# Patient Record
Sex: FEMALE | Race: WHITE | NOT HISPANIC OR LATINO | Employment: OTHER | RURAL
[De-identification: names, ages, dates, MRNs, and addresses within clinical notes are randomized per-mention and may not be internally consistent; named-entity substitution may affect disease eponyms.]

---

## 2017-02-09 ENCOUNTER — OFFICE VISIT (OUTPATIENT)
Dept: FAMILY MEDICINE CLINIC | Facility: CLINIC | Age: 72
End: 2017-02-09

## 2017-02-09 VITALS
HEART RATE: 64 BPM | BODY MASS INDEX: 32.1 KG/M2 | OXYGEN SATURATION: 97 % | DIASTOLIC BLOOD PRESSURE: 80 MMHG | WEIGHT: 187 LBS | SYSTOLIC BLOOD PRESSURE: 148 MMHG | RESPIRATION RATE: 16 BRPM

## 2017-02-09 DIAGNOSIS — E03.9 ACQUIRED HYPOTHYROIDISM: ICD-10-CM

## 2017-02-09 DIAGNOSIS — Z79.4 TYPE 2 DIABETES MELLITUS WITHOUT COMPLICATION, WITH LONG-TERM CURRENT USE OF INSULIN (HCC): ICD-10-CM

## 2017-02-09 DIAGNOSIS — R25.1 TREMOR: ICD-10-CM

## 2017-02-09 DIAGNOSIS — I48.0 PAROXYSMAL ATRIAL FIBRILLATION (HCC): ICD-10-CM

## 2017-02-09 DIAGNOSIS — E11.9 TYPE 2 DIABETES MELLITUS WITHOUT COMPLICATION, WITH LONG-TERM CURRENT USE OF INSULIN (HCC): ICD-10-CM

## 2017-02-09 DIAGNOSIS — I10 ESSENTIAL HYPERTENSION: Primary | ICD-10-CM

## 2017-02-09 PROCEDURE — 99213 OFFICE O/P EST LOW 20 MIN: CPT | Performed by: FAMILY MEDICINE

## 2017-02-09 NOTE — PROGRESS NOTES
Subjective   Dianne Pritchard is a 71 y.o. female.     Chief Complaint   Patient presents with   • Follow-up       History of Present Illness     she thinks the pacerone is causing her to have tremors--she wants to see dr ford...she nots good bp control without chest pain or dyspnea..she is seeing dr steele about her thyroid...      Current Outpatient Prescriptions:   •  amiodarone (PACERONE) 400 MG tablet, Take 400 mg by mouth 2 (two) times a day., Disp: , Rfl:   •  bisoprolol-hydrochlorothiazide (ZIAC) 10-6.25 MG per tablet, TAKE ONE TABLET BY MOUTH ONCE DAILY, Disp: 90 tablet, Rfl: 0  •  Calcium Carb-Cholecalciferol (CALCIUM CARBONATE-VITAMIN D3) 600-400 MG-UNIT tablet, Take  by mouth daily., Disp: , Rfl:   •  diltiazem (CARDIZEM) 30 MG tablet, Take 30 mg by mouth 3 (three) times a day., Disp: , Rfl:   •  Insulin Pen Needle 32G X 4 MM misc, 1 Units/day Daily., Disp: 100 each, Rfl: 2  •  letrozole (FEMARA) 2.5 MG tablet, Take  by mouth daily., Disp: , Rfl:   •  LEVEMIR FLEXTOUCH 100 UNIT/ML injection, INJECT 50 UNITS TWICE DAILY., Disp: 15 mL, Rfl: 5  •  levothyroxine (SYNTHROID, LEVOTHROID) 100 MCG tablet, TAKE ONE TABLET BY MOUTH ONCE DAILY, Disp: 90 tablet, Rfl: 0  •  metFORMIN (GLUCOPHAGE) 1000 MG tablet, Take 1 tablet by mouth 2 (Two) Times a Day., Disp: 180 tablet, Rfl: 1  •  Multiple Vitamins-Minerals (CENTRUM SILVER PO), Take  by mouth daily., Disp: , Rfl:   Allergies   Allergen Reactions   • Amoxil [Amoxicillin]    • Biaxin [Clarithromycin]    • Lortab [Hydrocodone-Acetaminophen]        Past Medical History   Diagnosis Date   • Atrial fibrillation, currently in sinus rhythm    • Diabetes mellitus, type II    • Dizziness    • Essential hypertension    • GERD (gastroesophageal reflux disease)    • GI bleed requiring more than 4 units of blood in 24 hours, ICU, or surgery    • History of lymphoma    • Hypomagnesemia    • Hypothyroidism    • On amiodarone therapy      Past Surgical History   Procedure  Laterality Date   • Tubal abdominal ligation     • Other surgical history       Mediport insertion   • Other surgical history       remote lymphoma treatment   • Mastectomy         Review of Systems   Constitutional: Negative.    HENT: Negative.    Eyes: Negative.    Respiratory: Negative.    Cardiovascular: Negative.    Gastrointestinal: Negative.    Endocrine: Negative.    Genitourinary: Negative.    Musculoskeletal: Negative.    Skin: Negative.    Allergic/Immunologic: Negative.    Neurological: Positive for tremors.   Hematological: Negative.    Psychiatric/Behavioral: Negative.        Objective    Visit Vitals   • /80   • Pulse 64   • Resp 16   • Wt 187 lb (84.8 kg)   • SpO2 97%   • BMI 32.1 kg/m2     Physical Exam   Constitutional: She is oriented to person, place, and time. She appears well-developed and well-nourished.   HENT:   Head: Normocephalic and atraumatic.   Right Ear: External ear normal.   Left Ear: External ear normal.   Nose: Nose normal.   Mouth/Throat: Oropharynx is clear and moist.   Eyes: Conjunctivae and EOM are normal. Pupils are equal, round, and reactive to light.   Neck: Normal range of motion. Neck supple.   Cardiovascular: Normal rate, regular rhythm, normal heart sounds and intact distal pulses.    Pulmonary/Chest: Effort normal and breath sounds normal.   Abdominal: Soft. Bowel sounds are normal.   Musculoskeletal: Normal range of motion.    Dianne had a diabetic foot exam performed today.   During the foot exam she had a monofilament test not performed.    Neurological Sensory Findings - Unaltered hot/cold right ankle/foot discrimination and unaltered hot/cold left ankle/foot discrimination. Unaltered sharp/dull right ankle/foot discrimination and unaltered sharp/dull left ankle/foot discrimination. No right ankle/foot altered proprioception and no left ankle/foot altered proprioception    Vascular Status -  Her exam exhibits right foot vasculature normal. Her exam exhibits no  right foot edema. Her exam exhibits left foot vasculature normal. Her exam exhibits no left foot edema.   Skin Integrity  -  Her right foot skin is intact.     Dianne 's left foot skin is intact. .  Neurological: She is alert and oriented to person, place, and time. She has normal reflexes.   Skin: Skin is warm and dry.   Psychiatric: She has a normal mood and affect. Her behavior is normal. Judgment and thought content normal.   Nursing note and vitals reviewed.      Assessment/Plan   Dianne was seen today for follow-up.    Diagnoses and all orders for this visit:    Essential hypertension    Type 2 diabetes mellitus without complication, with long-term current use of insulin  -     Ambulatory Referral to Endocrinology    Acquired hypothyroidism  -     Ambulatory Referral to Endocrinology    Paroxysmal atrial fibrillation  -     Ambulatory Referral to Cardiology    Tremor  -     Ambulatory Referral to Cardiology      She would like to see dr mcmanus since her son goes to him--will refer           No orders of the defined types were placed in this encounter.      Follow up: 6 month(s)

## 2017-02-14 RX ORDER — AMIODARONE HYDROCHLORIDE 200 MG/1
200 TABLET ORAL 2 TIMES DAILY
Qty: 120 TABLET | Refills: 1 | Status: SHIPPED | OUTPATIENT
Start: 2017-02-14 | End: 2017-02-27 | Stop reason: DRUGHIGH

## 2017-02-17 ENCOUNTER — TELEPHONE (OUTPATIENT)
Dept: FAMILY MEDICINE CLINIC | Facility: CLINIC | Age: 72
End: 2017-02-17

## 2017-02-17 RX ORDER — CIPROFLOXACIN 250 MG/1
250 TABLET, FILM COATED ORAL 2 TIMES DAILY
Qty: 14 TABLET | Refills: 0 | Status: SHIPPED | OUTPATIENT
Start: 2017-02-17 | End: 2017-02-27

## 2017-02-27 ENCOUNTER — OFFICE VISIT (OUTPATIENT)
Dept: CARDIOLOGY | Facility: CLINIC | Age: 72
End: 2017-02-27

## 2017-02-27 VITALS
SYSTOLIC BLOOD PRESSURE: 160 MMHG | HEART RATE: 63 BPM | HEIGHT: 64 IN | DIASTOLIC BLOOD PRESSURE: 80 MMHG | BODY MASS INDEX: 33.49 KG/M2 | WEIGHT: 196.2 LBS

## 2017-02-27 DIAGNOSIS — R25.1 TREMOR: ICD-10-CM

## 2017-02-27 DIAGNOSIS — I48.0 PAROXYSMAL ATRIAL FIBRILLATION (HCC): Primary | ICD-10-CM

## 2017-02-27 DIAGNOSIS — I10 ESSENTIAL HYPERTENSION: Chronic | ICD-10-CM

## 2017-02-27 PROCEDURE — 93000 ELECTROCARDIOGRAM COMPLETE: CPT | Performed by: NURSE PRACTITIONER

## 2017-02-27 PROCEDURE — 99214 OFFICE O/P EST MOD 30 MIN: CPT | Performed by: NURSE PRACTITIONER

## 2017-02-27 RX ORDER — AMIODARONE HYDROCHLORIDE 100 MG/1
100 TABLET ORAL 2 TIMES DAILY
Qty: 60 TABLET | Refills: 11 | Status: SHIPPED | OUTPATIENT
Start: 2017-02-27 | End: 2017-02-27 | Stop reason: SINTOL

## 2017-02-27 RX ORDER — PROPAFENONE HYDROCHLORIDE 225 MG/1
225 TABLET, FILM COATED ORAL EVERY 8 HOURS
Qty: 90 TABLET | Refills: 11 | Status: SHIPPED | OUTPATIENT
Start: 2017-02-27 | End: 2017-04-13 | Stop reason: SDUPTHER

## 2017-02-27 NOTE — PROGRESS NOTES
Subjective:     Encounter Date:02/27/2017      Patient ID: Dianne Pritchard is a 71 y.o. female.  She presents today for follow up of paroxysmal atrial fibrillation.  She has a history of HTN.  She c/o BUE tremors that she states started occurring after she started amiodarone.  She denies CP, shortness of breath, palpitations, dizziness, syncope, swelling, fatigue or decreased exercise tolerance.  She was last seen in office 8/8/16 following a hospital discharge for a GI bleed.  Her xarelto was discontinued at that time.  She was started on amiodarone at an office visit on 6/24/16.  Her HTN is managed by her PCP.  She states that her BP has been running in the 120s/70s at home, however she has had consistently higher readings on record.  She states that overall she has felt well.      Chief Complaint:  Atrial Fibrillation   Presents for follow-up visit. Symptoms are negative for an AICD problem, bradycardia, chest pain, dizziness, hemodynamic instability, hypertension, hypotension, pacemaker problem, palpitations, shortness of breath, syncope, tachycardia and weakness. The symptoms have been stable. Past medical history includes atrial fibrillation. There are no medication compliance problems.   Hypertension   This is a chronic problem. The current episode started more than 1 year ago. The problem is controlled. Pertinent negatives include no anxiety, blurred vision, chest pain, headaches, malaise/fatigue, neck pain, orthopnea, palpitations, peripheral edema, PND, shortness of breath or sweats. Risk factors for coronary artery disease include obesity. Past treatments include beta blockers and diuretics. The current treatment provides moderate improvement.   Medication Reaction   This is a new problem. The current episode started more than 1 month ago. The problem occurs daily. The problem has been gradually worsening. Pertinent negatives include no abdominal pain, anorexia, arthralgias, change in bowel habit, chest  pain, chills, congestion, coughing, diaphoresis, fatigue, fever, headaches, joint swelling, myalgias, nausea, neck pain, numbness, rash, sore throat, swollen glands, urinary symptoms, vertigo, visual change, vomiting or weakness. Associated symptoms comments: Tremors. Nothing aggravates the symptoms. She has tried nothing for the symptoms.       The following portions of the patient's history were reviewed and updated as appropriate: allergies, current medications, past family history, past medical history, past social history, past surgical history and problem list.     Allergies   Allergen Reactions   • Amoxil [Amoxicillin]    • Biaxin [Clarithromycin]    • Lortab [Hydrocodone-Acetaminophen]        Current Outpatient Prescriptions:   •  bisoprolol-hydrochlorothiazide (ZIAC) 10-6.25 MG per tablet, TAKE ONE TABLET BY MOUTH ONCE DAILY, Disp: 90 tablet, Rfl: 0  •  Calcium Carb-Cholecalciferol (CALCIUM CARBONATE-VITAMIN D3) 600-400 MG-UNIT tablet, Take  by mouth daily., Disp: , Rfl:   •  Insulin Pen Needle 32G X 4 MM misc, 1 Units/day Daily., Disp: 100 each, Rfl: 2  •  letrozole (FEMARA) 2.5 MG tablet, Take  by mouth daily., Disp: , Rfl:   •  LEVEMIR FLEXTOUCH 100 UNIT/ML injection, INJECT 50 UNITS TWICE DAILY., Disp: 15 mL, Rfl: 5  •  levothyroxine (SYNTHROID, LEVOTHROID) 100 MCG tablet, TAKE ONE TABLET BY MOUTH ONCE DAILY, Disp: 90 tablet, Rfl: 0  •  metFORMIN (GLUCOPHAGE) 1000 MG tablet, Take 1,000 mg by mouth 2 (Two) Times a Day With Meals., Disp: , Rfl:   •  Multiple Vitamins-Minerals (CENTRUM SILVER PO), Take  by mouth daily., Disp: , Rfl:   •  diltiaZEM (CARDIZEM) 30 MG tablet, Take 1 tablet by mouth 3 (Three) Times a Day. (Patient taking differently: Take 30 mg by mouth 2 (Two) Times a Day.), Disp: 90 tablet, Rfl: 1  •  propafenone (RYTHMOL) 225 MG tablet, Take 1 tablet by mouth Every 8 (Eight) Hours., Disp: 90 tablet, Rfl: 11  Past Medical History   Diagnosis Date   • Atrial fibrillation, currently in sinus  rhythm    • Cancer      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)   • Diabetes mellitus, type II    • Dizziness    • Essential hypertension    • GERD (gastroesophageal reflux disease)    • GI bleed requiring more than 4 units of blood in 24 hours, ICU, or surgery    • History of lymphoma    • Hypomagnesemia    • Hypothyroidism    • On amiodarone therapy      Past Surgical History   Procedure Laterality Date   • Tubal abdominal ligation     • Other surgical history       Mediport insertion   • Other surgical history       remote lymphoma treatment   • Mastectomy       Family History   Problem Relation Age of Onset   • Heart disease Mother    • Cancer Father    • Cancer Sister    • No Known Problems Brother    • Heart disease Sister    • Heart attack Sister    • No Known Problems Sister      Social History     Social History   • Marital status:      Spouse name: N/A   • Number of children: N/A   • Years of education: N/A     Occupational History   • Not on file.     Social History Main Topics   • Smoking status: Never Smoker   • Smokeless tobacco: Never Used      Comment: Nonsmoker   • Alcohol use No   • Drug use: No   • Sexual activity: Not on file     Other Topics Concern   • Not on file     Social History Narrative         Review of Systems   Constitution: Negative for chills, decreased appetite, diaphoresis, fatigue, fever, weakness, malaise/fatigue, night sweats, weight gain and weight loss.   HENT: Negative for congestion, headaches, nosebleeds and sore throat.    Eyes: Negative for blurred vision and visual disturbance.   Cardiovascular: Negative for chest pain, dyspnea on exertion, leg swelling, near-syncope, orthopnea, palpitations, paroxysmal nocturnal dyspnea and syncope.   Respiratory: Negative for cough, hemoptysis, shortness of breath, snoring and wheezing.    Endocrine: Negative for cold intolerance and heat intolerance.   Hematologic/Lymphatic: Does not bruise/bleed easily.   Skin: Negative for rash.  "  Musculoskeletal: Negative for arthralgias, back pain, falls, joint swelling, myalgias and neck pain.   Gastrointestinal: Negative for abdominal pain, anorexia, change in bowel habit, constipation, diarrhea, dysphagia, heartburn, nausea and vomiting.   Genitourinary: Negative for hematuria.   Neurological: Positive for tremors (Pt states that these started after starting amiodarone and have progressively worsened. ). Negative for dizziness, light-headedness, numbness and vertigo.   Psychiatric/Behavioral: Negative for altered mental status.   Allergic/Immunologic: Negative for persistent infections.         ECG 12 Lead  Date/Time: 2/27/2017 12:45 PM  Performed by: DICK DUNLAP  Authorized by: DICK DUNLAP   Comparison: compared with previous ECG from 6/24/2016  Rhythm: sinus rhythm and A-V block  Rate: normal  BPM: 63  Conduction: 1st degree  Clinical impression: abnormal ECG          Visit Vitals   • /80 (BP Location: Left arm, Patient Position: Sitting, Cuff Size: Adult)   • Pulse 63   • Ht 64\" (162.6 cm)   • Wt 196 lb 3.2 oz (89 kg)   • BMI 33.68 kg/m2          Objective:     Physical Exam   Constitutional: She is oriented to person, place, and time. Vital signs are normal. She appears well-developed and well-nourished. No distress.   HENT:   Head: Normocephalic and atraumatic.   Right Ear: External ear normal.   Left Ear: External ear normal.   Nose: Nose normal.   Eyes: Conjunctivae are normal. Pupils are equal, round, and reactive to light. Right eye exhibits no discharge. Left eye exhibits no discharge.   Neck: Normal range of motion. Neck supple. No JVD present. Carotid bruit is not present. No tracheal deviation present. No thyromegaly present.   Cardiovascular: Normal rate, regular rhythm, normal heart sounds, intact distal pulses and normal pulses.  PMI is not displaced.  Exam reveals no gallop, no friction rub and no decreased pulses.    No murmur heard.  Pulses:       Radial pulses are 2+ " on the right side, and 2+ on the left side.        Dorsalis pedis pulses are 2+ on the right side, and 2+ on the left side.        Posterior tibial pulses are 2+ on the right side, and 2+ on the left side.   Pulmonary/Chest: Effort normal and breath sounds normal. No respiratory distress. She has no decreased breath sounds. She has no wheezes. She has no rhonchi. She has no rales. She exhibits no tenderness.   Abdominal: Soft. She exhibits no distension. There is no tenderness.   Musculoskeletal: Normal range of motion. She exhibits no edema, tenderness or deformity.   Neurological: She is alert and oriented to person, place, and time. She displays tremor (Bilateral upper extremities).   Skin: Skin is warm and dry. No rash noted. She is not diaphoretic. No erythema. No pallor.   Psychiatric: She has a normal mood and affect. Her behavior is normal. Judgment and thought content normal.   Vitals reviewed.        Assessment:          Diagnosis Plan   1. Paroxysmal atrial fibrillation  Maintaining sinus rhythm.    Rate controlled. Not on anticoagualtion due to history of GI Bleed.    2. Essential hypertension  Monitor daily BP and record.     Managed by PCP.    3. Tremor  Discontinue amiodarone.  Start rythmol 225 mg three times daily.           Plan:       1. Discontinue amiodarone due to tremors.  Start Rythmol 225 mg by mouth three times daily.  2. Continue all other medications as previously ordered.  3. Monitor and record daily BP for review at next follow up.  4. Follow up with PCP for BP management and routine lab work.  5. Report any CP, palpitations, shortness of breath, fatigue, dizziness, syncope, swelling or decreased exercise tolerance.  6. Follow up in one month, or sooner if needed.

## 2017-04-03 ENCOUNTER — TELEPHONE (OUTPATIENT)
Dept: FAMILY MEDICINE CLINIC | Facility: CLINIC | Age: 72
End: 2017-04-03

## 2017-04-03 RX ORDER — BISOPROLOL FUMARATE AND HYDROCHLOROTHIAZIDE 10; 6.25 MG/1; MG/1
1 TABLET ORAL DAILY
Qty: 90 TABLET | Refills: 1 | Status: SHIPPED | OUTPATIENT
Start: 2017-04-03 | End: 2017-07-19

## 2017-04-03 NOTE — TELEPHONE ENCOUNTER
Says she is needing her Ziac and Cardizem refilled to [facility]. Done

## 2017-04-04 ENCOUNTER — OFFICE VISIT (OUTPATIENT)
Dept: CARDIOLOGY | Facility: CLINIC | Age: 72
End: 2017-04-04

## 2017-04-04 VITALS
HEART RATE: 70 BPM | WEIGHT: 197 LBS | HEIGHT: 64 IN | BODY MASS INDEX: 33.63 KG/M2 | DIASTOLIC BLOOD PRESSURE: 75 MMHG | RESPIRATION RATE: 18 BRPM | SYSTOLIC BLOOD PRESSURE: 122 MMHG

## 2017-04-04 DIAGNOSIS — R09.89 BRUIT OF LEFT CAROTID ARTERY: ICD-10-CM

## 2017-04-04 DIAGNOSIS — R29.6 MULTIPLE FALLS: ICD-10-CM

## 2017-04-04 DIAGNOSIS — R42 DIZZINESS: ICD-10-CM

## 2017-04-04 DIAGNOSIS — R25.1 TREMOR: ICD-10-CM

## 2017-04-04 DIAGNOSIS — I48.0 PAROXYSMAL ATRIAL FIBRILLATION (HCC): Primary | ICD-10-CM

## 2017-04-04 DIAGNOSIS — I10 ESSENTIAL HYPERTENSION: Chronic | ICD-10-CM

## 2017-04-04 PROCEDURE — 99214 OFFICE O/P EST MOD 30 MIN: CPT | Performed by: NURSE PRACTITIONER

## 2017-04-04 PROCEDURE — 93000 ELECTROCARDIOGRAM COMPLETE: CPT | Performed by: NURSE PRACTITIONER

## 2017-04-04 NOTE — PROGRESS NOTES
Subjective:     Encounter Date:04/04/2017      Patient ID: Dianne Pritchard is a 71 y.o. female.  She presents today for follow up of paroxysmal atrial fibrillation.  She has a history of HTN.  She was last seen in office 2/27/17 with c/o BUE tremor after she was started on amiodarone.  Her amiodarone was changed to rythmol at thatB visit, but she still c/o BUE tremor that is progressively worsening.  She also reports new onset dizziness and multiple falls since her last office visit.  She denies chest pain, shortness of breath, palpitations, syncope, fatigue or decreased exercise tolerance.  She states that her BP is managed by her PCP and is well controlled.   She states that she has been losing her balance causing her to fall at home, and she is concerned about these symptoms.      Chief Complaint:  Atrial Fibrillation   Presents for follow-up visit. Symptoms include dizziness. Symptoms are negative for an AICD problem, bradycardia, chest pain, hemodynamic instability, hypertension, hypotension, pacemaker problem, palpitations, shortness of breath, syncope, tachycardia and weakness. The symptoms have been stable. Past medical history includes atrial fibrillation. There are no medication compliance problems.   Dizziness   This is a new problem. The current episode started 1 to 4 weeks ago. The problem occurs intermittently. The problem has been gradually worsening. Pertinent negatives include no abdominal pain, anorexia, arthralgias, change in bowel habit, chest pain, chills, congestion, coughing, diaphoresis, fatigue, fever, headaches, joint swelling, myalgias, nausea, neck pain, numbness, rash, sore throat, swollen glands, urinary symptoms, vertigo, visual change, vomiting or weakness.   Fall   The accident occurred more than 1 week ago. The fall occurred while walking. She fell from an unknown height. Pertinent negatives include no abdominal pain, bowel incontinence, fever, headaches, hearing loss, hematuria,  loss of consciousness, nausea, numbness, tingling, visual change or vomiting.   Hypertension   This is a chronic problem. The current episode started more than 1 year ago. The problem is controlled. Pertinent negatives include no anxiety, blurred vision, chest pain, headaches, malaise/fatigue, neck pain, orthopnea, palpitations, peripheral edema, PND, shortness of breath or sweats. Risk factors for coronary artery disease include post-menopausal state. Past treatments include diuretics and beta blockers. The current treatment provides significant improvement.       The following portions of the patient's history were reviewed and updated as appropriate: allergies, current medications, past family history, past medical history, past social history, past surgical history and problem list.     Allergies   Allergen Reactions   • Amoxil [Amoxicillin]    • Biaxin [Clarithromycin]    • Lortab [Hydrocodone-Acetaminophen]        Current Outpatient Prescriptions:   •  bisoprolol-hydrochlorothiazide (ZIAC) 10-6.25 MG per tablet, Take 1 tablet by mouth Daily., Disp: 90 tablet, Rfl: 1  •  Calcium Carb-Cholecalciferol (CALCIUM CARBONATE-VITAMIN D3) 600-400 MG-UNIT tablet, Take  by mouth daily., Disp: , Rfl:   •  diltiaZEM (CARDIZEM) 30 MG tablet, Take 1 tablet by mouth 2 (Two) Times a Day., Disp: 180 tablet, Rfl: 1  •  Insulin Pen Needle 32G X 4 MM misc, 1 Units/day Daily., Disp: 100 each, Rfl: 2  •  letrozole (FEMARA) 2.5 MG tablet, Take  by mouth daily., Disp: , Rfl:   •  LEVEMIR FLEXTOUCH 100 UNIT/ML injection, INJECT 50 UNITS TWICE DAILY., Disp: 15 mL, Rfl: 5  •  levothyroxine (SYNTHROID, LEVOTHROID) 100 MCG tablet, TAKE ONE TABLET BY MOUTH ONCE DAILY, Disp: 90 tablet, Rfl: 0  •  metFORMIN (GLUCOPHAGE) 1000 MG tablet, Take 1,000 mg by mouth 2 (Two) Times a Day With Meals., Disp: , Rfl:   •  Multiple Vitamins-Minerals (CENTRUM SILVER PO), Take  by mouth daily., Disp: , Rfl:   •  propafenone (RYTHMOL) 225 MG tablet, Take 1  tablet by mouth Every 8 (Eight) Hours., Disp: 90 tablet, Rfl: 11  Past Medical History:   Diagnosis Date   • Atrial fibrillation, currently in sinus rhythm    • Cancer     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)   • Diabetes mellitus, type II    • Dizziness    • Essential hypertension    • GERD (gastroesophageal reflux disease)    • GI bleed requiring more than 4 units of blood in 24 hours, ICU, or surgery    • History of lymphoma    • Hypomagnesemia    • Hypothyroidism    • On amiodarone therapy      Past Surgical History:   Procedure Laterality Date   • MASTECTOMY     • OTHER SURGICAL HISTORY      Mediport insertion   • OTHER SURGICAL HISTORY      remote lymphoma treatment   • TUBAL ABDOMINAL LIGATION       Family History   Problem Relation Age of Onset   • Heart disease Mother    • Cancer Father    • Cancer Sister    • No Known Problems Brother    • Heart disease Sister    • Heart attack Sister    • No Known Problems Sister      Social History     Social History   • Marital status:      Spouse name: N/A   • Number of children: N/A   • Years of education: N/A     Occupational History   • Not on file.     Social History Main Topics   • Smoking status: Former Smoker     Years: 20.00     Types: Cigarettes     Quit date: 1993   • Smokeless tobacco: Never Used   • Alcohol use No   • Drug use: No   • Sexual activity: Defer     Other Topics Concern   • Not on file     Social History Narrative         Review of Systems   Constitution: Negative for chills, decreased appetite, diaphoresis, fatigue, fever, weakness, malaise/fatigue, night sweats, weight gain and weight loss.   HENT: Negative for congestion, headaches, nosebleeds and sore throat.    Eyes: Negative for blurred vision and visual disturbance.   Cardiovascular: Negative for chest pain, dyspnea on exertion, leg swelling, near-syncope, orthopnea, palpitations, paroxysmal nocturnal dyspnea and syncope.   Respiratory: Negative for cough, hemoptysis, shortness of breath,  "snoring and wheezing.    Endocrine: Negative for cold intolerance and heat intolerance.   Hematologic/Lymphatic: Does not bruise/bleed easily.   Skin: Negative for rash.   Musculoskeletal: Positive for falls. Negative for arthralgias, back pain, joint swelling, myalgias and neck pain.   Gastrointestinal: Negative for abdominal pain, anorexia, change in bowel habit, bowel incontinence, constipation, diarrhea, dysphagia, heartburn, nausea and vomiting.   Genitourinary: Negative for hematuria.   Neurological: Positive for dizziness, loss of balance and tremors. Negative for light-headedness, loss of consciousness, numbness, tingling and vertigo.   Psychiatric/Behavioral: Negative for altered mental status.   Allergic/Immunologic: Negative for persistent infections.         ECG 12 Lead  Date/Time: 4/4/2017 3:14 PM  Performed by: DICK DUNLAP  Authorized by: DICK DUNLAP   Comparison: compared with previous ECG from 2/27/2017  Similar to previous ECG  Rhythm: sinus rhythm and A-V block  Rate: normal  BPM: 64  Conduction: 1st degree  QRS axis: left  Clinical impression: abnormal ECG          /75 (BP Location: Left arm, Patient Position: Sitting, Cuff Size: Adult)  Pulse 70  Resp 18  Ht 64\" (162.6 cm)  Wt 197 lb (89.4 kg)  Breastfeeding? No  BMI 33.81 kg/m2       Objective:     Physical Exam   Constitutional: She is oriented to person, place, and time. Vital signs are normal. She appears well-developed and well-nourished. No distress.   HENT:   Head: Normocephalic and atraumatic.   Right Ear: External ear normal.   Left Ear: External ear normal.   Nose: Nose normal.   Eyes: Conjunctivae are normal. Pupils are equal, round, and reactive to light. Right eye exhibits no discharge. Left eye exhibits no discharge.   Neck: Normal range of motion. Neck supple. No JVD present. Carotid bruit is present (Left). No tracheal deviation present. No thyromegaly present.   Cardiovascular: Normal rate, regular rhythm, " normal heart sounds, intact distal pulses and normal pulses.  PMI is not displaced.  Exam reveals no gallop and no friction rub.    No murmur heard.  Pulses:       Radial pulses are 2+ on the right side, and 2+ on the left side.        Dorsalis pedis pulses are 2+ on the right side, and 2+ on the left side.        Posterior tibial pulses are 2+ on the right side, and 2+ on the left side.   Pulmonary/Chest: Effort normal and breath sounds normal. No respiratory distress. She has no decreased breath sounds. She has no wheezes. She has no rhonchi. She has no rales. She exhibits no tenderness.   Abdominal: Soft. She exhibits no distension. There is no tenderness.   Musculoskeletal: Normal range of motion. She exhibits no edema, tenderness or deformity.   Neurological: She is alert and oriented to person, place, and time. She displays tremor (BUE).   Skin: Skin is warm and dry. No rash noted. She is not diaphoretic. No erythema. No pallor.   Psychiatric: She has a normal mood and affect. Her behavior is normal. Judgment and thought content normal.   Vitals reviewed.        Assessment:          Diagnosis Plan   1. Paroxysmal atrial fibrillation  Remains in SR.     No anticoagulation due to history of GI bleed   2. Essential hypertension  Well controlled.     Managed by PCP   3. Tremor  Follow up with PCP for further work up.    4. Dizziness  US Carotid Bilateral   5. Multiple falls  US Carotid Bilateral   6. Bruit of left carotid artery  US Carotid Bilateral          Plan:       1. Continue all medications as previously ordered.  2. Bilateral carotid US  3. Follow up with PCP as soon as possible for further evaluation of tremor, dizziness and falls.  4. Report any worsening dizziness.  Report any chest pain, shortness of breath, palpitations, syncope, fatigue, decreased exercise tolerance or swelling.   5. Follow up in one year with Dr. Orantes, or sooner if needed.

## 2017-04-13 ENCOUNTER — HOSPITAL ENCOUNTER (OUTPATIENT)
Dept: ULTRASOUND IMAGING | Facility: HOSPITAL | Age: 72
Discharge: HOME | End: 2017-04-13
Admitting: NURSE PRACTITIONER

## 2017-04-13 DIAGNOSIS — R42 DIZZINESS: ICD-10-CM

## 2017-04-13 DIAGNOSIS — R29.6 MULTIPLE FALLS: ICD-10-CM

## 2017-04-13 DIAGNOSIS — R09.89 BRUIT OF LEFT CAROTID ARTERY: ICD-10-CM

## 2017-04-13 PROCEDURE — 93880 EXTRACRANIAL BILAT STUDY: CPT | Performed by: SURGERY

## 2017-04-13 PROCEDURE — 93880 EXTRACRANIAL BILAT STUDY: CPT

## 2017-04-13 RX ORDER — PROPAFENONE HYDROCHLORIDE 225 MG/1
225 TABLET, FILM COATED ORAL 2 TIMES DAILY
Qty: 180 TABLET | Refills: 0 | Status: SHIPPED | OUTPATIENT
Start: 2017-04-13 | End: 2017-07-09 | Stop reason: SDUPTHER

## 2017-04-14 DIAGNOSIS — I77.9 BILATERAL CAROTID ARTERY DISEASE (HCC): Primary | ICD-10-CM

## 2017-04-17 ENCOUNTER — TELEPHONE (OUTPATIENT)
Dept: CARDIOLOGY | Facility: CLINIC | Age: 72
End: 2017-04-17

## 2017-04-17 NOTE — TELEPHONE ENCOUNTER
----- Message from ДМИТРИЙ Jama sent at 4/14/2017  4:05 PM CDT -----  Regarding: carotid ultrasound  Marlyn,    Please let this pt know that I am referring her to vascular surgery for bilateral carotid artery disease shown on her ultrasound.  Dr. Oviedo's office should be contacting her with an appointment.  Thank you.    Re  ----- Message -----     From: Interface, Rad Results [place]     Sent: 4/13/2017   4:46 PM       To: ДМИТРИЙ Jama

## 2017-05-04 ENCOUNTER — OFFICE VISIT (OUTPATIENT)
Dept: VASCULAR SURGERY | Facility: CLINIC | Age: 72
End: 2017-05-04

## 2017-05-04 VITALS
HEART RATE: 64 BPM | SYSTOLIC BLOOD PRESSURE: 112 MMHG | WEIGHT: 195 LBS | BODY MASS INDEX: 33.29 KG/M2 | HEIGHT: 64 IN | DIASTOLIC BLOOD PRESSURE: 74 MMHG

## 2017-05-04 DIAGNOSIS — R09.89 BRUIT OF LEFT CAROTID ARTERY: ICD-10-CM

## 2017-05-04 DIAGNOSIS — I10 ESSENTIAL HYPERTENSION: ICD-10-CM

## 2017-05-04 DIAGNOSIS — I65.23 BILATERAL CAROTID ARTERY STENOSIS: Primary | ICD-10-CM

## 2017-05-04 PROCEDURE — 99203 OFFICE O/P NEW LOW 30 MIN: CPT | Performed by: SURGERY

## 2017-05-04 RX ORDER — ATORVASTATIN CALCIUM 20 MG/1
TABLET, FILM COATED ORAL
COMMUNITY
Start: 2017-02-13 | End: 2017-07-19

## 2017-05-04 RX ORDER — LEVOTHYROXINE SODIUM 88 UG/1
TABLET ORAL
COMMUNITY
Start: 2017-04-07 | End: 2017-11-16 | Stop reason: SDUPTHER

## 2017-05-18 ENCOUNTER — TELEPHONE (OUTPATIENT)
Dept: FAMILY MEDICINE CLINIC | Facility: CLINIC | Age: 72
End: 2017-05-18

## 2017-05-19 ENCOUNTER — TELEPHONE (OUTPATIENT)
Dept: FAMILY MEDICINE CLINIC | Facility: CLINIC | Age: 72
End: 2017-05-19

## 2017-05-19 ENCOUNTER — OFFICE VISIT (OUTPATIENT)
Dept: FAMILY MEDICINE CLINIC | Facility: CLINIC | Age: 72
End: 2017-05-19

## 2017-05-19 VITALS
BODY MASS INDEX: 33.12 KG/M2 | RESPIRATION RATE: 16 BRPM | HEART RATE: 70 BPM | DIASTOLIC BLOOD PRESSURE: 76 MMHG | TEMPERATURE: 98.7 F | WEIGHT: 194 LBS | SYSTOLIC BLOOD PRESSURE: 128 MMHG | HEIGHT: 64 IN

## 2017-05-19 DIAGNOSIS — M25.561 ACUTE PAIN OF RIGHT KNEE: ICD-10-CM

## 2017-05-19 DIAGNOSIS — E11.9 TYPE 2 DIABETES MELLITUS WITHOUT COMPLICATION, WITH LONG-TERM CURRENT USE OF INSULIN (HCC): ICD-10-CM

## 2017-05-19 DIAGNOSIS — I10 ESSENTIAL HYPERTENSION: Primary | ICD-10-CM

## 2017-05-19 DIAGNOSIS — Z79.4 TYPE 2 DIABETES MELLITUS WITHOUT COMPLICATION, WITH LONG-TERM CURRENT USE OF INSULIN (HCC): ICD-10-CM

## 2017-05-19 PROCEDURE — 99213 OFFICE O/P EST LOW 20 MIN: CPT | Performed by: FAMILY MEDICINE

## 2017-05-20 LAB
BUN SERPL-MCNC: 19 MG/DL (ref 8–27)
BUN/CREAT SERPL: 15 (ref 12–28)
CALCIUM SERPL-MCNC: 9.6 MG/DL (ref 8.7–10.3)
CHLORIDE SERPL-SCNC: 95 MMOL/L (ref 96–106)
CO2 SERPL-SCNC: 22 MMOL/L (ref 18–29)
CREAT SERPL-MCNC: 1.23 MG/DL (ref 0.57–1)
GLUCOSE SERPL-MCNC: 180 MG/DL (ref 65–99)
HBA1C MFR BLD: 6.2 % (ref 4.8–5.6)
POTASSIUM SERPL-SCNC: 5 MMOL/L (ref 3.5–5.2)
SODIUM SERPL-SCNC: 138 MMOL/L (ref 134–144)

## 2017-05-22 ENCOUNTER — TELEPHONE (OUTPATIENT)
Dept: FAMILY MEDICINE CLINIC | Facility: CLINIC | Age: 72
End: 2017-05-22

## 2017-05-22 DIAGNOSIS — N18.3 CKD (CHRONIC KIDNEY DISEASE), STAGE 3 (MODERATE): Primary | ICD-10-CM

## 2017-05-25 ENCOUNTER — TRANSCRIBE ORDERS (OUTPATIENT)
Dept: ADMINISTRATIVE | Facility: HOSPITAL | Age: 72
End: 2017-05-25

## 2017-05-25 DIAGNOSIS — E04.1 CYSTIC THYROID NODULE: Primary | ICD-10-CM

## 2017-05-31 ENCOUNTER — HOSPITAL ENCOUNTER (OUTPATIENT)
Dept: ULTRASOUND IMAGING | Facility: HOSPITAL | Age: 72
Discharge: HOME | End: 2017-05-31

## 2017-06-05 ENCOUNTER — HOSPITAL ENCOUNTER (OUTPATIENT)
Dept: ULTRASOUND IMAGING | Facility: HOSPITAL | Age: 72
Discharge: HOME | End: 2017-06-05
Admitting: INTERNAL MEDICINE

## 2017-06-05 DIAGNOSIS — E04.1 CYSTIC THYROID NODULE: ICD-10-CM

## 2017-06-05 PROCEDURE — 76536 US EXAM OF HEAD AND NECK: CPT

## 2017-06-13 ENCOUNTER — TELEPHONE (OUTPATIENT)
Dept: FAMILY MEDICINE CLINIC | Facility: CLINIC | Age: 72
End: 2017-06-13

## 2017-06-13 DIAGNOSIS — M25.569 KNEE PAIN, UNSPECIFIED CHRONICITY, UNSPECIFIED LATERALITY: Primary | ICD-10-CM

## 2017-06-13 NOTE — TELEPHONE ENCOUNTER
Pt called said that the med that u gave her for her knee is not working she wants to know if u will ref her to see dr cullen?

## 2017-07-10 RX ORDER — PROPAFENONE HYDROCHLORIDE 225 MG/1
TABLET, FILM COATED ORAL
Qty: 180 TABLET | Refills: 0 | Status: SHIPPED | OUTPATIENT
Start: 2017-07-10 | End: 2018-03-20

## 2017-07-19 ENCOUNTER — OFFICE VISIT (OUTPATIENT)
Dept: FAMILY MEDICINE CLINIC | Facility: CLINIC | Age: 72
End: 2017-07-19

## 2017-07-19 VITALS
BODY MASS INDEX: 33.46 KG/M2 | DIASTOLIC BLOOD PRESSURE: 82 MMHG | WEIGHT: 196 LBS | OXYGEN SATURATION: 98 % | SYSTOLIC BLOOD PRESSURE: 130 MMHG | HEART RATE: 68 BPM | RESPIRATION RATE: 16 BRPM | HEIGHT: 64 IN

## 2017-07-19 DIAGNOSIS — I10 ESSENTIAL HYPERTENSION: ICD-10-CM

## 2017-07-19 DIAGNOSIS — M25.561 RIGHT KNEE PAIN, UNSPECIFIED CHRONICITY: Primary | ICD-10-CM

## 2017-07-19 DIAGNOSIS — E11.9 TYPE 2 DIABETES MELLITUS WITHOUT COMPLICATION, WITH LONG-TERM CURRENT USE OF INSULIN (HCC): ICD-10-CM

## 2017-07-19 DIAGNOSIS — Z79.4 TYPE 2 DIABETES MELLITUS WITHOUT COMPLICATION, WITH LONG-TERM CURRENT USE OF INSULIN (HCC): ICD-10-CM

## 2017-07-19 PROCEDURE — 99213 OFFICE O/P EST LOW 20 MIN: CPT | Performed by: FAMILY MEDICINE

## 2017-07-19 RX ORDER — LOSARTAN POTASSIUM 25 MG/1
25 TABLET ORAL DAILY
COMMUNITY
Start: 2017-06-13 | End: 2022-05-18 | Stop reason: SDUPTHER

## 2017-07-19 NOTE — PROGRESS NOTES
Subjective   Dianne Pritchard is a 72 y.o. female.     Chief Complaint   Patient presents with   • Follow-up     2 mo        History of Present Illness     she is seeing dr pizano for her knee and is planning on having knee surgery this yeaer--she is seeing dr mcmanus about her dm and she notes her bp has been stable witout cp or ha      Current Outpatient Prescriptions:   •  Calcium Carb-Cholecalciferol (CALCIUM CARBONATE-VITAMIN D3) 600-400 MG-UNIT tablet, Take  by mouth daily., Disp: , Rfl:   •  diltiaZEM (CARDIZEM) 30 MG tablet, Take 1 tablet by mouth 2 (Two) Times a Day., Disp: 180 tablet, Rfl: 1  •  Insulin Pen Needle 32G X 4 MM misc, 1 Units/day Daily., Disp: 100 each, Rfl: 2  •  letrozole (FEMARA) 2.5 MG tablet, Take  by mouth daily., Disp: , Rfl:   •  LEVEMIR FLEXTOUCH 100 UNIT/ML injection, INJECT 50 UNITS TWICE DAILY., Disp: 15 mL, Rfl: 5  •  levothyroxine (SYNTHROID, LEVOTHROID) 88 MCG tablet, , Disp: , Rfl:   •  losartan (COZAAR) 25 MG tablet, , Disp: , Rfl:   •  metFORMIN (GLUCOPHAGE) 1000 MG tablet, Take 1,000 mg by mouth 2 (Two) Times a Day With Meals., Disp: , Rfl:   •  Multiple Vitamins-Minerals (CENTRUM SILVER PO), Take  by mouth daily., Disp: , Rfl:   •  propafenone (RYTHMOL) 225 MG tablet, TAKE 1 TABLET BY MOUTH TWICE DAILY, Disp: 180 tablet, Rfl: 0  Allergies   Allergen Reactions   • Amoxil [Amoxicillin]    • Biaxin [Clarithromycin]    • Lortab [Hydrocodone-Acetaminophen]    • Penicillins        Past Medical History:   Diagnosis Date   • Atrial fibrillation, currently in sinus rhythm    • Cancer     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)   • Diabetes mellitus, type II    • Dizziness    • Essential hypertension    • GERD (gastroesophageal reflux disease)    • GI bleed requiring more than 4 units of blood in 24 hours, ICU, or surgery    • History of lymphoma    • Hypomagnesemia    • Hypothyroidism    • On amiodarone therapy      Past Surgical History:   Procedure Laterality Date   • MASTECTOMY     • OTHER  "SURGICAL HISTORY      Mediport insertion X 2   • OTHER SURGICAL HISTORY      remote lymphoma treatment   • TUBAL ABDOMINAL LIGATION         Review of Systems   Constitutional: Negative.    HENT: Negative.    Eyes: Negative.    Respiratory: Negative.    Cardiovascular: Negative.    Gastrointestinal: Negative.    Endocrine: Negative.    Genitourinary: Negative.    Musculoskeletal: Negative.    Skin: Negative.    Allergic/Immunologic: Negative.    Hematological: Negative.    Psychiatric/Behavioral: Negative.        Objective ,Blood pressure 130/82, pulse 68, resp. rate 16, height 64\" (162.6 cm), weight 196 lb (88.9 kg), SpO2 98 %, not currently breastfeeding.    Physical Exam   Constitutional: She is oriented to person, place, and time. She appears well-developed and well-nourished.   HENT:   Head: Normocephalic and atraumatic.   Right Ear: External ear normal.   Left Ear: External ear normal.   Nose: Nose normal.   Mouth/Throat: Oropharynx is clear and moist.   Eyes: Conjunctivae are normal. Pupils are equal, round, and reactive to light.   Neck: Normal range of motion. Neck supple.   Cardiovascular: Normal rate, regular rhythm, normal heart sounds and intact distal pulses.    Pulmonary/Chest: Effort normal and breath sounds normal.   Abdominal: Soft. Bowel sounds are normal.   Musculoskeletal: Normal range of motion.   Neurological: She is alert and oriented to person, place, and time. She has normal reflexes.   Skin: Skin is warm and dry.   Psychiatric: She has a normal mood and affect. Her behavior is normal. Judgment and thought content normal.   Nursing note and vitals reviewed.      Assessment/Plan   Dianne was seen today for follow-up.    Diagnoses and all orders for this visit:    Right knee pain, unspecified chronicity    Essential hypertension    Type 2 diabetes mellitus without complication, with long-term current use of insulin                 No orders of the defined types were placed in this " encounter.      Follow up: 6 month(s)

## 2017-07-31 ENCOUNTER — DOCUMENTATION (OUTPATIENT)
Dept: CARDIOLOGY | Facility: CLINIC | Age: 72
End: 2017-07-31

## 2017-07-31 NOTE — PROGRESS NOTES
Dr. Swift sent a request for a cardiac clearance for a rt total knee repair on 9/7/17.  Faxed back with acceptable risk for surgery.  Abdirahman Graves, CMA

## 2017-08-16 ENCOUNTER — HOSPITAL ENCOUNTER (OUTPATIENT)
Dept: PREADMISSION TESTING | Age: 72
Discharge: HOME | End: 2017-08-16
Payer: MEDICARE

## 2017-08-16 VITALS — HEIGHT: 64 IN | BODY MASS INDEX: 33.63 KG/M2 | WEIGHT: 197 LBS

## 2017-08-16 LAB
ANION GAP SERPL CALCULATED.3IONS-SCNC: 17 MMOL/L (ref 7–19)
APTT: 29.5 SEC (ref 26–36.2)
BASOPHILS ABSOLUTE: 0 K/UL (ref 0–0.2)
BASOPHILS RELATIVE PERCENT: 0.6 % (ref 0–1)
BUN BLDV-MCNC: 16 MG/DL (ref 8–23)
CALCIUM SERPL-MCNC: 10.1 MG/DL (ref 8.8–10.2)
CHLORIDE BLD-SCNC: 96 MMOL/L (ref 98–111)
CO2: 26 MMOL/L (ref 22–29)
CREAT SERPL-MCNC: 1 MG/DL (ref 0.5–0.9)
EOSINOPHILS ABSOLUTE: 0.2 K/UL (ref 0–0.6)
EOSINOPHILS RELATIVE PERCENT: 3.2 % (ref 0–5)
GFR NON-AFRICAN AMERICAN: 54
GLUCOSE BLD-MCNC: 133 MG/DL (ref 74–109)
HBA1C MFR BLD: 6.1 %
HCT VFR BLD CALC: 38.9 % (ref 37–47)
HEMOGLOBIN: 12.5 G/DL (ref 12–16)
INR BLD: 1.01 (ref 0.88–1.18)
LYMPHOCYTES ABSOLUTE: 1.1 K/UL (ref 1.1–4.5)
LYMPHOCYTES RELATIVE PERCENT: 16.9 % (ref 20–40)
MCH RBC QN AUTO: 29.4 PG (ref 27–31)
MCHC RBC AUTO-ENTMCNC: 32.1 G/DL (ref 33–37)
MCV RBC AUTO: 91.5 FL (ref 81–99)
MONOCYTES ABSOLUTE: 0.4 K/UL (ref 0–0.9)
MONOCYTES RELATIVE PERCENT: 6.2 % (ref 0–10)
NEUTROPHILS ABSOLUTE: 4.6 K/UL (ref 1.5–7.5)
NEUTROPHILS RELATIVE PERCENT: 72.6 % (ref 50–65)
PDW BLD-RTO: 14 % (ref 11.5–14.5)
PLATELET # BLD: 149 K/UL (ref 130–400)
PMV BLD AUTO: 11.6 FL (ref 9.4–12.3)
POTASSIUM SERPL-SCNC: 4.3 MMOL/L (ref 3.5–5)
PROTHROMBIN TIME: 13.2 SEC (ref 12–14.6)
RBC # BLD: 4.25 M/UL (ref 4.2–5.4)
SODIUM BLD-SCNC: 139 MMOL/L (ref 136–145)
WBC # BLD: 6.3 K/UL (ref 4.8–10.8)

## 2017-08-16 PROCEDURE — 85025 COMPLETE CBC W/AUTO DIFF WBC: CPT

## 2017-08-16 PROCEDURE — 93005 ELECTROCARDIOGRAM TRACING: CPT

## 2017-08-16 PROCEDURE — 87070 CULTURE OTHR SPECIMN AEROBIC: CPT

## 2017-08-16 PROCEDURE — 83036 HEMOGLOBIN GLYCOSYLATED A1C: CPT

## 2017-08-16 PROCEDURE — 85610 PROTHROMBIN TIME: CPT

## 2017-08-16 PROCEDURE — 80048 BASIC METABOLIC PNL TOTAL CA: CPT

## 2017-08-16 PROCEDURE — 85730 THROMBOPLASTIN TIME PARTIAL: CPT

## 2017-08-16 RX ORDER — LEVOTHYROXINE SODIUM 88 UG/1
88 TABLET ORAL DAILY
COMMUNITY
Start: 2017-04-07 | End: 2020-02-24 | Stop reason: DRUGHIGH

## 2017-08-16 RX ORDER — LETROZOLE 2.5 MG/1
2.5 TABLET, FILM COATED ORAL DAILY
COMMUNITY
End: 2019-12-16 | Stop reason: SDUPTHER

## 2017-08-16 RX ORDER — PROPAFENONE HYDROCHLORIDE 225 MG/1
225 TABLET, FILM COATED ORAL 2 TIMES DAILY
COMMUNITY
Start: 2017-07-10 | End: 2020-02-24 | Stop reason: ALTCHOICE

## 2017-08-16 RX ORDER — LOSARTAN POTASSIUM 25 MG/1
25 TABLET ORAL DAILY
COMMUNITY
Start: 2017-06-13

## 2017-08-17 LAB — MRSA CULTURE ONLY: NORMAL

## 2017-08-21 LAB
EKG P AXIS: 27 DEGREES
EKG P-R INTERVAL: 281 MS
EKG Q-T INTERVAL: 391 MS
EKG QRS DURATION: 102 MS
EKG QTC CALCULATION (BAZETT): 449 MS
EKG T AXIS: 82 DEGREES

## 2017-08-30 ENCOUNTER — TELEPHONE (OUTPATIENT)
Dept: CARDIOLOGY | Facility: CLINIC | Age: 72
End: 2017-08-30

## 2017-08-30 NOTE — TELEPHONE ENCOUNTER
Pt called this morning and left a  msg that she was needing clearance to have a right total knee surgery on 9/7 for Dr. Swift.  I called her back and left a  msg that this was faxed to him on 7/31/17 and if they did not get it I would be happy to refax it. She will need to call me back to let me know.   Abdirahman Graves, CMA

## 2017-09-01 ENCOUNTER — HOSPITAL ENCOUNTER (OUTPATIENT)
Dept: PREADMISSION TESTING | Age: 72
Discharge: HOME | End: 2017-09-01
Payer: MEDICARE

## 2017-09-07 ENCOUNTER — TELEPHONE (OUTPATIENT)
Dept: FAMILY MEDICINE CLINIC | Facility: CLINIC | Age: 72
End: 2017-09-07

## 2017-09-07 ENCOUNTER — HOSPITAL ENCOUNTER (INPATIENT)
Age: 72
LOS: 3 days | Discharge: NURSING, REHAB OR LONG-TERM CARE | DRG: 470 | End: 2017-09-10
Attending: ORTHOPAEDIC SURGERY | Admitting: ORTHOPAEDIC SURGERY
Payer: MEDICARE

## 2017-09-07 ENCOUNTER — ANESTHESIA EVENT (OUTPATIENT)
Dept: OPERATING ROOM | Age: 72
DRG: 470 | End: 2017-09-07
Payer: MEDICARE

## 2017-09-07 ENCOUNTER — ANESTHESIA (OUTPATIENT)
Dept: OPERATING ROOM | Age: 72
DRG: 470 | End: 2017-09-07
Payer: MEDICARE

## 2017-09-07 VITALS — OXYGEN SATURATION: 100 % | SYSTOLIC BLOOD PRESSURE: 107 MMHG | DIASTOLIC BLOOD PRESSURE: 58 MMHG

## 2017-09-07 LAB
ABO/RH: NORMAL
ANTIBODY SCREEN: NORMAL
GLUCOSE BLD-MCNC: 127 MG/DL (ref 70–99)
GLUCOSE BLD-MCNC: 205 MG/DL (ref 70–99)
GLUCOSE BLD-MCNC: 247 MG/DL (ref 70–99)
GLUCOSE BLD-MCNC: 256 MG/DL (ref 70–99)
HBA1C MFR BLD: 6.2 %
PERFORMED ON: ABNORMAL

## 2017-09-07 PROCEDURE — 6360000002 HC RX W HCPCS: Performed by: FAMILY MEDICINE

## 2017-09-07 PROCEDURE — 0SRC0J9 REPLACEMENT OF RIGHT KNEE JOINT WITH SYNTHETIC SUBSTITUTE, CEMENTED, OPEN APPROACH: ICD-10-PCS | Performed by: ORTHOPAEDIC SURGERY

## 2017-09-07 PROCEDURE — 2500000003 HC RX 250 WO HCPCS: Performed by: ORTHOPAEDIC SURGERY

## 2017-09-07 PROCEDURE — 7100000001 HC PACU RECOVERY - ADDTL 15 MIN: Performed by: ORTHOPAEDIC SURGERY

## 2017-09-07 PROCEDURE — G8978 MOBILITY CURRENT STATUS: HCPCS

## 2017-09-07 PROCEDURE — 2580000003 HC RX 258: Performed by: ORTHOPAEDIC SURGERY

## 2017-09-07 PROCEDURE — 6360000002 HC RX W HCPCS: Performed by: ORTHOPAEDIC SURGERY

## 2017-09-07 PROCEDURE — 2720000003 HC MISC SUTURE/STAPLES/RELOADS/ETC: Performed by: ORTHOPAEDIC SURGERY

## 2017-09-07 PROCEDURE — 97161 PT EVAL LOW COMPLEX 20 MIN: CPT

## 2017-09-07 PROCEDURE — 6370000000 HC RX 637 (ALT 250 FOR IP): Performed by: ORTHOPAEDIC SURGERY

## 2017-09-07 PROCEDURE — 1210000000 HC MED SURG R&B

## 2017-09-07 PROCEDURE — 6360000002 HC RX W HCPCS: Performed by: NURSE ANESTHETIST, CERTIFIED REGISTERED

## 2017-09-07 PROCEDURE — 86850 RBC ANTIBODY SCREEN: CPT

## 2017-09-07 PROCEDURE — C1776 JOINT DEVICE (IMPLANTABLE): HCPCS | Performed by: ORTHOPAEDIC SURGERY

## 2017-09-07 PROCEDURE — 2500000003 HC RX 250 WO HCPCS: Performed by: NURSE ANESTHETIST, CERTIFIED REGISTERED

## 2017-09-07 PROCEDURE — 3600000005 HC SURGERY LEVEL 5 BASE: Performed by: ORTHOPAEDIC SURGERY

## 2017-09-07 PROCEDURE — 7100000000 HC PACU RECOVERY - FIRST 15 MIN: Performed by: ORTHOPAEDIC SURGERY

## 2017-09-07 PROCEDURE — 86901 BLOOD TYPING SEROLOGIC RH(D): CPT

## 2017-09-07 PROCEDURE — 94664 DEMO&/EVAL PT USE INHALER: CPT

## 2017-09-07 PROCEDURE — A4364 ADHESIVE, LIQUID OR EQUAL: HCPCS | Performed by: ORTHOPAEDIC SURGERY

## 2017-09-07 PROCEDURE — C1713 ANCHOR/SCREW BN/BN,TIS/BN: HCPCS | Performed by: ORTHOPAEDIC SURGERY

## 2017-09-07 PROCEDURE — 2720000001 HC MISC SURG SUPPLY STERILE $51-500: Performed by: ORTHOPAEDIC SURGERY

## 2017-09-07 PROCEDURE — 83036 HEMOGLOBIN GLYCOSYLATED A1C: CPT

## 2017-09-07 PROCEDURE — 82948 REAGENT STRIP/BLOOD GLUCOSE: CPT

## 2017-09-07 PROCEDURE — 36415 COLL VENOUS BLD VENIPUNCTURE: CPT

## 2017-09-07 PROCEDURE — G8979 MOBILITY GOAL STATUS: HCPCS

## 2017-09-07 PROCEDURE — 6360000002 HC RX W HCPCS: Performed by: ANESTHESIOLOGY

## 2017-09-07 PROCEDURE — 86900 BLOOD TYPING SEROLOGIC ABO: CPT

## 2017-09-07 PROCEDURE — 3700000000 HC ANESTHESIA ATTENDED CARE: Performed by: ORTHOPAEDIC SURGERY

## 2017-09-07 PROCEDURE — 3600000015 HC SURGERY LEVEL 5 ADDTL 15MIN: Performed by: ORTHOPAEDIC SURGERY

## 2017-09-07 PROCEDURE — 3700000001 HC ADD 15 MINUTES (ANESTHESIA): Performed by: ORTHOPAEDIC SURGERY

## 2017-09-07 DEVICE — PSN TIB STM 5 DEG SZ D R: Type: IMPLANTABLE DEVICE | Site: KNEE | Status: FUNCTIONAL

## 2017-09-07 DEVICE — COMPONENT ARTC SURF PS 6-9 CD 10 MM RT TIB KNEE POLYETH: Type: IMPLANTABLE DEVICE | Site: KNEE | Status: FUNCTIONAL

## 2017-09-07 DEVICE — COMPONENT FEM SZ 6 NAR R KNEE CO CHROM CEM POST STBL COR: Type: IMPLANTABLE DEVICE | Site: KNEE | Status: FUNCTIONAL

## 2017-09-07 DEVICE — COMPONENT PAT DIA29MM THK8MM KNEE POLY CEM CONVENTIONAL: Type: IMPLANTABLE DEVICE | Site: KNEE | Status: FUNCTIONAL

## 2017-09-07 DEVICE — DISCONTINUED USE 416978 CEMENT PALACOS R SING DOSE 40GR: Type: IMPLANTABLE DEVICE | Site: KNEE | Status: FUNCTIONAL

## 2017-09-07 RX ORDER — LETROZOLE 2.5 MG/1
2.5 TABLET, FILM COATED ORAL DAILY
Status: DISCONTINUED | OUTPATIENT
Start: 2017-09-07 | End: 2017-09-07

## 2017-09-07 RX ORDER — SODIUM CHLORIDE 9 MG/ML
INJECTION, SOLUTION INTRAVENOUS CONTINUOUS
Status: DISCONTINUED | OUTPATIENT
Start: 2017-09-07 | End: 2017-09-07

## 2017-09-07 RX ORDER — ACETAMINOPHEN 500 MG
1000 TABLET ORAL ONCE
Status: COMPLETED | OUTPATIENT
Start: 2017-09-07 | End: 2017-09-07

## 2017-09-07 RX ORDER — BUPIVACAINE HYDROCHLORIDE 7.5 MG/ML
INJECTION, SOLUTION INTRASPINAL PRN
Status: DISCONTINUED | OUTPATIENT
Start: 2017-09-07 | End: 2017-09-07 | Stop reason: SDUPTHER

## 2017-09-07 RX ORDER — HYDRALAZINE HYDROCHLORIDE 20 MG/ML
5 INJECTION INTRAMUSCULAR; INTRAVENOUS EVERY 10 MIN PRN
Status: DISCONTINUED | OUTPATIENT
Start: 2017-09-07 | End: 2017-09-07 | Stop reason: HOSPADM

## 2017-09-07 RX ORDER — EPHEDRINE SULFATE 50 MG/ML
INJECTION, SOLUTION INTRAVENOUS PRN
Status: DISCONTINUED | OUTPATIENT
Start: 2017-09-07 | End: 2017-09-07 | Stop reason: SDUPTHER

## 2017-09-07 RX ORDER — ENALAPRILAT 2.5 MG/2ML
1.25 INJECTION INTRAVENOUS
Status: DISCONTINUED | OUTPATIENT
Start: 2017-09-07 | End: 2017-09-07 | Stop reason: HOSPADM

## 2017-09-07 RX ORDER — LIDOCAINE HYDROCHLORIDE 10 MG/ML
1 INJECTION, SOLUTION EPIDURAL; INFILTRATION; INTRACAUDAL; PERINEURAL
Status: DISCONTINUED | OUTPATIENT
Start: 2017-09-07 | End: 2017-09-07 | Stop reason: HOSPADM

## 2017-09-07 RX ORDER — METOCLOPRAMIDE HYDROCHLORIDE 5 MG/ML
10 INJECTION INTRAMUSCULAR; INTRAVENOUS
Status: DISCONTINUED | OUTPATIENT
Start: 2017-09-07 | End: 2017-09-07 | Stop reason: HOSPADM

## 2017-09-07 RX ORDER — NICOTINE POLACRILEX 4 MG
15 LOZENGE BUCCAL PRN
Status: DISCONTINUED | OUTPATIENT
Start: 2017-09-07 | End: 2017-09-10 | Stop reason: HOSPADM

## 2017-09-07 RX ORDER — DEXAMETHASONE SODIUM PHOSPHATE 10 MG/ML
10 INJECTION INTRAMUSCULAR; INTRAVENOUS ONCE
Status: COMPLETED | OUTPATIENT
Start: 2017-09-07 | End: 2017-09-07

## 2017-09-07 RX ORDER — MORPHINE SULFATE 4 MG/ML
4 INJECTION, SOLUTION INTRAMUSCULAR; INTRAVENOUS EVERY 5 MIN PRN
Status: DISCONTINUED | OUTPATIENT
Start: 2017-09-07 | End: 2017-09-07 | Stop reason: HOSPADM

## 2017-09-07 RX ORDER — MORPHINE SULFATE 4 MG/ML
2 INJECTION, SOLUTION INTRAMUSCULAR; INTRAVENOUS
Status: DISCONTINUED | OUTPATIENT
Start: 2017-09-07 | End: 2017-09-10 | Stop reason: HOSPADM

## 2017-09-07 RX ORDER — 0.9 % SODIUM CHLORIDE 0.9 %
500 INTRAVENOUS SOLUTION INTRAVENOUS PRN
Status: DISCONTINUED | OUTPATIENT
Start: 2017-09-07 | End: 2017-09-10 | Stop reason: HOSPADM

## 2017-09-07 RX ORDER — ONDANSETRON 2 MG/ML
INJECTION INTRAMUSCULAR; INTRAVENOUS PRN
Status: DISCONTINUED | OUTPATIENT
Start: 2017-09-07 | End: 2017-09-07 | Stop reason: SDUPTHER

## 2017-09-07 RX ORDER — SODIUM CHLORIDE 0.9 % (FLUSH) 0.9 %
10 SYRINGE (ML) INJECTION EVERY 12 HOURS SCHEDULED
Status: DISCONTINUED | OUTPATIENT
Start: 2017-09-07 | End: 2017-09-07 | Stop reason: HOSPADM

## 2017-09-07 RX ORDER — PROMETHAZINE HYDROCHLORIDE 25 MG/ML
12.5 INJECTION, SOLUTION INTRAMUSCULAR; INTRAVENOUS EVERY 4 HOURS PRN
Status: DISCONTINUED | OUTPATIENT
Start: 2017-09-07 | End: 2017-09-10 | Stop reason: HOSPADM

## 2017-09-07 RX ORDER — SODIUM CHLORIDE 0.9 % (FLUSH) 0.9 %
10 SYRINGE (ML) INJECTION PRN
Status: DISCONTINUED | OUTPATIENT
Start: 2017-09-07 | End: 2017-09-07 | Stop reason: HOSPADM

## 2017-09-07 RX ORDER — SODIUM CHLORIDE, SODIUM LACTATE, POTASSIUM CHLORIDE, CALCIUM CHLORIDE 600; 310; 30; 20 MG/100ML; MG/100ML; MG/100ML; MG/100ML
INJECTION, SOLUTION INTRAVENOUS CONTINUOUS
Status: DISCONTINUED | OUTPATIENT
Start: 2017-09-07 | End: 2017-09-07

## 2017-09-07 RX ORDER — PROPAFENONE HYDROCHLORIDE 225 MG/1
225 TABLET, FILM COATED ORAL 2 TIMES DAILY
Status: DISCONTINUED | OUTPATIENT
Start: 2017-09-07 | End: 2017-09-10 | Stop reason: HOSPADM

## 2017-09-07 RX ORDER — ONDANSETRON 2 MG/ML
4 INJECTION INTRAMUSCULAR; INTRAVENOUS EVERY 6 HOURS PRN
Status: DISCONTINUED | OUTPATIENT
Start: 2017-09-07 | End: 2017-09-10 | Stop reason: HOSPADM

## 2017-09-07 RX ORDER — SODIUM CHLORIDE 9 MG/ML
INJECTION, SOLUTION INTRAVENOUS CONTINUOUS
Status: DISCONTINUED | OUTPATIENT
Start: 2017-09-07 | End: 2017-09-10 | Stop reason: HOSPADM

## 2017-09-07 RX ORDER — PROMETHAZINE HYDROCHLORIDE 25 MG/ML
6.25 INJECTION, SOLUTION INTRAMUSCULAR; INTRAVENOUS
Status: DISCONTINUED | OUTPATIENT
Start: 2017-09-07 | End: 2017-09-07 | Stop reason: HOSPADM

## 2017-09-07 RX ORDER — DEXTROSE MONOHYDRATE 25 G/50ML
12.5 INJECTION, SOLUTION INTRAVENOUS PRN
Status: DISCONTINUED | OUTPATIENT
Start: 2017-09-07 | End: 2017-09-10 | Stop reason: HOSPADM

## 2017-09-07 RX ORDER — PROMETHAZINE HYDROCHLORIDE 25 MG/ML
INJECTION, SOLUTION INTRAMUSCULAR; INTRAVENOUS
Status: DISPENSED
Start: 2017-09-07 | End: 2017-09-08

## 2017-09-07 RX ORDER — LABETALOL HYDROCHLORIDE 5 MG/ML
5 INJECTION, SOLUTION INTRAVENOUS EVERY 10 MIN PRN
Status: DISCONTINUED | OUTPATIENT
Start: 2017-09-07 | End: 2017-09-07 | Stop reason: HOSPADM

## 2017-09-07 RX ORDER — ACETAMINOPHEN 325 MG/1
650 TABLET ORAL EVERY 4 HOURS PRN
Status: DISCONTINUED | OUTPATIENT
Start: 2017-09-07 | End: 2017-09-10 | Stop reason: HOSPADM

## 2017-09-07 RX ORDER — MEPERIDINE HYDROCHLORIDE 50 MG/ML
12.5 INJECTION INTRAMUSCULAR; INTRAVENOUS; SUBCUTANEOUS EVERY 5 MIN PRN
Status: DISCONTINUED | OUTPATIENT
Start: 2017-09-07 | End: 2017-09-07 | Stop reason: HOSPADM

## 2017-09-07 RX ORDER — SODIUM CHLORIDE 0.9 % (FLUSH) 0.9 %
10 SYRINGE (ML) INJECTION PRN
Status: DISCONTINUED | OUTPATIENT
Start: 2017-09-07 | End: 2017-09-10 | Stop reason: HOSPADM

## 2017-09-07 RX ORDER — OXYCODONE HYDROCHLORIDE AND ACETAMINOPHEN 5; 325 MG/1; MG/1
2 TABLET ORAL EVERY 4 HOURS PRN
Status: DISCONTINUED | OUTPATIENT
Start: 2017-09-07 | End: 2017-09-10 | Stop reason: HOSPADM

## 2017-09-07 RX ORDER — LETROZOLE 2.5 MG/1
2.5 TABLET, FILM COATED ORAL DAILY
Status: DISCONTINUED | OUTPATIENT
Start: 2017-09-07 | End: 2017-09-10 | Stop reason: HOSPADM

## 2017-09-07 RX ORDER — OXYCODONE HCL 10 MG/1
10 TABLET, FILM COATED, EXTENDED RELEASE ORAL ONCE
Status: COMPLETED | OUTPATIENT
Start: 2017-09-07 | End: 2017-09-07

## 2017-09-07 RX ORDER — LEVOTHYROXINE SODIUM 88 UG/1
88 TABLET ORAL DAILY
Status: DISCONTINUED | OUTPATIENT
Start: 2017-09-07 | End: 2017-09-10 | Stop reason: HOSPADM

## 2017-09-07 RX ORDER — PROPOFOL 10 MG/ML
INJECTION, EMULSION INTRAVENOUS CONTINUOUS PRN
Status: DISCONTINUED | OUTPATIENT
Start: 2017-09-07 | End: 2017-09-07 | Stop reason: SDUPTHER

## 2017-09-07 RX ORDER — DOCUSATE SODIUM 100 MG/1
100 CAPSULE, LIQUID FILLED ORAL 2 TIMES DAILY
Status: DISCONTINUED | OUTPATIENT
Start: 2017-09-07 | End: 2017-09-10 | Stop reason: HOSPADM

## 2017-09-07 RX ORDER — OXYCODONE HYDROCHLORIDE AND ACETAMINOPHEN 5; 325 MG/1; MG/1
1 TABLET ORAL EVERY 4 HOURS PRN
Status: DISCONTINUED | OUTPATIENT
Start: 2017-09-07 | End: 2017-09-10 | Stop reason: HOSPADM

## 2017-09-07 RX ORDER — MORPHINE SULFATE 4 MG/ML
2 INJECTION, SOLUTION INTRAMUSCULAR; INTRAVENOUS EVERY 5 MIN PRN
Status: DISCONTINUED | OUTPATIENT
Start: 2017-09-07 | End: 2017-09-07 | Stop reason: HOSPADM

## 2017-09-07 RX ORDER — DIPHENHYDRAMINE HYDROCHLORIDE 50 MG/ML
12.5 INJECTION INTRAMUSCULAR; INTRAVENOUS
Status: DISCONTINUED | OUTPATIENT
Start: 2017-09-07 | End: 2017-09-07 | Stop reason: HOSPADM

## 2017-09-07 RX ORDER — FENTANYL CITRATE 50 UG/ML
25 INJECTION, SOLUTION INTRAMUSCULAR; INTRAVENOUS ONCE
Status: DISCONTINUED | OUTPATIENT
Start: 2017-09-07 | End: 2017-09-07 | Stop reason: HOSPADM

## 2017-09-07 RX ORDER — TRANEXAMIC ACID 650 1/1
1950 TABLET ORAL ONCE
Status: COMPLETED | OUTPATIENT
Start: 2017-09-07 | End: 2017-09-07

## 2017-09-07 RX ORDER — ASPIRIN 81 MG/1
81 TABLET ORAL 2 TIMES DAILY
Status: DISCONTINUED | OUTPATIENT
Start: 2017-09-07 | End: 2017-09-10 | Stop reason: HOSPADM

## 2017-09-07 RX ORDER — SODIUM CHLORIDE 0.9 % (FLUSH) 0.9 %
10 SYRINGE (ML) INJECTION EVERY 12 HOURS SCHEDULED
Status: DISCONTINUED | OUTPATIENT
Start: 2017-09-07 | End: 2017-09-10 | Stop reason: HOSPADM

## 2017-09-07 RX ORDER — DEXTROSE MONOHYDRATE 50 MG/ML
100 INJECTION, SOLUTION INTRAVENOUS PRN
Status: DISCONTINUED | OUTPATIENT
Start: 2017-09-07 | End: 2017-09-10 | Stop reason: HOSPADM

## 2017-09-07 RX ORDER — MIDAZOLAM HYDROCHLORIDE 1 MG/ML
2 INJECTION INTRAMUSCULAR; INTRAVENOUS
Status: COMPLETED | OUTPATIENT
Start: 2017-09-07 | End: 2017-09-07

## 2017-09-07 RX ORDER — FENTANYL CITRATE 50 UG/ML
50 INJECTION, SOLUTION INTRAMUSCULAR; INTRAVENOUS ONCE
Status: DISCONTINUED | OUTPATIENT
Start: 2017-09-07 | End: 2017-09-07 | Stop reason: HOSPADM

## 2017-09-07 RX ORDER — ROPIVACAINE HYDROCHLORIDE 5 MG/ML
INJECTION, SOLUTION EPIDURAL; INFILTRATION; PERINEURAL PRN
Status: DISCONTINUED | OUTPATIENT
Start: 2017-09-07 | End: 2017-09-07 | Stop reason: HOSPADM

## 2017-09-07 RX ORDER — MORPHINE SULFATE 4 MG/ML
4 INJECTION, SOLUTION INTRAMUSCULAR; INTRAVENOUS
Status: DISCONTINUED | OUTPATIENT
Start: 2017-09-07 | End: 2017-09-10 | Stop reason: HOSPADM

## 2017-09-07 RX ADMIN — ACETAMINOPHEN 1000 MG: 500 TABLET ORAL at 06:09

## 2017-09-07 RX ADMIN — TRANEXAMIC ACID 1950 MG: 650 TABLET ORAL at 06:09

## 2017-09-07 RX ADMIN — BUPIVACAINE HYDROCHLORIDE IN DEXTROSE 2 ML: 7.5 INJECTION, SOLUTION SUBARACHNOID at 07:08

## 2017-09-07 RX ADMIN — Medication 2 G: at 07:18

## 2017-09-07 RX ADMIN — LETROZOLE 2.5 MG: 2.5 TABLET, FILM COATED ORAL at 13:25

## 2017-09-07 RX ADMIN — ONDANSETRON HYDROCHLORIDE 4 MG: 2 INJECTION, SOLUTION INTRAVENOUS at 07:22

## 2017-09-07 RX ADMIN — SODIUM CHLORIDE: 9 INJECTION, SOLUTION INTRAVENOUS at 15:05

## 2017-09-07 RX ADMIN — PROPAFENONE HYDROCHLORIDE 225 MG: 225 TABLET, FILM COATED ORAL at 22:47

## 2017-09-07 RX ADMIN — ASPIRIN 81 MG: 81 TABLET, COATED ORAL at 22:47

## 2017-09-07 RX ADMIN — PROMETHAZINE HYDROCHLORIDE 12.5 MG: 25 INJECTION INTRAMUSCULAR; INTRAVENOUS at 20:30

## 2017-09-07 RX ADMIN — SODIUM CHLORIDE, SODIUM LACTATE, POTASSIUM CHLORIDE, AND CALCIUM CHLORIDE: 600; 310; 30; 20 INJECTION, SOLUTION INTRAVENOUS at 08:19

## 2017-09-07 RX ADMIN — MIDAZOLAM HYDROCHLORIDE 1 MG: 1 INJECTION, SOLUTION INTRAMUSCULAR; INTRAVENOUS at 07:02

## 2017-09-07 RX ADMIN — EPHEDRINE SULFATE 30 MG: 50 INJECTION, SOLUTION INTRAMUSCULAR; INTRAVENOUS; SUBCUTANEOUS at 07:18

## 2017-09-07 RX ADMIN — PROPOFOL 75 MCG/KG/MIN: 10 INJECTION, EMULSION INTRAVENOUS at 07:09

## 2017-09-07 RX ADMIN — OXYCODONE AND ACETAMINOPHEN 1 TABLET: 5; 325 TABLET ORAL at 11:40

## 2017-09-07 RX ADMIN — DOCUSATE SODIUM 100 MG: 100 CAPSULE, LIQUID FILLED ORAL at 11:40

## 2017-09-07 RX ADMIN — ONDANSETRON 4 MG: 2 INJECTION INTRAMUSCULAR; INTRAVENOUS at 17:18

## 2017-09-07 RX ADMIN — VITAMIN D, TAB 1000IU (100/BT) 1000 UNITS: 25 TAB at 11:40

## 2017-09-07 RX ADMIN — MIDAZOLAM HYDROCHLORIDE 1 MG: 1 INJECTION, SOLUTION INTRAMUSCULAR; INTRAVENOUS at 06:58

## 2017-09-07 RX ADMIN — OXYCODONE HYDROCHLORIDE AND ACETAMINOPHEN 2 TABLET: 5; 325 TABLET ORAL at 15:04

## 2017-09-07 RX ADMIN — OXYCODONE HYDROCHLORIDE 10 MG: 10 TABLET, FILM COATED, EXTENDED RELEASE ORAL at 06:09

## 2017-09-07 RX ADMIN — EPHEDRINE SULFATE 20 MG: 50 INJECTION, SOLUTION INTRAMUSCULAR; INTRAVENOUS; SUBCUTANEOUS at 07:45

## 2017-09-07 RX ADMIN — DILTIAZEM HYDROCHLORIDE 30 MG: 30 TABLET, FILM COATED ORAL at 22:47

## 2017-09-07 RX ADMIN — ASPIRIN 81 MG: 81 TABLET, COATED ORAL at 11:40

## 2017-09-07 RX ADMIN — Medication 2 G: at 15:20

## 2017-09-07 RX ADMIN — DEXAMETHASONE SODIUM PHOSPHATE 10 MG: 10 INJECTION INTRAMUSCULAR; INTRAVENOUS at 07:22

## 2017-09-07 RX ADMIN — Medication 1 TABLET: at 11:40

## 2017-09-07 RX ADMIN — SODIUM CHLORIDE, SODIUM LACTATE, POTASSIUM CHLORIDE, AND CALCIUM CHLORIDE: 600; 310; 30; 20 INJECTION, SOLUTION INTRAVENOUS at 06:09

## 2017-09-07 ASSESSMENT — PAIN SCALES - GENERAL
PAINLEVEL_OUTOF10: 5
PAINLEVEL_OUTOF10: 2

## 2017-09-07 ASSESSMENT — ENCOUNTER SYMPTOMS: SHORTNESS OF BREATH: 0

## 2017-09-07 NOTE — TELEPHONE ENCOUNTER
radha castellon at [facility] called and dr pizano did surgery on her and he consulted u for post op care in room 510

## 2017-09-08 ENCOUNTER — OUTSIDE FACILITY SERVICE (OUTPATIENT)
Dept: FAMILY MEDICINE CLINIC | Facility: CLINIC | Age: 72
End: 2017-09-08

## 2017-09-08 LAB
ANION GAP SERPL CALCULATED.3IONS-SCNC: 19 MMOL/L (ref 7–19)
BUN BLDV-MCNC: 17 MG/DL (ref 8–23)
CALCIUM SERPL-MCNC: 9.5 MG/DL (ref 8.8–10.2)
CHLORIDE BLD-SCNC: 94 MMOL/L (ref 98–111)
CO2: 21 MMOL/L (ref 22–29)
CREAT SERPL-MCNC: 1 MG/DL (ref 0.5–0.9)
GFR NON-AFRICAN AMERICAN: 54
GLUCOSE BLD-MCNC: 171 MG/DL (ref 70–99)
GLUCOSE BLD-MCNC: 184 MG/DL (ref 70–99)
GLUCOSE BLD-MCNC: 186 MG/DL (ref 70–99)
GLUCOSE BLD-MCNC: 190 MG/DL (ref 70–99)
GLUCOSE BLD-MCNC: 205 MG/DL (ref 74–109)
HCT VFR BLD CALC: 37.7 % (ref 37–47)
HEMOGLOBIN: 11.4 G/DL (ref 12–16)
PERFORMED ON: ABNORMAL
POTASSIUM SERPL-SCNC: 4.5 MMOL/L (ref 3.5–5)
SODIUM BLD-SCNC: 134 MMOL/L (ref 136–145)

## 2017-09-08 PROCEDURE — 36415 COLL VENOUS BLD VENIPUNCTURE: CPT

## 2017-09-08 PROCEDURE — 6360000002 HC RX W HCPCS: Performed by: ORTHOPAEDIC SURGERY

## 2017-09-08 PROCEDURE — 97110 THERAPEUTIC EXERCISES: CPT

## 2017-09-08 PROCEDURE — 97116 GAIT TRAINING THERAPY: CPT

## 2017-09-08 PROCEDURE — 85014 HEMATOCRIT: CPT

## 2017-09-08 PROCEDURE — 82948 REAGENT STRIP/BLOOD GLUCOSE: CPT

## 2017-09-08 PROCEDURE — 80048 BASIC METABOLIC PNL TOTAL CA: CPT

## 2017-09-08 PROCEDURE — 2580000003 HC RX 258: Performed by: ORTHOPAEDIC SURGERY

## 2017-09-08 PROCEDURE — 6370000000 HC RX 637 (ALT 250 FOR IP): Performed by: ORTHOPAEDIC SURGERY

## 2017-09-08 PROCEDURE — 1210000000 HC MED SURG R&B

## 2017-09-08 PROCEDURE — 85018 HEMOGLOBIN: CPT

## 2017-09-08 RX ADMIN — Medication 2 G: at 00:55

## 2017-09-08 RX ADMIN — PROPAFENONE HYDROCHLORIDE 225 MG: 225 TABLET, FILM COATED ORAL at 09:15

## 2017-09-08 RX ADMIN — Medication 10 ML: at 09:27

## 2017-09-08 RX ADMIN — SODIUM CHLORIDE: 9 INJECTION, SOLUTION INTRAVENOUS at 04:28

## 2017-09-08 RX ADMIN — OXYCODONE AND ACETAMINOPHEN 1 TABLET: 5; 325 TABLET ORAL at 04:47

## 2017-09-08 RX ADMIN — OXYCODONE AND ACETAMINOPHEN 1 TABLET: 5; 325 TABLET ORAL at 10:57

## 2017-09-08 RX ADMIN — DOCUSATE SODIUM 100 MG: 100 CAPSULE, LIQUID FILLED ORAL at 09:15

## 2017-09-08 RX ADMIN — DILTIAZEM HYDROCHLORIDE 30 MG: 30 TABLET, FILM COATED ORAL at 20:29

## 2017-09-08 RX ADMIN — Medication 10 ML: at 20:30

## 2017-09-08 RX ADMIN — ASPIRIN 81 MG: 81 TABLET, COATED ORAL at 20:29

## 2017-09-08 RX ADMIN — VITAMIN D, TAB 1000IU (100/BT) 1000 UNITS: 25 TAB at 09:23

## 2017-09-08 RX ADMIN — DILTIAZEM HYDROCHLORIDE 30 MG: 30 TABLET, FILM COATED ORAL at 09:15

## 2017-09-08 RX ADMIN — LEVOTHYROXINE SODIUM 88 MCG: 88 TABLET ORAL at 09:15

## 2017-09-08 RX ADMIN — OXYCODONE AND ACETAMINOPHEN 2 TABLET: 5; 325 TABLET ORAL at 15:47

## 2017-09-08 RX ADMIN — PROPAFENONE HYDROCHLORIDE 225 MG: 225 TABLET, FILM COATED ORAL at 20:29

## 2017-09-08 RX ADMIN — ASPIRIN 81 MG: 81 TABLET, COATED ORAL at 09:15

## 2017-09-08 RX ADMIN — Medication 1 TABLET: at 09:15

## 2017-09-08 RX ADMIN — DOCUSATE SODIUM 100 MG: 100 CAPSULE, LIQUID FILLED ORAL at 20:29

## 2017-09-08 ASSESSMENT — PAIN SCALES - GENERAL
PAINLEVEL_OUTOF10: 8
PAINLEVEL_OUTOF10: 4
PAINLEVEL_OUTOF10: 9

## 2017-09-09 LAB
ANION GAP SERPL CALCULATED.3IONS-SCNC: 14 MMOL/L (ref 7–19)
BUN BLDV-MCNC: 18 MG/DL (ref 8–23)
CALCIUM SERPL-MCNC: 9.1 MG/DL (ref 8.8–10.2)
CHLORIDE BLD-SCNC: 91 MMOL/L (ref 98–111)
CO2: 27 MMOL/L (ref 22–29)
CREAT SERPL-MCNC: 1 MG/DL (ref 0.5–0.9)
GFR NON-AFRICAN AMERICAN: 54
GLUCOSE BLD-MCNC: 188 MG/DL (ref 70–99)
GLUCOSE BLD-MCNC: 193 MG/DL (ref 74–109)
GLUCOSE BLD-MCNC: 210 MG/DL (ref 70–99)
GLUCOSE BLD-MCNC: 224 MG/DL (ref 70–99)
HCT VFR BLD CALC: 31.2 % (ref 37–47)
HEMOGLOBIN: 10.1 G/DL (ref 12–16)
PERFORMED ON: ABNORMAL
POTASSIUM SERPL-SCNC: 4 MMOL/L (ref 3.5–5)
SODIUM BLD-SCNC: 132 MMOL/L (ref 136–145)

## 2017-09-09 PROCEDURE — 80048 BASIC METABOLIC PNL TOTAL CA: CPT

## 2017-09-09 PROCEDURE — 2580000003 HC RX 258: Performed by: ORTHOPAEDIC SURGERY

## 2017-09-09 PROCEDURE — 97530 THERAPEUTIC ACTIVITIES: CPT

## 2017-09-09 PROCEDURE — 85018 HEMOGLOBIN: CPT

## 2017-09-09 PROCEDURE — 6370000000 HC RX 637 (ALT 250 FOR IP): Performed by: ORTHOPAEDIC SURGERY

## 2017-09-09 PROCEDURE — 85014 HEMATOCRIT: CPT

## 2017-09-09 PROCEDURE — 82948 REAGENT STRIP/BLOOD GLUCOSE: CPT

## 2017-09-09 PROCEDURE — 36415 COLL VENOUS BLD VENIPUNCTURE: CPT

## 2017-09-09 PROCEDURE — 97116 GAIT TRAINING THERAPY: CPT

## 2017-09-09 PROCEDURE — 1210000000 HC MED SURG R&B

## 2017-09-09 RX ADMIN — DILTIAZEM HYDROCHLORIDE 30 MG: 30 TABLET, FILM COATED ORAL at 20:19

## 2017-09-09 RX ADMIN — OXYCODONE AND ACETAMINOPHEN 1 TABLET: 5; 325 TABLET ORAL at 09:01

## 2017-09-09 RX ADMIN — ASPIRIN 81 MG: 81 TABLET, COATED ORAL at 20:19

## 2017-09-09 RX ADMIN — Medication 10 ML: at 20:20

## 2017-09-09 RX ADMIN — DOCUSATE SODIUM 100 MG: 100 CAPSULE, LIQUID FILLED ORAL at 08:31

## 2017-09-09 RX ADMIN — INSULIN LISPRO 4 UNITS: 100 INJECTION, SOLUTION INTRAVENOUS; SUBCUTANEOUS at 17:35

## 2017-09-09 RX ADMIN — Medication 1 TABLET: at 08:30

## 2017-09-09 RX ADMIN — ASPIRIN 81 MG: 81 TABLET, COATED ORAL at 09:03

## 2017-09-09 RX ADMIN — Medication 10 ML: at 08:35

## 2017-09-09 RX ADMIN — OXYCODONE AND ACETAMINOPHEN 1 TABLET: 5; 325 TABLET ORAL at 15:28

## 2017-09-09 RX ADMIN — OXYCODONE AND ACETAMINOPHEN 1 TABLET: 5; 325 TABLET ORAL at 09:39

## 2017-09-09 RX ADMIN — PROPAFENONE HYDROCHLORIDE 225 MG: 225 TABLET, FILM COATED ORAL at 08:30

## 2017-09-09 RX ADMIN — LEVOTHYROXINE SODIUM 88 MCG: 88 TABLET ORAL at 08:31

## 2017-09-09 RX ADMIN — OXYCODONE AND ACETAMINOPHEN 1 TABLET: 5; 325 TABLET ORAL at 20:47

## 2017-09-09 RX ADMIN — DOCUSATE SODIUM 100 MG: 100 CAPSULE, LIQUID FILLED ORAL at 20:19

## 2017-09-09 RX ADMIN — LETROZOLE 2.5 MG: 2.5 TABLET, FILM COATED ORAL at 08:31

## 2017-09-09 RX ADMIN — PROPAFENONE HYDROCHLORIDE 225 MG: 225 TABLET, FILM COATED ORAL at 20:19

## 2017-09-09 RX ADMIN — DILTIAZEM HYDROCHLORIDE 30 MG: 30 TABLET, FILM COATED ORAL at 08:31

## 2017-09-09 RX ADMIN — VITAMIN D, TAB 1000IU (100/BT) 1000 UNITS: 25 TAB at 08:31

## 2017-09-09 ASSESSMENT — PAIN SCALES - GENERAL
PAINLEVEL_OUTOF10: 3
PAINLEVEL_OUTOF10: 3
PAINLEVEL_OUTOF10: 10
PAINLEVEL_OUTOF10: 5
PAINLEVEL_OUTOF10: 6
PAINLEVEL_OUTOF10: 5

## 2017-09-10 VITALS
HEIGHT: 64 IN | TEMPERATURE: 97.3 F | WEIGHT: 197 LBS | HEART RATE: 96 BPM | OXYGEN SATURATION: 92 % | BODY MASS INDEX: 33.63 KG/M2 | SYSTOLIC BLOOD PRESSURE: 104 MMHG | DIASTOLIC BLOOD PRESSURE: 65 MMHG | RESPIRATION RATE: 16 BRPM

## 2017-09-10 LAB
ANION GAP SERPL CALCULATED.3IONS-SCNC: 14 MMOL/L (ref 7–19)
BUN BLDV-MCNC: 18 MG/DL (ref 8–23)
CALCIUM SERPL-MCNC: 9 MG/DL (ref 8.8–10.2)
CHLORIDE BLD-SCNC: 88 MMOL/L (ref 98–111)
CO2: 26 MMOL/L (ref 22–29)
CREAT SERPL-MCNC: 1 MG/DL (ref 0.5–0.9)
GFR NON-AFRICAN AMERICAN: 54
GLUCOSE BLD-MCNC: 204 MG/DL (ref 74–109)
GLUCOSE BLD-MCNC: 228 MG/DL (ref 70–99)
GLUCOSE BLD-MCNC: 241 MG/DL (ref 70–99)
HCT VFR BLD CALC: 30.8 % (ref 37–47)
HEMOGLOBIN: 9.8 G/DL (ref 12–16)
PERFORMED ON: ABNORMAL
PERFORMED ON: ABNORMAL
POTASSIUM SERPL-SCNC: 4.4 MMOL/L (ref 3.5–5)
SODIUM BLD-SCNC: 128 MMOL/L (ref 136–145)

## 2017-09-10 PROCEDURE — 80048 BASIC METABOLIC PNL TOTAL CA: CPT

## 2017-09-10 PROCEDURE — 2580000003 HC RX 258: Performed by: ORTHOPAEDIC SURGERY

## 2017-09-10 PROCEDURE — 85018 HEMOGLOBIN: CPT

## 2017-09-10 PROCEDURE — 99221 1ST HOSP IP/OBS SF/LOW 40: CPT | Performed by: FAMILY MEDICINE

## 2017-09-10 PROCEDURE — 36415 COLL VENOUS BLD VENIPUNCTURE: CPT

## 2017-09-10 PROCEDURE — 82948 REAGENT STRIP/BLOOD GLUCOSE: CPT

## 2017-09-10 PROCEDURE — 6370000000 HC RX 637 (ALT 250 FOR IP): Performed by: ORTHOPAEDIC SURGERY

## 2017-09-10 PROCEDURE — 85014 HEMATOCRIT: CPT

## 2017-09-10 PROCEDURE — 97116 GAIT TRAINING THERAPY: CPT

## 2017-09-10 RX ORDER — DOCUSATE SODIUM 100 MG/1
100 CAPSULE, LIQUID FILLED ORAL DAILY
Qty: 20 CAPSULE | Refills: 0 | Status: SHIPPED | OUTPATIENT
Start: 2017-09-10 | End: 2019-12-20

## 2017-09-10 RX ORDER — CELECOXIB 200 MG/1
200 CAPSULE ORAL DAILY
Qty: 14 CAPSULE | Refills: 0 | Status: SHIPPED | OUTPATIENT
Start: 2017-09-10 | End: 2019-12-20

## 2017-09-10 RX ORDER — OXYCODONE HYDROCHLORIDE AND ACETAMINOPHEN 5; 325 MG/1; MG/1
TABLET ORAL
Qty: 60 TABLET | Refills: 0 | Status: SHIPPED | OUTPATIENT
Start: 2017-09-10 | End: 2019-12-20

## 2017-09-10 RX ORDER — TRAMADOL HYDROCHLORIDE 50 MG/1
50 TABLET ORAL EVERY 6 HOURS PRN
Qty: 60 TABLET | Refills: 0 | Status: SHIPPED | OUTPATIENT
Start: 2017-09-10 | End: 2017-09-20

## 2017-09-10 RX ORDER — ASPIRIN 81 MG/1
81 TABLET ORAL 2 TIMES DAILY
Qty: 60 TABLET | Refills: 0 | Status: SHIPPED | OUTPATIENT
Start: 2017-09-10

## 2017-09-10 RX ADMIN — INSULIN LISPRO 4 UNITS: 100 INJECTION, SOLUTION INTRAVENOUS; SUBCUTANEOUS at 09:14

## 2017-09-10 RX ADMIN — Medication 10 ML: at 10:37

## 2017-09-10 RX ADMIN — OXYCODONE AND ACETAMINOPHEN 1 TABLET: 5; 325 TABLET ORAL at 09:50

## 2017-09-10 RX ADMIN — Medication 1 TABLET: at 09:16

## 2017-09-10 RX ADMIN — DOCUSATE SODIUM 100 MG: 100 CAPSULE, LIQUID FILLED ORAL at 09:15

## 2017-09-10 RX ADMIN — LETROZOLE 2.5 MG: 2.5 TABLET, FILM COATED ORAL at 09:16

## 2017-09-10 RX ADMIN — LEVOTHYROXINE SODIUM 88 MCG: 88 TABLET ORAL at 09:15

## 2017-09-10 RX ADMIN — PROPAFENONE HYDROCHLORIDE 225 MG: 225 TABLET, FILM COATED ORAL at 09:16

## 2017-09-10 RX ADMIN — ASPIRIN 81 MG: 81 TABLET, COATED ORAL at 09:15

## 2017-09-10 RX ADMIN — VITAMIN D, TAB 1000IU (100/BT) 1000 UNITS: 25 TAB at 09:15

## 2017-09-10 RX ADMIN — DILTIAZEM HYDROCHLORIDE 30 MG: 30 TABLET, FILM COATED ORAL at 09:16

## 2017-09-10 ASSESSMENT — PAIN SCALES - GENERAL: PAINLEVEL_OUTOF10: 5

## 2017-09-28 ENCOUNTER — TELEPHONE (OUTPATIENT)
Dept: FAMILY MEDICINE CLINIC | Facility: CLINIC | Age: 72
End: 2017-09-28

## 2017-09-28 DIAGNOSIS — Z96.659 STATUS POST TOTAL KNEE REPLACEMENT, UNSPECIFIED LATERALITY: Primary | ICD-10-CM

## 2017-09-28 NOTE — TELEPHONE ENCOUNTER
Pt daughter called she wants to know if u will do a order for physical therapy to [facility] [place]

## 2017-10-13 ENCOUNTER — OFFICE VISIT (OUTPATIENT)
Dept: FAMILY MEDICINE CLINIC | Facility: CLINIC | Age: 72
End: 2017-10-13

## 2017-10-13 VITALS
BODY MASS INDEX: 31.58 KG/M2 | WEIGHT: 185 LBS | OXYGEN SATURATION: 96 % | HEIGHT: 64 IN | RESPIRATION RATE: 16 BRPM | SYSTOLIC BLOOD PRESSURE: 118 MMHG | DIASTOLIC BLOOD PRESSURE: 76 MMHG | HEART RATE: 89 BPM

## 2017-10-13 DIAGNOSIS — E11.9 TYPE 2 DIABETES MELLITUS WITHOUT COMPLICATION, WITH LONG-TERM CURRENT USE OF INSULIN (HCC): ICD-10-CM

## 2017-10-13 DIAGNOSIS — I48.0 PAROXYSMAL ATRIAL FIBRILLATION (HCC): ICD-10-CM

## 2017-10-13 DIAGNOSIS — I10 ESSENTIAL HYPERTENSION: ICD-10-CM

## 2017-10-13 DIAGNOSIS — Z79.4 TYPE 2 DIABETES MELLITUS WITHOUT COMPLICATION, WITH LONG-TERM CURRENT USE OF INSULIN (HCC): ICD-10-CM

## 2017-10-13 DIAGNOSIS — Z96.651 STATUS POST RIGHT KNEE REPLACEMENT: Primary | ICD-10-CM

## 2017-10-13 DIAGNOSIS — E03.9 ACQUIRED HYPOTHYROIDISM: ICD-10-CM

## 2017-10-13 PROCEDURE — 99214 OFFICE O/P EST MOD 30 MIN: CPT | Performed by: FAMILY MEDICINE

## 2017-10-13 RX ORDER — TRAMADOL HYDROCHLORIDE 50 MG/1
TABLET ORAL
COMMUNITY
Start: 2017-09-29 | End: 2018-02-13

## 2017-10-13 NOTE — PROGRESS NOTES
Subjective   Dianne Pritchard is a 72 y.o. female.     Chief Complaint   Patient presents with   • Follow-up     superior care        History of Present Illness     recently had rightknee replacement adn went to [facility]--now home and [facility]...she notes good bp control without cp or ha--she is toleraging thyroid repelacement witout heat or cold intoleraces--she noteds bs are stable without hypoglcyemia      Current Outpatient Prescriptions:   •  Calcium Carb-Cholecalciferol (CALCIUM CARBONATE-VITAMIN D3) 600-400 MG-UNIT tablet, Take  by mouth daily., Disp: , Rfl:   •  diltiaZEM (CARDIZEM) 30 MG tablet, Take 1 tablet by mouth 2 (Two) Times a Day., Disp: 180 tablet, Rfl: 1  •  Insulin Pen Needle 32G X 4 MM misc, 1 Units/day Daily., Disp: 100 each, Rfl: 2  •  letrozole (FEMARA) 2.5 MG tablet, Take  by mouth daily., Disp: , Rfl:   •  LEVEMIR FLEXTOUCH 100 UNIT/ML injection, INJECT 50 UNITS TWICE DAILY., Disp: 15 mL, Rfl: 5  •  levothyroxine (SYNTHROID, LEVOTHROID) 88 MCG tablet, , Disp: , Rfl:   •  losartan (COZAAR) 25 MG tablet, , Disp: , Rfl:   •  metFORMIN (GLUCOPHAGE) 1000 MG tablet, Take 1,000 mg by mouth 2 (Two) Times a Day With Meals., Disp: , Rfl:   •  Multiple Vitamins-Minerals (CENTRUM SILVER PO), Take  by mouth daily., Disp: , Rfl:   •  propafenone (RYTHMOL) 225 MG tablet, TAKE 1 TABLET BY MOUTH TWICE DAILY, Disp: 180 tablet, Rfl: 0  •  traMADol (ULTRAM) 50 MG tablet, , Disp: , Rfl:   Allergies   Allergen Reactions   • Amoxil [Amoxicillin]    • Biaxin [Clarithromycin]    • Lortab [Hydrocodone-Acetaminophen]    • Penicillins        Past Medical History:   Diagnosis Date   • Atrial fibrillation, currently in sinus rhythm    • Cancer     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)   • Diabetes mellitus, type II    • Dizziness    • Essential hypertension    • GERD (gastroesophageal reflux disease)    • GI bleed requiring more than 4 units of blood in 24 hours, ICU, or surgery    • History of lymphoma    •  "Hypomagnesemia    • Hypothyroidism    • On amiodarone therapy      Past Surgical History:   Procedure Laterality Date   • MASTECTOMY     • OTHER SURGICAL HISTORY      Mediport insertion X 2   • OTHER SURGICAL HISTORY      remote lymphoma treatment   • TUBAL ABDOMINAL LIGATION         Review of Systems   Constitutional: Negative.    HENT: Negative.    Eyes: Negative.    Respiratory: Negative.    Cardiovascular: Negative.    Gastrointestinal: Negative.    Endocrine: Negative.    Genitourinary: Negative.    Musculoskeletal: Positive for arthralgias and gait problem.   Skin: Negative.    Allergic/Immunologic: Negative.    Hematological: Negative.    Psychiatric/Behavioral: Negative.        Objective  /76  Pulse 89  Resp 16  Ht 64\" (162.6 cm)  Wt 185 lb (83.9 kg)  SpO2 96%  BMI 31.76 kg/m2  Physical Exam   Constitutional: She is oriented to person, place, and time. She appears well-developed and well-nourished.   HENT:   Head: Normocephalic and atraumatic.   Right Ear: External ear normal.   Left Ear: External ear normal.   Nose: Nose normal.   Mouth/Throat: Oropharynx is clear and moist.   Eyes: Conjunctivae and EOM are normal. Pupils are equal, round, and reactive to light.   Neck: Normal range of motion. Neck supple.   Cardiovascular: Normal rate, normal heart sounds and intact distal pulses.    Pulmonary/Chest: Effort normal and breath sounds normal.   Abdominal: Soft. Bowel sounds are normal.   Musculoskeletal: Normal range of motion.   Neurological: She is alert and oriented to person, place, and time. She has normal reflexes.   Skin: Skin is warm and dry.   Psychiatric: She has a normal mood and affect. Her behavior is normal. Judgment and thought content normal.   Nursing note and vitals reviewed.      Assessment/Plan   Dianne was seen today for follow-up.    Diagnoses and all orders for this visit:    Status post right knee replacement    Paroxysmal atrial fibrillation  -     CBC & Differential  -     " Comprehensive Metabolic Panel  -     Lipid panel    Essential hypertension    Acquired hypothyroidism    Type 2 diabetes mellitus without complication, with long-term current use of insulin  -     Lipid panel  -     Hemoglobin A1c  -     MicroAlbumin, Urine, Random - Urine, Clean Catch    Type 2 diabetes mellitus without complication, with long-term current use of insulin   -     Lipid panel  -     Hemoglobin A1c  -     MicroAlbumin, Urine, Random - Urine, Clean Catch      She is getting flu shot           No orders of the defined types were placed in this encounter.      Follow up: 6 month(s)

## 2017-10-14 LAB
ALBUMIN SERPL-MCNC: 4.7 G/DL (ref 3.5–5)
ALBUMIN/GLOB SERPL: 1.6 G/DL (ref 1.1–2.5)
ALP SERPL-CCNC: 79 U/L (ref 24–120)
ALT SERPL-CCNC: 27 U/L (ref 0–54)
AST SERPL-CCNC: 28 U/L (ref 7–45)
BASOPHILS # BLD AUTO: 0.05 10*3/MM3 (ref 0–0.2)
BASOPHILS NFR BLD AUTO: 0.6 % (ref 0–2)
BILIRUB SERPL-MCNC: 0.5 MG/DL (ref 0.1–1)
BUN SERPL-MCNC: 19 MG/DL (ref 5–21)
BUN/CREAT SERPL: 18.8 (ref 7–25)
CALCIUM SERPL-MCNC: 9.9 MG/DL (ref 8.4–10.4)
CHLORIDE SERPL-SCNC: 95 MMOL/L (ref 98–110)
CHOLEST SERPL-MCNC: 168 MG/DL (ref 130–200)
CO2 SERPL-SCNC: 28 MMOL/L (ref 24–31)
CREAT SERPL-MCNC: 1.01 MG/DL (ref 0.5–1.4)
EOSINOPHIL # BLD AUTO: 0.16 10*3/MM3 (ref 0–0.7)
EOSINOPHIL NFR BLD AUTO: 2 % (ref 0–4)
ERYTHROCYTE [DISTWIDTH] IN BLOOD BY AUTOMATED COUNT: 14.6 % (ref 12–15)
GLOBULIN SER CALC-MCNC: 2.9 GM/DL
GLUCOSE SERPL-MCNC: 147 MG/DL (ref 70–100)
HBA1C MFR BLD: 5.2 %
HCT VFR BLD AUTO: 38.3 % (ref 37–47)
HDLC SERPL-MCNC: 65 MG/DL
HGB BLD-MCNC: 11.9 G/DL (ref 12–16)
IMM GRANULOCYTES # BLD: 0.03 10*3/MM3 (ref 0–0.03)
IMM GRANULOCYTES NFR BLD: 0.4 % (ref 0–5)
LDLC SERPL CALC-MCNC: 63 MG/DL (ref 0–99)
LYMPHOCYTES # BLD AUTO: 1.34 10*3/MM3 (ref 0.72–4.86)
LYMPHOCYTES NFR BLD AUTO: 16.4 % (ref 15–45)
MCH RBC QN AUTO: 28.8 PG (ref 28–32)
MCHC RBC AUTO-ENTMCNC: 31.1 G/DL (ref 33–36)
MCV RBC AUTO: 92.7 FL (ref 82–98)
MICROALBUMIN UR-MCNC: 48.9 UG/ML
MONOCYTES # BLD AUTO: 0.51 10*3/MM3 (ref 0.19–1.3)
MONOCYTES NFR BLD AUTO: 6.3 % (ref 4–12)
NEUTROPHILS # BLD AUTO: 6.06 10*3/MM3 (ref 1.87–8.4)
NEUTROPHILS NFR BLD AUTO: 74.3 % (ref 39–78)
PLATELET # BLD AUTO: 243 10*3/MM3 (ref 130–400)
POTASSIUM SERPL-SCNC: 4.7 MMOL/L (ref 3.5–5.3)
PROT SERPL-MCNC: 7.6 G/DL (ref 6.3–8.7)
RBC # BLD AUTO: 4.13 10*6/MM3 (ref 4.2–5.4)
SODIUM SERPL-SCNC: 137 MMOL/L (ref 135–145)
TRIGL SERPL-MCNC: 199 MG/DL (ref 0–149)
VLDLC SERPL CALC-MCNC: 39.8 MG/DL
WBC # BLD AUTO: 8.15 10*3/MM3 (ref 4.8–10.8)

## 2017-11-16 RX ORDER — LEVOTHYROXINE SODIUM 88 UG/1
88 TABLET ORAL DAILY
Qty: 90 TABLET | Refills: 1 | Status: SHIPPED | OUTPATIENT
Start: 2017-11-16 | End: 2018-05-01

## 2017-12-15 ENCOUNTER — TRANSCRIBE ORDERS (OUTPATIENT)
Dept: ADMINISTRATIVE | Facility: HOSPITAL | Age: 72
End: 2017-12-15

## 2017-12-15 DIAGNOSIS — C50.511 MALIGNANT NEOPLASM OF LOWER-OUTER QUADRANT OF RIGHT FEMALE BREAST, UNSPECIFIED ESTROGEN RECEPTOR STATUS (HCC): Primary | ICD-10-CM

## 2018-01-10 RX ORDER — INSULIN DETEMIR 100 [IU]/ML
INJECTION, SOLUTION SUBCUTANEOUS
Qty: 15 PEN | Refills: 5 | Status: SHIPPED | OUTPATIENT
Start: 2018-01-10 | End: 2018-06-27 | Stop reason: SDUPTHER

## 2018-01-10 RX ORDER — PEN NEEDLE, DIABETIC 32GX 5/32"
NEEDLE, DISPOSABLE MISCELLANEOUS
Qty: 100 EACH | Refills: 2 | Status: SHIPPED | OUTPATIENT
Start: 2018-01-10 | End: 2018-01-23

## 2018-01-23 DIAGNOSIS — E11.9 DIABETES MELLITUS WITHOUT COMPLICATION (HCC): Primary | ICD-10-CM

## 2018-02-03 ENCOUNTER — APPOINTMENT (OUTPATIENT)
Dept: GENERAL RADIOLOGY | Facility: HOSPITAL | Age: 73
End: 2018-02-03

## 2018-02-03 ENCOUNTER — HOSPITAL ENCOUNTER (OUTPATIENT)
Facility: HOSPITAL | Age: 73
Setting detail: OBSERVATION
Discharge: HOME | End: 2018-02-06
Attending: EMERGENCY MEDICINE | Admitting: FAMILY MEDICINE

## 2018-02-03 DIAGNOSIS — E87.20 LACTIC ACID ACIDOSIS: ICD-10-CM

## 2018-02-03 DIAGNOSIS — J11.1 FLU: Primary | ICD-10-CM

## 2018-02-03 LAB
ANION GAP SERPL CALCULATED.3IONS-SCNC: 16 MMOL/L (ref 4–13)
BACTERIA UR QL AUTO: ABNORMAL /HPF
BASOPHILS # BLD AUTO: 0.02 10*3/MM3 (ref 0–0.2)
BASOPHILS NFR BLD AUTO: 0.2 % (ref 0–2)
BILIRUB UR QL STRIP: NEGATIVE
BUN BLD-MCNC: 17 MG/DL (ref 5–21)
BUN/CREAT SERPL: 17 (ref 7–25)
CALCIUM SPEC-SCNC: 9.8 MG/DL (ref 8.4–10.4)
CHLORIDE SERPL-SCNC: 98 MMOL/L (ref 98–110)
CLARITY UR: CLEAR
CO2 SERPL-SCNC: 29 MMOL/L (ref 24–31)
COLOR UR: YELLOW
CREAT BLD-MCNC: 1 MG/DL (ref 0.5–1.4)
D-LACTATE SERPL-SCNC: 2.8 MMOL/L (ref 0.5–2)
DEPRECATED RDW RBC AUTO: 46.2 FL (ref 40–54)
EOSINOPHIL # BLD AUTO: 0.13 10*3/MM3 (ref 0–0.7)
EOSINOPHIL NFR BLD AUTO: 1.6 % (ref 0–4)
ERYTHROCYTE [DISTWIDTH] IN BLOOD BY AUTOMATED COUNT: 14.7 % (ref 12–15)
FLUAV AG NPH QL: POSITIVE
FLUBV AG NPH QL IA: NEGATIVE
GFR SERPL CREATININE-BSD FRML MDRD: 55 ML/MIN/1.73
GLUCOSE BLD-MCNC: 189 MG/DL (ref 70–100)
GLUCOSE UR STRIP-MCNC: NEGATIVE MG/DL
HCT VFR BLD AUTO: 37.9 % (ref 37–47)
HGB BLD-MCNC: 12.5 G/DL (ref 12–16)
HGB UR QL STRIP.AUTO: NEGATIVE
HYALINE CASTS UR QL AUTO: ABNORMAL /LPF
IMM GRANULOCYTES # BLD: 0.04 10*3/MM3 (ref 0–0.03)
IMM GRANULOCYTES NFR BLD: 0.5 % (ref 0–5)
KETONES UR QL STRIP: NEGATIVE
LEUKOCYTE ESTERASE UR QL STRIP.AUTO: NEGATIVE
LYMPHOCYTES # BLD AUTO: 0.29 10*3/MM3 (ref 0.72–4.86)
LYMPHOCYTES NFR BLD AUTO: 3.6 % (ref 15–45)
MCH RBC QN AUTO: 28.3 PG (ref 28–32)
MCHC RBC AUTO-ENTMCNC: 33 G/DL (ref 33–36)
MCV RBC AUTO: 85.9 FL (ref 82–98)
MONOCYTES # BLD AUTO: 0.45 10*3/MM3 (ref 0.19–1.3)
MONOCYTES NFR BLD AUTO: 5.6 % (ref 4–12)
NEUTROPHILS # BLD AUTO: 7.08 10*3/MM3 (ref 1.87–8.4)
NEUTROPHILS NFR BLD AUTO: 88.5 % (ref 39–78)
NITRITE UR QL STRIP: NEGATIVE
NRBC BLD MANUAL-RTO: 0 /100 WBC (ref 0–0)
PH UR STRIP.AUTO: 7 [PH] (ref 5–8)
PLATELET # BLD AUTO: 137 10*3/MM3 (ref 130–400)
PMV BLD AUTO: 10.9 FL (ref 6–12)
POTASSIUM BLD-SCNC: 4.7 MMOL/L (ref 3.5–5.3)
PROCALCITONIN SERPL-MCNC: <0.25 NG/ML
PROT UR QL STRIP: ABNORMAL
RBC # BLD AUTO: 4.41 10*6/MM3 (ref 4.2–5.4)
RBC # UR: ABNORMAL /HPF
REF LAB TEST METHOD: ABNORMAL
SODIUM BLD-SCNC: 143 MMOL/L (ref 135–145)
SP GR UR STRIP: 1.01 (ref 1–1.03)
SQUAMOUS #/AREA URNS HPF: ABNORMAL /HPF
UROBILINOGEN UR QL STRIP: ABNORMAL
WBC NRBC COR # BLD: 8.01 10*3/MM3 (ref 4.8–10.8)
WBC UR QL AUTO: ABNORMAL /HPF

## 2018-02-03 PROCEDURE — 71046 X-RAY EXAM CHEST 2 VIEWS: CPT

## 2018-02-03 PROCEDURE — 81001 URINALYSIS AUTO W/SCOPE: CPT | Performed by: EMERGENCY MEDICINE

## 2018-02-03 PROCEDURE — 83605 ASSAY OF LACTIC ACID: CPT | Performed by: EMERGENCY MEDICINE

## 2018-02-03 PROCEDURE — 99284 EMERGENCY DEPT VISIT MOD MDM: CPT

## 2018-02-03 PROCEDURE — 87804 INFLUENZA ASSAY W/OPTIC: CPT | Performed by: EMERGENCY MEDICINE

## 2018-02-03 PROCEDURE — 80048 BASIC METABOLIC PNL TOTAL CA: CPT | Performed by: EMERGENCY MEDICINE

## 2018-02-03 PROCEDURE — 85025 COMPLETE CBC W/AUTO DIFF WBC: CPT | Performed by: EMERGENCY MEDICINE

## 2018-02-03 PROCEDURE — 84145 PROCALCITONIN (PCT): CPT | Performed by: EMERGENCY MEDICINE

## 2018-02-03 RX ORDER — OSELTAMIVIR PHOSPHATE 75 MG/1
75 CAPSULE ORAL ONCE
Status: COMPLETED | OUTPATIENT
Start: 2018-02-03 | End: 2018-02-03

## 2018-02-03 RX ADMIN — SODIUM CHLORIDE 1000 ML: 9 INJECTION, SOLUTION INTRAVENOUS at 21:46

## 2018-02-03 RX ADMIN — OSELTAMIVIR PHOSPHATE 75 MG: 75 CAPSULE ORAL at 22:42

## 2018-02-04 LAB
D-LACTATE SERPL-SCNC: 2.1 MMOL/L (ref 0.5–2)
GLUCOSE BLDC GLUCOMTR-MCNC: 134 MG/DL (ref 70–130)
GLUCOSE BLDC GLUCOMTR-MCNC: 159 MG/DL (ref 70–130)
GLUCOSE BLDC GLUCOMTR-MCNC: 221 MG/DL (ref 70–130)
GLUCOSE BLDC GLUCOMTR-MCNC: 230 MG/DL (ref 70–130)
HOLD SPECIMEN: NORMAL

## 2018-02-04 PROCEDURE — 96361 HYDRATE IV INFUSION ADD-ON: CPT

## 2018-02-04 PROCEDURE — G0378 HOSPITAL OBSERVATION PER HR: HCPCS

## 2018-02-04 PROCEDURE — 96372 THER/PROPH/DIAG INJ SC/IM: CPT

## 2018-02-04 PROCEDURE — 96360 HYDRATION IV INFUSION INIT: CPT

## 2018-02-04 PROCEDURE — 25010000002 ENOXAPARIN PER 10 MG: Performed by: FAMILY MEDICINE

## 2018-02-04 PROCEDURE — 99218 PR INITIAL OBSERVATION CARE/DAY 30 MINUTES: CPT | Performed by: FAMILY MEDICINE

## 2018-02-04 PROCEDURE — 82962 GLUCOSE BLOOD TEST: CPT

## 2018-02-04 RX ORDER — TRAMADOL HYDROCHLORIDE 50 MG/1
50 TABLET ORAL EVERY 6 HOURS PRN
Status: DISCONTINUED | OUTPATIENT
Start: 2018-02-04 | End: 2018-02-06 | Stop reason: HOSPADM

## 2018-02-04 RX ORDER — OSELTAMIVIR PHOSPHATE 30 MG/1
30 CAPSULE ORAL EVERY 12 HOURS SCHEDULED
Status: DISCONTINUED | OUTPATIENT
Start: 2018-02-04 | End: 2018-02-05

## 2018-02-04 RX ORDER — SODIUM CHLORIDE 9 MG/ML
50 INJECTION, SOLUTION INTRAVENOUS CONTINUOUS
Status: DISCONTINUED | OUTPATIENT
Start: 2018-02-04 | End: 2018-02-06 | Stop reason: HOSPADM

## 2018-02-04 RX ORDER — LOSARTAN POTASSIUM 25 MG/1
25 TABLET ORAL DAILY
Status: DISCONTINUED | OUTPATIENT
Start: 2018-02-04 | End: 2018-02-06 | Stop reason: HOSPADM

## 2018-02-04 RX ORDER — LETROZOLE 2.5 MG/1
2.5 TABLET, FILM COATED ORAL DAILY
Status: DISCONTINUED | OUTPATIENT
Start: 2018-02-04 | End: 2018-02-06 | Stop reason: HOSPADM

## 2018-02-04 RX ORDER — PROPAFENONE HYDROCHLORIDE 225 MG/1
225 TABLET, FILM COATED ORAL 2 TIMES DAILY
Status: DISCONTINUED | OUTPATIENT
Start: 2018-02-04 | End: 2018-02-06 | Stop reason: HOSPADM

## 2018-02-04 RX ORDER — LEVOTHYROXINE SODIUM 88 UG/1
88 TABLET ORAL DAILY
Status: DISCONTINUED | OUTPATIENT
Start: 2018-02-04 | End: 2018-02-06 | Stop reason: HOSPADM

## 2018-02-04 RX ORDER — ACETAMINOPHEN 325 MG/1
650 TABLET ORAL EVERY 6 HOURS PRN
Status: DISCONTINUED | OUTPATIENT
Start: 2018-02-04 | End: 2018-02-06 | Stop reason: HOSPADM

## 2018-02-04 RX ADMIN — ACETAMINOPHEN 650 MG: 325 TABLET, FILM COATED ORAL at 15:25

## 2018-02-04 RX ADMIN — OSELTAMIVIR PHOSPHATE 30 MG: 30 CAPSULE ORAL at 08:50

## 2018-02-04 RX ADMIN — LOSARTAN POTASSIUM 25 MG: 25 TABLET, FILM COATED ORAL at 17:53

## 2018-02-04 RX ADMIN — SODIUM CHLORIDE 100 ML/HR: 9 INJECTION, SOLUTION INTRAVENOUS at 11:56

## 2018-02-04 RX ADMIN — PROPAFENONE HYDROCHLORIDE 225 MG: 225 TABLET, COATED ORAL at 20:39

## 2018-02-04 RX ADMIN — SODIUM CHLORIDE 1000 ML: 9 INJECTION, SOLUTION INTRAVENOUS at 00:10

## 2018-02-04 RX ADMIN — SODIUM CHLORIDE 100 ML/HR: 9 INJECTION, SOLUTION INTRAVENOUS at 20:53

## 2018-02-04 RX ADMIN — SODIUM CHLORIDE 100 ML/HR: 9 INJECTION, SOLUTION INTRAVENOUS at 01:52

## 2018-02-04 RX ADMIN — ENOXAPARIN SODIUM 40 MG: 40 INJECTION SUBCUTANEOUS at 17:53

## 2018-02-04 RX ADMIN — ACETAMINOPHEN 650 MG: 325 TABLET, FILM COATED ORAL at 00:17

## 2018-02-04 RX ADMIN — LETROZOLE 2.5 MG: 2.5 TABLET ORAL at 17:53

## 2018-02-04 RX ADMIN — ACETAMINOPHEN 650 MG: 325 TABLET, FILM COATED ORAL at 09:00

## 2018-02-04 RX ADMIN — LEVOTHYROXINE SODIUM 88 MCG: 88 TABLET ORAL at 17:53

## 2018-02-04 RX ADMIN — OSELTAMIVIR PHOSPHATE 30 MG: 30 CAPSULE ORAL at 20:37

## 2018-02-04 RX ADMIN — DILTIAZEM HYDROCHLORIDE 30 MG: 30 TABLET, FILM COATED ORAL at 20:37

## 2018-02-04 NOTE — PLAN OF CARE
Problem: Patient Care Overview (Adult)  Goal: Plan of Care Review  Outcome: Ongoing (interventions implemented as appropriate)   02/04/18 8337   Coping/Psychosocial Response Interventions   Plan Of Care Reviewed With patient   Patient Care Overview   Progress no change   Outcome Evaluation   Outcome Summary/Follow up Plan C/O NP cough, weakness, and chills at times. VSS. IVF infusing.        Problem: Infection, Risk/Actual (Adult)  Goal: Identify Related Risk Factors and Signs and Symptoms  Outcome: Ongoing (interventions implemented as appropriate)    Goal: Infection Prevention/Resolution  Outcome: Ongoing (interventions implemented as appropriate)      Problem: Fall Risk (Adult)  Goal: Identify Related Risk Factors and Signs and Symptoms  Outcome: Outcome(s) achieved Date Met: 02/04/18    Goal: Absence of Falls  Outcome: Ongoing (interventions implemented as appropriate)

## 2018-02-04 NOTE — ED PROVIDER NOTES
"Subjective   HPI Comments: 71 y/o female with history of afib on anticoagulation for this with history of DM without history of COPD/asthma arrives for evaluation of a cough that she describes as \"my bronchials are inflamed\" that is productive with yellow/green mucus without noted fevers, chills, sob, dyspnea, solis, pnd, orthopnea, weight gain, weight loss, cp, LE pain, ill contacts or other issues. She also notes diffuse body aches but denies rash, falls, urinary issues. She arrives in [facility].       Patient is a 72 y.o. female presenting with cough.   Cough   Cough characteristics:  Productive  Sputum characteristics:  Yellow  Severity:  Mild  Duration:  1 day  Chronicity:  New  Smoker: no    Relieved by:  Nothing  Worsened by:  Nothing  Ineffective treatments:  None tried  Associated symptoms: myalgias    Associated symptoms: no chest pain, no chills, no fever, no headaches, no rash, no sore throat and no wheezing        Review of Systems   Constitutional: Negative for chills, fatigue and fever.   HENT: Negative for sore throat.    Respiratory: Positive for cough. Negative for wheezing.    Cardiovascular: Negative for chest pain.   Gastrointestinal: Negative for abdominal pain, diarrhea, nausea and vomiting.   Genitourinary: Negative for dysuria, flank pain and frequency.   Musculoskeletal: Positive for myalgias. Negative for back pain.   Skin: Negative for rash.   Neurological: Negative for headaches.   All other systems reviewed and are negative.      Past Medical History:   Diagnosis Date   • Atrial fibrillation, currently in sinus rhythm    • Cancer     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)   • Diabetes mellitus, type II    • Dizziness    • Essential hypertension    • GERD (gastroesophageal reflux disease)    • GI bleed requiring more than 4 units of blood in 24 hours, ICU, or surgery    • History of lymphoma    • Hypomagnesemia    • Hypothyroidism    • On amiodarone therapy        Allergies   Allergen Reactions   • Amoxil " "[Amoxicillin] Hives   • Penicillins Hives   • Biaxin [Clarithromycin] Other (See Comments)     NOT SURE OF REACTION, \"SORE MOUTH OF DIARRHEA\"   • Lortab [Hydrocodone-Acetaminophen] GI Intolerance       Past Surgical History:   Procedure Laterality Date   • MASTECTOMY     • OTHER SURGICAL HISTORY      Mediport insertion X 2   • OTHER SURGICAL HISTORY      remote lymphoma treatment   • TUBAL ABDOMINAL LIGATION         Family History   Problem Relation Age of Onset   • Heart disease Mother    • Cancer Father    • Cancer Sister    • No Known Problems Brother    • Heart disease Sister    • Heart attack Sister    • No Known Problems Sister        Social History     Social History   • Marital status:      Spouse name: N/A   • Number of children: N/A   • Years of education: N/A     Social History Main Topics   • Smoking status: Former Smoker     Years: 20.00     Types: Cigarettes     Quit date: 1993   • Smokeless tobacco: Never Used   • Alcohol use No   • Drug use: No   • Sexual activity: Defer     Other Topics Concern   • None     Social History Narrative           Objective   Physical Exam   Constitutional: She is oriented to person, place, and time. She appears well-developed.   HENT:   Head: Normocephalic.   Right Ear: External ear normal.   Left Ear: External ear normal.   Nose: Nose normal.   Mouth/Throat: Oropharynx is clear and moist.   No exudates, no erythema.    Eyes: Conjunctivae are normal. Pupils are equal, round, and reactive to light.   Neck: Normal range of motion. Neck supple.   Cardiovascular: Normal rate, regular rhythm, normal heart sounds and intact distal pulses.    Pulmonary/Chest: Effort normal and breath sounds normal. No respiratory distress. She has no wheezes. She has no rales. She exhibits no tenderness.   Abdominal: Soft. Bowel sounds are normal.   Musculoskeletal: Normal range of motion.   Neurological: She is alert and oriented to person, place, and time. She has normal reflexes. "   Skin: Skin is warm.   Psychiatric: She has a normal mood and affect. Her behavior is normal.   Vitals reviewed.      Procedures         ED Course  ED Course   Comment By Time   Does not wish to stay, will stay for hydration and repeat lactate Pola Beatty MD 02/03 2141      Labs Reviewed   INFLUENZA ANTIGEN, RAPID - Abnormal; Notable for the following:        Result Value    Influenza A Ag, EIA Positive (*)     All other components within normal limits    Narrative:     Recommend confirmation of negative results by viral culture or molecular assay.   BASIC METABOLIC PANEL - Abnormal; Notable for the following:     Glucose 189 (*)     eGFR Non  Amer 55 (*)     Anion Gap 16.0 (*)     All other components within normal limits    Narrative:     The MDRD GFR formula is only valid for adults with stable renal function between ages 18 and 70.   LACTIC ACID, PLASMA - Abnormal; Notable for the following:     Lactate 2.8 (*)     All other components within normal limits   URINALYSIS W/ CULTURE IF INDICATED - Abnormal; Notable for the following:     Protein,  mg/dL (2+) (*)     All other components within normal limits   CBC WITH AUTO DIFFERENTIAL - Abnormal; Notable for the following:     Neutrophil % 88.5 (*)     Lymphocyte % 3.6 (*)     Lymphocytes, Absolute 0.29 (*)     Immature Grans, Absolute 0.04 (*)     All other components within normal limits   URINALYSIS, MICROSCOPIC ONLY - Abnormal; Notable for the following:     RBC, UA 3-5 (*)     WBC, UA 3-5 (*)     All other components within normal limits   PROCALCITONIN - Normal    Narrative:     SIRS, sepsis, severe sepsis, and septic shock are categorized according to the criteria of the consensus conference of the American College of Chest Physicians/Society of Critical Care Medicine.    PCT < 0.5 ng/mL     Systemic infection (sepsis) is not likely.    PCT >0.5 and < 2.0 ng/mL Systemic infection (sepsis) is possible, but other conditions are known to  elevate PCT as well.    PCT > 2.0 ng/mL     Systemic infection (sepsis) is likely, unless other causes are known.      PCT > 10.0 ng/mL    Important systemic inflammatory response, almost exclusively due to severe bacterial sepsis or septic shock.    PCT values of < 0.5 ng/mL do not exclude an infection, because localized infections (without systemic signs) may be associated with such low concentrations, or a systemic infection in its initial stages (<6 hours).  Increased PCT can occur without infection.  PCT concentrations between 0.5 and 2.0 ng/mL should be interpreted taking into account the patients history.  It is recommended to retest PCT within 6-24 hours if any concentrations < 2.0 ng/mL are obtained.   LACTIC ACID REFLEX TIMER   CBC AND DIFFERENTIAL    Narrative:     The following orders were created for panel order CBC & Differential.  Procedure                               Abnormality         Status                     ---------                               -----------         ------                     Scan Slide[[id number]]                                                                  CBC Auto Differential[[id number]]        Abnormal            Final result                 Please view results for these tests on the individual orders.     XR Chest 2 View   Final Result   Impression:   Bronchial wall thickening may suggest acute and/or chronic bronchitis.   No evidence of pneumonia.   This report was finalized on 02/03/2018 20:45 by  Francois Gaspar, .        After hydration lacate is still 2.1 patient requesting to stay.    Her lungs are clear, I do not feel she has bronchitis.             MDM    Final diagnoses:   Flu   Lactic acid acidosis            Pola Beatty MD  02/03/18 2530

## 2018-02-04 NOTE — PLAN OF CARE
Problem: Patient Care Overview (Adult)  Goal: Plan of Care Review  Outcome: Ongoing (interventions implemented as appropriate)   02/04/18 0534   Coping/Psychosocial Response Interventions   Plan Of Care Reviewed With patient   Patient Care Overview   Progress no change   Outcome Evaluation   Outcome Summary/Follow up Plan New admit from ER with diagnosis of Flu. Patient received 2 - 1 liter fluid boluses and Tamiflu in ER. Will continue to monitor.       Problem: Infection, Risk/Actual (Adult)  Goal: Identify Related Risk Factors and Signs and Symptoms  Outcome: Ongoing (interventions implemented as appropriate)    Goal: Infection Prevention/Resolution  Outcome: Ongoing (interventions implemented as appropriate)      Problem: Fall Risk (Adult)  Goal: Identify Related Risk Factors and Signs and Symptoms  Outcome: Ongoing (interventions implemented as appropriate)    Goal: Absence of Falls  Outcome: Ongoing (interventions implemented as appropriate)

## 2018-02-05 ENCOUNTER — EPISODE CHANGES (OUTPATIENT)
Dept: CASE MANAGEMENT | Facility: OTHER | Age: 73
End: 2018-02-05

## 2018-02-05 LAB
ANION GAP SERPL CALCULATED.3IONS-SCNC: 11 MMOL/L (ref 4–13)
BUN BLD-MCNC: 11 MG/DL (ref 5–21)
BUN/CREAT SERPL: 12.1 (ref 7–25)
CALCIUM SPEC-SCNC: 8.7 MG/DL (ref 8.4–10.4)
CHLORIDE SERPL-SCNC: 99 MMOL/L (ref 98–110)
CO2 SERPL-SCNC: 28 MMOL/L (ref 24–31)
CREAT BLD-MCNC: 0.91 MG/DL (ref 0.5–1.4)
DEPRECATED RDW RBC AUTO: 46.9 FL (ref 40–54)
ERYTHROCYTE [DISTWIDTH] IN BLOOD BY AUTOMATED COUNT: 14.9 % (ref 12–15)
GFR SERPL CREATININE-BSD FRML MDRD: 61 ML/MIN/1.73
GLUCOSE BLD-MCNC: 152 MG/DL (ref 70–100)
GLUCOSE BLDC GLUCOMTR-MCNC: 142 MG/DL (ref 70–130)
GLUCOSE BLDC GLUCOMTR-MCNC: 148 MG/DL (ref 70–130)
GLUCOSE BLDC GLUCOMTR-MCNC: 237 MG/DL (ref 70–130)
GLUCOSE BLDC GLUCOMTR-MCNC: 251 MG/DL (ref 70–130)
HCT VFR BLD AUTO: 32.7 % (ref 37–47)
HGB BLD-MCNC: 10.7 G/DL (ref 12–16)
MCH RBC QN AUTO: 28.2 PG (ref 28–32)
MCHC RBC AUTO-ENTMCNC: 32.7 G/DL (ref 33–36)
MCV RBC AUTO: 86.1 FL (ref 82–98)
PLATELET # BLD AUTO: 99 10*3/MM3 (ref 130–400)
PMV BLD AUTO: 11.6 FL (ref 6–12)
POTASSIUM BLD-SCNC: 3.9 MMOL/L (ref 3.5–5.3)
RBC # BLD AUTO: 3.8 10*6/MM3 (ref 4.2–5.4)
SODIUM BLD-SCNC: 138 MMOL/L (ref 135–145)
WBC NRBC COR # BLD: 3.78 10*3/MM3 (ref 4.8–10.8)

## 2018-02-05 PROCEDURE — 63710000001 INSULIN DETEMIR PER 5 UNITS: Performed by: FAMILY MEDICINE

## 2018-02-05 PROCEDURE — 99224 PR SBSQ OBSERVATION CARE/DAY 15 MINUTES: CPT | Performed by: FAMILY MEDICINE

## 2018-02-05 PROCEDURE — G0378 HOSPITAL OBSERVATION PER HR: HCPCS

## 2018-02-05 PROCEDURE — 85027 COMPLETE CBC AUTOMATED: CPT | Performed by: FAMILY MEDICINE

## 2018-02-05 PROCEDURE — 80048 BASIC METABOLIC PNL TOTAL CA: CPT | Performed by: FAMILY MEDICINE

## 2018-02-05 PROCEDURE — 82962 GLUCOSE BLOOD TEST: CPT

## 2018-02-05 RX ORDER — OSELTAMIVIR PHOSPHATE 75 MG/1
75 CAPSULE ORAL EVERY 12 HOURS SCHEDULED
Status: DISCONTINUED | OUTPATIENT
Start: 2018-02-05 | End: 2018-02-06 | Stop reason: HOSPADM

## 2018-02-05 RX ADMIN — OSELTAMIVIR PHOSPHATE 30 MG: 30 CAPSULE ORAL at 08:12

## 2018-02-05 RX ADMIN — LEVOTHYROXINE SODIUM 88 MCG: 88 TABLET ORAL at 08:12

## 2018-02-05 RX ADMIN — DILTIAZEM HYDROCHLORIDE 30 MG: 30 TABLET, FILM COATED ORAL at 20:42

## 2018-02-05 RX ADMIN — INSULIN DETEMIR 50 UNITS: 100 INJECTION, SOLUTION SUBCUTANEOUS at 08:21

## 2018-02-05 RX ADMIN — SODIUM CHLORIDE 100 ML/HR: 9 INJECTION, SOLUTION INTRAVENOUS at 06:20

## 2018-02-05 RX ADMIN — DILTIAZEM HYDROCHLORIDE 30 MG: 30 TABLET, FILM COATED ORAL at 08:12

## 2018-02-05 RX ADMIN — LOSARTAN POTASSIUM 25 MG: 25 TABLET, FILM COATED ORAL at 08:13

## 2018-02-05 RX ADMIN — LETROZOLE 2.5 MG: 2.5 TABLET ORAL at 08:12

## 2018-02-05 RX ADMIN — OSELTAMIVIR PHOSPHATE 75 MG: 75 CAPSULE ORAL at 21:44

## 2018-02-05 RX ADMIN — PROPAFENONE HYDROCHLORIDE 225 MG: 225 TABLET, COATED ORAL at 20:42

## 2018-02-05 RX ADMIN — PROPAFENONE HYDROCHLORIDE 225 MG: 225 TABLET, COATED ORAL at 08:12

## 2018-02-05 NOTE — PROGRESS NOTES
Discharge Planning Assessment  [facility]     Patient Name: Dianne Pritchard  MRN: [id number]  Today's Date: 2/5/2018    Admit Date: 2/3/2018          Discharge Needs Assessment       02/05/18 1433    Living Environment    Lives With (P)  child(doris), adult    Transportation Available (P)  family or friend will provide;car    Living Environment    Provides Primary Care For (P)  no one    Able to Return to Prior Living Arrangements (P)  yes    Discharge Needs Assessment    Concerns To Be Addressed (P)  no discharge needs identified    Readmission Within The Last 30 Days (P)  no previous admission in last 30 days    Anticipated Changes Related to Illness (P)  none    Equipment Currently Used at Home (P)  cane, quad;rollator;commode    Equipment Needed After Discharge (P)  none    Discharge Planning Comments (P)  Pt lives at home with son and plans to return at dc. Pt stated she has all needed DME already and does not anticipate needing anything additional at dc. Pt is independent and says she is still able to drive. Will follow.            Discharge Plan     None        Discharge Placement     No information found                Demographic Summary     None            Functional Status     None            Psychosocial     None            Abuse/Neglect     None            Legal     None            Substance Abuse     None            Patient Forms     None          Mojgan Robles

## 2018-02-05 NOTE — H&P
"[facility]  HISTORY AND PHYSICAL    Date of Admission: 2/3/2018  Primary Care Physician: Darryl Bang MD    Subjective--flu    Chief Complaint: \"i feel awful\"    HPI Comments: This is a yr old female with hx of dm and ca who presented to the ed with 24 hrs of body aches, fever, chills, harsh cough and diminished cough--dx with influenxa in the ed--ed md felt she needed to be kept for temp control and fluid support --admitted to obs     Cough   Associated symptoms include chills, a fever and myalgias.   Flu Symptoms   Associated symptoms include chills, congestion, coughing, fatigue, a fever, myalgias, nausea and weakness.       Review of Systems   Constitutional: Positive for activity change, appetite change, chills, fatigue and fever.   HENT: Positive for congestion.    Eyes: Negative.    Respiratory: Positive for cough.    Cardiovascular: Negative.    Gastrointestinal: Positive for nausea.   Endocrine: Negative.    Genitourinary: Negative.    Musculoskeletal: Positive for myalgias.   Skin: Negative.    Allergic/Immunologic: Negative.    Neurological: Positive for weakness.   Hematological: Negative.    Psychiatric/Behavioral: Negative.         Otherwise complete ROS reviewed and negative except as mentioned in the HPI.      Past Medical History:   Past Medical History:   Diagnosis Date   • Atrial fibrillation, currently in sinus rhythm    • Cancer     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)   • Diabetes mellitus, type II    • Dizziness    • Essential hypertension    • GERD (gastroesophageal reflux disease)    • GI bleed requiring more than 4 units of blood in 24 hours, ICU, or surgery    • History of lymphoma    • Hypomagnesemia    • Hypothyroidism    • On amiodarone therapy        Past Surgical History:  Past Surgical History:   Procedure Laterality Date   • MASTECTOMY     • OTHER SURGICAL HISTORY      Mediport insertion X 2   • OTHER SURGICAL HISTORY      remote lymphoma treatment   • TUBAL ABDOMINAL " "LIGATION         Social History:  reports that she quit smoking about 25 years ago. Her smoking use included Cigarettes. She quit after 20.00 years of use. She has never used smokeless tobacco. She reports that she does not drink alcohol or use illicit drugs.    Family History: family history includes Cancer in her father and sister; Heart attack in her sister; Heart disease in her mother and sister; No Known Problems in her brother and sister.     Allergies:  Allergies   Allergen Reactions   • Amoxil [Amoxicillin] Hives   • Penicillins Hives   • Biaxin [Clarithromycin] Other (See Comments)     NOT SURE OF REACTION, \"SORE MOUTH OF DIARRHEA\"   • Lortab [Hydrocodone-Acetaminophen] GI Intolerance       Medications:  Prior to Admission medications    Medication Sig Start Date End Date Taking? Authorizing Provider   Calcium Carb-Cholecalciferol (CALCIUM CARBONATE-VITAMIN D3) 600-400 MG-UNIT tablet Take  by mouth daily.    Historical Provider, MD   diltiaZEM (CARDIZEM) 30 MG tablet Take 1 tablet by mouth 2 (Two) Times a Day. 12/12/17   Darryl Bang MD   glucose blood (FREESTYLE LITE) test strip Use to test daily as directed-dx e11.9 1/17/18   Darryl Bang MD   Insulin Pen Needle 31G X 5 MM misc Use to test twice daily as directed 1/23/18   Darryl Bang MD   letrozole (FEMARA) 2.5 MG tablet Take  by mouth daily.    Historical Provider, MD   LEVEMIR FLEXTOUCH 100 UNIT/ML injection INJECT 50 UNITS TWICE DAILY.  Patient taking differently: INJECT 50 UNITS  DAILY. 1/10/18   Darryl Bang MD   levothyroxine (SYNTHROID, LEVOTHROID) 88 MCG tablet Take 1 tablet by mouth Daily. 11/16/17   Darryl Bang MD   losartan (COZAAR) 25 MG tablet Take 25 mg by mouth Daily. 6/13/17   Historical Provider, MD   metFORMIN (GLUCOPHAGE) 1000 MG tablet Take 1 tablet by mouth 2 (Two) Times a Day With Meals. 11/16/17   Darryl Bang MD   Multiple Vitamins-Minerals (CENTRUM SILVER PO) Take 1 tablet " "by mouth Daily.    Historical Provider, MD   propafenone (RYTHMOL) 225 MG tablet TAKE 1 TABLET BY MOUTH TWICE DAILY 7/10/17   Darryl Bang MD   traMADol (ULTRAM) 50 MG tablet  9/29/17   Historical Provider, MD       Objective    Vital Signs: /76 (BP Location: Left arm, Patient Position: Sitting)  Pulse 78  Temp 99.8 °F (37.7 °C) (Tympanic)   Resp 18  Ht 162.6 cm (64.02\")  Wt 87.5 kg (192 lb 12.8 oz)  SpO2 95%  BMI 33.08 kg/m2  Physical Exam   Constitutional: She is oriented to person, place, and time. She appears well-developed and well-nourished.   HENT:   Head: Normocephalic and atraumatic.   Right Ear: External ear normal.   Left Ear: External ear normal.   Nose: Nose normal.   Mouth/Throat: Oropharynx is clear and moist.   Eyes: Conjunctivae and EOM are normal. Pupils are equal, round, and reactive to light.   Neck: Normal range of motion. Neck supple.   Cardiovascular: Normal rate, regular rhythm, normal heart sounds and intact distal pulses.    Pulmonary/Chest: Effort normal and breath sounds normal.   Abdominal: Soft. Bowel sounds are normal.   Musculoskeletal: Normal range of motion.   Neurological: She is alert and oriented to person, place, and time. She has normal reflexes.   Skin: Skin is warm and dry.   Psychiatric: She has a normal mood and affect. Her behavior is normal. Judgment and thought content normal.   Nursing note and vitals reviewed.      Results Reviewed:  Lab Results (last 24 hours)     Procedure Component Value Units Date/Time    Urinalysis With / Culture If Indicated - Urine, Clean Catch [[id number]]  (Abnormal) Collected:  02/03/18 2052    Specimen:  Urine from Urine, Clean Catch Updated:  02/03/18 2102     Color, UA Yellow     Appearance, UA Clear     pH, UA 7.0     Specific Gravity, UA 1.013     Glucose, UA Negative     Ketones, UA Negative     Bilirubin, UA Negative     Blood, UA Negative     Protein,  mg/dL (2+) (A)     Leuk Esterase, UA Negative     " Nitrite, UA Negative     Urobilinogen, UA 0.2 E.U./dL    Urinalysis, Microscopic Only - Urine, Clean Catch [[id number]]  (Abnormal) Collected:  02/03/18 2052    Specimen:  Urine from Urine, Clean Catch Updated:  02/03/18 2103     RBC, UA 3-5 (A) /HPF      WBC, UA 3-5 (A) /HPF      Bacteria, UA None Seen /HPF      Squamous Epithelial Cells, UA None Seen /HPF      Hyaline Casts, UA None Seen /LPF      Methodology Automated Microscopy    Basic Metabolic Panel [[id number]]  (Abnormal) Collected:  02/03/18 2052    Specimen:  Blood Updated:  02/03/18 2109     Glucose 189 (H) mg/dL      BUN 17 mg/dL       Specimen hemolyzed. Results may be affected.        Creatinine 1.00 mg/dL      Sodium 143 mmol/L      Potassium 4.7 mmol/L       Specimen hemolyzed.  Results may be affected.        Chloride 98 mmol/L      CO2 29.0 mmol/L      Calcium 9.8 mg/dL      eGFR Non African Amer 55 (L) mL/min/1.73      BUN/Creatinine Ratio 17.0     Anion Gap 16.0 (H) mmol/L     Narrative:       The MDRD GFR formula is only valid for adults with stable renal function between ages 18 and 70.    Influenza Antigen, Rapid - Swab, Nasopharynx [[id number]]  (Abnormal) Collected:  02/03/18 2050    Specimen:  Swab from Nasopharynx Updated:  02/03/18 2111     Influenza A Ag, EIA Positive (A)     Influenza B Ag, EIA Negative    Narrative:         Recommend confirmation of negative results by viral culture or molecular assay.    Lactic Acid, Plasma [[id number]]  (Abnormal) Collected:  02/03/18 2051    Specimen:  Blood Updated:  02/03/18 2113     Lactate 2.8 (C) mmol/L     CBC & Differential [[id number]] Collected:  02/03/18 2051    Specimen:  Blood Updated:  02/03/18 2124    Narrative:       The following orders were created for panel order CBC & Differential.  Procedure                               Abnormality         Status                     ---------                               -----------         ------                     Scan Slide[[id number]]                                                                   CBC Auto Differential[[id number]]        Abnormal            Final result                 Please view results for these tests on the individual orders.    CBC Auto Differential [[id number]]  (Abnormal) Collected:  02/03/18 2051    Specimen:  Blood Updated:  02/03/18 2124     WBC 8.01 10*3/mm3      RBC 4.41 10*6/mm3      Hemoglobin 12.5 g/dL      Hematocrit 37.9 %      MCV 85.9 fL      MCH 28.3 pg      MCHC 33.0 g/dL      RDW 14.7 %      RDW-SD 46.2 fl      MPV 10.9 fL      Platelets 137 10*3/mm3      Neutrophil % 88.5 (H) %      Lymphocyte % 3.6 (L) %      Monocyte % 5.6 %      Eosinophil % 1.6 %      Basophil % 0.2 %      Immature Grans % 0.5 %      Neutrophils, Absolute 7.08 10*3/mm3      Lymphocytes, Absolute 0.29 (L) 10*3/mm3      Monocytes, Absolute 0.45 10*3/mm3      Eosinophils, Absolute 0.13 10*3/mm3      Basophils, Absolute 0.02 10*3/mm3      Immature Grans, Absolute 0.04 (H) 10*3/mm3      nRBC 0.0 /100 WBC     Procalcitonin [[id number]]  (Normal) Collected:  02/03/18 2052    Specimen:  Blood Updated:  02/03/18 2130     Procalcitonin <0.25 ng/mL     Narrative:       SIRS, sepsis, severe sepsis, and septic shock are categorized according to the criteria of the consensus conference of the American College of Chest Physicians/Society of Critical Care Medicine.    PCT < 0.5 ng/mL     Systemic infection (sepsis) is not likely.    PCT >0.5 and < 2.0 ng/mL Systemic infection (sepsis) is possible, but other conditions are known to elevate PCT as well.    PCT > 2.0 ng/mL     Systemic infection (sepsis) is likely, unless other causes are known.      PCT > 10.0 ng/mL    Important systemic inflammatory response, almost exclusively due to severe bacterial sepsis or septic shock.    PCT values of < 0.5 ng/mL do not exclude an infection, because localized infections (without systemic signs) may be associated with such low concentrations, or a systemic infection in its  initial stages (<6 hours).  Increased PCT can occur without infection.  PCT concentrations between 0.5 and 2.0 ng/mL should be interpreted taking into account the patients history.  It is recommended to retest PCT within 6-24 hours if any concentrations < 2.0 ng/mL are obtained.    Lactic Acid, Reflex Timer (This will reflex a repeat order 3-3:15 hours after ordered.) [[id number]] Collected:  02/03/18 2051    Specimen:  Blood Updated:  02/04/18 0016     Extra Tube Hold for add-ons.      Auto resulted.       Lactic Acid, Reflex [[id number]]  (Abnormal) Collected:  02/03/18 2312    Specimen:  Blood Updated:  02/04/18 0031     Lactate 2.1 (C) mmol/L     POC Glucose Once [[id number]]  (Abnormal) Collected:  02/04/18 0815    Specimen:  Blood Updated:  02/04/18 0848     Glucose 159 (H) mg/dL       : [id number] [employer] ID: [id number]       POC Glucose Once [[id number]]  (Abnormal) Collected:  02/04/18 1151    Specimen:  Blood Updated:  02/04/18 1237     Glucose 221 (H) mg/dL       : [id number] [employer] PamelaMeter ID: [id number]       POC Glucose Once [[id number]]  (Abnormal) Collected:  02/04/18 1702    Specimen:  Blood Updated:  02/04/18 1732     Glucose 134 (H) mg/dL       : [id number] [employer] ID: [id number]           Imaging Results (last 24 hours)     Procedure Component Value Units Date/Time    XR Chest 2 View [[id number]] Collected:  02/03/18 2041     Updated:  02/03/18 2048    Narrative:       History:  72-year-old evaluated for cough.     Reference:  Chest radiographs June 2016.     Findings:  Frontal and lateral chest radiographs performed.     Patient is post right mastectomy with right chest wall surgical clips  noted. Bronchial wall thickening without consolidation or edema. No  pleural effusion or pneumothorax. No acute osseous abnormalities.          Impression:       Impression:  Bronchial wall thickening may suggest acute and/or chronic bronchitis.  No evidence of  pneumonia.  This report was finalized on 02/03/2018 20:45 by  Francois Gaspar, .            Hospital Problem List     Flu          Assessment / Plan  Influenza a---symptom control and iv fluids for support--monitor for complications--home in 1-2 days if stable      Code Status: full code     I discussed the patient's findings and my recommendations with the patient and her son.    Estimated length of stay 2 days.    Darryl Bang MD   02/04/18   6:53 PM

## 2018-02-05 NOTE — PROGRESS NOTES
"Pharmacy Dosing Service  TAMIFLU    Assessment/Action/Plan:  Est CrCl 60 mL/min or above. Based on prescribing information provided by the drug , TAMIFLU 30 mg PO every 12 hours, has been changed to 75 mg PO every 12 hours. Pharmacy will continue to monitor daily and make further adjustment(s) accordingly.     Dianne Pritchard is a 72 y.o. female     Additional Factors Considered:  • Patient disposition per documentation  • Disease state or condition being treated    Objective:  Ht: 162.6 cm (64.02\"); Wt: 88.6 kg (195 lb 4.8 oz) Body mass index is 33.51 kg/(m^2).  Estimated Creatinine Clearance: 60.3 mL/min (by C-G formula based on Cr of 0.91).   Lab Results   Component Value Date    CREATININE 0.91 02/05/2018    CREATININE 1.00 02/03/2018       Damian Morales [facility] PharmD  02/05/18 1:09 PM    "

## 2018-02-05 NOTE — PLAN OF CARE
Problem: Patient Care Overview (Adult)  Goal: Plan of Care Review  Outcome: Ongoing (interventions implemented as appropriate)   02/05/18 1547   Coping/Psychosocial Response Interventions   Plan Of Care Reviewed With patient   Patient Care Overview   Progress improving   Outcome Evaluation   Outcome Summary/Follow up Plan Pt denies any SOA. Safety maintained. Continues on droplet precautions for Flu.        Problem: Infection, Risk/Actual (Adult)  Goal: Identify Related Risk Factors and Signs and Symptoms  Outcome: Ongoing (interventions implemented as appropriate)    Goal: Infection Prevention/Resolution  Outcome: Ongoing (interventions implemented as appropriate)      Problem: Fall Risk (Adult)  Goal: Absence of Falls  Outcome: Ongoing (interventions implemented as appropriate)

## 2018-02-05 NOTE — PLAN OF CARE
Problem: Patient Care Overview (Adult)  Goal: Plan of Care Review  Outcome: Ongoing (interventions implemented as appropriate)   02/05/18 0501   Coping/Psychosocial Response Interventions   Plan Of Care Reviewed With patient   Patient Care Overview   Progress no change   Outcome Evaluation   Outcome Summary/Follow up Plan Patient complained of weakness and non-productive cough. Patient has run low grade fever this shift. Will continue to monitor.       Problem: Infection, Risk/Actual (Adult)  Goal: Identify Related Risk Factors and Signs and Symptoms  Outcome: Ongoing (interventions implemented as appropriate)    Goal: Infection Prevention/Resolution  Outcome: Ongoing (interventions implemented as appropriate)      Problem: Fall Risk (Adult)  Goal: Absence of Falls  Outcome: Ongoing (interventions implemented as appropriate)

## 2018-02-06 ENCOUNTER — TELEPHONE (OUTPATIENT)
Dept: FAMILY MEDICINE CLINIC | Facility: CLINIC | Age: 73
End: 2018-02-06

## 2018-02-06 VITALS
HEART RATE: 83 BPM | RESPIRATION RATE: 18 BRPM | SYSTOLIC BLOOD PRESSURE: 114 MMHG | OXYGEN SATURATION: 96 % | WEIGHT: 195.4 LBS | TEMPERATURE: 98.6 F | BODY MASS INDEX: 33.36 KG/M2 | HEIGHT: 64 IN | DIASTOLIC BLOOD PRESSURE: 55 MMHG

## 2018-02-06 LAB
ANION GAP SERPL CALCULATED.3IONS-SCNC: 10 MMOL/L (ref 4–13)
BUN BLD-MCNC: 13 MG/DL (ref 5–21)
BUN/CREAT SERPL: 13.4 (ref 7–25)
CALCIUM SPEC-SCNC: 8.6 MG/DL (ref 8.4–10.4)
CHLORIDE SERPL-SCNC: 102 MMOL/L (ref 98–110)
CO2 SERPL-SCNC: 31 MMOL/L (ref 24–31)
CREAT BLD-MCNC: 0.97 MG/DL (ref 0.5–1.4)
DEPRECATED RDW RBC AUTO: 48 FL (ref 40–54)
ERYTHROCYTE [DISTWIDTH] IN BLOOD BY AUTOMATED COUNT: 14.9 % (ref 12–15)
GFR SERPL CREATININE-BSD FRML MDRD: 56 ML/MIN/1.73
GLUCOSE BLD-MCNC: 115 MG/DL (ref 70–100)
GLUCOSE BLDC GLUCOMTR-MCNC: 122 MG/DL (ref 70–130)
GLUCOSE BLDC GLUCOMTR-MCNC: 246 MG/DL (ref 70–130)
HCT VFR BLD AUTO: 32.9 % (ref 37–47)
HGB BLD-MCNC: 10.6 G/DL (ref 12–16)
MCH RBC QN AUTO: 28.3 PG (ref 28–32)
MCHC RBC AUTO-ENTMCNC: 32.2 G/DL (ref 33–36)
MCV RBC AUTO: 88 FL (ref 82–98)
PLATELET # BLD AUTO: 106 10*3/MM3 (ref 130–400)
PMV BLD AUTO: 11.1 FL (ref 6–12)
POTASSIUM BLD-SCNC: 4.2 MMOL/L (ref 3.5–5.3)
RBC # BLD AUTO: 3.74 10*6/MM3 (ref 4.2–5.4)
SODIUM BLD-SCNC: 143 MMOL/L (ref 135–145)
WBC NRBC COR # BLD: 3.74 10*3/MM3 (ref 4.8–10.8)

## 2018-02-06 PROCEDURE — 99217 PR OBSERVATION CARE DISCHARGE MANAGEMENT: CPT | Performed by: FAMILY MEDICINE

## 2018-02-06 PROCEDURE — 80048 BASIC METABOLIC PNL TOTAL CA: CPT | Performed by: FAMILY MEDICINE

## 2018-02-06 PROCEDURE — 63710000001 INSULIN DETEMIR PER 5 UNITS: Performed by: FAMILY MEDICINE

## 2018-02-06 PROCEDURE — 85027 COMPLETE CBC AUTOMATED: CPT | Performed by: FAMILY MEDICINE

## 2018-02-06 PROCEDURE — G0378 HOSPITAL OBSERVATION PER HR: HCPCS

## 2018-02-06 PROCEDURE — 82962 GLUCOSE BLOOD TEST: CPT

## 2018-02-06 RX ORDER — OSELTAMIVIR PHOSPHATE 75 MG/1
CAPSULE ORAL
Qty: 4 CAPSULE | Refills: 0 | Status: SHIPPED | OUTPATIENT
Start: 2018-02-06 | End: 2018-02-13

## 2018-02-06 RX ADMIN — PROPAFENONE HYDROCHLORIDE 225 MG: 225 TABLET, COATED ORAL at 09:55

## 2018-02-06 RX ADMIN — INSULIN DETEMIR 50 UNITS: 100 INJECTION, SOLUTION SUBCUTANEOUS at 10:01

## 2018-02-06 RX ADMIN — OSELTAMIVIR PHOSPHATE 75 MG: 75 CAPSULE ORAL at 10:00

## 2018-02-06 RX ADMIN — ACETAMINOPHEN 650 MG: 325 TABLET, FILM COATED ORAL at 00:36

## 2018-02-06 RX ADMIN — DILTIAZEM HYDROCHLORIDE 30 MG: 30 TABLET, FILM COATED ORAL at 09:56

## 2018-02-06 RX ADMIN — LEVOTHYROXINE SODIUM 88 MCG: 88 TABLET ORAL at 09:57

## 2018-02-06 RX ADMIN — LETROZOLE 2.5 MG: 2.5 TABLET ORAL at 09:55

## 2018-02-06 RX ADMIN — LOSARTAN POTASSIUM 25 MG: 25 TABLET, FILM COATED ORAL at 09:57
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
## 2018-02-06 NOTE — PLAN OF CARE
Problem: Patient Care Overview (Adult)  Goal: Plan of Care Review  Outcome: Ongoing (interventions implemented as appropriate)   02/06/18 0423   Coping/Psychosocial Response Interventions   Plan Of Care Reviewed With patient   Patient Care Overview   Progress improving     Goal: Adult Individualization and Mutuality  Outcome: Ongoing (interventions implemented as appropriate)   02/06/18 0423   Individualization   Patient Specific Goals Pt. hopes to be discharged today or tomorrow.   Patient Specific Interventions Droplet precautions continue.       Problem: Infection, Risk/Actual (Adult)  Goal: Identify Related Risk Factors and Signs and Symptoms  Outcome: Ongoing (interventions implemented as appropriate)   02/06/18 0423   Infection, Risk/Actual   Infection, Risk/Actual: Related Risk Factors age extremes   Signs and Symptoms (Infection, Risk/Actual) weakness     Goal: Infection Prevention/Resolution  Outcome: Ongoing (interventions implemented as appropriate)   02/06/18 0423   Infection, Risk/Actual (Adult)   Infection Prevention/Resolution making progress toward outcome       Problem: Fall Risk (Adult)  Goal: Absence of Falls  Outcome: Ongoing (interventions implemented as appropriate)   02/06/18 0423   Fall Risk (Adult)   Absence of Falls making progress toward outcome

## 2018-02-08 NOTE — DISCHARGE SUMMARY
"[facility]   DISCHARGE SUMMARY       Date of Admission: 2/3/2018  Date of Discharge:  2/7/2018  Primary Care Physician: Darryl Bang MD    Presenting Problem/History of Present Illness:  Flu [J11.1]     Final Discharge Diagnoses:  Hospital Problem List     Flu          Consults: none    Procedures Performed: none    Pertinent Test Results: cxr neg for infiltrate    Chief Complaint on Day of Discharge: no body aches or fever    History of Present Illness on Day of Discharge: recent fever and myalgias have resolved     Hospital Course:  The patient is a 72 y.o. female who presented to [facility] with persistent cough, myalgias and decreasing po intake--started on tamiflu and iv fluid support--she began to feel better .She noted her fever and body aches resolved--her appetite improved and she was discharged.    Condition on Discharge:  good    Physical Exam on Discharge:  /55  Pulse 83  Temp 98.6 °F (37 °C)  Resp 18  Ht 162.6 cm (64.02\")  Wt 88.6 kg (195 lb 6.4 oz)  SpO2 96%  BMI 33.52 kg/m2  Physical Exam   Constitutional: She appears well-developed and well-nourished.   Cardiovascular: Normal rate, regular rhythm, normal heart sounds and intact distal pulses.    Pulmonary/Chest: Effort normal and breath sounds normal.   Nursing note and vitals reviewed.      Discharge Disposition:  Home or Self Care    Discharge Medications:   Dianne Pritchard   Home Medication Instructions FRACISCO:[id number]    Printed on:02/07/18 2049   Medication Information                      Calcium Carb-Cholecalciferol (CALCIUM CARBONATE-VITAMIN D3) 600-400 MG-UNIT tablet  Take  by mouth daily.             diltiaZEM (CARDIZEM) 30 MG tablet  Take 1 tablet by mouth 2 (Two) Times a Day.             Insulin Pen Needle 31G X 5 MM misc  Use to test twice daily as directed             letrozole (FEMARA) 2.5 MG tablet  Take  by mouth daily.             LEVEMIR FLEXTOUCH 100 UNIT/ML injection  INJECT 50 UNITS " TWICE DAILY.             levothyroxine (SYNTHROID, LEVOTHROID) 88 MCG tablet  Take 1 tablet by mouth Daily.             losartan (COZAAR) 25 MG tablet  Take 25 mg by mouth Daily.             metFORMIN (GLUCOPHAGE) 1000 MG tablet  Take 1 tablet by mouth 2 (Two) Times a Day With Meals.             Multiple Vitamins-Minerals (CENTRUM SILVER PO)  Take 1 tablet by mouth Daily.             oseltamivir (TAMIFLU) 75 MG capsule  Take twice a day for 2 days  Indications: Influenza A             propafenone (RYTHMOL) 225 MG tablet  TAKE 1 TABLET BY MOUTH TWICE DAILY             traMADol (ULTRAM) 50 MG tablet                   Discharge Diet:   Diet Instructions     Consistent Carbohydrate (diabetic diet)               Activity at Discharge:   Activity Instructions     Gradually resume your normal daily activity as tolerated.                Discharge Care Plan/Instructions: finish scriipt of tamiflu    Follow-up Appointments:   Future Appointments  Date Time Provider Department Center   2/13/2018 1:45 PM MD [facility] None   4/13/2018 11:30 AM MD [facility] None   4/16/2018 9:45 AM MD [facility] None   5/1/2018 9:00 AM [facility]   5/1/2018 10:15 AM Babar Oviedo DO [facility] None       Test Results Pending at Discharge: none    Darryl Bang MD  02/07/18  8:49 PM    Time: 8:50pm

## 2018-02-08 NOTE — PROGRESS NOTES
"   LOS: 0 days   Patient Care Team:  Darryl Bang MD as PCP - General (Family Medicine)  Darryl Bang MD as PCP - Claims Attributed  Pat Torres RN as Care Coordinator (Population Health)    Chief Complaint: 'im feeling better    Subjective     HPI Comments: Dianne is feeling better but still noting temp and body aches--appetitie is better    Cough   Associated symptoms include a fever. Pertinent negatives include no chest pain or shortness of breath.   Flu Symptoms   Associated symptoms include coughing, a fever and weakness. Pertinent negatives include no anorexia, chest pain, nausea or vomiting.       Subjective:  Symptoms:  Stable.  She reports cough and weakness.  No shortness of breath, chest pain, headache, chest pressure, anorexia, diarrhea or anxiety.    Diet:  Adequate intake.  No nausea or vomiting.    Activity level: Impaired due to weakness.    Pain:  She complains of pain that is mild.  She reports pain is improving.  Pain is requiring pain medication.        History taken from: patient chart    Objective     Vital Signs       Objective:  General Appearance:  Comfortable, well-appearing, in no acute distress and not in pain.    Vital signs: (most recent): Blood pressure 114/55, pulse 83, temperature 98.6 °F (37 °C), resp. rate 18, height 162.6 cm (64.02\"), weight 88.6 kg (195 lb 6.4 oz), SpO2 96 %, not currently breastfeeding.  Fever.    Output: Producing urine.    HEENT: Normal HEENT exam.    Lungs:  Normal respiratory rate and normal effort.  Breath sounds clear to auscultation.    Heart: Normal rate.  Regular rhythm.  S1 normal.    Abdomen: Abdomen is soft.  Bowel sounds are normal.   There is no abdominal tenderness.     Extremities: Normal range of motion.    Pulses: Distal pulses are intact.    Neurological: Patient is alert and oriented to person, place and time.  Normal strength.    Pupils:  Pupils are equal, round, and reactive to light.    Skin:  Warm and dry.  "             Results Review:     I reviewed the patient's new clinical results.    Medication Review: Scheduled Meds:  Continuous Infusions:  No current facility-administered medications for this encounter.   PRN Meds:.    Assessment/Plan     Active Problems:    Flu      Assessment:    Condition: In stable condition.  Improving.         Darryl Bnag MD  02/07/18  8:54 PM    Time: 15 min

## 2018-02-13 ENCOUNTER — OFFICE VISIT (OUTPATIENT)
Dept: FAMILY MEDICINE CLINIC | Facility: CLINIC | Age: 73
End: 2018-02-13

## 2018-02-13 VITALS
OXYGEN SATURATION: 96 % | HEIGHT: 64 IN | SYSTOLIC BLOOD PRESSURE: 136 MMHG | TEMPERATURE: 98.5 F | WEIGHT: 194 LBS | HEART RATE: 78 BPM | BODY MASS INDEX: 33.12 KG/M2 | DIASTOLIC BLOOD PRESSURE: 86 MMHG | RESPIRATION RATE: 16 BRPM

## 2018-02-13 DIAGNOSIS — J40 BRONCHITIS: Primary | ICD-10-CM

## 2018-02-13 PROCEDURE — 99213 OFFICE O/P EST LOW 20 MIN: CPT | Performed by: FAMILY MEDICINE

## 2018-02-13 RX ORDER — PREDNISONE 10 MG/1
TABLET ORAL
Qty: 7 TABLET | Refills: 0 | Status: SHIPPED | OUTPATIENT
Start: 2018-02-13 | End: 2018-04-13

## 2018-02-13 RX ORDER — AZITHROMYCIN 250 MG/1
TABLET, FILM COATED ORAL
Qty: 6 TABLET | Refills: 0 | Status: SHIPPED | OUTPATIENT
Start: 2018-02-13 | End: 2018-04-13

## 2018-02-13 NOTE — PROGRESS NOTES
"Glenn Pritchard is a 72 y.o. female.     Chief Complaint   Patient presents with   • Follow-up        History of Present Illness     she recently admitted to [facility] in [place] for influenza---home now wituout fever --cough is productive without hemoptysis or dyspnea      Current Outpatient Prescriptions:   •  Calcium Carb-Cholecalciferol (CALCIUM CARBONATE-VITAMIN D3) 600-400 MG-UNIT tablet, Take  by mouth daily., Disp: , Rfl:   •  diltiaZEM (CARDIZEM) 30 MG tablet, Take 1 tablet by mouth 2 (Two) Times a Day., Disp: 180 tablet, Rfl: 1  •  Insulin Pen Needle 31G X 5 MM misc, Use to test twice daily as directed, Disp: 100 each, Rfl: 3  •  letrozole (FEMARA) 2.5 MG tablet, Take  by mouth daily., Disp: , Rfl:   •  LEVEMIR FLEXTOUCH 100 UNIT/ML injection, INJECT 50 UNITS TWICE DAILY. (Patient taking differently: INJECT 50 UNITS  DAILY.), Disp: 15 pen, Rfl: 5  •  levothyroxine (SYNTHROID, LEVOTHROID) 88 MCG tablet, Take 1 tablet by mouth Daily., Disp: 90 tablet, Rfl: 1  •  losartan (COZAAR) 25 MG tablet, Take 25 mg by mouth Daily., Disp: , Rfl:   •  metFORMIN (GLUCOPHAGE) 1000 MG tablet, Take 1 tablet by mouth 2 (Two) Times a Day With Meals., Disp: 180 tablet, Rfl: 1  •  Multiple Vitamins-Minerals (CENTRUM SILVER PO), Take 1 tablet by mouth Daily., Disp: , Rfl:   •  propafenone (RYTHMOL) 225 MG tablet, TAKE 1 TABLET BY MOUTH TWICE DAILY, Disp: 180 tablet, Rfl: 0  Allergies   Allergen Reactions   • Amoxil [Amoxicillin] Hives   • Penicillins Hives   • Biaxin [Clarithromycin] Other (See Comments)     NOT SURE OF REACTION, \"SORE MOUTH OF DIARRHEA\"   • Lortab [Hydrocodone-Acetaminophen] GI Intolerance       Past Medical History:   Diagnosis Date   • Atrial fibrillation, currently in sinus rhythm    • Cancer     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)   • Diabetes mellitus, type II    • Dizziness    • Essential hypertension    • GERD (gastroesophageal reflux disease)    • GI bleed requiring more than 4 units of blood in 24 " "hours, ICU, or surgery    • History of lymphoma    • Hypomagnesemia    • Hypothyroidism    • On amiodarone therapy      Past Surgical History:   Procedure Laterality Date   • MASTECTOMY     • OTHER SURGICAL HISTORY      Mediport insertion X 2   • OTHER SURGICAL HISTORY      remote lymphoma treatment   • TUBAL ABDOMINAL LIGATION         Review of Systems   Constitutional: Negative.    HENT: Positive for congestion.    Eyes: Negative.    Respiratory: Positive for cough.    Cardiovascular: Negative.    Gastrointestinal: Negative.    Endocrine: Negative.    Genitourinary: Negative.  Negative for difficulty urinating.   Musculoskeletal: Negative.    Skin: Negative.    Allergic/Immunologic: Negative.    Neurological: Negative.    Hematological: Negative.    Psychiatric/Behavioral: Negative.        Objective  /86  Pulse 78  Temp 98.5 °F (36.9 °C)  Resp 16  Ht 162.6 cm (64.02\")  Wt 88 kg (194 lb)  SpO2 96%  BMI 33.28 kg/m2  Physical Exam   Constitutional: She is oriented to person, place, and time. She appears well-developed and well-nourished.   HENT:   Head: Normocephalic and atraumatic.   Right Ear: External ear normal.   Left Ear: External ear normal.   Nose: Nose normal.   Mouth/Throat: Oropharynx is clear and moist.   Eyes: Conjunctivae and EOM are normal. Pupils are equal, round, and reactive to light.   Neck: Normal range of motion. Neck supple.   Cardiovascular: Normal rate, regular rhythm, normal heart sounds and intact distal pulses.    Pulmonary/Chest: Effort normal.   Abdominal: Soft. Bowel sounds are normal.   Musculoskeletal: Normal range of motion.   Neurological: She is alert and oriented to person, place, and time. She has normal reflexes.   Skin: Skin is warm and dry.   Psychiatric: She has a normal mood and affect. Her behavior is normal. Judgment and thought content normal.   Nursing note and vitals reviewed.      Assessment/Plan   Dianne was seen today for follow-up.    Diagnoses and all " orders for this visit:    Bronchitis    Other orders  -     azithromycin (ZITHROMAX Z-ELIZABETH) 250 MG tablet; Take 2 tablets the first day, then 1 tablet daily for 4 days.  -     predniSONE (DELTASONE) 10 MG tablet; 20mg x 2 days then 10mg x 3 days      Keep me informed           No orders of the defined types were placed in this encounter.      Follow up: prn

## 2018-03-20 RX ORDER — PROPAFENONE HYDROCHLORIDE 225 MG/1
TABLET, FILM COATED ORAL
Qty: 90 TABLET | Refills: 0 | Status: SHIPPED | OUTPATIENT
Start: 2018-03-20 | End: 2018-05-07 | Stop reason: SDUPTHER

## 2018-04-05 DIAGNOSIS — I10 HYPERTENSION, UNSPECIFIED TYPE: ICD-10-CM

## 2018-04-05 DIAGNOSIS — Z00.00 WELLNESS EXAMINATION: ICD-10-CM

## 2018-04-05 DIAGNOSIS — E11.9 DIABETES MELLITUS WITHOUT COMPLICATION (HCC): Primary | ICD-10-CM

## 2018-04-05 LAB
BASOPHILS # BLD AUTO: 0.06 10*3/MM3 (ref 0–0.2)
BASOPHILS # BLD MANUAL: 0 10*3/MM3 (ref 0–0.2)
BASOPHILS NFR BLD AUTO: 0.8 % (ref 0–2)
BASOPHILS NFR BLD MANUAL: 0 % (ref 0–2)
DIFFERENTIAL COMMENT: ABNORMAL
EOSINOPHIL # BLD AUTO: 0.19 10*3/MM3 (ref 0–0.7)
EOSINOPHIL # BLD MANUAL: 0.43 10*3/MM3 (ref 0–0.7)
EOSINOPHIL NFR BLD AUTO: 2.7 % (ref 0–4)
EOSINOPHIL NFR BLD MANUAL: 6 % (ref 0–4)
ERYTHROCYTE [DISTWIDTH] IN BLOOD BY AUTOMATED COUNT: 13.8 % (ref 12–15)
HCT VFR BLD AUTO: 37.2 % (ref 37–47)
HGB BLD-MCNC: 12 G/DL (ref 12–16)
IMM GRANULOCYTES # BLD: 0.03 10*3/MM3 (ref 0–0.03)
IMM GRANULOCYTES NFR BLD: 0.4 % (ref 0–5)
LYMPHOCYTES # BLD AUTO: 1.16 10*3/MM3 (ref 0.72–4.86)
LYMPHOCYTES # BLD MANUAL: 1.07 10*3/MM3 (ref 0.72–4.86)
LYMPHOCYTES NFR BLD AUTO: 16.3 % (ref 15–45)
LYMPHOCYTES NFR BLD MANUAL: 15 % (ref 15–45)
MCH RBC QN AUTO: 29 PG (ref 28–32)
MCHC RBC AUTO-ENTMCNC: 32.3 G/DL (ref 33–36)
MCV RBC AUTO: 89.9 FL (ref 82–98)
MONOCYTES # BLD AUTO: 0.4 10*3/MM3 (ref 0.19–1.3)
MONOCYTES # BLD MANUAL: 0.43 10*3/MM3 (ref 0.19–1.3)
MONOCYTES NFR BLD AUTO: 5.6 % (ref 4–12)
MONOCYTES NFR BLD MANUAL: 6 % (ref 4–12)
NEUTROPHILS # BLD AUTO: 5.28 10*3/MM3 (ref 1.87–8.4)
NEUTROPHILS # BLD MANUAL: 5.2 10*3/MM3 (ref 1.87–8.4)
NEUTROPHILS NFR BLD AUTO: 74.2 % (ref 39–78)
NEUTROPHILS NFR BLD MANUAL: 72 % (ref 39–78)
PLATELET # BLD AUTO: 137 10*3/MM3 (ref 130–400)
PLATELET BLD QL SMEAR: ABNORMAL
RBC # BLD AUTO: 4.14 10*6/MM3 (ref 4.2–5.4)
RBC MORPH BLD: ABNORMAL
WBC # BLD AUTO: 7.12 10*3/MM3 (ref 4.8–10.8)

## 2018-04-10 ENCOUNTER — RESULTS ENCOUNTER (OUTPATIENT)
Dept: FAMILY MEDICINE CLINIC | Facility: CLINIC | Age: 73
End: 2018-04-10

## 2018-04-10 DIAGNOSIS — I10 HYPERTENSION, UNSPECIFIED TYPE: ICD-10-CM

## 2018-04-10 DIAGNOSIS — Z00.00 WELLNESS EXAMINATION: ICD-10-CM

## 2018-04-10 DIAGNOSIS — E11.9 DIABETES MELLITUS WITHOUT COMPLICATION (HCC): ICD-10-CM

## 2018-04-13 ENCOUNTER — TELEPHONE (OUTPATIENT)
Dept: FAMILY MEDICINE CLINIC | Facility: CLINIC | Age: 73
End: 2018-04-13

## 2018-04-13 ENCOUNTER — OFFICE VISIT (OUTPATIENT)
Dept: FAMILY MEDICINE CLINIC | Facility: CLINIC | Age: 73
End: 2018-04-13

## 2018-04-13 VITALS
BODY MASS INDEX: 33.46 KG/M2 | SYSTOLIC BLOOD PRESSURE: 152 MMHG | RESPIRATION RATE: 16 BRPM | WEIGHT: 196 LBS | OXYGEN SATURATION: 94 % | DIASTOLIC BLOOD PRESSURE: 82 MMHG | HEIGHT: 64 IN | HEART RATE: 87 BPM

## 2018-04-13 DIAGNOSIS — I48.0 PAROXYSMAL ATRIAL FIBRILLATION (HCC): ICD-10-CM

## 2018-04-13 DIAGNOSIS — E03.9 ACQUIRED HYPOTHYROIDISM: ICD-10-CM

## 2018-04-13 DIAGNOSIS — Z79.4 TYPE 2 DIABETES MELLITUS WITHOUT COMPLICATION, WITH LONG-TERM CURRENT USE OF INSULIN (HCC): ICD-10-CM

## 2018-04-13 DIAGNOSIS — E11.9 TYPE 2 DIABETES MELLITUS WITHOUT COMPLICATION, WITH LONG-TERM CURRENT USE OF INSULIN (HCC): ICD-10-CM

## 2018-04-13 DIAGNOSIS — I10 ESSENTIAL HYPERTENSION: Primary | ICD-10-CM

## 2018-04-13 PROBLEM — J40 BRONCHITIS: Status: RESOLVED | Noted: 2018-02-13 | Resolved: 2018-04-13

## 2018-04-13 PROBLEM — J11.1 FLU: Status: RESOLVED | Noted: 2018-02-03 | Resolved: 2018-04-13

## 2018-04-13 PROCEDURE — 99214 OFFICE O/P EST MOD 30 MIN: CPT | Performed by: FAMILY MEDICINE

## 2018-04-13 RX ORDER — LEVOTHYROXINE SODIUM 0.12 MG/1
125 TABLET ORAL DAILY
Qty: 30 TABLET | Refills: 0 | Status: SHIPPED | OUTPATIENT
Start: 2018-04-13 | End: 2018-05-07 | Stop reason: SDUPTHER

## 2018-04-13 NOTE — PROGRESS NOTES
"Glenn Pritchard is a 72 y.o. female.     Chief Complaint   Patient presents with   • Follow-up     6 mo            History of Present Illness     she is seeing endocrinology for dm and thyroid dz--she notes her bp is doign well without cp or ha..her afib is stable wituout palpitiatons or tia or cva symptoms      Current Outpatient Prescriptions:   •  Calcium Carb-Cholecalciferol (CALCIUM CARBONATE-VITAMIN D3) 600-400 MG-UNIT tablet, Take  by mouth daily., Disp: , Rfl:   •  diltiaZEM (CARDIZEM) 30 MG tablet, Take 1 tablet by mouth 2 (Two) Times a Day., Disp: 180 tablet, Rfl: 1  •  Insulin Pen Needle 31G X 5 MM misc, Use to test twice daily as directed, Disp: 100 each, Rfl: 3  •  letrozole (FEMARA) 2.5 MG tablet, Take  by mouth daily., Disp: , Rfl:   •  LEVEMIR FLEXTOUCH 100 UNIT/ML injection, INJECT 50 UNITS TWICE DAILY. (Patient taking differently: INJECT 50 UNITS  DAILY.), Disp: 15 pen, Rfl: 5  •  levothyroxine (SYNTHROID, LEVOTHROID) 88 MCG tablet, Take 1 tablet by mouth Daily., Disp: 90 tablet, Rfl: 1  •  losartan (COZAAR) 25 MG tablet, Take 25 mg by mouth Daily., Disp: , Rfl:   •  Multiple Vitamins-Minerals (CENTRUM SILVER PO), Take 1 tablet by mouth Daily., Disp: , Rfl:   •  propafenone (RYTHMOL) 225 MG tablet, TAKE ONE TABLET BY MOUTH EVERY 8 HOURS, Disp: 90 tablet, Rfl: 0  Allergies   Allergen Reactions   • Amoxil [Amoxicillin] Hives   • Penicillins Hives   • Biaxin [Clarithromycin] Other (See Comments)     NOT SURE OF REACTION, \"SORE MOUTH OF DIARRHEA\"   • Lortab [Hydrocodone-Acetaminophen] GI Intolerance       Past Medical History:   Diagnosis Date   • Atrial fibrillation, currently in sinus rhythm    • Cancer     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)   • Diabetes mellitus, type II    • Dizziness    • Essential hypertension    • GERD (gastroesophageal reflux disease)    • GI bleed requiring more than 4 units of blood in 24 hours, ICU, or surgery    • History of lymphoma    • Hypomagnesemia    • Hypothyroidism " "   • On amiodarone therapy      Past Surgical History:   Procedure Laterality Date   • MASTECTOMY     • OTHER SURGICAL HISTORY      Mediport insertion X 2   • OTHER SURGICAL HISTORY      remote lymphoma treatment   • TUBAL ABDOMINAL LIGATION         Review of Systems   Constitutional: Negative.    HENT: Negative.    Eyes: Negative.    Respiratory: Negative.    Cardiovascular: Negative.    Gastrointestinal: Negative.    Endocrine: Negative.    Genitourinary: Negative.    Musculoskeletal: Negative.    Skin: Negative.    Allergic/Immunologic: Negative.    Neurological: Negative.    Hematological: Negative.    Psychiatric/Behavioral: Negative.        Objective  /82   Pulse 87   Resp 16   Ht 162.6 cm (64\")   Wt 88.9 kg (196 lb)   SpO2 94%   BMI 33.64 kg/m²   Physical Exam   Constitutional: She is oriented to person, place, and time. She appears well-developed and well-nourished.   HENT:   Head: Normocephalic and atraumatic.   Right Ear: External ear normal.   Left Ear: External ear normal.   Nose: Nose normal.   Mouth/Throat: Oropharynx is clear and moist.   Eyes: Conjunctivae are normal. Pupils are equal, round, and reactive to light.   Neck: Normal range of motion. Neck supple.   Cardiovascular: Normal rate, normal heart sounds and intact distal pulses.    Pulmonary/Chest: Effort normal and breath sounds normal.   Abdominal: Soft. Bowel sounds are normal.   Musculoskeletal: Normal range of motion.   Neurological: She is alert and oriented to person, place, and time. She has normal reflexes.   Skin: Skin is warm. Capillary refill takes less than 2 seconds.   Psychiatric: She has a normal mood and affect. Her behavior is normal. Judgment and thought content normal.   Nursing note and vitals reviewed.      Assessment/Plan   Dianne was seen today for follow-up.    Diagnoses and all orders for this visit:    Essential hypertension    Paroxysmal atrial fibrillation    Type 2 diabetes mellitus without complication, " with long-term current use of insulin    Acquired hypothyroidism      Patient's Body mass index is 33.64 kg/m². BMI is above normal parameters. Follow-up plan includes:  exercise counseling and referral to a nutritionist.           No orders of the defined types were placed in this encounter.    Current outpatient and discharge medications have been reconciled for the patient.  Darryl Bang MD  Follow up: 5 month(s)

## 2018-04-13 NOTE — TELEPHONE ENCOUNTER
Pt said that she spoke to you today regarding her synthroid 125 she called dr frey office and they did not answer and nothing was called into [facility] she asked if u will please send in rx for this med

## 2018-04-24 ENCOUNTER — TELEPHONE (OUTPATIENT)
Dept: FAMILY MEDICINE CLINIC | Facility: CLINIC | Age: 73
End: 2018-04-24

## 2018-04-24 DIAGNOSIS — R30.0 DYSURIA: Primary | ICD-10-CM

## 2018-04-24 RX ORDER — LEVOFLOXACIN 500 MG/1
500 TABLET, FILM COATED ORAL DAILY
Qty: 7 TABLET | Refills: 0 | Status: SHIPPED | OUTPATIENT
Start: 2018-04-24 | End: 2018-05-01

## 2018-04-24 NOTE — TELEPHONE ENCOUNTER
Pt called she wants to come in this am to get a ua she thinks she has uti she has fever chills lower abdomen pain do u want to give meds after ua? [phone]

## 2018-04-27 LAB
APPEARANCE UR: CLEAR
BACTERIA UR CULT: ABNORMAL
BACTERIA UR CULT: ABNORMAL
BILIRUB UR QL STRIP: NEGATIVE
COLOR UR: YELLOW
GLUCOSE UR QL: ABNORMAL
HGB UR QL STRIP: NEGATIVE
KETONES UR QL STRIP: NEGATIVE
LEUKOCYTE ESTERASE UR QL STRIP: ABNORMAL
NITRITE UR QL STRIP: NEGATIVE
OTHER ANTIBIOTIC SUSC ISLT: ABNORMAL
PH UR STRIP: 5.5 [PH] (ref 5–8)
PROT UR QL STRIP: NEGATIVE
SP GR UR: 1.01 (ref 1–1.03)
UROBILINOGEN UR STRIP-MCNC: ABNORMAL MG/DL

## 2018-04-30 ENCOUNTER — TELEPHONE (OUTPATIENT)
Dept: VASCULAR SURGERY | Facility: CLINIC | Age: 73
End: 2018-04-30

## 2018-04-30 NOTE — TELEPHONE ENCOUNTER
Left message reminding Mrs Pritchard of her appointments tomorrow. Reminded [facility] for testing and follow up afterwards with Dr Oviedo at 1015 am. Also advised if she had any questions or needed to reschedule to please call the office at [phone].

## 2018-05-01 ENCOUNTER — OFFICE VISIT (OUTPATIENT)
Dept: VASCULAR SURGERY | Facility: CLINIC | Age: 73
End: 2018-05-01

## 2018-05-01 ENCOUNTER — HOSPITAL ENCOUNTER (OUTPATIENT)
Dept: ULTRASOUND IMAGING | Facility: HOSPITAL | Age: 73
Discharge: HOME | End: 2018-05-01
Admitting: NURSE PRACTITIONER

## 2018-05-01 VITALS
HEART RATE: 70 BPM | WEIGHT: 194 LBS | SYSTOLIC BLOOD PRESSURE: 136 MMHG | BODY MASS INDEX: 33.12 KG/M2 | DIASTOLIC BLOOD PRESSURE: 72 MMHG | OXYGEN SATURATION: 99 % | HEIGHT: 64 IN

## 2018-05-01 DIAGNOSIS — I65.23 BILATERAL CAROTID ARTERY STENOSIS: ICD-10-CM

## 2018-05-01 DIAGNOSIS — Z79.4 TYPE 2 DIABETES MELLITUS WITHOUT COMPLICATION, WITH LONG-TERM CURRENT USE OF INSULIN (HCC): ICD-10-CM

## 2018-05-01 DIAGNOSIS — I65.23 BILATERAL CAROTID ARTERY STENOSIS: Primary | ICD-10-CM

## 2018-05-01 DIAGNOSIS — E11.9 TYPE 2 DIABETES MELLITUS WITHOUT COMPLICATION, WITH LONG-TERM CURRENT USE OF INSULIN (HCC): ICD-10-CM

## 2018-05-01 DIAGNOSIS — I10 ESSENTIAL HYPERTENSION: ICD-10-CM

## 2018-05-01 PROCEDURE — 93880 EXTRACRANIAL BILAT STUDY: CPT | Performed by: SURGERY

## 2018-05-01 PROCEDURE — 99213 OFFICE O/P EST LOW 20 MIN: CPT | Performed by: NURSE PRACTITIONER

## 2018-05-01 PROCEDURE — 93880 EXTRACRANIAL BILAT STUDY: CPT

## 2018-05-03 ENCOUNTER — OFFICE VISIT (OUTPATIENT)
Dept: CARDIOLOGY | Facility: CLINIC | Age: 73
End: 2018-05-03

## 2018-05-03 VITALS
WEIGHT: 194.6 LBS | SYSTOLIC BLOOD PRESSURE: 142 MMHG | DIASTOLIC BLOOD PRESSURE: 70 MMHG | HEIGHT: 64 IN | BODY MASS INDEX: 33.22 KG/M2 | OXYGEN SATURATION: 100 % | HEART RATE: 95 BPM

## 2018-05-03 DIAGNOSIS — I48.0 PAROXYSMAL ATRIAL FIBRILLATION (HCC): Primary | ICD-10-CM

## 2018-05-03 DIAGNOSIS — I77.9 BILATERAL CAROTID ARTERY DISEASE (HCC): ICD-10-CM

## 2018-05-03 DIAGNOSIS — E11.9 TYPE 2 DIABETES MELLITUS WITHOUT COMPLICATION, WITH LONG-TERM CURRENT USE OF INSULIN (HCC): ICD-10-CM

## 2018-05-03 DIAGNOSIS — I10 ESSENTIAL HYPERTENSION: ICD-10-CM

## 2018-05-03 DIAGNOSIS — Z79.4 TYPE 2 DIABETES MELLITUS WITHOUT COMPLICATION, WITH LONG-TERM CURRENT USE OF INSULIN (HCC): ICD-10-CM

## 2018-05-03 PROCEDURE — 93000 ELECTROCARDIOGRAM COMPLETE: CPT | Performed by: NURSE PRACTITIONER

## 2018-05-03 PROCEDURE — 99214 OFFICE O/P EST MOD 30 MIN: CPT | Performed by: NURSE PRACTITIONER

## 2018-05-03 NOTE — PROGRESS NOTES
Subjective:     Encounter Date:05/03/2018      Patient ID: Dianne Pritchard is a 72 y.o. female.    Chief Complaint:  Atrial Fibrillation   Presents for follow-up visit. Symptoms are negative for dizziness, palpitations and syncope. The symptoms have been stable. Past medical history includes atrial fibrillation. There are no medication compliance problems.   Hypertension   This is a chronic problem. The current episode started more than 1 year ago. Condition status: borderline high. Pertinent negatives include no headaches, malaise/fatigue, orthopnea, palpitations or PND. Current antihypertension treatment includes angiotensin blockers and calcium channel blockers.     Patient presents today for follow up for atrial fibrillation. Patient is maintaining NSR without issues. Had GI bleeding on Xarelto and therefore was stopped June 2016. Patient takes Cardizem 30 twice a day and Rythmol 225 twice a day. It is unclear why patient is taking these medications this way. But she is maintaining NSR. Follows with Dr. Bang as PCP. At last office visit was referred to vascular for bilateral carotid artery stenosis and bruit- saw [facility] office 2 days ago.     The following portions of the patient's history were reviewed and updated as appropriate: allergies, current medications, past family history, past medical history, past social history, past surgical history and problem list.   Prior to Admission medications    Medication Sig Start Date End Date Taking? Authorizing Provider   Calcium Carb-Cholecalciferol (CALCIUM CARBONATE-VITAMIN D3) 600-400 MG-UNIT tablet Take  by mouth daily.   Yes Historical Provider, MD   diltiaZEM (CARDIZEM) 30 MG tablet Take 1 tablet by mouth 2 (Two) Times a Day. 12/12/17  Yes Darryl Bang MD   Insulin Pen Needle 31G X 5 MM misc Use to test twice daily as directed 1/23/18  Yes Darryl Bang MD   letrozole (FEMARA) 2.5 MG tablet Take  by mouth daily.   Yes Historical Provider,  MD   LEVEMIR FLEXTOUCH 100 UNIT/ML injection INJECT 50 UNITS TWICE DAILY.  Patient taking differently: INJECT 50 UNITS  DAILY. 1/10/18  Yes Darryl Bang MD   levothyroxine (SYNTHROID) 125 MCG tablet Take 1 tablet by mouth Daily. 4/13/18  Yes Darryl Bang MD   losartan (COZAAR) 25 MG tablet Take 25 mg by mouth Daily. 6/13/17  Yes Historical Provider, MD   Multiple Vitamins-Minerals (CENTRUM SILVER PO) Take 1 tablet by mouth Daily.   Yes Historical Provider, MD   propafenone (RYTHMOL) 225 MG tablet TAKE ONE TABLET BY MOUTH EVERY 8 HOURS 3/20/18  Yes ДМИТРИЙ Jama     Past Medical History:   Diagnosis Date   • Atrial fibrillation, currently in sinus rhythm    • Cancer     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)   • Diabetes mellitus, type II    • Dizziness    • Essential hypertension    • GERD (gastroesophageal reflux disease)    • GI bleed requiring more than 4 units of blood in 24 hours, ICU, or surgery    • History of lymphoma    • Hypomagnesemia    • Hypothyroidism    • On amiodarone therapy        Review of Systems   Constitution: Negative for chills, decreased appetite, fever, malaise/fatigue, weight gain and weight loss.   HENT: Negative for nosebleeds.    Eyes: Negative for visual disturbance.   Cardiovascular: Negative for dyspnea on exertion, leg swelling, near-syncope, orthopnea, palpitations, paroxysmal nocturnal dyspnea and syncope.   Respiratory: Negative for cough, hemoptysis and snoring.    Endocrine: Negative for cold intolerance and heat intolerance.   Hematologic/Lymphatic: Negative for bleeding problem. Does not bruise/bleed easily.   Skin: Negative for rash.   Musculoskeletal: Negative for back pain and falls.   Gastrointestinal: Negative for abdominal pain, constipation, diarrhea, heartburn, melena, nausea and vomiting.   Genitourinary: Negative for hematuria.   Neurological: Negative for dizziness, headaches and light-headedness.   Psychiatric/Behavioral: Negative for altered mental  "status.   Allergic/Immunologic: Negative for persistent infections.         ECG 12 Lead  Date/Time: 5/3/2018 2:48 PM  Performed by: MICHAEL MCGARRY  Authorized by: MICHAEL MCGARRY   Comparison: compared with previous ECG from 4/4/2017  Similar to previous ECG  Rhythm: sinus rhythm and A-V block               Objective:     Physical Exam   Constitutional: She is oriented to person, place, and time. She appears well-developed and well-nourished.   HENT:   Head: Normocephalic and atraumatic.   Eyes: Pupils are equal, round, and reactive to light.   Neck: Normal range of motion. Neck supple. No JVD present. Carotid bruit is not present.   Cardiovascular: Normal rate, regular rhythm, normal heart sounds and intact distal pulses.    Carotid bruit   Pulmonary/Chest: Effort normal and breath sounds normal.   Abdominal: Soft. Bowel sounds are normal.   Musculoskeletal: Normal range of motion.   Neurological: She is alert and oriented to person, place, and time. She has normal reflexes.   Skin: Skin is warm and dry.   Psychiatric: She has a normal mood and affect. Her behavior is normal. Judgment and thought content normal.     Blood pressure 142/70, pulse 95, height 162.6 cm (64\"), weight 88.3 kg (194 lb 9.6 oz), SpO2 100 %,     Lab Review:       Assessment:          Diagnosis Plan   1. Paroxysmal atrial fibrillation     2. Essential hypertension     3. Type 2 diabetes mellitus without complication, with long-term current use of insulin     4. BMI 33.0-33.9,adult     5. Bilateral carotid artery disease            Plan:       1. Paroxysmal Atrial Fibrillation- maintaining NSR on Cardizem 30 twice a day and Rythmol 225 BID - continue current dosing as maintaining NSR for over a year. Not anticoagulated for history of GI Bleed  2. Blood pressure borderline elevated- continue to monitor and call with persistent elevations  3. Type II DM- managed by PCP  4. Patient's Body mass index is 33.4 kg/m². BMI is above normal parameters. " Follow-up plan includes:  exercise counseling and nutrition counseling.  5. Bilateral carotid artery disease- managed by vascular

## 2018-05-07 RX ORDER — LEVOTHYROXINE SODIUM 0.12 MG/1
TABLET ORAL
Qty: 90 TABLET | Refills: 1 | Status: SHIPPED | OUTPATIENT
Start: 2018-05-07 | End: 2018-09-13 | Stop reason: DRUGHIGH

## 2018-05-07 RX ORDER — PROPAFENONE HYDROCHLORIDE 225 MG/1
TABLET, FILM COATED ORAL
Qty: 90 TABLET | Refills: 11 | Status: SHIPPED | OUTPATIENT
Start: 2018-05-07 | End: 2018-09-16 | Stop reason: HOSPADM

## 2018-06-15 ENCOUNTER — TRANSCRIBE ORDERS (OUTPATIENT)
Dept: ADMINISTRATIVE | Facility: HOSPITAL | Age: 73
End: 2018-06-15

## 2018-06-15 DIAGNOSIS — Z12.39 SCREENING BREAST EXAMINATION: Primary | ICD-10-CM

## 2018-06-15 DIAGNOSIS — Z78.0 POSTMENOPAUSAL: Primary | ICD-10-CM

## 2018-06-15 DIAGNOSIS — E11.9 DIABETES MELLITUS WITHOUT COMPLICATION (HCC): Primary | ICD-10-CM

## 2018-06-22 ENCOUNTER — HOSPITAL ENCOUNTER (OUTPATIENT)
Dept: MAMMOGRAPHY | Facility: HOSPITAL | Age: 73
Discharge: HOME | End: 2018-06-22
Attending: INTERNAL MEDICINE | Admitting: INTERNAL MEDICINE

## 2018-06-22 DIAGNOSIS — Z12.39 SCREENING BREAST EXAMINATION: ICD-10-CM

## 2018-06-22 PROCEDURE — 77067 SCR MAMMO BI INCL CAD: CPT

## 2018-06-22 PROCEDURE — 77063 BREAST TOMOSYNTHESIS BI: CPT

## 2018-06-27 RX ORDER — INSULIN DETEMIR 100 [IU]/ML
INJECTION, SOLUTION SUBCUTANEOUS
Qty: 15 ML | Refills: 5 | Status: SHIPPED | OUTPATIENT
Start: 2018-06-27 | End: 2018-09-16

## 2018-08-14 ENCOUNTER — HOSPITAL ENCOUNTER (OUTPATIENT)
Dept: BONE DENSITY | Facility: HOSPITAL | Age: 73
Discharge: HOME | End: 2018-08-14
Attending: INTERNAL MEDICINE | Admitting: INTERNAL MEDICINE

## 2018-08-14 DIAGNOSIS — Z78.0 POSTMENOPAUSAL: ICD-10-CM

## 2018-08-14 PROCEDURE — 77080 DXA BONE DENSITY AXIAL: CPT

## 2018-09-13 ENCOUNTER — OFFICE VISIT (OUTPATIENT)
Dept: FAMILY MEDICINE CLINIC | Facility: CLINIC | Age: 73
End: 2018-09-13

## 2018-09-13 VITALS
WEIGHT: 187 LBS | RESPIRATION RATE: 16 BRPM | BODY MASS INDEX: 31.92 KG/M2 | HEART RATE: 111 BPM | SYSTOLIC BLOOD PRESSURE: 138 MMHG | HEIGHT: 64 IN | DIASTOLIC BLOOD PRESSURE: 76 MMHG | OXYGEN SATURATION: 96 %

## 2018-09-13 DIAGNOSIS — E03.9 ACQUIRED HYPOTHYROIDISM: ICD-10-CM

## 2018-09-13 DIAGNOSIS — Z79.4 TYPE 2 DIABETES MELLITUS WITHOUT COMPLICATION, WITH LONG-TERM CURRENT USE OF INSULIN (HCC): ICD-10-CM

## 2018-09-13 DIAGNOSIS — E11.9 TYPE 2 DIABETES MELLITUS WITHOUT COMPLICATION, WITH LONG-TERM CURRENT USE OF INSULIN (HCC): ICD-10-CM

## 2018-09-13 DIAGNOSIS — I77.9 BILATERAL CAROTID ARTERY DISEASE (HCC): ICD-10-CM

## 2018-09-13 DIAGNOSIS — I48.0 PAROXYSMAL ATRIAL FIBRILLATION (HCC): ICD-10-CM

## 2018-09-13 DIAGNOSIS — I10 ESSENTIAL HYPERTENSION: Primary | ICD-10-CM

## 2018-09-13 PROCEDURE — G0439 PPPS, SUBSEQ VISIT: HCPCS | Performed by: FAMILY MEDICINE

## 2018-09-13 PROCEDURE — 99214 OFFICE O/P EST MOD 30 MIN: CPT | Performed by: FAMILY MEDICINE

## 2018-09-13 RX ORDER — LEVOTHYROXINE SODIUM 137 UG/1
137 TABLET ORAL DAILY
COMMUNITY
Start: 2018-07-02 | End: 2019-01-07 | Stop reason: SDUPTHER

## 2018-09-13 NOTE — PROGRESS NOTES
"Gelnn Pritchard is a 73 y.o. female.     Chief Complaint   Patient presents with   • Follow-up     5 mo   htn        History of Present Illness     she notes variable bs control sees dr mcmanus for her dm --she is toleraing synthroid without heat or cold intolaces--she notees good bp control witout cp or ha--she is still seeing cardiology for afib..      Current Outpatient Prescriptions:   •  Calcium Carb-Cholecalciferol (CALCIUM CARBONATE-VITAMIN D3) 600-400 MG-UNIT tablet, Take  by mouth daily., Disp: , Rfl:   •  diltiaZEM (CARDIZEM) 30 MG tablet, TAKE ONE TABLET BY MOUTH TWICE DAILY, Disp: 180 tablet, Rfl: 1  •  glucose blood (FREESTYLE LITE) test strip, Use to test blood sugar 4 times daily, Disp: 200 each, Rfl: 3  •  Insulin Pen Needle 31G X 5 MM misc, Use to test twice daily as directed, Disp: 100 each, Rfl: 3  •  letrozole (FEMARA) 2.5 MG tablet, Take  by mouth daily., Disp: , Rfl:   •  LEVEMIR FLEXTOUCH 100 UNIT/ML injection, INJECT 50 UNITS TWICE DAILY., Disp: 15 mL, Rfl: 5  •  levothyroxine (SYNTHROID, LEVOTHROID) 137 MCG tablet, , Disp: , Rfl:   •  losartan (COZAAR) 25 MG tablet, Take 25 mg by mouth Daily., Disp: , Rfl:   •  Multiple Vitamins-Minerals (CENTRUM SILVER PO), Take 1 tablet by mouth Daily., Disp: , Rfl:   •  propafenone (RYTHMOL) 225 MG tablet, TAKE 1 TABLET BY MOUTH EVERY 8 HOURS, Disp: 90 tablet, Rfl: 11  Allergies   Allergen Reactions   • Amoxil [Amoxicillin] Hives   • Penicillins Hives   • Biaxin [Clarithromycin] Other (See Comments)     NOT SURE OF REACTION, \"SORE MOUTH OF DIARRHEA\"   • Lortab [Hydrocodone-Acetaminophen] GI Intolerance       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)   • Diabetes mellitus, type II (CMS/HCC)    • Dizziness    • Drug therapy    • Essential hypertension    • GERD (gastroesophageal reflux disease)    • GI bleed " "requiring more than 4 units of blood in 24 hours, ICU, or surgery    • History of lymphoma    • Hx of radiation therapy    • Hypomagnesemia    • Hypothyroidism    • On amiodarone therapy      Past Surgical History:   Procedure Laterality Date   • BREAST BIOPSY     • MASTECTOMY     • OTHER SURGICAL HISTORY      Mediport insertion X 2   • OTHER SURGICAL HISTORY      remote lymphoma treatment   • REPLACEMENT TOTAL KNEE Right 09/2017   • TUBAL ABDOMINAL LIGATION         Review of Systems   Constitutional: Negative.    HENT: Negative.    Eyes: Negative.    Respiratory: Negative.    Cardiovascular: Negative.    Gastrointestinal: Negative.    Endocrine: Negative.    Genitourinary: Negative.    Musculoskeletal: Negative.    Skin: Negative.    Allergic/Immunologic: Negative.    Neurological: Negative.    Hematological: Negative.    Psychiatric/Behavioral: Negative.        Objective  /76   Pulse 111   Resp 16   Ht 162.6 cm (64\")   Wt 84.8 kg (187 lb)   SpO2 96%   BMI 32.10 kg/m²   Physical Exam   Constitutional: She is oriented to person, place, and time. She appears well-developed and well-nourished.   HENT:   Head: Normocephalic and atraumatic.   Eyes: Pupils are equal, round, and reactive to light. Conjunctivae and EOM are normal.   Neck: Normal range of motion. Neck supple.   Cardiovascular: Normal rate, normal heart sounds and intact distal pulses.    Pulmonary/Chest: Effort normal and breath sounds normal.   Abdominal: Soft. Bowel sounds are normal.   Musculoskeletal: Normal range of motion.   Neurological: She is alert and oriented to person, place, and time.   Skin: Capillary refill takes less than 2 seconds.   Psychiatric: She has a normal mood and affect. Her behavior is normal. Judgment and thought content normal.   Vitals reviewed.      Assessment/Plan   Dianne was seen today for follow-up.    Diagnoses and all orders for this visit:    Essential hypertension    Type 2 diabetes mellitus without " complication, with long-term current use of insulin (CMS/HCC)    Bilateral carotid artery disease (CMS/HCC)    Paroxysmal atrial fibrillation (CMS/HCC)    Acquired hypothyroidism                 No orders of the defined types were placed in this encounter.      Follow up: 6 month(s)

## 2018-09-13 NOTE — PATIENT INSTRUCTIONS
Advance Directive  Advance directives are legal documents that let you make choices ahead of time about your health care and medical treatment in case you become unable to communicate for yourself. Advance directives are a way for you to communicate your wishes to family, friends, and health care providers. This can help convey your decisions about end-of-life care if you become unable to communicate.  Discussing and writing advance directives should happen over time rather than all at once. Advance directives can be changed depending on your situation and what you want, even after you have signed the advance directives.  If you do not have an advance directive, some states assign family decision makers to act on your behalf based on how closely you are related to them. Each state has its own laws regarding advance directives. You may want to check with your health care provider, , or state representative about the laws in your state. There are different types of advance directives, such as:  · Medical power of .  · Living will.  · Do not resuscitate (DNR) or do not attempt resuscitation (DNAR) order.    Health care proxy and medical power of   A health care proxy, also called a health care agent, is a person who is appointed to make medical decisions for you in cases in which you are unable to make the decisions yourself. Generally, people choose someone they know well and trust to represent their preferences. Make sure to ask this person for an agreement to act as your proxy. A proxy may have to exercise judgment in the event of a medical decision for which your wishes are not known.  A medical power of  is a legal document that names your health care proxy. Depending on the laws in your state, after the document is written, it may also need to be:  · Signed.  · Notarized.  · Dated.  · Copied.  · Witnessed.  · Incorporated into your medical record.    You may also want to appoint  someone to manage your financial affairs in a situation in which you are unable to do so. This is called a durable power of  for finances. It is a separate legal document from the durable power of  for health care. You may choose the same person or someone different from your health care proxy to act as your agent in financial matters.  If you do not appoint a proxy, or if there is a concern that the proxy is not acting in your best interests, a court-appointed guardian may be designated to act on your behalf.  Living will  A living will is a set of instructions documenting your wishes about medical care when you cannot express them yourself. Health care providers should keep a copy of your living will in your medical record. You may want to give a copy to family members or friends. To alert caregivers in case of an emergency, you can place a card in your wallet to let them know that you have a living will and where they can find it. A living will is used if you become:  · Terminally ill.  · Incapacitated.  · Unable to communicate or make decisions.    Items to consider in your living will include:  · The use or non-use of life-sustaining equipment, such as dialysis machines and breathing machines (ventilators).  · A DNR or DNAR order, which is the instruction not to use cardiopulmonary resuscitation (CPR) if breathing or heartbeat stops.  · The use or non-use of tube feeding.  · Withholding of food and fluids.  · Comfort (palliative) care when the goal becomes comfort rather than a cure.  · Organ and tissue donation.    A living will does not give instructions for distributing your money and property if you should pass away. It is recommended that you seek the advice of a  when writing a will. Decisions about taxes, beneficiaries, and asset distribution will be legally binding. This process can relieve your family and friends of any concerns surrounding disputes or questions that may come up  about the distribution of your assets.  DNR or DNAR  A DNR or DNAR order is a request not to have CPR in the event that your heart stops beating or you stop breathing. If a DNR or DNAR order has not been made and shared, a health care provider will try to help any patient whose heart has stopped or who has stopped breathing. If you plan to have surgery, talk with your health care provider about how your DNR or DNAR order will be followed if problems occur.  Summary  · Advance directives are the legal documents that allow you to make choices ahead of time about your health care and medical treatment in case you become unable to communicate for yourself.  · The process of discussing and writing advance directives should happen over time. You can change the advance directives, even after you have signed them.  · Advance directives include DNR or DNAR orders, living duenas, and designating an agent as your medical power of .  This information is not intended to replace advice given to you by your health care provider. Make sure you discuss any questions you have with your health care provider.  Document Released: 03/26/2009 Document Revised: 11/06/2017 Document Reviewed: 11/06/2017  [employer] Interactive Patient Education © 2017 [employer]

## 2018-09-13 NOTE — PROGRESS NOTES
QUICK REFERENCE INFORMATION:  The ABCs of the Annual Wellness Visit    Subsequent Medicare Wellness Visit    HEALTH RISK ASSESSMENT    1945    Recent Hospitalizations:  Recently treated at the following:  [facility].        Current Medical Providers:  Patient Care Team:  Darryl Bang MD as PCP - General (Family Medicine)  Darryl Bang MD as PCP - Claims Attributed  Pat Greenberg RN as Care Coordinator (Population Health)        Smoking Status:  History   Smoking Status   • Former Smoker   • Years: 20.00   • Types: Cigarettes   • Quit date: 1993   Smokeless Tobacco   • Never Used       Alcohol Consumption:  History   Alcohol Use No       Depression Screen:   PHQ-2/PHQ-9 Depression Screening 9/13/2018   Little interest or pleasure in doing things 0   Feeling down, depressed, or hopeless 0   Trouble falling or staying asleep, or sleeping too much 0   Feeling tired or having little energy 1   Poor appetite or overeating 0   Feeling bad about yourself - or that you are a failure or have let yourself or your family down 0   Trouble concentrating on things, such as reading the newspaper or watching television 0   Moving or speaking so slowly that other people could have noticed. Or the opposite - being so fidgety or restless that you have been moving around a lot more than usual 0   Thoughts that you would be better off dead, or of hurting yourself in some way 0   Total Score 1   If you checked off any problems, how difficult have these problems made it for you to do your work, take care of things at home, or get along with other people? Not difficult at all       Health Habits and Functional and Cognitive Screening:  Functional & Cognitive Status 9/13/2018   Do you have difficulty preparing food and eating? No   Do you have difficulty bathing yourself, getting dressed or grooming yourself? No   Do you have difficulty using the toilet? No   Do you have difficulty moving around from  place to place? Yes   Do you have trouble with steps or getting out of a bed or a chair? Yes   In the past year have you fallen or experienced a near fall? No   Current Diet Well Balanced Diet   Dental Exam Up to date   Eye Exam Not up to date   Exercise (times per week) 5 times per week   Current Exercise Activities Include Gardening   Do you need help using the phone?  No   Are you deaf or do you have serious difficulty hearing?  No   Do you need help with transportation? No   Do you need help shopping? No   Do you need help preparing meals?  No   Do you need help with housework?  No   Do you need help with laundry? No   Do you need help taking your medications? No   Do you need help managing money? No   Do you ever drive or ride in a car without wearing a seat belt? No   Have you felt unusual stress, anger or loneliness in the last month? No   Who do you live with? Child   If you need help, do you have trouble finding someone available to you? No   Have you been bothered in the last four weeks by sexual problems? No   Do you have difficulty concentrating, remembering or making decisions? No           Does the patient have evidence of cognitive impairment? No    Aspirin use counseling: Contraindicated from taking ASA      Recent Lab Results:  CMP:  Lab Results   Component Value Date     (H) 10/13/2017    BUN 13 02/06/2018    CREATININE 0.97 02/06/2018    EGFRIFNONA 56 (L) 02/06/2018    EGFRIFAFRI 65 10/13/2017    BCR 13.4 02/06/2018     02/06/2018    K 4.2 02/06/2018    CO2 31.0 02/06/2018    CALCIUM 8.6 02/06/2018    PROTENTOTREF 7.6 10/13/2017    ALBUMIN 4.70 10/13/2017    LABGLOBREF 2.9 10/13/2017    LABIL2 1.6 10/13/2017    BILITOT 0.5 10/13/2017    ALKPHOS 79 10/13/2017    AST 28 10/13/2017    ALT 27 10/13/2017     Lipid Panel:  Lab Results   Component Value Date    TRIG 199 (H) 10/13/2017    HDL 65 10/13/2017    VLDL 39.8 10/13/2017    LDLHDL 2.7 04/29/2016     HbA1c:  Lab Results   Component  Value Date    HGBA1C 5.20 10/13/2017       Visual Acuity:   Visual Acuity Screening    Right eye Left eye Both eyes   Without correction:      With correction: 20/30 20/25 20/25       Age-appropriate Screening Schedule:  Refer to the list below for future screening recommendations based on patient's age, sex and/or medical conditions. Orders for these recommended tests are listed in the plan section. The patient has been provided with a written plan.    Health Maintenance   Topic Date Due   • TDAP/TD VACCINES (1 - Tdap) 06/24/1964   • ZOSTER VACCINE (1 of 2) 06/24/1995   • PNEUMOCOCCAL VACCINES (65+ LOW/MEDIUM RISK) (1 of 2 - PCV13) 06/24/2010   • DIABETIC FOOT EXAM  02/09/2018   • HEMOGLOBIN A1C  04/13/2018   • INFLUENZA VACCINE  08/01/2018   • DIABETIC EYE EXAM  08/03/2018   • URINE MICROALBUMIN  10/13/2018   • MAMMOGRAM  06/22/2020   • COLONOSCOPY  06/28/2026        Subjective   History of Present Illness    Dianne Pritchard is a 73 y.o. female who presents for an Subsequent Wellness Visit.    The following portions of the patient's history were reviewed and updated as appropriate: allergies, current medications, past family history, past medical history, past social history, past surgical history and problem list.    Outpatient Medications Prior to Visit   Medication Sig Dispense Refill   • Calcium Carb-Cholecalciferol (CALCIUM CARBONATE-VITAMIN D3) 600-400 MG-UNIT tablet Take  by mouth daily.     • diltiaZEM (CARDIZEM) 30 MG tablet TAKE ONE TABLET BY MOUTH TWICE DAILY 180 tablet 1   • glucose blood (FREESTYLE LITE) test strip Use to test blood sugar 4 times daily 200 each 3   • Insulin Pen Needle 31G X 5 MM misc Use to test twice daily as directed 100 each 3   • letrozole (FEMARA) 2.5 MG tablet Take  by mouth daily.     • LEVEMIR FLEXTOUCH 100 UNIT/ML injection INJECT 50 UNITS TWICE DAILY. 15 mL 5   • losartan (COZAAR) 25 MG tablet Take 25 mg by mouth Daily.     • Multiple Vitamins-Minerals (CENTRUM SILVER PO) Take  "1 tablet by mouth Daily.     • propafenone (RYTHMOL) 225 MG tablet TAKE 1 TABLET BY MOUTH EVERY 8 HOURS 90 tablet 11   • levothyroxine (SYNTHROID, LEVOTHROID) 125 MCG tablet TAKE 1 TABLET BY MOUTH ONCE DAILY 90 tablet 1     No facility-administered medications prior to visit.        Patient Active Problem List   Diagnosis   • Tremor   • Essential hypertension   • Type 2 diabetes mellitus without complication (CMS/HCC)   • Acquired hypothyroidism   • Paroxysmal atrial fibrillation (CMS/HCC)   • Dizziness   • Multiple falls   • Bruit of left carotid artery   • Acute pain of right knee   • Right knee pain   • Status post right knee replacement   • BMI 33.0-33.9,adult   • Bilateral carotid artery disease (CMS/HCC)       Advance Care Planning:  has NO advance directive - information provided to the patient today    Identification of Risk Factors:  Risk factors include: cardiovascular risk.    Review of Systems    Compared to one year ago, the patient feels her physical health is worse.  Compared to one year ago, the patient feels her mental health is worse.    Objective     Physical Exam    Vitals:    09/13/18 1133   BP: 138/76   Pulse: 111   Resp: 16   SpO2: 96%   Weight: 84.8 kg (187 lb)   Height: 162.6 cm (64\")       Patient's Body mass index is 32.1 kg/m². BMI is above normal parameters. Recommendations include: exercise counseling and nutrition counseling.      Assessment/Plan   Patient Self-Management and Personalized Health Advice  The patient has been provided with information about: designing advance directives and preventive services including:   · Advance directive.    Visit Diagnoses:    ICD-10-CM ICD-9-CM   1. Essential hypertension I10 401.9   2. Type 2 diabetes mellitus without complication, with long-term current use of insulin (CMS/HCC) E11.9 250.00    Z79.4 V58.67   3. Bilateral carotid artery disease (CMS/HCC) I77.9 447.9   4. Paroxysmal atrial fibrillation (CMS/HCC) I48.0 427.31   5. Acquired " hypothyroidism E03.9 244.9       No orders of the defined types were placed in this encounter.      Outpatient Encounter Prescriptions as of 9/13/2018   Medication Sig Dispense Refill   • Calcium Carb-Cholecalciferol (CALCIUM CARBONATE-VITAMIN D3) 600-400 MG-UNIT tablet Take  by mouth daily.     • diltiaZEM (CARDIZEM) 30 MG tablet TAKE ONE TABLET BY MOUTH TWICE DAILY 180 tablet 1   • glucose blood (FREESTYLE LITE) test strip Use to test blood sugar 4 times daily 200 each 3   • Insulin Pen Needle 31G X 5 MM misc Use to test twice daily as directed 100 each 3   • letrozole (FEMARA) 2.5 MG tablet Take  by mouth daily.     • LEVEMIR FLEXTOUCH 100 UNIT/ML injection INJECT 50 UNITS TWICE DAILY. 15 mL 5   • levothyroxine (SYNTHROID, LEVOTHROID) 137 MCG tablet      • losartan (COZAAR) 25 MG tablet Take 25 mg by mouth Daily.     • Multiple Vitamins-Minerals (CENTRUM SILVER PO) Take 1 tablet by mouth Daily.     • propafenone (RYTHMOL) 225 MG tablet TAKE 1 TABLET BY MOUTH EVERY 8 HOURS 90 tablet 11   • [DISCONTINUED] levothyroxine (SYNTHROID, LEVOTHROID) 125 MCG tablet TAKE 1 TABLET BY MOUTH ONCE DAILY 90 tablet 1     No facility-administered encounter medications on file as of 9/13/2018.        Reviewed use of high risk medication in the elderly: yes  Reviewed for potential of harmful drug interactions in the elderly: yes    Follow Up:  No Follow-up on file.     An After Visit Summary and PPPS with all of these plans were given to the patient.

## 2018-09-14 ENCOUNTER — APPOINTMENT (OUTPATIENT)
Dept: GENERAL RADIOLOGY | Facility: HOSPITAL | Age: 73
End: 2018-09-14

## 2018-09-14 ENCOUNTER — HOSPITAL ENCOUNTER (INPATIENT)
Facility: HOSPITAL | Age: 73
LOS: 2 days | Discharge: HOME | End: 2018-09-16
Attending: EMERGENCY MEDICINE | Admitting: FAMILY MEDICINE

## 2018-09-14 DIAGNOSIS — I48.91 ATRIAL FIBRILLATION WITH RVR (HCC): Primary | ICD-10-CM

## 2018-09-14 LAB
ALBUMIN SERPL-MCNC: 4.8 G/DL (ref 3.5–5)
ALBUMIN/GLOB SERPL: 1.2 G/DL (ref 1.1–2.5)
ALP SERPL-CCNC: 106 U/L (ref 24–120)
ALT SERPL W P-5'-P-CCNC: 28 U/L (ref 0–54)
ANION GAP SERPL CALCULATED.3IONS-SCNC: 13 MMOL/L (ref 4–13)
AST SERPL-CCNC: 32 U/L (ref 7–45)
BASOPHILS # BLD AUTO: 0.09 10*3/MM3 (ref 0–0.2)
BASOPHILS NFR BLD AUTO: 0.9 % (ref 0–2)
BILIRUB SERPL-MCNC: 0.7 MG/DL (ref 0.1–1)
BUN BLD-MCNC: 26 MG/DL (ref 5–21)
BUN/CREAT SERPL: 25.5 (ref 7–25)
CALCIUM SPEC-SCNC: 10.7 MG/DL (ref 8.4–10.4)
CHLORIDE SERPL-SCNC: 100 MMOL/L (ref 98–110)
CO2 SERPL-SCNC: 26 MMOL/L (ref 24–31)
CREAT BLD-MCNC: 1.02 MG/DL (ref 0.5–1.4)
DEPRECATED RDW RBC AUTO: 41.1 FL (ref 40–54)
EOSINOPHIL # BLD AUTO: 0.2 10*3/MM3 (ref 0–0.7)
EOSINOPHIL NFR BLD AUTO: 2 % (ref 0–4)
ERYTHROCYTE [DISTWIDTH] IN BLOOD BY AUTOMATED COUNT: 13.2 % (ref 12–15)
GFR SERPL CREATININE-BSD FRML MDRD: 53 ML/MIN/1.73
GLOBULIN UR ELPH-MCNC: 3.9 GM/DL
GLUCOSE BLD-MCNC: 194 MG/DL (ref 70–100)
GLUCOSE BLDC GLUCOMTR-MCNC: 177 MG/DL (ref 70–130)
GLUCOSE BLDC GLUCOMTR-MCNC: 271 MG/DL (ref 70–130)
HCT VFR BLD AUTO: 43.6 % (ref 37–47)
HGB BLD-MCNC: 15 G/DL (ref 12–16)
HOLD SPECIMEN: NORMAL
HOLD SPECIMEN: NORMAL
IMM GRANULOCYTES # BLD: 0.05 10*3/MM3 (ref 0–0.03)
IMM GRANULOCYTES NFR BLD: 0.5 % (ref 0–5)
LYMPHOCYTES # BLD AUTO: 1.95 10*3/MM3 (ref 0.72–4.86)
LYMPHOCYTES NFR BLD AUTO: 19.2 % (ref 15–45)
MAGNESIUM SERPL-MCNC: 1.5 MG/DL (ref 1.4–2.2)
MCH RBC QN AUTO: 29.8 PG (ref 28–32)
MCHC RBC AUTO-ENTMCNC: 34.4 G/DL (ref 33–36)
MCV RBC AUTO: 86.7 FL (ref 82–98)
MONOCYTES # BLD AUTO: 0.53 10*3/MM3 (ref 0.19–1.3)
MONOCYTES NFR BLD AUTO: 5.2 % (ref 4–12)
NEUTROPHILS # BLD AUTO: 7.32 10*3/MM3 (ref 1.87–8.4)
NEUTROPHILS NFR BLD AUTO: 72.2 % (ref 39–78)
NRBC BLD MANUAL-RTO: 0 /100 WBC (ref 0–0)
PLATELET # BLD AUTO: 189 10*3/MM3 (ref 130–400)
PMV BLD AUTO: 11.3 FL (ref 6–12)
POTASSIUM BLD-SCNC: 4.7 MMOL/L (ref 3.5–5.3)
PROT SERPL-MCNC: 8.7 G/DL (ref 6.3–8.7)
RBC # BLD AUTO: 5.03 10*6/MM3 (ref 4.2–5.4)
SODIUM BLD-SCNC: 139 MMOL/L (ref 135–145)
TROPONIN I SERPL-MCNC: 0.01 NG/ML (ref 0–0.03)
TROPONIN I SERPL-MCNC: 0.02 NG/ML (ref 0–0.03)
TSH SERPL DL<=0.05 MIU/L-ACNC: 3.07 MIU/ML (ref 0.47–4.68)
WBC NRBC COR # BLD: 10.14 10*3/MM3 (ref 4.8–10.8)
WHOLE BLOOD HOLD SPECIMEN: NORMAL
WHOLE BLOOD HOLD SPECIMEN: NORMAL

## 2018-09-14 PROCEDURE — 63710000001 INSULIN DETEMIR PER 5 UNITS: Performed by: FAMILY MEDICINE

## 2018-09-14 PROCEDURE — 36415 COLL VENOUS BLD VENIPUNCTURE: CPT | Performed by: EMERGENCY MEDICINE

## 2018-09-14 PROCEDURE — 80053 COMPREHEN METABOLIC PANEL: CPT | Performed by: EMERGENCY MEDICINE

## 2018-09-14 PROCEDURE — 83735 ASSAY OF MAGNESIUM: CPT | Performed by: EMERGENCY MEDICINE

## 2018-09-14 PROCEDURE — 63710000001 INSULIN LISPRO (HUMAN) PER 5 UNITS: Performed by: FAMILY MEDICINE

## 2018-09-14 PROCEDURE — 93005 ELECTROCARDIOGRAM TRACING: CPT | Performed by: EMERGENCY MEDICINE

## 2018-09-14 PROCEDURE — 71045 X-RAY EXAM CHEST 1 VIEW: CPT

## 2018-09-14 PROCEDURE — 85025 COMPLETE CBC W/AUTO DIFF WBC: CPT | Performed by: EMERGENCY MEDICINE

## 2018-09-14 PROCEDURE — 84484 ASSAY OF TROPONIN QUANT: CPT | Performed by: EMERGENCY MEDICINE

## 2018-09-14 PROCEDURE — 84443 ASSAY THYROID STIM HORMONE: CPT | Performed by: EMERGENCY MEDICINE

## 2018-09-14 PROCEDURE — 82962 GLUCOSE BLOOD TEST: CPT

## 2018-09-14 PROCEDURE — 99284 EMERGENCY DEPT VISIT MOD MDM: CPT

## 2018-09-14 PROCEDURE — 99222 1ST HOSP IP/OBS MODERATE 55: CPT | Performed by: INTERNAL MEDICINE

## 2018-09-14 PROCEDURE — 25010000002 MAGNESIUM SULFATE 2 GM/50ML SOLUTION: Performed by: EMERGENCY MEDICINE

## 2018-09-14 PROCEDURE — 93010 ELECTROCARDIOGRAM REPORT: CPT | Performed by: INTERNAL MEDICINE

## 2018-09-14 PROCEDURE — 93005 ELECTROCARDIOGRAM TRACING: CPT

## 2018-09-14 RX ORDER — SODIUM CHLORIDE 0.9 % (FLUSH) 0.9 %
1-10 SYRINGE (ML) INJECTION AS NEEDED
Status: DISCONTINUED | OUTPATIENT
Start: 2018-09-14 | End: 2018-09-16 | Stop reason: HOSPADM

## 2018-09-14 RX ORDER — LETROZOLE 2.5 MG/1
2.5 TABLET, FILM COATED ORAL DAILY
Status: DISCONTINUED | OUTPATIENT
Start: 2018-09-14 | End: 2018-09-16 | Stop reason: HOSPADM

## 2018-09-14 RX ORDER — DILTIAZEM HYDROCHLORIDE 60 MG/1
60 TABLET, FILM COATED ORAL EVERY 6 HOURS SCHEDULED
Status: DISCONTINUED | OUTPATIENT
Start: 2018-09-14 | End: 2018-09-15

## 2018-09-14 RX ORDER — DEXTROSE MONOHYDRATE 25 G/50ML
25 INJECTION, SOLUTION INTRAVENOUS
Status: DISCONTINUED | OUTPATIENT
Start: 2018-09-14 | End: 2018-09-16 | Stop reason: HOSPADM

## 2018-09-14 RX ORDER — NICOTINE POLACRILEX 4 MG
15 LOZENGE BUCCAL
Status: DISCONTINUED | OUTPATIENT
Start: 2018-09-14 | End: 2018-09-16 | Stop reason: HOSPADM

## 2018-09-14 RX ORDER — MAGNESIUM SULFATE HEPTAHYDRATE 40 MG/ML
2 INJECTION, SOLUTION INTRAVENOUS ONCE
Status: COMPLETED | OUTPATIENT
Start: 2018-09-14 | End: 2018-09-14

## 2018-09-14 RX ORDER — LOSARTAN POTASSIUM 25 MG/1
25 TABLET ORAL DAILY
Status: DISCONTINUED | OUTPATIENT
Start: 2018-09-14 | End: 2018-09-16 | Stop reason: HOSPADM

## 2018-09-14 RX ORDER — PROPAFENONE HYDROCHLORIDE 225 MG/1
225 TABLET, FILM COATED ORAL EVERY 8 HOURS SCHEDULED
Status: DISCONTINUED | OUTPATIENT
Start: 2018-09-14 | End: 2018-09-14

## 2018-09-14 RX ORDER — ASPIRIN 81 MG/1
324 TABLET, CHEWABLE ORAL ONCE
Status: DISCONTINUED | OUTPATIENT
Start: 2018-09-14 | End: 2018-09-14

## 2018-09-14 RX ORDER — SODIUM CHLORIDE 0.9 % (FLUSH) 0.9 %
10 SYRINGE (ML) INJECTION AS NEEDED
Status: DISCONTINUED | OUTPATIENT
Start: 2018-09-14 | End: 2018-09-16 | Stop reason: HOSPADM

## 2018-09-14 RX ORDER — DILTIAZEM HYDROCHLORIDE 5 MG/ML
10 INJECTION INTRAVENOUS ONCE
Status: COMPLETED | OUTPATIENT
Start: 2018-09-14 | End: 2018-09-14

## 2018-09-14 RX ORDER — LEVOTHYROXINE SODIUM 137 UG/1
137 TABLET ORAL
Status: DISCONTINUED | OUTPATIENT
Start: 2018-09-15 | End: 2018-09-16 | Stop reason: HOSPADM

## 2018-09-14 RX ADMIN — INSULIN LISPRO 4 UNITS: 100 INJECTION, SOLUTION INTRAVENOUS; SUBCUTANEOUS at 21:31

## 2018-09-14 RX ADMIN — INSULIN DETEMIR 50 UNITS: 100 INJECTION, SOLUTION SUBCUTANEOUS at 21:31

## 2018-09-14 RX ADMIN — DILTIAZEM HYDROCHLORIDE 5 MG/HR: 5 INJECTION INTRAVENOUS at 12:41

## 2018-09-14 RX ADMIN — DILTIAZEM HYDROCHLORIDE 10 MG: 5 INJECTION INTRAVENOUS at 12:19

## 2018-09-14 RX ADMIN — INSULIN LISPRO 2 UNITS: 100 INJECTION, SOLUTION INTRAVENOUS; SUBCUTANEOUS at 18:44

## 2018-09-14 RX ADMIN — DILTIAZEM HYDROCHLORIDE 60 MG: 60 TABLET, FILM COATED ORAL at 18:44

## 2018-09-14 RX ADMIN — MAGNESIUM SULFATE HEPTAHYDRATE 2 G: 40 INJECTION, SOLUTION INTRAVENOUS at 12:37

## 2018-09-14 RX ADMIN — DILTIAZEM HYDROCHLORIDE 10 MG/HR: 5 INJECTION INTRAVENOUS at 21:33

## 2018-09-14 NOTE — PLAN OF CARE
Problem: Fall Risk (Adult)  Goal: Identify Related Risk Factors and Signs and Symptoms  Outcome: Ongoing (interventions implemented as appropriate)   09/14/18 1510   Fall Risk (Adult)   Related Risk Factors (Fall Risk) age-related changes;fatigue/slow reaction;gait/mobility problems   Signs and Symptoms (Fall Risk) presence of risk factors       Problem: Arrhythmia/Dysrhythmia (Symptomatic) (Adult)  Goal: Signs and Symptoms of Listed Potential Problems Will be Absent, Minimized or Managed (Arrhythmia/Dysrhythmia)  Outcome: Ongoing (interventions implemented as appropriate)   09/14/18 1510   Goal/Outcome Evaluation   Problems Assessed (Arrhythmia/Dysrhythmia) electrophysiologic conduction defect;situational response   Problems Present (Dysrhythmia) none

## 2018-09-14 NOTE — PLAN OF CARE
Problem: Patient Care Overview  Goal: Plan of Care Review  Outcome: Ongoing (interventions implemented as appropriate)   09/14/18 5628   Coping/Psychosocial   Plan of Care Reviewed With patient   Plan of Care Review   Progress no change   OTHER   Outcome Summary patient admitted to [facility] from ER with atrial fib, cardizem drip infusing at 10mg/hr. no c/o pain at this time. cardiology consult.

## 2018-09-14 NOTE — ED PROVIDER NOTES
Subjective   History of Present Illness   73-year-old female presenting with irregular heart rate.    Patient has a known history of paroxysmal atrial fibrillation, she is not on anticoagulation due to previous GI bleed.  Yesterday patient went to her primary care doctor noted an irregular heart rate.  This morning patient noted onset of generalized weakness, reduced exercise tolerance, dyspnea with exertion.  She took her heart rate and found it to be above 120 which prompted her arrival to the emergency department.    Patient had a similar episode 2 years prior that was controlled with IV medications.  Of note patient firmly denies any chest pain, discomfort, shoulder or jaw pain, epigastric pain.  Patient denies any infectious symptoms with no fever, night sweats.    Review of Systems   Constitutional: Positive for fatigue. Negative for appetite change, chills and fever.   HENT: Negative for nosebleeds.    Eyes: Negative for redness.   Respiratory: Positive for shortness of breath.    Cardiovascular: Positive for palpitations. Negative for chest pain.   Gastrointestinal: Negative for abdominal pain, nausea and vomiting.   Genitourinary: Negative for flank pain.   Musculoskeletal: Negative for gait problem.   Skin: Negative for wound.   Neurological: Positive for weakness. Negative for syncope.   Psychiatric/Behavioral: The patient is not hyperactive.        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)   • Diabetes mellitus, type II (CMS/HCC)    • Dizziness    • Drug therapy    • Essential hypertension    • GERD (gastroesophageal reflux disease)    • GI bleed requiring more than 4 units of blood in 24 hours, ICU, or surgery    • History of lymphoma    • Hx of radiation therapy    • Hypomagnesemia    • Hypothyroidism    • On amiodarone therapy        Allergies   Allergen Reactions  "  • Amoxil [Amoxicillin] Hives   • Penicillins Hives   • Biaxin [Clarithromycin] Other (See Comments)     NOT SURE OF REACTION, \"SORE MOUTH OF DIARRHEA\"   • Lortab [Hydrocodone-Acetaminophen] GI Intolerance       Past Surgical History:   Procedure Laterality Date   • BREAST BIOPSY     • MASTECTOMY     • OTHER SURGICAL HISTORY      Mediport insertion X 2   • OTHER SURGICAL HISTORY      remote lymphoma treatment   • REPLACEMENT TOTAL KNEE Right 09/2017   • TUBAL ABDOMINAL LIGATION         Family History   Problem Relation Age of Onset   • Heart disease Mother    • Cancer Father    • Cancer Sister    • No Known Problems Brother    • No Known Problems Brother    • Heart disease Sister    • Heart attack Sister    • No Known Problems Sister    • No Known Problems Daughter    • No Known Problems Son    • No Known Problems Maternal Grandmother    • No Known Problems Paternal Grandmother    • No Known Problems Maternal Aunt    • Breast cancer Paternal Aunt    • BRCA 1/2 Neg Hx    • Colon cancer Neg Hx    • Endometrial cancer Neg Hx    • Ovarian cancer Neg Hx        Social History     Social History   • Marital status:      Social History Main Topics   • Smoking status: Former Smoker     Years: 20.00     Types: Cigarettes     Quit date: 1993   • Smokeless tobacco: Never Used   • Alcohol use No   • Drug use: No   • Sexual activity: Defer     Other Topics Concern   • Not on file           Objective   Physical Exam   Constitutional: She is oriented to person, place, and time. She appears well-developed and well-nourished.   HENT:   Head: Normocephalic and atraumatic.   Eyes: Conjunctivae are normal.   Neck: Normal range of motion.   Cardiovascular: Intact distal pulses.  An irregularly irregular rhythm present. Tachycardia present.    Pulmonary/Chest: Effort normal and breath sounds normal. No respiratory distress.   Abdominal: Soft. She exhibits no distension. There is no tenderness.   Musculoskeletal: She exhibits no " edema.   Neurological: She is alert and oriented to person, place, and time.   Skin: Skin is warm and dry.   Psychiatric: She has a normal mood and affect.   Nursing note and vitals reviewed.      Procedures           ED Course        Labs Reviewed   COMPREHENSIVE METABOLIC PANEL - Abnormal; Notable for the following:        Result Value    Glucose 194 (*)     BUN 26 (*)     Calcium 10.7 (*)     eGFR Non African Amer 53 (*)     BUN/Creatinine Ratio 25.5 (*)     All other components within normal limits    Narrative:     The MDRD GFR formula is only valid for adults with stable renal function between ages 18 and 70.   CBC WITH AUTO DIFFERENTIAL - Abnormal; Notable for the following:     Immature Grans, Absolute 0.05 (*)     All other components within normal limits   POCT GLUCOSE FINGERSTICK - Abnormal; Notable for the following:     Glucose 177 (*)     All other components within normal limits   POCT GLUCOSE FINGERSTICK - Abnormal; Notable for the following:     Glucose 271 (*)     All other components within normal limits   TROPONIN (IN-HOUSE) - Normal   TROPONIN (IN-HOUSE) - Normal   MAGNESIUM - Normal   TSH - Normal   RAINBOW DRAW    Narrative:     The following orders were created for panel order [place] Draw.  Procedure                               Abnormality         Status                     ---------                               -----------         ------                     Light Blue Top[[id number]]                                   Final result               Green Top (Gel)[[id number]]                                  Final result               Lavender Top[[id number]]                                     Final result               Red Top[[id number]]                                          Final result                 Please view results for these tests on the individual orders.   POCT GLUCOSE FINGERSTICK   POCT GLUCOSE FINGERSTICK   POCT GLUCOSE FINGERSTICK   POCT GLUCOSE FINGERSTICK   POCT GLUCOSE  FINGERSTICK   POCT GLUCOSE FINGERSTICK   CBC AND DIFFERENTIAL    Narrative:     The following orders were created for panel order CBC & Differential.  Procedure                               Abnormality         Status                     ---------                               -----------         ------                     CBC Auto Differential[[id number]]        Abnormal            Final result                 Please view results for these tests on the individual orders.   LIGHT BLUE TOP   GREEN TOP   LAVENDER TOP   RED TOP       Xr Chest 1 View    Result Date: 9/14/2018  . No active disease. This report was finalized on 09/14/2018 12:29 by Dr. David Guevara MD.              MDM  Number of Diagnoses or Management Options  Diagnosis management comments: 73-year-old female presenting with atrial fibrillation with rapid ventricular response.  There is no clear precipitating factor, no chest pain just ACS.  No fever chills to suggest infection.  There is no evidence of heart failure no JVD or lower extremity edema.  Heart is controlled with IV diltiazem bolus followed by infusion.  Plan will be for admission to the hospital for further workup of atrial fibrillation with RVR and further rate control.       Amount and/or Complexity of Data Reviewed  Clinical lab tests: reviewed  Tests in the radiology section of CPT®: reviewed  Tests in the medicine section of CPT®: reviewed          Final diagnoses:   Atrial fibrillation with RVR (CMS/[facility])            Mikhail Ying MD  09/14/18 1359

## 2018-09-14 NOTE — CONSULTS
[facility] CONSULT NOTE    Referring Provider: Darryl Bang MD    Reason for Consultation: afib     Chief Complaint   Patient presents with   • Irregular Heart Beat       Subjective .     History of present illness:  Dianne Pritchard is a 73 y.o. female with history of paroxysmal atrial fibrillation, diabetes mellitus type 2, hypertension who presents to [facility] with complaints of dyspnea and generalized weakness. She notes she started feeling weak and fatigue yesterday. This has progressively worsened. She started feeling very short of breath this morning while trying to make her bed. She denies any orthopnea, chest pain or similar complaint. She notes that these symptoms are the same as her previous atrial fibrillation episodes. She states she listened to her chest with her stethoscope and could tell she was out of rhythm.     History  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)   • Diabetes mellitus, type II (CMS/HCC)    • Dizziness    • Drug therapy    • Essential hypertension    • GERD (gastroesophageal reflux disease)    • GI bleed requiring more than 4 units of blood in 24 hours, ICU, or surgery    • History of lymphoma    • Hx of radiation therapy    • Hypomagnesemia    • Hypothyroidism    • On amiodarone therapy    ,   Past Surgical History:   Procedure Laterality Date   • BREAST BIOPSY     • MASTECTOMY     • OTHER SURGICAL HISTORY      Mediport insertion X 2   • OTHER SURGICAL HISTORY      remote lymphoma treatment   • REPLACEMENT TOTAL KNEE Right 09/2017   • TUBAL ABDOMINAL LIGATION     ,   Family History   Problem Relation Age of Onset   • Heart disease Mother    • Cancer Father    • Cancer Sister    • No Known Problems Brother    • No Known Problems Brother    • Heart disease Sister    • Heart attack Sister    • No Known Problems Sister    • No  Known Problems Daughter    • No Known Problems Son    • No Known Problems Maternal Grandmother    • No Known Problems Paternal Grandmother    • No Known Problems Maternal Aunt    • Breast cancer Paternal Aunt    • BRCA 1/2 Neg Hx    • Colon cancer Neg Hx    • Endometrial cancer Neg Hx    • Ovarian cancer Neg Hx    ,   Social History   Substance Use Topics   • Smoking status: Former Smoker     Years: 20.00     Types: Cigarettes     Quit date: 1993   • Smokeless tobacco: Never Used   • Alcohol use No   ,     Medications  Current Facility-Administered Medications   Medication Dose Route Frequency Provider Last Rate Last Dose   • dextrose (D50W) 25 g/ 50mL Intravenous Solution 25 g  25 g Intravenous Q15 Min PRN Darryl Bang MD       • dextrose (GLUTOSE) oral gel 15 g  15 g Oral Q15 Min PRN Darryl Bang MD       • diltiaZEM (CARDIZEM) 125 mg in sodium chloride 0.9 % 125 mL (1 mg/mL) infusion  5-15 mg/hr Intravenous Titrated Mikhail Ying MD 10 mL/hr at 09/14/18 1414 10 mg/hr at 09/14/18 1414   • diltiaZEM (CARDIZEM) tablet 60 mg  60 mg Oral Q6H Qi Durand PA-C       • glucagon (human recombinant) (GLUCAGEN DIAGNOSTIC) injection 1 mg  1 mg Subcutaneous PRN Darryl Bang MD       • insulin detemir (LEVEMIR) injection 50 Units  50 Units Subcutaneous Q12H Darryl Bang MD       • insulin lispro (humaLOG) injection 2-7 Units  2-7 Units Subcutaneous 4x Daily With Meals & Nightly Darryl Bang MD       • letrozole (FEMARA) tablet 2.5 mg  2.5 mg Oral Daily Darryl Bang MD       • [START ON 9/15/2018] levothyroxine (SYNTHROID, LEVOTHROID) tablet 137 mcg  137 mcg Oral Q AM Darryl Bang MD       • losartan (COZAAR) tablet 25 mg  25 mg Oral Daily Darryl Bang MD       • sodium chloride 0.9 % flush 1-10 mL  1-10 mL Intravenous PRN Darryl Bang MD       • sodium chloride 0.9 % flush 10 mL  10 mL Intravenous PRN Mikhail Ying  "MD           Allergies:  Amoxil [amoxicillin]; Penicillins; Biaxin [clarithromycin]; and Lortab [hydrocodone-acetaminophen]    Review of Systems  Review of Systems   Constitution: Positive for weakness and malaise/fatigue. Negative for weight gain.   Cardiovascular: Positive for dyspnea on exertion. Negative for chest pain, claudication, irregular heartbeat, leg swelling, near-syncope, orthopnea, palpitations, paroxysmal nocturnal dyspnea and syncope.   Respiratory: Positive for shortness of breath. Negative for hemoptysis.    Hematologic/Lymphatic: Negative for bleeding problem.   Skin: Negative for poor wound healing.   Musculoskeletal: Negative for myalgias.   Gastrointestinal: Negative for melena, nausea and vomiting.   Genitourinary: Negative for hematuria.   Neurological: Negative for dizziness, focal weakness and light-headedness.   Psychiatric/Behavioral: Negative for memory loss.   All other systems reviewed and are negative.      Objective     Physical Exam:  Patient Vitals for the past 24 hrs:   BP Temp Temp src Pulse Resp SpO2 Height Weight   09/14/18 1452 125/65 98.1 °F (36.7 °C) Temporal Art 112 18 95 % 162.6 cm (64\") 85.6 kg (188 lb 12.8 oz)   09/14/18 1429 - - - 117 - 94 % - -   09/14/18 1422 128/72 - - (!) 130 - 94 % - -   09/14/18 1401 123/71 - - (!) 122 - 94 % - -   09/14/18 1346 130/76 - - 107 - 95 % - -   09/14/18 1331 128/80 - - 106 - 93 % - -   09/14/18 1316 127/70 - - 112 - 94 % - -   09/14/18 1246 133/73 - - 109 - 94 % - -   09/14/18 1231 114/71 - - (!) 128 - 94 % - -   09/14/18 1200 124/74 - - (!) 134 - 96 % - -   09/14/18 1148 - 98.7 °F (37.1 °C) Temporal Art - - - - -   09/14/18 1136 - - - - - - 162.6 cm (64\") 84.8 kg (187 lb)   09/14/18 1133 156/78 - - 92 20 96 % - -     Physical Exam   Constitutional: She is oriented to person, place, and time. She appears well-developed and well-nourished.   HENT:   Head: Normocephalic and atraumatic.   Eyes: Pupils are equal, round, and reactive to " light. Conjunctivae and EOM are normal.   Neck: Normal range of motion. Neck supple. No JVD present.   Cardiovascular: Normal rate, S1 normal, S2 normal, intact distal pulses and normal pulses.  An irregularly irregular rhythm present.   Murmur heard.   Systolic murmur is present with a grade of 1/6  at the upper left sternal border  Pulmonary/Chest: Effort normal and breath sounds normal. No respiratory distress.   Abdominal: Soft. Bowel sounds are normal. She exhibits no distension.   Musculoskeletal: She exhibits no edema or tenderness.   Neurological: She is alert and oriented to person, place, and time.   Skin: Skin is warm and dry.   Psychiatric: She has a normal mood and affect. Judgment normal.   Vitals reviewed.      Results Review:   I reviewed the patient's new clinical results.    Lab Results (last 72 hours)     Procedure Component Value Units Date/Time    Troponin [[id number]] Collected:  09/14/18 1458    Specimen:  Blood Updated:  09/14/18 1512    [place] Draw [[id number]] Collected:  09/14/18 1145    Specimen:  Blood Updated:  09/14/18 1300    Narrative:       The following orders were created for panel order [place] Draw.  Procedure                               Abnormality         Status                     ---------                               -----------         ------                     Light Blue Top[[id number]]                                   Final result               Green Top (Gel)[[id number]]                                  Final result               Lavender Top[[id number]]                                     Final result               Red Top[[id number]]                                          Final result                 Please view results for these tests on the individual orders.    Light Blue Top [[id number]] Collected:  09/14/18 1145    Specimen:  Blood Updated:  09/14/18 1300     Extra Tube hold for add-on     Comment: Auto resulted       Green Top (Gel) [[id number]] Collected:   09/14/18 1145    Specimen:  Blood Updated:  09/14/18 1300     Extra Tube Hold for add-ons.     Comment: Auto resulted.       Lavender Top [[id number]] Collected:  09/14/18 1145    Specimen:  Blood Updated:  09/14/18 1300     Extra Tube hold for add-on     Comment: Auto resulted       Red Top [[id number]] Collected:  09/14/18 1145    Specimen:  Blood Updated:  09/14/18 1300     Extra Tube Hold for add-ons.     Comment: Auto resulted.       TSH [[id number]]  (Normal) Collected:  09/14/18 1145    Specimen:  Blood Updated:  09/14/18 1255     TSH 3.070 mIU/mL     Magnesium [[id number]]  (Normal) Collected:  09/14/18 1145    Specimen:  Blood Updated:  09/14/18 1224     Magnesium 1.5 mg/dL     Troponin [[id number]]  (Normal) Collected:  09/14/18 1145    Specimen:  Blood Updated:  09/14/18 1213     Troponin I 0.014 ng/mL     Comprehensive Metabolic Panel [[id number]]  (Abnormal) Collected:  09/14/18 1145    Specimen:  Blood Updated:  09/14/18 1202     Glucose 194 (H) mg/dL      BUN 26 (H) mg/dL      Creatinine 1.02 mg/dL      Sodium 139 mmol/L      Potassium 4.7 mmol/L      Chloride 100 mmol/L      CO2 26.0 mmol/L      Calcium 10.7 (H) mg/dL      Total Protein 8.7 g/dL      Albumin 4.80 g/dL      ALT (SGPT) 28 U/L      AST (SGOT) 32 U/L      Alkaline Phosphatase 106 U/L      Total Bilirubin 0.7 mg/dL      eGFR Non African Amer 53 (L) mL/min/1.73      Globulin 3.9 gm/dL      A/G Ratio 1.2 g/dL      BUN/Creatinine Ratio 25.5 (H)     Anion Gap 13.0 mmol/L     Narrative:       The MDRD GFR formula is only valid for adults with stable renal function between ages 18 and 70.    CBC & Differential [[id number]] Collected:  09/14/18 1145    Specimen:  Blood Updated:  09/14/18 1150    Narrative:       The following orders were created for panel order CBC & Differential.  Procedure                               Abnormality         Status                     ---------                               -----------         ------                      CBC Auto Differential[[id number]]        Abnormal            Final result                 Please view results for these tests on the individual orders.    CBC Auto Differential [[id number]]  (Abnormal) Collected:  09/14/18 1145    Specimen:  Blood Updated:  09/14/18 1150     WBC 10.14 10*3/mm3      RBC 5.03 10*6/mm3      Hemoglobin 15.0 g/dL      Hematocrit 43.6 %      MCV 86.7 fL      MCH 29.8 pg      MCHC 34.4 g/dL      RDW 13.2 %      RDW-SD 41.1 fl      MPV 11.3 fL      Platelets 189 10*3/mm3      Neutrophil % 72.2 %      Lymphocyte % 19.2 %      Monocyte % 5.2 %      Eosinophil % 2.0 %      Basophil % 0.9 %      Immature Grans % 0.5 %      Neutrophils, Absolute 7.32 10*3/mm3      Lymphocytes, Absolute 1.95 10*3/mm3      Monocytes, Absolute 0.53 10*3/mm3      Eosinophils, Absolute 0.20 10*3/mm3      Basophils, Absolute 0.09 10*3/mm3      Immature Grans, Absolute 0.05 (H) 10*3/mm3      nRBC 0.0 /100 WBC           No results found for: ECHOEFEST    Imaging Results (last 72 hours)     Procedure Component Value Units Date/Time    XR Chest 1 View [[id number]] Collected:  09/14/18 1227     Updated:  09/14/18 1232    Narrative:       EXAMINATION: Chest 1/14/2018     HISTORY: Chest pain     FINDINGS: Today's exam is compared to previous study of 2/3/2018. The  patient's undergone previous right axillary dissection. Lungs are clear.  There is no effusion or free air present. Mitral annulus calcifications  are present.       Impression:       . No active disease.  This report was finalized on 09/14/2018 12:29 by Dr. David Guevara MD.        Assessment   1. Paroxysmal atrial fibrillation: CHDS2-vasc score of 5 based on patient's age, gender, hypertension, diabetes, and vascular disease.  2. Hypertension  3. Diabetes mellitus type 2  4. Obesity  5. Bilateral carotid artery disease      Plan   1. Increase Cardizem to 60 mg q 6. Up-titrate as needed. Continue cardizem drip for now; wean as able.   2. Discussed possibility  of Watchman device with patient. She wants to further consider this at this point.   3. I have DCd Rythmol as this is not keeping her in normal sinus rhythm. For now I will try rate control strategy. We could consider cardioversion if patient is able to tolerate DOAC for a short period of time. She has history of GIB on Xarelto in the remote past.     Further orders per Dr. Orantes upon his evaluation of the patient.     Thank you for the consultation, cardiology will gladly continue to follow.     Qi Durand PA-C        Please note this cardiology consultation note is the result of a face to face consultation with the patient, in addition to reviewing medical records at length by myself, Qi Durand PA-C.    Time:appx 35 minutes

## 2018-09-15 LAB
GLUCOSE BLDC GLUCOMTR-MCNC: 204 MG/DL (ref 70–130)
GLUCOSE BLDC GLUCOMTR-MCNC: 285 MG/DL (ref 70–130)
GLUCOSE BLDC GLUCOMTR-MCNC: 302 MG/DL (ref 70–130)
GLUCOSE BLDC GLUCOMTR-MCNC: 312 MG/DL (ref 70–130)

## 2018-09-15 PROCEDURE — 93010 ELECTROCARDIOGRAM REPORT: CPT | Performed by: INTERNAL MEDICINE

## 2018-09-15 PROCEDURE — 99232 SBSQ HOSP IP/OBS MODERATE 35: CPT | Performed by: FAMILY MEDICINE

## 2018-09-15 PROCEDURE — 82962 GLUCOSE BLOOD TEST: CPT

## 2018-09-15 PROCEDURE — 63710000001 INSULIN LISPRO (HUMAN) PER 5 UNITS: Performed by: FAMILY MEDICINE

## 2018-09-15 PROCEDURE — 99232 SBSQ HOSP IP/OBS MODERATE 35: CPT | Performed by: INTERNAL MEDICINE

## 2018-09-15 PROCEDURE — 63710000001 INSULIN DETEMIR PER 5 UNITS: Performed by: FAMILY MEDICINE

## 2018-09-15 RX ORDER — DILTIAZEM HCL 90 MG
90 TABLET ORAL EVERY 6 HOURS SCHEDULED
Status: DISCONTINUED | OUTPATIENT
Start: 2018-09-15 | End: 2018-09-16 | Stop reason: HOSPADM

## 2018-09-15 RX ADMIN — DILTIAZEM HYDROCHLORIDE 90 MG: 90 TABLET, FILM COATED ORAL at 16:36

## 2018-09-15 RX ADMIN — DILTIAZEM HYDROCHLORIDE 60 MG: 60 TABLET, FILM COATED ORAL at 00:34

## 2018-09-15 RX ADMIN — LETROZOLE 2.5 MG: 2.5 TABLET ORAL at 08:31

## 2018-09-15 RX ADMIN — INSULIN LISPRO 7 UNITS: 100 INJECTION, SOLUTION INTRAVENOUS; SUBCUTANEOUS at 16:36

## 2018-09-15 RX ADMIN — INSULIN LISPRO 5 UNITS: 100 INJECTION, SOLUTION INTRAVENOUS; SUBCUTANEOUS at 21:03

## 2018-09-15 RX ADMIN — INSULIN DETEMIR 50 UNITS: 100 INJECTION, SOLUTION SUBCUTANEOUS at 21:03

## 2018-09-15 RX ADMIN — DILTIAZEM HYDROCHLORIDE 90 MG: 90 TABLET, FILM COATED ORAL at 23:11

## 2018-09-15 RX ADMIN — DILTIAZEM HYDROCHLORIDE 60 MG: 60 TABLET, FILM COATED ORAL at 06:21

## 2018-09-15 RX ADMIN — LEVOTHYROXINE SODIUM 137 MCG: 137 TABLET ORAL at 06:21

## 2018-09-15 RX ADMIN — INSULIN LISPRO 7 UNITS: 100 INJECTION, SOLUTION INTRAVENOUS; SUBCUTANEOUS at 11:58

## 2018-09-15 RX ADMIN — INSULIN LISPRO 2 UNITS: 100 INJECTION, SOLUTION INTRAVENOUS; SUBCUTANEOUS at 08:30

## 2018-09-15 RX ADMIN — INSULIN DETEMIR 50 UNITS: 100 INJECTION, SOLUTION SUBCUTANEOUS at 08:30

## 2018-09-15 RX ADMIN — DILTIAZEM HYDROCHLORIDE 90 MG: 90 TABLET, FILM COATED ORAL at 11:58

## 2018-09-15 NOTE — PLAN OF CARE
Problem: Patient Care Overview  Goal: Plan of Care Review  Outcome: Ongoing (interventions implemented as appropriate)   09/15/18 0545   Coping/Psychosocial   Plan of Care Reviewed With patient   Plan of Care Review   Progress no change   OTHER   Outcome Summary VSS, no c/o pain, afib/afl  up to 120s on telemetry, cardizem gtt continued.       Problem: Fall Risk (Adult)  Goal: Identify Related Risk Factors and Signs and Symptoms  Outcome: Ongoing (interventions implemented as appropriate)    Goal: Absence of Fall  Outcome: Ongoing (interventions implemented as appropriate)      Problem: Arrhythmia/Dysrhythmia (Symptomatic) (Adult)  Goal: Signs and Symptoms of Listed Potential Problems Will be Absent, Minimized or Managed (Arrhythmia/Dysrhythmia)  Outcome: Ongoing (interventions implemented as appropriate)

## 2018-09-15 NOTE — PROGRESS NOTES
[facility] -  Progress Note     LOS: 1 day   Patient Care Team:  Darryl Bang MD as PCP - General (Family Medicine)  Darryl Bang MD as PCP - Claims Attributed  Pat Greenberg RN as Care Coordinator (Population Health)    Chief Complaint: fatigue    Reason for consultation: af    Subjective     Interval History:   Pt reports she's feeling better today. Continues with fatigue, but denies any further dyspnea.         Review of Systems:   Review of Systems   Constitution: Positive for malaise/fatigue.   Cardiovascular: Negative for chest pain, dyspnea on exertion, leg swelling, orthopnea and palpitations.   Respiratory: Negative for shortness of breath.    Hematologic/Lymphatic: Negative for bleeding problem.   Musculoskeletal: Negative for muscle cramps.   Gastrointestinal: Negative for nausea.   Neurological: Negative for dizziness.        Objective     Vital Sign Min/Max for last 24 hours  Temp  Min: 97.1 °F (36.2 °C)  Max: 98.7 °F (37.1 °C)   BP  Min: 105/70  Max: 156/78   Pulse  Min: 92  Max: 134   Resp  Min: 18  Max: 20   SpO2  Min: 93 %  Max: 98 %   No Data Recorded   Weight  Min: 84.8 kg (187 lb)  Max: 85.6 kg (188 lb 12.8 oz)     1    09/14/18  1452   Weight: 85.6 kg (188 lb 12.8 oz)         Intake/Output Summary (Last 24 hours) at 09/15/18 1016  Last data filed at 09/15/18 1002   Gross per 24 hour   Intake              720 ml   Output                0 ml   Net              720 ml         Physical Exam:  Physical Exam   Constitutional: She is oriented to person, place, and time. She appears well-developed and well-nourished.   HENT:   Head: Normocephalic and atraumatic.   Eyes: Pupils are equal, round, and reactive to light. Conjunctivae and EOM are normal.   Neck: Normal range of motion. Neck supple. No JVD present.   Cardiovascular: Normal rate, S1 normal, S2 normal, normal heart sounds, intact distal pulses and normal pulses.  An irregularly irregular rhythm  present.   No murmur heard.  Pulmonary/Chest: Effort normal and breath sounds normal. No respiratory distress.   Abdominal: Soft. Bowel sounds are normal. She exhibits no distension.   Musculoskeletal: She exhibits no edema.   Neurological: She is alert and oriented to person, place, and time.   Skin: Skin is warm and dry.   Psychiatric: She has a normal mood and affect. Judgment normal.   Vitals reviewed.       Results Review:   Lab Results (last 72 hours)     Procedure Component Value Units Date/Time    POC Glucose Once [[id number]]  (Abnormal) Collected:  09/15/18 0826    Specimen:  Blood Updated:  09/15/18 0847     Glucose 204 (H) mg/dL      Comment: : [id number] Ke Petersen  RMeter ID: [id number]       POC Glucose Once [[id number]]  (Abnormal) Collected:  09/14/18 2047    Specimen:  Blood Updated:  09/14/18 2059     Glucose 271 (H) mg/dL      Comment: : [id number] Christi DanielleMeter ID: [id number]       POC Glucose Once [[id number]]  (Abnormal) Collected:  09/14/18 1556    Specimen:  Blood Updated:  09/14/18 1610     Glucose 177 (H) mg/dL      Comment: : [id number] Bryce EuraisaMeter ID: [id number]       Troponin [[id number]]  (Normal) Collected:  09/14/18 1458    Specimen:  Blood Updated:  09/14/18 1537     Troponin I 0.016 ng/mL     [place] Draw [[id number]] Collected:  09/14/18 1145    Specimen:  Blood Updated:  09/14/18 1300    Narrative:       The following orders were created for panel order [place] Draw.  Procedure                               Abnormality         Status                     ---------                               -----------         ------                     Light Blue Top[[id number]]                                   Final result               Green Top (Gel)[[id number]]                                  Final result               Lavender Top[[id number]]                                     Final result               Red Top[[id number]]                                           Final result                 Please view results for these tests on the individual orders.    Light Blue Top [[id number]] Collected:  09/14/18 1145    Specimen:  Blood Updated:  09/14/18 1300     Extra Tube hold for add-on     Comment: Auto resulted       Green Top (Gel) [[id number]] Collected:  09/14/18 1145    Specimen:  Blood Updated:  09/14/18 1300     Extra Tube Hold for add-ons.     Comment: Auto resulted.       Lavender Top [[id number]] Collected:  09/14/18 1145    Specimen:  Blood Updated:  09/14/18 1300     Extra Tube hold for add-on     Comment: Auto resulted       Red Top [[id number]] Collected:  09/14/18 1145    Specimen:  Blood Updated:  09/14/18 1300     Extra Tube Hold for add-ons.     Comment: Auto resulted.       TSH [[id number]]  (Normal) Collected:  09/14/18 1145    Specimen:  Blood Updated:  09/14/18 1255     TSH 3.070 mIU/mL     Magnesium [[id number]]  (Normal) Collected:  09/14/18 1145    Specimen:  Blood Updated:  09/14/18 1224     Magnesium 1.5 mg/dL     Troponin [[id number]]  (Normal) Collected:  09/14/18 1145    Specimen:  Blood Updated:  09/14/18 1213     Troponin I 0.014 ng/mL     Comprehensive Metabolic Panel [[id number]]  (Abnormal) Collected:  09/14/18 1145    Specimen:  Blood Updated:  09/14/18 1202     Glucose 194 (H) mg/dL      BUN 26 (H) mg/dL      Creatinine 1.02 mg/dL      Sodium 139 mmol/L      Potassium 4.7 mmol/L      Chloride 100 mmol/L      CO2 26.0 mmol/L      Calcium 10.7 (H) mg/dL      Total Protein 8.7 g/dL      Albumin 4.80 g/dL      ALT (SGPT) 28 U/L      AST (SGOT) 32 U/L      Alkaline Phosphatase 106 U/L      Total Bilirubin 0.7 mg/dL      eGFR Non African Amer 53 (L) mL/min/1.73      Globulin 3.9 gm/dL      A/G Ratio 1.2 g/dL      BUN/Creatinine Ratio 25.5 (H)     Anion Gap 13.0 mmol/L     Narrative:       The MDRD GFR formula is only valid for adults with stable renal function between ages 18 and 70.    CBC & Differential [[id number]] Collected:  09/14/18 1145    Specimen:   Blood Updated:  09/14/18 1150    Narrative:       The following orders were created for panel order CBC & Differential.  Procedure                               Abnormality         Status                     ---------                               -----------         ------                     CBC Auto Differential[[id number]]        Abnormal            Final result                 Please view results for these tests on the individual orders.    CBC Auto Differential [[id number]]  (Abnormal) Collected:  09/14/18 1145    Specimen:  Blood Updated:  09/14/18 1150     WBC 10.14 10*3/mm3      RBC 5.03 10*6/mm3      Hemoglobin 15.0 g/dL      Hematocrit 43.6 %      MCV 86.7 fL      MCH 29.8 pg      MCHC 34.4 g/dL      RDW 13.2 %      RDW-SD 41.1 fl      MPV 11.3 fL      Platelets 189 10*3/mm3      Neutrophil % 72.2 %      Lymphocyte % 19.2 %      Monocyte % 5.2 %      Eosinophil % 2.0 %      Basophil % 0.9 %      Immature Grans % 0.5 %      Neutrophils, Absolute 7.32 10*3/mm3      Lymphocytes, Absolute 1.95 10*3/mm3      Monocytes, Absolute 0.53 10*3/mm3      Eosinophils, Absolute 0.20 10*3/mm3      Basophils, Absolute 0.09 10*3/mm3      Immature Grans, Absolute 0.05 (H) 10*3/mm3      nRBC 0.0 /100 WBC               Echo EF Estimated  No results found for: ECHOEFEST      Cath Ejection Fraction Quantitative  No results found for: CATHEF        Medication Review: yes  Current Facility-Administered Medications   Medication Dose Route Frequency Provider Last Rate Last Dose   • dextrose (D50W) 25 g/ 50mL Intravenous Solution 25 g  25 g Intravenous Q15 Min PRN Darryl Bang MD       • dextrose (GLUTOSE) oral gel 15 g  15 g Oral Q15 Min PRN Darryl Bang MD       • diltiaZEM (CARDIZEM) tablet 90 mg  90 mg Oral Q6H Qi Durand PA-C       • glucagon (human recombinant) (GLUCAGEN DIAGNOSTIC) injection 1 mg  1 mg Subcutaneous PRN Darryl Bang MD       • insulin detemir (LEVEMIR) injection 50 Units  50  "Units Subcutaneous Q12H Darryl Bang MD   50 Units at 09/15/18 0830   • insulin lispro (humaLOG) injection 2-7 Units  2-7 Units Subcutaneous 4x Daily With Meals & Nightly Darryl Bang MD   2 Units at 09/15/18 0830   • letrozole (FEMARA) tablet 2.5 mg  2.5 mg Oral Daily Darryl Bang MD   2.5 mg at 09/15/18 0831   • levothyroxine (SYNTHROID, LEVOTHROID) tablet 137 mcg  137 mcg Oral Q AM Darryl Bang MD   137 mcg at 09/15/18 0621   • losartan (COZAAR) tablet 25 mg  25 mg Oral Daily Darryl Bang MD       • sodium chloride 0.9 % flush 1-10 mL  1-10 mL Intravenous PRN Darryl Bang MD       • sodium chloride 0.9 % flush 10 mL  10 mL Intravenous PRN Mikhail Ying MD             Assessment/Plan   1. Paroxysmal atrial fibrillation: CHDS2-vasc score of 5 based on patient's age, gender, hypertension, diabetes, and vascular disease.  2. Hypertension  3. Diabetes mellitus type 2  4. Obesity  5. Bilateral carotid artery disease      Plan   1. Wean cardizem drip. Increase oral cardizem to 90 mg q6. Switch to CD prior to discharge.  2. Patient declines watchman procedure. She reports she's \"basically a free bleeder\" and scared of the thought of being on any antiplatelet or anticoagulant therapy surrounding this. She declines anticoagulation as well. She completely understands her risk of stroke in the setting of PAF.     Qi Durand PA-C  09/15/18  10:16 AM      "

## 2018-09-15 NOTE — PLAN OF CARE
Problem: Patient Care Overview  Goal: Plan of Care Review  Outcome: Ongoing (interventions implemented as appropriate)   09/15/18 8494   Coping/Psychosocial   Plan of Care Reviewed With patient   Plan of Care Review   Progress improving   OTHER   Outcome Summary Denies pain, up in chair most of day, cardizem drip off, po continues, converted to nsr at 1730. VSS, Will cont to monitor

## 2018-09-16 VITALS
RESPIRATION RATE: 18 BRPM | WEIGHT: 192.1 LBS | OXYGEN SATURATION: 98 % | HEIGHT: 64 IN | SYSTOLIC BLOOD PRESSURE: 126 MMHG | HEART RATE: 100 BPM | DIASTOLIC BLOOD PRESSURE: 82 MMHG | TEMPERATURE: 97.8 F | BODY MASS INDEX: 32.8 KG/M2

## 2018-09-16 LAB
GLUCOSE BLDC GLUCOMTR-MCNC: 163 MG/DL (ref 70–130)
GLUCOSE BLDC GLUCOMTR-MCNC: 232 MG/DL (ref 70–130)

## 2018-09-16 PROCEDURE — 99238 HOSP IP/OBS DSCHRG MGMT 30/<: CPT | Performed by: FAMILY MEDICINE

## 2018-09-16 PROCEDURE — 82962 GLUCOSE BLOOD TEST: CPT

## 2018-09-16 PROCEDURE — 63710000001 INSULIN DETEMIR PER 5 UNITS: Performed by: FAMILY MEDICINE

## 2018-09-16 PROCEDURE — 63710000001 INSULIN LISPRO (HUMAN) PER 5 UNITS: Performed by: FAMILY MEDICINE

## 2018-09-16 RX ORDER — DILTIAZEM HYDROCHLORIDE 360 MG/1
360 CAPSULE, EXTENDED RELEASE ORAL DAILY
Qty: 30 CAPSULE | Refills: 11 | Status: SHIPPED | OUTPATIENT
Start: 2018-09-16 | End: 2018-09-17

## 2018-09-16 RX ADMIN — LETROZOLE 2.5 MG: 2.5 TABLET ORAL at 09:46

## 2018-09-16 RX ADMIN — DILTIAZEM HYDROCHLORIDE 90 MG: 90 TABLET, FILM COATED ORAL at 06:35

## 2018-09-16 RX ADMIN — INSULIN LISPRO 2 UNITS: 100 INJECTION, SOLUTION INTRAVENOUS; SUBCUTANEOUS at 09:46

## 2018-09-16 RX ADMIN — LOSARTAN POTASSIUM 25 MG: 25 TABLET ORAL at 09:46

## 2018-09-16 RX ADMIN — INSULIN DETEMIR 50 UNITS: 100 INJECTION, SOLUTION SUBCUTANEOUS at 09:46

## 2018-09-16 RX ADMIN — INSULIN LISPRO 3 UNITS: 100 INJECTION, SOLUTION INTRAVENOUS; SUBCUTANEOUS at 12:43

## 2018-09-16 RX ADMIN — LEVOTHYROXINE SODIUM 137 MCG: 137 TABLET ORAL at 06:34

## 2018-09-16 RX ADMIN — DILTIAZEM HYDROCHLORIDE 90 MG: 90 TABLET, FILM COATED ORAL at 12:43

## 2018-09-16 NOTE — H&P
"[facility]  HISTORY AND PHYSICAL    Date of Admission: 9/14/2018  Primary Care Physician: Darryl Bang MD    Subjective    Chief Complaint: \"my heart was beating really fast\"    This is a 73 yr old lady with hx of dm type 2 and afib who presented to the ed with sob and rapid hr and noted to have afib with RVR--started on cardizem drip for rate control.        Review of Systems   Constitutional: Positive for fatigue.   HENT: Negative.    Eyes: Negative.    Respiratory: Positive for shortness of breath.    Cardiovascular: Negative.    Gastrointestinal: Negative.    Endocrine: Negative.    Genitourinary: Negative.    Musculoskeletal: Negative.    Skin: Negative.    Allergic/Immunologic: Negative.    Neurological: Negative.    Hematological: Negative.    Psychiatric/Behavioral: Negative.         Otherwise complete ROS reviewed and negative except as mentioned in the HPI.      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)   • Diabetes mellitus, type II (CMS/HCC)    • Dizziness    • Drug therapy    • Essential hypertension    • GERD (gastroesophageal reflux disease)    • GI bleed requiring more than 4 units of blood in 24 hours, ICU, or surgery    • History of lymphoma    • Hx of radiation therapy    • Hypomagnesemia    • Hypothyroidism    • On amiodarone therapy        Past Surgical History:  Past Surgical History:   Procedure Laterality Date   • BREAST BIOPSY     • MASTECTOMY     • OTHER SURGICAL HISTORY      Mediport insertion X 2   • OTHER SURGICAL HISTORY      remote lymphoma treatment   • REPLACEMENT TOTAL KNEE Right 09/2017   • TUBAL ABDOMINAL LIGATION         Social History:  reports that she quit smoking about 25 years ago. Her smoking use included Cigarettes. She quit after 20.00 years of use. She has never used smokeless tobacco. She " "reports that she does not drink alcohol or use drugs.    Family History: family history includes Breast cancer in her paternal aunt; Cancer in her father and sister; Heart attack in her sister; Heart disease in her mother and sister; No Known Problems in her brother, brother, daughter, maternal aunt, maternal grandmother, paternal grandmother, sister, and son.     Allergies:  Allergies   Allergen Reactions   • Amoxil [Amoxicillin] Hives   • Penicillins Hives   • Biaxin [Clarithromycin] Other (See Comments)     NOT SURE OF REACTION, \"SORE MOUTH OF DIARRHEA\"   • Lortab [Hydrocodone-Acetaminophen] GI Intolerance       Medications:  Prior to Admission medications    Medication Sig Start Date End Date Taking? Authorizing Provider   Calcium Carb-Cholecalciferol (CALCIUM CARBONATE-VITAMIN D3) 600-400 MG-UNIT tablet Take  by mouth daily.    Siva Dumont MD   diltiaZEM (CARDIZEM) 30 MG tablet TAKE ONE TABLET BY MOUTH TWICE DAILY 6/12/18   Darryl Bang MD   glucose blood (FREESTYLE LITE) test strip Use to test blood sugar 4 times daily 6/15/18   Darryl Bang MD   Insulin Pen Needle 31G X 5 MM misc Use to test twice daily as directed 1/23/18   Darryl Bang MD   letrozole (FEMARA) 2.5 MG tablet Take  by mouth daily.    Siva Dumont MD   LEVEMIR FLEXTOUCH 100 UNIT/ML injection INJECT 50 UNITS TWICE DAILY. 6/27/18   Darryl Bang MD   levothyroxine (SYNTHROID, LEVOTHROID) 137 MCG tablet  7/2/18   Siva Dumont MD   losartan (COZAAR) 25 MG tablet Take 25 mg by mouth Daily. 6/13/17   Siva Dumont MD   Multiple Vitamins-Minerals (CENTRUM SILVER PO) Take 1 tablet by mouth Daily.    Siva Dumont MD   propafenone (RYTHMOL) 225 MG tablet TAKE 1 TABLET BY MOUTH EVERY 8 HOURS 5/7/18   Imtiaz Jon APRN       Objective    Vital Signs: /67 (BP Location: Left arm, Patient Position: Lying)   Pulse 83   Temp 98.4 °F (36.9 °C) (Temporal Artery )   " "Resp 20   Ht 162.6 cm (64\")   Wt 87.1 kg (192 lb 1.6 oz)   SpO2 96%   BMI 32.97 kg/m²   Physical Exam   Constitutional: She is oriented to person, place, and time. She appears well-developed and well-nourished.   HENT:   Head: Normocephalic and atraumatic.   Right Ear: External ear normal.   Left Ear: External ear normal.   Nose: Nose normal.   Mouth/Throat: Oropharynx is clear and moist.   Eyes: Pupils are equal, round, and reactive to light. Conjunctivae and EOM are normal.   Neck: Normal range of motion. Neck supple.   Cardiovascular: Intact distal pulses.    Pulmonary/Chest: Effort normal and breath sounds normal.   Abdominal: Soft. Bowel sounds are normal.   Neurological: She is alert and oriented to person, place, and time.   Skin: Skin is warm and dry. Capillary refill takes less than 2 seconds.   Psychiatric: She has a normal mood and affect. Her behavior is normal. Judgment and thought content normal.   Nursing note and vitals reviewed.      Results Reviewed:  Lab Results (last 24 hours)     Procedure Component Value Units Date/Time    POC Glucose Once [[id number]]  (Abnormal) Collected:  09/15/18 1956    Specimen:  Blood Updated:  09/15/18 2007     Glucose 302 (H) mg/dL      Comment: : [id number] Micheal RodriguezMeter ID: [id number]       POC Glucose Once [[id number]]  (Abnormal) Collected:  09/15/18 1617    Specimen:  Blood Updated:  09/15/18 1629     Glucose 285 (H) mg/dL      Comment: : [id number] [facility] ID: [id number]       POC Glucose Once [[id number]]  (Abnormal) Collected:  09/15/18 1135    Specimen:  Blood Updated:  09/15/18 1146     Glucose 312 (H) mg/dL      Comment: : [id number] Ke Petersen  RMeter ID: [id number]       POC Glucose Once [[id number]]  (Abnormal) Collected:  09/15/18 0826    Specimen:  Blood Updated:  09/15/18 0847     Glucose 204 (H) mg/dL      Comment: : [id number] Ke Zazuetaa  RMeter ID: [id number]           Imaging Results (last 24 hours)     ** No " results found for the last 24 hours. **            Hospital Problem List     Atrial fibrillation with RVR (CMS/[facility])          Assessment / Plan  Iv cardizem, cardiac consult.telemetry monitoring      Code Status: full code     I discussed the patient's findings and my recommendations with the patient..    Estimated length of stay 3-4 days.    Darryl Bang MD   09/16/18   7:52 AM

## 2018-09-16 NOTE — PROGRESS NOTES
[facility] -  Progress Note     LOS: 2 days   Patient Care Team:  Darryl Bang MD as PCP - General (Family Medicine)  Darryl Bang MD as PCP - Claims [facility]  Pat Greenberg RN as Care Coordinator (Population Health)    Chief Complaint: fatigue    Reason for consultation: af    Subjective     Interval History:   Pt reports she's feeling better today. Ready for discharge. Converted to NSR yesterday- weaned off drip.         Review of Systems:   Review of Systems   Constitution: Positive for malaise/fatigue.   Cardiovascular: Negative for chest pain, dyspnea on exertion, leg swelling, orthopnea and palpitations.   Respiratory: Negative for shortness of breath.    Hematologic/Lymphatic: Negative for bleeding problem.   Musculoskeletal: Negative for muscle cramps.   Gastrointestinal: Negative for nausea.   Neurological: Negative for dizziness.        Objective     Vital Sign Min/Max for last 24 hours  Temp  Min: 98 °F (36.7 °C)  Max: 98.9 °F (37.2 °C)   BP  Min: 98/59  Max: 118/56   Pulse  Min: 82  Max: 113   Resp  Min: 18  Max: 20   SpO2  Min: 95 %  Max: 99 %   No Data Recorded   Weight  Min: 87.1 kg (192 lb 1.6 oz)  Max: 87.1 kg (192 lb 1.6 oz)     1    09/15/18  1946   Weight: 87.1 kg (192 lb 1.6 oz)         Intake/Output Summary (Last 24 hours) at 09/16/18 1019  Last data filed at 09/16/18 0427   Gross per 24 hour   Intake              720 ml   Output             1500 ml   Net             -780 ml         Physical Exam:  Physical Exam   Constitutional: She is oriented to person, place, and time. She appears well-developed and well-nourished.   HENT:   Head: Normocephalic and atraumatic.   Eyes: Pupils are equal, round, and reactive to light. Conjunctivae and EOM are normal.   Neck: Normal range of motion. Neck supple. No JVD present.   Cardiovascular: Normal rate, regular rhythm, S1 normal, S2 normal, normal heart sounds, intact distal pulses and normal pulses.    No  murmur heard.  Pulmonary/Chest: Effort normal and breath sounds normal. No respiratory distress.   Abdominal: Soft. Bowel sounds are normal. She exhibits no distension.   Musculoskeletal: She exhibits no edema.   Neurological: She is alert and oriented to person, place, and time.   Skin: Skin is warm and dry.   Psychiatric: She has a normal mood and affect. Judgment normal.   Vitals reviewed.       Results Review:   Lab Results (last 72 hours)     Procedure Component Value Units Date/Time    POC Glucose Once [[id number]]  (Abnormal) Collected:  09/15/18 0826    Specimen:  Blood Updated:  09/15/18 0847     Glucose 204 (H) mg/dL      Comment: : [id number] Ke Petersen  RMeter ID: [id number]       POC Glucose Once [[id number]]  (Abnormal) Collected:  09/14/18 2047    Specimen:  Blood Updated:  09/14/18 2059     Glucose 271 (H) mg/dL      Comment: : [id number] Christi DanielleMeter ID: [id number]       POC Glucose Once [[id number]]  (Abnormal) Collected:  09/14/18 1556    Specimen:  Blood Updated:  09/14/18 1610     Glucose 177 (H) mg/dL      Comment: : [id number] Bryce EuraisaMeter ID: [id number]       Troponin [[id number]]  (Normal) Collected:  09/14/18 1458    Specimen:  Blood Updated:  09/14/18 1537     Troponin I 0.016 ng/mL     [place] Draw [[id number]] Collected:  09/14/18 1145    Specimen:  Blood Updated:  09/14/18 1300    Narrative:       The following orders were created for panel order [place] Draw.  Procedure                               Abnormality         Status                     ---------                               -----------         ------                     Light Blue Top[[id number]]                                   Final result               Green Top (Gel)[[id number]]                                  Final result               Lavender Top[[id number]]                                     Final result               Red Top[[id number]]                                          Final result                  Please view results for these tests on the individual orders.    Light Blue Top [[id number]] Collected:  09/14/18 1145    Specimen:  Blood Updated:  09/14/18 1300     Extra Tube hold for add-on     Comment: Auto resulted       Green Top (Gel) [[id number]] Collected:  09/14/18 1145    Specimen:  Blood Updated:  09/14/18 1300     Extra Tube Hold for add-ons.     Comment: Auto resulted.       Lavender Top [[id number]] Collected:  09/14/18 1145    Specimen:  Blood Updated:  09/14/18 1300     Extra Tube hold for add-on     Comment: Auto resulted       Red Top [[id number]] Collected:  09/14/18 1145    Specimen:  Blood Updated:  09/14/18 1300     Extra Tube Hold for add-ons.     Comment: Auto resulted.       TSH [[id number]]  (Normal) Collected:  09/14/18 1145    Specimen:  Blood Updated:  09/14/18 1255     TSH 3.070 mIU/mL     Magnesium [[id number]]  (Normal) Collected:  09/14/18 1145    Specimen:  Blood Updated:  09/14/18 1224     Magnesium 1.5 mg/dL     Troponin [[id number]]  (Normal) Collected:  09/14/18 1145    Specimen:  Blood Updated:  09/14/18 1213     Troponin I 0.014 ng/mL     Comprehensive Metabolic Panel [[id number]]  (Abnormal) Collected:  09/14/18 1145    Specimen:  Blood Updated:  09/14/18 1202     Glucose 194 (H) mg/dL      BUN 26 (H) mg/dL      Creatinine 1.02 mg/dL      Sodium 139 mmol/L      Potassium 4.7 mmol/L      Chloride 100 mmol/L      CO2 26.0 mmol/L      Calcium 10.7 (H) mg/dL      Total Protein 8.7 g/dL      Albumin 4.80 g/dL      ALT (SGPT) 28 U/L      AST (SGOT) 32 U/L      Alkaline Phosphatase 106 U/L      Total Bilirubin 0.7 mg/dL      eGFR Non African Amer 53 (L) mL/min/1.73      Globulin 3.9 gm/dL      A/G Ratio 1.2 g/dL      BUN/Creatinine Ratio 25.5 (H)     Anion Gap 13.0 mmol/L     Narrative:       The MDRD GFR formula is only valid for adults with stable renal function between ages 18 and 70.    CBC & Differential [[id number]] Collected:  09/14/18 1145    Specimen:  Blood Updated:   09/14/18 1150    Narrative:       The following orders were created for panel order CBC & Differential.  Procedure                               Abnormality         Status                     ---------                               -----------         ------                     CBC Auto Differential[[id number]]        Abnormal            Final result                 Please view results for these tests on the individual orders.    CBC Auto Differential [[id number]]  (Abnormal) Collected:  09/14/18 1145    Specimen:  Blood Updated:  09/14/18 1150     WBC 10.14 10*3/mm3      RBC 5.03 10*6/mm3      Hemoglobin 15.0 g/dL      Hematocrit 43.6 %      MCV 86.7 fL      MCH 29.8 pg      MCHC 34.4 g/dL      RDW 13.2 %      RDW-SD 41.1 fl      MPV 11.3 fL      Platelets 189 10*3/mm3      Neutrophil % 72.2 %      Lymphocyte % 19.2 %      Monocyte % 5.2 %      Eosinophil % 2.0 %      Basophil % 0.9 %      Immature Grans % 0.5 %      Neutrophils, Absolute 7.32 10*3/mm3      Lymphocytes, Absolute 1.95 10*3/mm3      Monocytes, Absolute 0.53 10*3/mm3      Eosinophils, Absolute 0.20 10*3/mm3      Basophils, Absolute 0.09 10*3/mm3      Immature Grans, Absolute 0.05 (H) 10*3/mm3      nRBC 0.0 /100 WBC               Echo EF Estimated  No results found for: ECHOEFEST      Cath Ejection Fraction Quantitative  No results found for: CATHEF        Medication Review: yes  Current Facility-Administered Medications   Medication Dose Route Frequency Provider Last Rate Last Dose   • dextrose (D50W) 25 g/ 50mL Intravenous Solution 25 g  25 g Intravenous Q15 Min PRN Darryl Bang MD       • dextrose (GLUTOSE) oral gel 15 g  15 g Oral Q15 Min PRN Darryl Bang MD       • diltiaZEM (CARDIZEM) tablet 90 mg  90 mg Oral Q6H Qi Durand PA-C   90 mg at 09/16/18 0635   • glucagon (human recombinant) (GLUCAGEN DIAGNOSTIC) injection 1 mg  1 mg Subcutaneous PRN Darryl Bang MD       • insulin detemir (LEVEMIR) injection 50 Units   50 Units Subcutaneous Q12H Darryl Bang MD   50 Units at 09/16/18 0946   • insulin lispro (humaLOG) injection 2-7 Units  2-7 Units Subcutaneous 4x Daily With Meals & Nightly Darryl Bang MD   2 Units at 09/16/18 0946   • letrozole (FEMARA) tablet 2.5 mg  2.5 mg Oral Daily Darryl Bang MD   2.5 mg at 09/16/18 0946   • levothyroxine (SYNTHROID, LEVOTHROID) tablet 137 mcg  137 mcg Oral Q AM Darryl Bang MD   137 mcg at 09/16/18 0634   • losartan (COZAAR) tablet 25 mg  25 mg Oral Daily Darryl Bang MD   25 mg at 09/16/18 0946   • sodium chloride 0.9 % flush 1-10 mL  1-10 mL Intravenous PRN Darryl Bang MD       • sodium chloride 0.9 % flush 10 mL  10 mL Intravenous PRN Mikhail Ying MD             Assessment/Plan   1. Paroxysmal atrial fibrillation: CHDS2-vasc score of 5 based on patient's age, gender, hypertension, diabetes, and vascular disease.  2. Hypertension  3. Diabetes mellitus type 2  4. Obesity  5. Bilateral carotid artery disease      Plan   1. Will print a prescription for Cardizem   2. Follow up in our office in 2-3 weeks  3. Discussed anticoagulation and Watchman with patient. Patient fully understands the risk associated with not being anticoagulated. Patient has been educated on Watchman procedure and wants to think about it.    Okay for home from out standpoint     ДМИТРИЙ Crandall  09/16/18  10:19 AM

## 2018-09-16 NOTE — PLAN OF CARE
Problem: Patient Care Overview  Goal: Plan of Care Review  Outcome: Ongoing (interventions implemented as appropriate)   09/16/18 0334   Coping/Psychosocial   Plan of Care Reviewed With patient   Plan of Care Review   Progress improving   OTHER   Outcome Summary VSS. NSR 76-81 with PACs on tele. No c/o pain. PO Cardizem cont. Safety maintained. Will cont to monitor.      Goal: Individualization and Mutuality  Outcome: Ongoing (interventions implemented as appropriate)    Goal: Discharge Needs Assessment  Outcome: Ongoing (interventions implemented as appropriate)    Goal: Interprofessional Rounds/Family Conf  Outcome: Ongoing (interventions implemented as appropriate)      Problem: Fall Risk (Adult)  Goal: Identify Related Risk Factors and Signs and Symptoms  Outcome: Ongoing (interventions implemented as appropriate)    Goal: Absence of Fall  Outcome: Ongoing (interventions implemented as appropriate)      Problem: Arrhythmia/Dysrhythmia (Symptomatic) (Adult)  Goal: Signs and Symptoms of Listed Potential Problems Will be Absent, Minimized or Managed (Arrhythmia/Dysrhythmia)  Outcome: Ongoing (interventions implemented as appropriate)

## 2018-09-16 NOTE — DISCHARGE SUMMARY
"[facility]   DISCHARGE SUMMARY       Date of Admission: 9/14/2018  Date of Discharge:  9/16/2018  Primary Care Physician: Darryl Bang MD    Presenting Problem/History of Present Illness:  Atrial fibrillation with RVR (CMS/[facility]) [I48.91]  Atrial fibrillation with RVR (CMS/HCC) [I48.91]  Atrial fibrillation with RVR (CMS/HCC) [I48.91]     Final Discharge Diagnoses:  Hospital Problem List     Essential hypertension    Type 2 diabetes mellitus without complication (CMS/HCC)    Atrial fibrillation with RVR (CMS/[facility])          Consults: cardiology    Procedures Performed: none    Pertinent Test Results: ekg and thyroid functions    Chief Complaint on Day of Discharge: I feel better and im ready to go home    History of Present Illness on Day of Discharge: no chest pain or dyspnea     Hospital Course:  The patient is a 73 y.o. female who presented to [facility] with persistent afib with RVR--started on cardizem drip----rate controlled---she declined any attempt at anticoagulation----meds changed to po----she was ready for discharge.      Condition on Discharge:  good    Physical Exam on Discharge:  /82 (BP Location: Left arm, Patient Position: Sitting)   Pulse 100   Temp 97.8 °F (36.6 °C) (Temporal Artery )   Resp 18   Ht 162.6 cm (64\")   Wt 87.1 kg (192 lb 1.6 oz)   SpO2 98%   BMI 32.97 kg/m²   Physical Exam   Constitutional: She appears well-developed and well-nourished.   HENT:   Head: Atraumatic.   Eyes: Pupils are equal, round, and reactive to light. Conjunctivae and EOM are normal.   Cardiovascular: Normal rate, regular rhythm, normal heart sounds and intact distal pulses.    Pulmonary/Chest: Effort normal and breath sounds normal.   Abdominal: Soft. Bowel sounds are normal.   Nursing note and vitals reviewed.        Discharge Disposition:  Home or Self Care    Discharge Medications:     Discharge Medications      New Medications      Instructions Start Date   diltiaZEM "  MG 24 hr capsule  Commonly known as:  CARDIZEM CD  Notes to patient:  Next Dose: 9/17/2018 8am   360 mg, Oral, Daily         Continue These Medications      Instructions Start Date   CENTRUM SILVER PO   1 tablet, Oral, Daily      letrozole 2.5 MG tablet  Commonly known as:  FEMARA   2.5 mg, Oral, Daily      levothyroxine 137 MCG tablet  Commonly known as:  SYNTHROID, LEVOTHROID   137 mcg, Oral, Daily      losartan 25 MG tablet  Commonly known as:  COZAAR   25 mg, Oral, Daily         Stop These Medications    Calcium Carbonate-Vitamin D3 600-400 MG-UNIT tablet     diltiaZEM 30 MG tablet  Commonly known as:  CARDIZEM     Insulin Pen Needle 31G X 5 MM misc     propafenone 225 MG tablet  Commonly known as:  RYTHMOL        ASK your doctor about these medications      Instructions Start Date   insulin detemir 100 UNIT/ML injection  Commonly known as:  LEVEMIR  Ask about: Which instructions should I use?   50 Units, Subcutaneous, Daily             Discharge Diet:   ada  Activity at Discharge:   As tolerated  Discharge Care Plan/Instructions: take meds as prescribed    Follow-up Appointments:   Future Appointments  Date Time Provider Department Center   9/25/2018 9:00 AM LAB PC [facility] None   3/13/2019 10:00 AM Darryl Bang MD [facility] None   4/16/2019 9:00 AM PAD US NIVAS CART 2 BH PAD US PAD   4/16/2019 10:30 AM BicBabar palomo DO [facility] VS PAD None   5/3/2019 11:00 AM PAD [facility] NP [facility] CD PAD [facility]     Will be seen by me in one week  Test Results Pending at Discharge: none    Darryl Bang MD  09/16/18  4:07 PM    Time: 4:11pm

## 2018-09-17 RX ORDER — DILTIAZEM HYDROCHLORIDE 180 MG/1
360 CAPSULE, EXTENDED RELEASE ORAL DAILY
Qty: 60 CAPSULE | Refills: 5 | Status: ON HOLD | OUTPATIENT
Start: 2018-09-17 | End: 2018-09-26 | Stop reason: SDUPTHER

## 2018-09-25 ENCOUNTER — APPOINTMENT (OUTPATIENT)
Dept: GENERAL RADIOLOGY | Facility: HOSPITAL | Age: 73
End: 2018-09-25

## 2018-09-25 ENCOUNTER — HOSPITAL ENCOUNTER (INPATIENT)
Facility: HOSPITAL | Age: 73
LOS: 1 days | Discharge: HOME | End: 2018-09-26
Attending: EMERGENCY MEDICINE | Admitting: FAMILY MEDICINE

## 2018-09-25 DIAGNOSIS — I48.91 ATRIAL FIBRILLATION WITH RAPID VENTRICULAR RESPONSE (HCC): Primary | ICD-10-CM

## 2018-09-25 DIAGNOSIS — I48.0 PAROXYSMAL ATRIAL FIBRILLATION (HCC): ICD-10-CM

## 2018-09-25 LAB
ALBUMIN SERPL-MCNC: 4.4 G/DL (ref 3.5–5)
ALBUMIN/GLOB SERPL: 1.3 G/DL (ref 1.1–2.5)
ALP SERPL-CCNC: 101 U/L (ref 24–120)
ALT SERPL W P-5'-P-CCNC: 26 U/L (ref 0–54)
ANION GAP SERPL CALCULATED.3IONS-SCNC: 13 MMOL/L (ref 4–13)
AST SERPL-CCNC: 34 U/L (ref 7–45)
BASOPHILS # BLD AUTO: 0.04 10*3/MM3 (ref 0–0.2)
BASOPHILS NFR BLD AUTO: 0.5 % (ref 0–2)
BILIRUB SERPL-MCNC: 0.6 MG/DL (ref 0.1–1)
BUN BLD-MCNC: 20 MG/DL (ref 5–21)
BUN/CREAT SERPL: 21.1 (ref 7–25)
CALCIUM SPEC-SCNC: 10.2 MG/DL (ref 8.4–10.4)
CHLORIDE SERPL-SCNC: 95 MMOL/L (ref 98–110)
CO2 SERPL-SCNC: 28 MMOL/L (ref 24–31)
CREAT BLD-MCNC: 0.95 MG/DL (ref 0.5–1.4)
DEPRECATED RDW RBC AUTO: 42.5 FL (ref 40–54)
EOSINOPHIL # BLD AUTO: 0.11 10*3/MM3 (ref 0–0.7)
EOSINOPHIL NFR BLD AUTO: 1.3 % (ref 0–4)
ERYTHROCYTE [DISTWIDTH] IN BLOOD BY AUTOMATED COUNT: 13.1 % (ref 12–15)
GFR SERPL CREATININE-BSD FRML MDRD: 58 ML/MIN/1.73
GLOBULIN UR ELPH-MCNC: 3.4 GM/DL
GLUCOSE BLD-MCNC: 404 MG/DL (ref 70–100)
GLUCOSE BLDC GLUCOMTR-MCNC: 315 MG/DL (ref 70–130)
HBA1C MFR BLD: 9.1 %
HCT VFR BLD AUTO: 39.8 % (ref 37–47)
HGB BLD-MCNC: 13.4 G/DL (ref 12–16)
HOLD SPECIMEN: NORMAL
HOLD SPECIMEN: NORMAL
IMM GRANULOCYTES # BLD: 0.03 10*3/MM3 (ref 0–0.03)
IMM GRANULOCYTES NFR BLD: 0.4 % (ref 0–5)
INR PPP: 0.95 (ref 0.91–1.09)
LYMPHOCYTES # BLD AUTO: 1.14 10*3/MM3 (ref 0.72–4.86)
LYMPHOCYTES NFR BLD AUTO: 14 % (ref 15–45)
MCH RBC QN AUTO: 30.1 PG (ref 28–32)
MCHC RBC AUTO-ENTMCNC: 33.7 G/DL (ref 33–36)
MCV RBC AUTO: 89.4 FL (ref 82–98)
MONOCYTES # BLD AUTO: 0.41 10*3/MM3 (ref 0.19–1.3)
MONOCYTES NFR BLD AUTO: 5 % (ref 4–12)
NEUTROPHILS # BLD AUTO: 6.42 10*3/MM3 (ref 1.87–8.4)
NEUTROPHILS NFR BLD AUTO: 78.8 % (ref 39–78)
NRBC BLD MANUAL-RTO: 0 /100 WBC (ref 0–0)
PLATELET # BLD AUTO: 150 10*3/MM3 (ref 130–400)
PMV BLD AUTO: 11.6 FL (ref 6–12)
POTASSIUM BLD-SCNC: 4.5 MMOL/L (ref 3.5–5.3)
PROT SERPL-MCNC: 7.8 G/DL (ref 6.3–8.7)
PROTHROMBIN TIME: 13 SECONDS (ref 11.9–14.6)
RBC # BLD AUTO: 4.45 10*6/MM3 (ref 4.2–5.4)
SODIUM BLD-SCNC: 136 MMOL/L (ref 135–145)
TROPONIN I SERPL-MCNC: <0.012 NG/ML (ref 0–0.03)
WBC NRBC COR # BLD: 8.15 10*3/MM3 (ref 4.8–10.8)
WHOLE BLOOD HOLD SPECIMEN: NORMAL
WHOLE BLOOD HOLD SPECIMEN: NORMAL

## 2018-09-25 PROCEDURE — 36415 COLL VENOUS BLD VENIPUNCTURE: CPT | Performed by: EMERGENCY MEDICINE

## 2018-09-25 PROCEDURE — 84484 ASSAY OF TROPONIN QUANT: CPT | Performed by: EMERGENCY MEDICINE

## 2018-09-25 PROCEDURE — 85025 COMPLETE CBC W/AUTO DIFF WBC: CPT | Performed by: EMERGENCY MEDICINE

## 2018-09-25 PROCEDURE — 99232 SBSQ HOSP IP/OBS MODERATE 35: CPT | Performed by: FAMILY MEDICINE

## 2018-09-25 PROCEDURE — 80053 COMPREHEN METABOLIC PANEL: CPT | Performed by: EMERGENCY MEDICINE

## 2018-09-25 PROCEDURE — 93005 ELECTROCARDIOGRAM TRACING: CPT | Performed by: EMERGENCY MEDICINE

## 2018-09-25 PROCEDURE — 85610 PROTHROMBIN TIME: CPT | Performed by: EMERGENCY MEDICINE

## 2018-09-25 PROCEDURE — 93010 ELECTROCARDIOGRAM REPORT: CPT | Performed by: INTERNAL MEDICINE

## 2018-09-25 PROCEDURE — 71045 X-RAY EXAM CHEST 1 VIEW: CPT

## 2018-09-25 PROCEDURE — 99284 EMERGENCY DEPT VISIT MOD MDM: CPT

## 2018-09-25 PROCEDURE — 93005 ELECTROCARDIOGRAM TRACING: CPT | Performed by: FAMILY MEDICINE

## 2018-09-25 PROCEDURE — 63710000001 INSULIN DETEMIR PER 5 UNITS: Performed by: FAMILY MEDICINE

## 2018-09-25 PROCEDURE — 84484 ASSAY OF TROPONIN QUANT: CPT | Performed by: FAMILY MEDICINE

## 2018-09-25 PROCEDURE — 99222 1ST HOSP IP/OBS MODERATE 55: CPT | Performed by: INTERNAL MEDICINE

## 2018-09-25 PROCEDURE — 83036 HEMOGLOBIN GLYCOSYLATED A1C: CPT | Performed by: INTERNAL MEDICINE

## 2018-09-25 PROCEDURE — 82962 GLUCOSE BLOOD TEST: CPT

## 2018-09-25 RX ORDER — ASPIRIN 81 MG/1
324 TABLET, CHEWABLE ORAL ONCE
Status: DISCONTINUED | OUTPATIENT
Start: 2018-09-25 | End: 2018-09-25

## 2018-09-25 RX ORDER — DILTIAZEM HYDROCHLORIDE 180 MG/1
360 CAPSULE, COATED, EXTENDED RELEASE ORAL
Status: DISCONTINUED | OUTPATIENT
Start: 2018-09-26 | End: 2018-09-26 | Stop reason: HOSPADM

## 2018-09-25 RX ORDER — DILTIAZEM HYDROCHLORIDE 5 MG/ML
10 INJECTION INTRAVENOUS ONCE
Status: COMPLETED | OUTPATIENT
Start: 2018-09-25 | End: 2018-09-25

## 2018-09-25 RX ORDER — LEVOTHYROXINE SODIUM 137 UG/1
137 TABLET ORAL
Status: DISCONTINUED | OUTPATIENT
Start: 2018-09-26 | End: 2018-09-26 | Stop reason: HOSPADM

## 2018-09-25 RX ORDER — LETROZOLE 2.5 MG/1
2.5 TABLET, FILM COATED ORAL DAILY
Status: DISCONTINUED | OUTPATIENT
Start: 2018-09-26 | End: 2018-09-26 | Stop reason: HOSPADM

## 2018-09-25 RX ORDER — SODIUM CHLORIDE 0.9 % (FLUSH) 0.9 %
10 SYRINGE (ML) INJECTION AS NEEDED
Status: DISCONTINUED | OUTPATIENT
Start: 2018-09-25 | End: 2018-09-26 | Stop reason: HOSPADM

## 2018-09-25 RX ORDER — LOSARTAN POTASSIUM 25 MG/1
25 TABLET ORAL DAILY
Status: DISCONTINUED | OUTPATIENT
Start: 2018-09-25 | End: 2018-09-25

## 2018-09-25 RX ORDER — DILTIAZEM HYDROCHLORIDE 180 MG/1
180 CAPSULE, COATED, EXTENDED RELEASE ORAL
Status: DISCONTINUED | OUTPATIENT
Start: 2018-09-26 | End: 2018-09-25

## 2018-09-25 RX ORDER — SODIUM CHLORIDE 0.9 % (FLUSH) 0.9 %
1-10 SYRINGE (ML) INJECTION AS NEEDED
Status: DISCONTINUED | OUTPATIENT
Start: 2018-09-25 | End: 2018-09-26 | Stop reason: HOSPADM

## 2018-09-25 RX ADMIN — DILTIAZEM HYDROCHLORIDE 10 MG/HR: 5 INJECTION INTRAVENOUS at 16:56

## 2018-09-25 RX ADMIN — METOPROLOL TARTRATE 25 MG: 25 TABLET, FILM COATED ORAL at 20:02

## 2018-09-25 RX ADMIN — INSULIN DETEMIR 50 UNITS: 100 INJECTION, SOLUTION SUBCUTANEOUS at 20:35

## 2018-09-25 RX ADMIN — DILTIAZEM HYDROCHLORIDE 5 MG/HR: 5 INJECTION INTRAVENOUS at 13:36

## 2018-09-25 RX ADMIN — DILTIAZEM HYDROCHLORIDE 10 MG: 5 INJECTION INTRAVENOUS at 13:37

## 2018-09-26 VITALS
DIASTOLIC BLOOD PRESSURE: 74 MMHG | SYSTOLIC BLOOD PRESSURE: 122 MMHG | TEMPERATURE: 97.2 F | HEIGHT: 64 IN | WEIGHT: 190.7 LBS | HEART RATE: 70 BPM | BODY MASS INDEX: 32.56 KG/M2 | OXYGEN SATURATION: 98 % | RESPIRATION RATE: 18 BRPM

## 2018-09-26 LAB
ANION GAP SERPL CALCULATED.3IONS-SCNC: 14 MMOL/L (ref 4–13)
BUN BLD-MCNC: 22 MG/DL (ref 5–21)
BUN/CREAT SERPL: 23.9 (ref 7–25)
CALCIUM SPEC-SCNC: 9.7 MG/DL (ref 8.4–10.4)
CHLORIDE SERPL-SCNC: 95 MMOL/L (ref 98–110)
CO2 SERPL-SCNC: 26 MMOL/L (ref 24–31)
CREAT BLD-MCNC: 0.92 MG/DL (ref 0.5–1.4)
GFR SERPL CREATININE-BSD FRML MDRD: 60 ML/MIN/1.73
GLUCOSE BLD-MCNC: 271 MG/DL (ref 70–100)
GLUCOSE BLDC GLUCOMTR-MCNC: 199 MG/DL (ref 70–130)
GLUCOSE BLDC GLUCOMTR-MCNC: 359 MG/DL (ref 70–130)
MAGNESIUM SERPL-MCNC: 1.8 MG/DL (ref 1.4–2.2)
POTASSIUM BLD-SCNC: 4.2 MMOL/L (ref 3.5–5.3)
SODIUM BLD-SCNC: 135 MMOL/L (ref 135–145)
TROPONIN I SERPL-MCNC: <0.012 NG/ML (ref 0–0.03)

## 2018-09-26 PROCEDURE — 63710000001 INSULIN DETEMIR PER 5 UNITS: Performed by: FAMILY MEDICINE

## 2018-09-26 PROCEDURE — 99238 HOSP IP/OBS DSCHRG MGMT 30/<: CPT | Performed by: FAMILY MEDICINE

## 2018-09-26 PROCEDURE — 84484 ASSAY OF TROPONIN QUANT: CPT | Performed by: FAMILY MEDICINE

## 2018-09-26 PROCEDURE — 93010 ELECTROCARDIOGRAM REPORT: CPT | Performed by: INTERNAL MEDICINE

## 2018-09-26 PROCEDURE — 82962 GLUCOSE BLOOD TEST: CPT

## 2018-09-26 PROCEDURE — 94799 UNLISTED PULMONARY SVC/PX: CPT

## 2018-09-26 PROCEDURE — 80048 BASIC METABOLIC PNL TOTAL CA: CPT | Performed by: PHYSICIAN ASSISTANT

## 2018-09-26 PROCEDURE — 83735 ASSAY OF MAGNESIUM: CPT | Performed by: PHYSICIAN ASSISTANT

## 2018-09-26 PROCEDURE — 94760 N-INVAS EAR/PLS OXIMETRY 1: CPT

## 2018-09-26 RX ORDER — DILTIAZEM HYDROCHLORIDE 360 MG/1
360 CAPSULE, EXTENDED RELEASE ORAL
Qty: 30 CAPSULE | Refills: 5 | Status: SHIPPED | OUTPATIENT
Start: 2018-09-27 | End: 2019-01-30

## 2018-09-26 RX ADMIN — LEVOTHYROXINE SODIUM 137 MCG: 137 TABLET ORAL at 06:01

## 2018-09-26 RX ADMIN — DILTIAZEM HYDROCHLORIDE 5 MG/HR: 5 INJECTION INTRAVENOUS at 00:10

## 2018-09-26 RX ADMIN — METOPROLOL TARTRATE 25 MG: 25 TABLET, FILM COATED ORAL at 08:16

## 2018-09-26 RX ADMIN — INSULIN DETEMIR 50 UNITS: 100 INJECTION, SOLUTION SUBCUTANEOUS at 08:16

## 2018-09-26 RX ADMIN — DILTIAZEM HYDROCHLORIDE 360 MG: 180 CAPSULE, COATED, EXTENDED RELEASE ORAL at 08:16

## 2018-09-26 RX ADMIN — LETROZOLE 2.5 MG: 2.5 TABLET ORAL at 08:16

## 2018-09-27 ENCOUNTER — EPISODE CHANGES (OUTPATIENT)
Dept: CASE MANAGEMENT | Facility: OTHER | Age: 73
End: 2018-09-27

## 2018-09-27 ENCOUNTER — READMISSION MANAGEMENT (OUTPATIENT)
Dept: CALL CENTER | Facility: HOSPITAL | Age: 73
End: 2018-09-27

## 2018-09-27 NOTE — OUTREACH NOTE
Prep Survey      Responses   Facility patient discharged from?  [place]   Is patient eligible?  Yes   Discharge diagnosis  Afib with RVR   Does the patient have one of the following disease processes/diagnoses(primary or secondary)?  Other   Does the patient have Home health ordered?  No   Is there a DME ordered?  No   Comments regarding appointments  See AVS   Prep survey completed?  Yes          Karen Callahan RN

## 2018-09-28 ENCOUNTER — READMISSION MANAGEMENT (OUTPATIENT)
Dept: CALL CENTER | Facility: HOSPITAL | Age: 73
End: 2018-09-28

## 2018-09-28 NOTE — OUTREACH NOTE
Medical Week 1 Survey      Responses   Facility patient discharged from?  [place]   Does the patient have one of the following disease processes/diagnoses(primary or secondary)?  Other   Is there a successful TCM telephone encounter documented?  No   Week 1 attempt successful?  No   Unsuccessful attempts  Attempt 1          Karen Stauffer RN

## 2018-10-01 ENCOUNTER — READMISSION MANAGEMENT (OUTPATIENT)
Dept: CALL CENTER | Facility: HOSPITAL | Age: 73
End: 2018-10-01

## 2018-10-01 NOTE — OUTREACH NOTE
Medical Week 1 Survey      Responses   Facility patient discharged from?  [facility]   Does the patient have one of the following disease processes/diagnoses(primary or secondary)?  Other   Is there a successful TCM telephone encounter documented?  No   Week 1 attempt successful?  No   Unsuccessful attempts  Attempt 2          Sherry Sherwood RN

## 2018-10-02 ENCOUNTER — READMISSION MANAGEMENT (OUTPATIENT)
Dept: CALL CENTER | Facility: HOSPITAL | Age: 73
End: 2018-10-02

## 2018-10-02 NOTE — OUTREACH NOTE
Medical Week 1 Survey      Responses   Facility patient discharged from?  [place]   Does the patient have one of the following disease processes/diagnoses(primary or secondary)?  Other   Is there a successful TCM telephone encounter documented?  No   Week 1 attempt successful?  No   Unsuccessful attempts  Attempt 3          Sherry Gomez RN

## 2018-10-03 ENCOUNTER — OFFICE VISIT (OUTPATIENT)
Dept: FAMILY MEDICINE CLINIC | Facility: CLINIC | Age: 73
End: 2018-10-03

## 2018-10-03 ENCOUNTER — READMISSION MANAGEMENT (OUTPATIENT)
Dept: CALL CENTER | Facility: HOSPITAL | Age: 73
End: 2018-10-03

## 2018-10-03 VITALS
BODY MASS INDEX: 32.78 KG/M2 | RESPIRATION RATE: 16 BRPM | HEIGHT: 64 IN | HEART RATE: 71 BPM | SYSTOLIC BLOOD PRESSURE: 126 MMHG | OXYGEN SATURATION: 98 % | DIASTOLIC BLOOD PRESSURE: 82 MMHG | WEIGHT: 192 LBS

## 2018-10-03 DIAGNOSIS — I48.0 PAROXYSMAL ATRIAL FIBRILLATION (HCC): Primary | ICD-10-CM

## 2018-10-03 PROCEDURE — 99213 OFFICE O/P EST LOW 20 MIN: CPT | Performed by: FAMILY MEDICINE

## 2018-10-03 NOTE — OUTREACH NOTE
Medical Week 1 Survey      Responses   Facility patient discharged from?  [place]   Does the patient have one of the following disease processes/diagnoses(primary or secondary)?  Other   Is there a successful TCM telephone encounter documented?  No   Week 1 attempt successful?  Yes   Call start time  0942   Call end time  0946   Discharge diagnosis  Afib with RVR   Meds reviewed with patient/caregiver?  Yes   Is the patient having any side effects they believe may be caused by any medication additions or changes?  No   Does the patient have all medications ordered at discharge?  Yes   Is the patient taking all medications as directed (includes completed medication regime)?  Yes   Does the patient have a primary care provider?   Yes   Does the patient have an appointment with their PCP within 7 days of discharge?  Yes   Has the patient kept scheduled appointments due by today?  Yes   Psychosocial issues?  No   Did the patient receive a copy of their discharge instructions?  Yes   Nursing interventions  Reviewed instructions with patient   What is the patient's perception of their health status since discharge?  Same   Is the patient/caregiver able to teach back signs and symptoms related to disease process for when to call PCP?  Yes   Is the patient/caregiver able to teach back signs and symptoms related to disease process for when to call 911?  Yes   Is the patient/caregiver able to teach back the hierarchy of who to call/visit for symptoms/problems? PCP, Specialist, Home health nurse, Urgent Care, ED, 911  Yes   Week 1 call completed?  Yes          Claritza Leahy RN

## 2018-10-03 NOTE — PROGRESS NOTES
"Glenn Pritchard is a 73 y.o. female.     Chief Complaint   Patient presents with   • Follow-up     1 week   hosp f/u   afib       History of Present Illness     shewill see cardiology this month--denies any cp or ha--she is tired most ddays      Current Outpatient Prescriptions:   •  diltiaZEM CD (CARDIZEM CD) 360 MG 24 hr capsule, Take 1 capsule by mouth Daily., Disp: 30 capsule, Rfl: 5  •  insulin detemir (LEVEMIR) 100 UNIT/ML injection, Inject 50 Units under the skin into the appropriate area as directed 2 (Two) Times a Day., Disp: , Rfl:   •  letrozole (FEMARA) 2.5 MG tablet, Take 2.5 mg by mouth Daily., Disp: , Rfl:   •  levothyroxine (SYNTHROID, LEVOTHROID) 137 MCG tablet, Take 137 mcg by mouth Daily., Disp: , Rfl:   •  losartan (COZAAR) 25 MG tablet, Take 25 mg by mouth Daily., Disp: , Rfl:   •  metoprolol tartrate (LOPRESSOR) 25 MG tablet, Take 1 tablet by mouth Every 12 (Twelve) Hours., Disp: 60 tablet, Rfl: 5  •  Multiple Vitamins-Minerals (CENTRUM SILVER PO), Take 1 tablet by mouth Daily., Disp: , Rfl:   Allergies   Allergen Reactions   • Amoxil [Amoxicillin] Hives   • Penicillins Hives   • Biaxin [Clarithromycin] Other (See Comments)     NOT SURE OF REACTION, \"SORE MOUTH OF DIARRHEA\"   • Lortab [Hydrocodone-Acetaminophen] GI Intolerance       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)   • Diabetes mellitus, type II (CMS/HCC)    • Dizziness    • Drug therapy    • Essential hypertension    • GERD (gastroesophageal reflux disease)    • GI bleed requiring more than 4 units of blood in 24 hours, ICU, or surgery    • History of lymphoma    • Hx of radiation therapy    • Hypomagnesemia    • Hypothyroidism    • On amiodarone therapy      Past Surgical History:   Procedure Laterality Date   • BREAST BIOPSY     • MASTECTOMY     • OTHER SURGICAL HISTORY      Mediport " "insertion X 2   • OTHER SURGICAL HISTORY      remote lymphoma treatment   • REPLACEMENT TOTAL KNEE Right 09/2017   • TUBAL ABDOMINAL LIGATION         Review of Systems   Constitutional: Positive for fatigue.   HENT: Negative.    Eyes: Negative.    Respiratory: Negative.    Cardiovascular: Positive for palpitations.   Gastrointestinal: Negative.    Endocrine: Negative.    Genitourinary: Negative.    Musculoskeletal: Negative.    Skin: Negative.    Allergic/Immunologic: Negative.    Neurological: Negative.    Hematological: Negative.    Psychiatric/Behavioral: Negative.        Objective  /82   Pulse 71   Resp 16   Ht 162.6 cm (64\")   Wt 87.1 kg (192 lb)   SpO2 98%   BMI 32.96 kg/m²   Physical Exam   Constitutional: She is oriented to person, place, and time. She appears well-developed and well-nourished.   HENT:   Head: Normocephalic and atraumatic.   Right Ear: External ear normal.   Left Ear: External ear normal.   Nose: Nose normal.   Mouth/Throat: Oropharynx is clear and moist.   Eyes: Pupils are equal, round, and reactive to light. Conjunctivae and EOM are normal.   Neck: Normal range of motion. Neck supple.   Cardiovascular: Normal rate, regular rhythm, normal heart sounds and intact distal pulses.    Pulmonary/Chest: Effort normal and breath sounds normal.   Abdominal: Soft. Bowel sounds are normal.   Musculoskeletal: Normal range of motion.   Neurological: She is alert and oriented to person, place, and time.   Skin: Skin is warm. Capillary refill takes less than 2 seconds.   Psychiatric: She has a normal mood and affect. Her behavior is normal. Judgment and thought content normal.   Nursing note and vitals reviewed.      Assessment/Plan   Dianne was seen today for follow-up.    Diagnoses and all orders for this visit:    Paroxysmal atrial fibrillation (CMS/HCC)    Patient's Body mass index is 32.96 kg/m². BMI is above normal parameters. Recommendations include: no follow-up required.    dorene " with cardiolgy appt         No orders of the defined types were placed in this encounter.    Current outpatient and discharge medications have been reconciled for the patient.  Reviewed by: Darryl Bang MD  Follow up: 4 month(s)

## 2018-10-09 ENCOUNTER — OFFICE VISIT (OUTPATIENT)
Dept: CARDIOLOGY | Facility: CLINIC | Age: 73
End: 2018-10-09

## 2018-10-09 VITALS
DIASTOLIC BLOOD PRESSURE: 72 MMHG | WEIGHT: 191.4 LBS | HEIGHT: 64 IN | HEART RATE: 91 BPM | BODY MASS INDEX: 32.68 KG/M2 | SYSTOLIC BLOOD PRESSURE: 110 MMHG

## 2018-10-09 DIAGNOSIS — I77.9 BILATERAL CAROTID ARTERY DISEASE, UNSPECIFIED TYPE (HCC): ICD-10-CM

## 2018-10-09 DIAGNOSIS — I10 ESSENTIAL HYPERTENSION: ICD-10-CM

## 2018-10-09 DIAGNOSIS — I48.0 PAROXYSMAL ATRIAL FIBRILLATION (HCC): Primary | ICD-10-CM

## 2018-10-09 DIAGNOSIS — Z51.81 ENCOUNTER FOR MONITORING AMIODARONE THERAPY: ICD-10-CM

## 2018-10-09 DIAGNOSIS — E11.9 TYPE 2 DIABETES MELLITUS WITHOUT COMPLICATION, WITH LONG-TERM CURRENT USE OF INSULIN (HCC): ICD-10-CM

## 2018-10-09 DIAGNOSIS — Z79.899 ENCOUNTER FOR MONITORING AMIODARONE THERAPY: ICD-10-CM

## 2018-10-09 DIAGNOSIS — Z79.4 TYPE 2 DIABETES MELLITUS WITHOUT COMPLICATION, WITH LONG-TERM CURRENT USE OF INSULIN (HCC): ICD-10-CM

## 2018-10-09 PROCEDURE — 93000 ELECTROCARDIOGRAM COMPLETE: CPT | Performed by: PHYSICIAN ASSISTANT

## 2018-10-09 PROCEDURE — 99214 OFFICE O/P EST MOD 30 MIN: CPT | Performed by: PHYSICIAN ASSISTANT

## 2018-10-09 RX ORDER — AMIODARONE HYDROCHLORIDE 200 MG/1
200 TABLET ORAL 3 TIMES DAILY
Qty: 90 TABLET | Refills: 1 | Status: SHIPPED | OUTPATIENT
Start: 2018-10-09 | End: 2018-11-06 | Stop reason: SDUPTHER

## 2018-10-09 NOTE — PROGRESS NOTES
"    Subjective:     Encounter Date: 10/9/18      Patient ID: Dianne Pritchard is a 73 y.o. female who presents for hospital follow-up. She has been feeling terrible since this time, noting shortness of breath with any amount of exertion, significant fatigue, frequent palpitations and generalized weakness. She reports that she has not felt like doing hardly anything. She reports that she \"cannot go on like this.\"     Chief Complaint: fatigue    Atrial Fibrillation   Presents for follow-up visit. Symptoms include palpitations, shortness of breath and weakness. Symptoms are negative for chest pain, dizziness and syncope. The symptoms have been worsening. Past medical history includes atrial fibrillation. There are no medication compliance problems.   Hypertension   This is a chronic problem. The current episode started more than 1 year ago. The problem is unchanged. The problem is controlled (borderline high). Associated symptoms include malaise/fatigue, palpitations and shortness of breath. Pertinent negatives include no chest pain, headaches, orthopnea or PND. There are no associated agents to hypertension. Risk factors for coronary artery disease include post-menopausal state. Past treatments include calcium channel blockers, beta blockers and angiotensin blockers. Current antihypertension treatment includes angiotensin blockers and calcium channel blockers. The current treatment provides significant improvement. Compliance problems include exercise and diet.  There is no history of CAD/MI. There is no history of chronic renal disease.       The following portions of the patient's history were reviewed and updated as appropriate: allergies, current medications, past family history, past medical history, past social history, past surgical history and problem list.   Prior to Admission medications    Medication Sig Start Date End Date Taking? Authorizing Provider   Calcium Carb-Cholecalciferol (CALCIUM CARBONATE-VITAMIN " D3) 600-400 MG-UNIT tablet Take  by mouth daily.   Yes Historical Provider, MD   diltiaZEM (CARDIZEM) 30 MG tablet Take 1 tablet by mouth 2 (Two) Times a Day. 12/12/17  Yes Darryl Bang MD   Insulin Pen Needle 31G X 5 MM misc Use to test twice daily as directed 1/23/18  Yes Darryl Bang MD   letrozole (FEMARA) 2.5 MG tablet Take  by mouth daily.   Yes Historical Provider, MD   LEVEMIR FLEXTOUCH 100 UNIT/ML injection INJECT 50 UNITS TWICE DAILY.  Patient taking differently: INJECT 50 UNITS  DAILY. 1/10/18  Yes Darryl Bang MD   levothyroxine (SYNTHROID) 125 MCG tablet Take 1 tablet by mouth Daily. 4/13/18  Yes Darryl Bang MD   losartan (COZAAR) 25 MG tablet Take 25 mg by mouth Daily. 6/13/17  Yes Historical Provider, MD   Multiple Vitamins-Minerals (CENTRUM SILVER PO) Take 1 tablet by mouth Daily.   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 Negative for chills, decreased appetite, fever, weight gain and weight loss.   HENT: Negative for nosebleeds.    Eyes: Negative for visual disturbance.   Cardiovascular: Positive for dyspnea on exertion and palpitations. Negative for chest pain, leg swelling, near-syncope, orthopnea, paroxysmal nocturnal dyspnea and syncope.   Respiratory:  Positive for shortness of breath. Negative for cough, hemoptysis and snoring.    Endocrine: Negative for cold intolerance and heat intolerance.   Hematologic/Lymphatic: Negative for bleeding problem. Does not bruise/bleed easily.   Skin: Negative for rash.   Musculoskeletal: Negative for back pain and falls.   Gastrointestinal: Negative for abdominal pain, constipation, diarrhea, heartburn, melena, nausea and vomiting.   Genitourinary: Negative for hematuria.   Neurological: Negative for dizziness, headaches and light-headedness.   Psychiatric/Behavioral: Negative for altered mental status.   Allergic/Immunologic: Negative for persistent infections.         ECG 12 Lead  Date/Time: 10/9/2018 3:50 PM  Performed by: NANCY TAYLOR  Authorized by: NANCY TAYLOR   Comparison: compared with previous ECG from 9/26/2018  Similar to previous ECG  Rhythm: atrial fibrillation  Rate: normal  QRS axis: normal  Clinical impression: abnormal ECG               Objective:     Physical Exam   Constitutional: She is oriented to person, place, and time. She appears well-developed and well-nourished.   HENT:   Head: Normocephalic and atraumatic.   Eyes: Pupils are equal, round, and reactive to light. Conjunctivae and EOM are normal.   Neck: Normal range of motion. Neck supple. No JVD present. Carotid bruit is not present.   Cardiovascular: Normal rate, S1 normal, S2 normal, normal heart sounds, intact distal pulses and normal pulses.  An irregularly irregular rhythm present.   No murmur heard.  Carotid bruit   Pulmonary/Chest: Effort normal and breath sounds normal. No respiratory distress.   Abdominal: Soft. Bowel sounds are normal. She exhibits no distension.   Musculoskeletal: Normal range of motion. She exhibits no edema or tenderness.   Neurological: She is alert and oriented to person, place, and time. She has normal reflexes.   Skin: Skin is warm and dry.   Psychiatric: She has a normal mood and affect. Her behavior is normal.  "Judgment and thought content normal.   Vitals reviewed.    /72   Pulse 91   Ht 162.6 cm (64\")   Wt 86.8 kg (191 lb 6.4 oz)   BMI 32.85 kg/m²       Lab Review: AST, ALT, TSH within normal limits 9/25/18, 9/14/18 respectively      Assessment:          Diagnosis Plan   1. Paroxysmal atrial fibrillation (CMS/HCC)  Ambulatory Referral to Cardiac Electrophysiology    Not anticoagulated 2/2 history of GIB  Sxs of profound fatigue, dyspnea, palpitations and weakness   2. Essential hypertension      Well controlled.    3. Bilateral carotid artery disease, unspecified type (CMS/HCC)     4. Type 2 diabetes mellitus without complication, with long-term current use of insulin (CMS/HCC)     5. Encounter for monitoring amiodarone therapy  Pulmonary Function Test          Plan:       1. Given patient's significant symptoms with recurrence of atrial fibrillation, I will focus on rhythm control strategy. We discussed her stroke risk with potential conversion of rhythm and she understands. She still wants to proceed with attempted chemical cardioversion, stating \"I have to try something.\" Ideally, of course, we would have her on anticoagulant prior to trying chemical or electrical cardioversion, but she cannot tolerate this. I will therefore begin amiodarone load 200 mg TID x 4 weeks. Reduce to 200 mg daily at this time. I will refer her to EP for consideration of ablation.   2. We will postpone JOSSELYN for now and consider Watchman in future pending above.   3. PFT to determine baseline prior to amiodarone initiation. Hopefully she will not have to be on this long-term. TSH, and liver function study were normal.   4. Follow-up in 4 weeks, unless needed sooner.   5. Verbalized understanding of instructions.          "

## 2018-10-10 ENCOUNTER — TELEPHONE (OUTPATIENT)
Dept: CARDIOLOGY | Facility: CLINIC | Age: 73
End: 2018-10-10

## 2018-10-10 NOTE — TELEPHONE ENCOUNTER
----- Message from Qi Durand PA-C sent at 10/10/2018  1:49 PM CDT -----  Yes to fill    ----- Message -----  From: Marlyn Fernández MA  Sent: 10/10/2018  10:40 AM  To: Qi Durand PA-C    Pharmacy called and wanted you to be aware of the interaction of amiodarone with her other medications.(Im sure your aware)  Ok to still feel

## 2018-10-11 ENCOUNTER — EPISODE CHANGES (OUTPATIENT)
Dept: CASE MANAGEMENT | Facility: OTHER | Age: 73
End: 2018-10-11

## 2018-10-11 ENCOUNTER — TELEPHONE (OUTPATIENT)
Dept: CARDIOLOGY | Facility: CLINIC | Age: 73
End: 2018-10-11

## 2018-10-11 DIAGNOSIS — Z01.818 PRE-OP EXAM: ICD-10-CM

## 2018-10-11 DIAGNOSIS — I48.0 PAROXYSMAL ATRIAL FIBRILLATION (HCC): Primary | ICD-10-CM

## 2018-10-11 NOTE — TELEPHONE ENCOUNTER
----- Message from Qi Durand PA-C sent at 10/11/2018 12:42 PM CDT -----  Please tell her Dr. Orantes discussed her case with the EP docs who would consider her for ablation. They prefer Watchman procedure first. If she is agreeable to go ahead and get this done we can get her set up with them for consideration of ablation. If she's agreeable I will reorder her JOSSELYN    Thanks  Qi     ----- Message -----  From: Mick Orantes MD  Sent: 10/10/2018   7:00 PM  To: Qi Durand PA-C    I discussed this with [facility]. She will have to be on anticoagulation for 6 mo for ablation and they will not likely do it if there is a change she will have to stop it prematurely. They preferred Watchman first  ----- Message -----  From: Qi Durand PA-C  Sent: 10/9/2018   3:42 PM  To: Mick Orantes MD    I have postponed patient's JOSSELYN for Watchman. She does not want to have this done right now as she is not feeling well. She is back in afib, with significant fatigue and weakness. Bc of these symptoms, I am starting her on amiodarone and referring her to EP for consideration of ablation. She wants to still consider Watchman in the future. Please let me know if you'd have me do anything differently.     Thank you,  Qi

## 2018-10-12 ENCOUNTER — READMISSION MANAGEMENT (OUTPATIENT)
Dept: CALL CENTER | Facility: HOSPITAL | Age: 73
End: 2018-10-12

## 2018-10-12 ENCOUNTER — HOSPITAL ENCOUNTER (OUTPATIENT)
Dept: PULMONOLOGY | Facility: HOSPITAL | Age: 73
Discharge: HOME | End: 2018-10-12
Admitting: PHYSICIAN ASSISTANT

## 2018-10-12 VITALS — BODY MASS INDEX: 33.66 KG/M2 | HEIGHT: 63 IN | WEIGHT: 190 LBS

## 2018-10-12 DIAGNOSIS — Z79.899 ENCOUNTER FOR MONITORING AMIODARONE THERAPY: ICD-10-CM

## 2018-10-12 DIAGNOSIS — Z51.81 ENCOUNTER FOR MONITORING AMIODARONE THERAPY: ICD-10-CM

## 2018-10-12 PROCEDURE — 94729 DIFFUSING CAPACITY: CPT

## 2018-10-12 PROCEDURE — 94060 EVALUATION OF WHEEZING: CPT

## 2018-10-12 PROCEDURE — 94726 PLETHYSMOGRAPHY LUNG VOLUMES: CPT

## 2018-10-12 NOTE — OUTREACH NOTE
Medical Week 2 Survey      Responses   Facility patient discharged from?  [facility]   Does the patient have one of the following disease processes/diagnoses(primary or secondary)?  Other   Week 2 attempt successful?  Yes   Call start time  1225   Discharge diagnosis  Afib with RVR   Call end time  1226   Meds reviewed with patient/caregiver?  Yes   Is the patient having any side effects they believe may be caused by any medication additions or changes?  No   Does the patient have all medications ordered at discharge?  Yes   Is the patient taking all medications as directed (includes completed medication regime)?  Yes   Does the patient have a primary care provider?   Yes   Has the patient kept scheduled appointments due by today?  Yes   Has home health visited the patient within 72 hours of discharge?  N/A   Psychosocial issues?  No   Did the patient receive a copy of their discharge instructions?  Yes   Nursing interventions  Educated on MyChart   What is the patient's perception of their health status since discharge?  Same   Is the patient/caregiver able to teach back signs and symptoms related to disease process for when to call PCP?  Yes   Is the patient/caregiver able to teach back signs and symptoms related to disease process for when to call 911?  Yes   Week 2 Call Completed?  Yes          Karen Ramirez RN

## 2018-10-15 ENCOUNTER — HOSPITAL ENCOUNTER (OUTPATIENT)
Dept: CARDIOLOGY | Facility: HOSPITAL | Age: 73
Setting detail: HOSPITAL OUTPATIENT SURGERY
End: 2018-10-15

## 2018-10-17 ENCOUNTER — HOSPITAL ENCOUNTER (OUTPATIENT)
Dept: CARDIOLOGY | Facility: HOSPITAL | Age: 73
Discharge: HOME | End: 2018-10-17
Admitting: INTERNAL MEDICINE

## 2018-10-17 VITALS
HEART RATE: 82 BPM | OXYGEN SATURATION: 98 % | RESPIRATION RATE: 21 BRPM | SYSTOLIC BLOOD PRESSURE: 124 MMHG | DIASTOLIC BLOOD PRESSURE: 57 MMHG

## 2018-10-17 DIAGNOSIS — Z01.818 PRE-OP EXAM: ICD-10-CM

## 2018-10-17 DIAGNOSIS — I48.0 PAROXYSMAL ATRIAL FIBRILLATION (HCC): ICD-10-CM

## 2018-10-17 LAB
BH CV ECHO MEAS - BSA(HAYCOCK): 2 M^2
BH CV ECHO MEAS - BSA: 1.9 M^2
BH CV ECHO MEAS - BZI_BMI: 33.7 KILOGRAMS/M^2
BH CV ECHO MEAS - BZI_METRIC_HEIGHT: 160 CM
BH CV ECHO MEAS - BZI_METRIC_WEIGHT: 86.2 KG
BH CV ECHO MEAS - RAP SYSTOLE: 5 MMHG
BH CV ECHO MEAS - RVSP: 26.2 MMHG
BH CV ECHO MEAS - TR MAX VEL: 230 CM/SEC
LV EF 2D ECHO EST: 60 %

## 2018-10-17 PROCEDURE — 93312 ECHO TRANSESOPHAGEAL: CPT

## 2018-10-17 PROCEDURE — 93312 ECHO TRANSESOPHAGEAL: CPT | Performed by: INTERNAL MEDICINE

## 2018-10-17 PROCEDURE — 99152 MOD SED SAME PHYS/QHP 5/>YRS: CPT

## 2018-10-17 PROCEDURE — 93320 DOPPLER ECHO COMPLETE: CPT | Performed by: INTERNAL MEDICINE

## 2018-10-17 PROCEDURE — 25010000002 FENTANYL CITRATE (PF) 100 MCG/2ML SOLUTION: Performed by: INTERNAL MEDICINE

## 2018-10-17 PROCEDURE — 93325 DOPPLER ECHO COLOR FLOW MAPG: CPT | Performed by: INTERNAL MEDICINE

## 2018-10-17 PROCEDURE — 93325 DOPPLER ECHO COLOR FLOW MAPG: CPT

## 2018-10-17 PROCEDURE — 93320 DOPPLER ECHO COMPLETE: CPT

## 2018-10-17 PROCEDURE — 25010000002 MIDAZOLAM PER 1 MG: Performed by: INTERNAL MEDICINE

## 2018-10-17 RX ORDER — SODIUM CHLORIDE 0.9 % (FLUSH) 0.9 %
3-10 SYRINGE (ML) INJECTION AS NEEDED
Status: DISCONTINUED | OUTPATIENT
Start: 2018-10-17 | End: 2018-10-18 | Stop reason: HOSPADM

## 2018-10-17 RX ORDER — MIDAZOLAM HYDROCHLORIDE 1 MG/ML
INJECTION INTRAMUSCULAR; INTRAVENOUS
Status: COMPLETED | OUTPATIENT
Start: 2018-10-17 | End: 2018-10-17

## 2018-10-17 RX ORDER — SODIUM CHLORIDE 9 MG/ML
50 INJECTION, SOLUTION INTRAVENOUS CONTINUOUS
Status: DISCONTINUED | OUTPATIENT
Start: 2018-10-17 | End: 2018-10-18 | Stop reason: HOSPADM

## 2018-10-17 RX ORDER — MIDAZOLAM HYDROCHLORIDE 1 MG/ML
INJECTION INTRAMUSCULAR; INTRAVENOUS
Status: DISPENSED
Start: 2018-10-17 | End: 2018-10-18

## 2018-10-17 RX ORDER — FENTANYL CITRATE 50 UG/ML
INJECTION, SOLUTION INTRAMUSCULAR; INTRAVENOUS
Status: COMPLETED | OUTPATIENT
Start: 2018-10-17 | End: 2018-10-17

## 2018-10-17 RX ORDER — SODIUM CHLORIDE 0.9 % (FLUSH) 0.9 %
3 SYRINGE (ML) INJECTION EVERY 12 HOURS SCHEDULED
Status: DISCONTINUED | OUTPATIENT
Start: 2018-10-17 | End: 2018-10-18 | Stop reason: HOSPADM

## 2018-10-17 RX ORDER — FENTANYL CITRATE 50 UG/ML
INJECTION, SOLUTION INTRAMUSCULAR; INTRAVENOUS
Status: DISPENSED
Start: 2018-10-17 | End: 2018-10-18

## 2018-10-17 RX ADMIN — BENZOCAINE, BUTAMBEN, AND TETRACAINE HYDROCHLORIDE 1 SPRAY: .028; .004; .004 AEROSOL, SPRAY TOPICAL at 15:03

## 2018-10-17 RX ADMIN — MIDAZOLAM 2 MG: 1 INJECTION INTRAMUSCULAR; INTRAVENOUS at 15:06

## 2018-10-17 RX ADMIN — MIDAZOLAM 2 MG: 1 INJECTION INTRAMUSCULAR; INTRAVENOUS at 15:04

## 2018-10-17 RX ADMIN — FENTANYL CITRATE 50 MCG: 50 INJECTION, SOLUTION INTRAMUSCULAR; INTRAVENOUS at 15:04

## 2018-10-17 NOTE — DISCHARGE INSTR - APPOINTMENTS
Keep any previously scheduled appointments.    Dr. Orantes's office will contact you with further recommendations for your care.

## 2018-10-18 ENCOUNTER — TELEPHONE (OUTPATIENT)
Dept: CARDIOLOGY | Facility: CLINIC | Age: 73
End: 2018-10-18

## 2018-10-18 NOTE — TELEPHONE ENCOUNTER
Call to pt to notify her of appt with Dr. Ortiz on Monday, Oct 22 at 3 PM. She verbalized understanding.

## 2018-10-22 ENCOUNTER — OFFICE VISIT (OUTPATIENT)
Dept: CARDIOLOGY | Facility: CLINIC | Age: 73
End: 2018-10-22

## 2018-10-22 VITALS
DIASTOLIC BLOOD PRESSURE: 78 MMHG | OXYGEN SATURATION: 96 % | WEIGHT: 193 LBS | HEART RATE: 77 BPM | HEIGHT: 63 IN | BODY MASS INDEX: 34.2 KG/M2 | SYSTOLIC BLOOD PRESSURE: 130 MMHG

## 2018-10-22 DIAGNOSIS — R09.89 BRUIT OF LEFT CAROTID ARTERY: ICD-10-CM

## 2018-10-22 DIAGNOSIS — Z87.19 HISTORY OF GI BLEED: ICD-10-CM

## 2018-10-22 DIAGNOSIS — I77.9 BILATERAL CAROTID ARTERY DISEASE, UNSPECIFIED TYPE (HCC): ICD-10-CM

## 2018-10-22 DIAGNOSIS — I48.0 PAROXYSMAL ATRIAL FIBRILLATION (HCC): Primary | ICD-10-CM

## 2018-10-22 DIAGNOSIS — I10 ESSENTIAL HYPERTENSION: ICD-10-CM

## 2018-10-22 PROCEDURE — 99213 OFFICE O/P EST LOW 20 MIN: CPT | Performed by: INTERNAL MEDICINE

## 2018-10-22 NOTE — PROGRESS NOTES
Reason for Visit: Left atrial appendage closure shared decision making.     HPI:  Dianne Pritchard is a 73 y.o. female is here Left atrial appendage closure shared decision making.  She has had persistent atrial fibrillation that is currently rate controlled.  She has a history of GI bleeding on Xarelto.  She is not willing to consider further long-term anticoagulation but is open to alternative therapy such as a watchman device.  She has underwent a JOSSELYN in October 17 2 evaluate her left atrial appendage.        Patient Active Problem List   Diagnosis   • Tremor   • Essential hypertension   • Type 2 diabetes mellitus without complication (CMS/HCC)   • Acquired hypothyroidism   • Paroxysmal atrial fibrillation (CMS/HCC)   • Dizziness   • Multiple falls   • Bruit of left carotid artery   • Acute pain of right knee   • Right knee pain   • Status post right knee replacement   • BMI 33.0-33.9,adult   • Bilateral carotid artery disease (CMS/HCC)   • Atrial fibrillation with RVR (CMS/HCC)   • Atrial fibrillation with rapid ventricular response (CMS/HCC)       Social History   Substance Use Topics   • Smoking status: Former Smoker     Years: 20.00     Types: Cigarettes     Quit date: 1993   • Smokeless tobacco: Never Used   • Alcohol use No       Family History   Problem Relation Age of Onset   • Heart disease Mother    • Cancer Father    • Cancer Sister    • No Known Problems Brother    • No Known Problems Brother    • Heart disease Sister    • Heart attack Sister    • No Known Problems Sister    • No Known Problems Daughter    • No Known Problems Son    • No Known Problems Maternal Grandmother    • No Known Problems Paternal Grandmother    • No Known Problems Maternal Aunt    • Breast cancer Paternal Aunt    • BRCA 1/2 Neg Hx    • Colon cancer Neg Hx    • Endometrial cancer Neg Hx    • Ovarian cancer Neg Hx        The following portions of the patient's history were reviewed and updated as appropriate: allergies, current  "medications, past family history, past medical history, past social history, past surgical history and problem list.      Current Outpatient Prescriptions:   •  amiodarone (PACERONE) 200 MG tablet, Take 1 tablet by mouth 3 (Three) Times a Day., Disp: 90 tablet, Rfl: 1  •  diltiaZEM CD (CARDIZEM CD) 360 MG 24 hr capsule, Take 1 capsule by mouth Daily., Disp: 30 capsule, Rfl: 5  •  insulin detemir (LEVEMIR) 100 UNIT/ML injection, Inject 50 Units under the skin into the appropriate area as directed 2 (Two) Times a Day., Disp: , Rfl:   •  letrozole (FEMARA) 2.5 MG tablet, Take 2.5 mg by mouth Daily., Disp: , Rfl:   •  levothyroxine (SYNTHROID, LEVOTHROID) 137 MCG tablet, Take 137 mcg by mouth Daily., Disp: , Rfl:   •  losartan (COZAAR) 25 MG tablet, Take 25 mg by mouth Daily., Disp: , Rfl:   •  metoprolol tartrate (LOPRESSOR) 25 MG tablet, Take 1 tablet by mouth Every 12 (Twelve) Hours., Disp: 60 tablet, Rfl: 5  •  Multiple Vitamins-Minerals (CENTRUM SILVER PO), Take 1 tablet by mouth Daily., Disp: , Rfl:     Review of Systems   Constitution: Negative for chills and fever.   Cardiovascular: Negative for chest pain and paroxysmal nocturnal dyspnea.   Respiratory: Negative for cough and shortness of breath.    Skin: Negative for rash.   Gastrointestinal: Negative for abdominal pain and heartburn.   Neurological: Negative for dizziness and numbness.       Objective   /78 (BP Location: Left arm, Patient Position: Sitting, Cuff Size: Adult)   Pulse 77   Ht 160 cm (62.99\")   Wt 87.5 kg (193 lb)   SpO2 96%   BMI 34.20 kg/m²   Physical Exam   Constitutional: She is oriented to person, place, and time. She appears well-developed and well-nourished.   HENT:   Head: Normocephalic and atraumatic.   Cardiovascular: Normal rate and normal heart sounds.  An irregularly irregular rhythm present.   No murmur heard.  Pulmonary/Chest: Effort normal and breath sounds normal.   Musculoskeletal: She exhibits no edema. "   Neurological: She is alert and oriented to person, place, and time.   Skin: Skin is warm and dry.   Psychiatric: She has a normal mood and affect.     Procedures      ICD-10-CM ICD-9-CM   1. Paroxysmal atrial fibrillation (CMS/[facility]) I48.0 427.31   2. Essential hypertension I10 401.9   3. Bruit of left carotid artery R09.89 785.9   4. Bilateral carotid artery disease, unspecified type (CMS/[facility]) I77.9 447.9   5. History of GI bleed Z87.19 V12.79         Assessment/Plan:  Based on the history, it has been determined that the patient is a poor candidate for long-term oral anticoagulation.  The patient may, however, be tolerant to short-term anticoagulation as necessary.    Specifically regarding anticoagulation they have demonstrated: GI bleeding    We have discussed that you need stroke and bleeding risk on and off anticoagulation.  The individual [id number] stroke risk store is 5 (age 65-75, female sex, HTN, DM, PVD), indicating an adjusted stroke rate of 6.7%/year.    Based on both stroke and bleeding risk, shared decision has been made to pursue closure of the left atrial appendage is a safe, effective alternative to long-term oral anticoagulation therapy for stroke prophylaxis.  This will reduce her long-term risk of incidence of bleeding.  Information regarding [facility] shared decision making was provided to patient.

## 2018-10-23 DIAGNOSIS — R94.2 ABNORMAL PFTS: Primary | ICD-10-CM

## 2018-10-25 ENCOUNTER — PREP FOR SURGERY (OUTPATIENT)
Dept: OTHER | Facility: HOSPITAL | Age: 73
End: 2018-10-25

## 2018-10-25 DIAGNOSIS — I48.0 PAROXYSMAL ATRIAL FIBRILLATION (HCC): Primary | ICD-10-CM

## 2018-10-25 RX ORDER — SODIUM CHLORIDE, SODIUM LACTATE, POTASSIUM CHLORIDE, CALCIUM CHLORIDE 600; 310; 30; 20 MG/100ML; MG/100ML; MG/100ML; MG/100ML
125 INJECTION, SOLUTION INTRAVENOUS CONTINUOUS
Status: CANCELLED | OUTPATIENT
Start: 2018-10-25

## 2018-11-06 ENCOUNTER — OFFICE VISIT (OUTPATIENT)
Dept: CARDIOLOGY | Facility: CLINIC | Age: 73
End: 2018-11-06

## 2018-11-06 VITALS
WEIGHT: 192 LBS | HEIGHT: 63 IN | HEART RATE: 76 BPM | SYSTOLIC BLOOD PRESSURE: 110 MMHG | BODY MASS INDEX: 34.02 KG/M2 | DIASTOLIC BLOOD PRESSURE: 62 MMHG

## 2018-11-06 DIAGNOSIS — I77.9 BILATERAL CAROTID ARTERY DISEASE, UNSPECIFIED TYPE (HCC): ICD-10-CM

## 2018-11-06 DIAGNOSIS — Z79.4 TYPE 2 DIABETES MELLITUS WITHOUT COMPLICATION, WITH LONG-TERM CURRENT USE OF INSULIN (HCC): ICD-10-CM

## 2018-11-06 DIAGNOSIS — J98.4 RESTRICTIVE LUNG DISEASE: ICD-10-CM

## 2018-11-06 DIAGNOSIS — E11.9 TYPE 2 DIABETES MELLITUS WITHOUT COMPLICATION, WITH LONG-TERM CURRENT USE OF INSULIN (HCC): ICD-10-CM

## 2018-11-06 DIAGNOSIS — I10 ESSENTIAL HYPERTENSION: ICD-10-CM

## 2018-11-06 DIAGNOSIS — I48.19 PERSISTENT ATRIAL FIBRILLATION (HCC): Primary | ICD-10-CM

## 2018-11-06 PROCEDURE — 99214 OFFICE O/P EST MOD 30 MIN: CPT | Performed by: NURSE PRACTITIONER

## 2018-11-06 PROCEDURE — 93000 ELECTROCARDIOGRAM COMPLETE: CPT | Performed by: NURSE PRACTITIONER

## 2018-11-06 RX ORDER — AMIODARONE HYDROCHLORIDE 200 MG/1
200 TABLET ORAL DAILY
Qty: 90 TABLET | Refills: 1
Start: 2018-11-06 | End: 2019-01-23 | Stop reason: HOSPADM

## 2018-11-06 NOTE — PATIENT INSTRUCTIONS
"DASH Eating Plan  DASH stands for \"Dietary Approaches to Stop Hypertension.\" The DASH eating plan is a healthy eating plan that has been shown to reduce high blood pressure (hypertension). It may also reduce your risk for type 2 diabetes, heart disease, and stroke. The DASH eating plan may also help with weight loss.  What are tips for following this plan?  General guidelines  · Avoid eating more than 2,300 mg (milligrams) of salt (sodium) a day. If you have hypertension, you may need to reduce your sodium intake to 1,500 mg a day.  · Limit alcohol intake to no more than 1 drink a day for nonpregnant women and 2 drinks a day for men. One drink equals 12 oz of beer, 5 oz of wine, or 1½ oz of hard liquor.  · Work with your health care provider to maintain a healthy body weight or to lose weight. Ask what an ideal weight is for you.  · Get at least 30 minutes of exercise that causes your heart to beat faster (aerobic exercise) most days of the week. Activities may include walking, swimming, or biking.  · Work with your health care provider or diet and nutrition specialist (dietitian) to adjust your eating plan to your individual calorie needs.  Reading food labels  · Check food labels for the amount of sodium per serving. Choose foods with less than 5 percent of the Daily Value of sodium. Generally, foods with less than 300 mg of sodium per serving fit into this eating plan.  · To find whole grains, look for the word \"whole\" as the first word in the ingredient list.  Shopping  · Buy products labeled as \"low-sodium\" or \"no salt added.\"  · Buy fresh foods. Avoid canned foods and premade or frozen meals.  Cooking  · Avoid adding salt when cooking. Use salt-free seasonings or herbs instead of table salt or sea salt. Check with your health care provider or pharmacist before using salt substitutes.  · Do not ware foods. Cook foods using healthy methods such as baking, boiling, grilling, and broiling instead.  · Cook with " heart-healthy oils, such as olive, canola, soybean, or sunflower oil.  Meal planning    · Eat a balanced diet that includes:  ? 5 or more servings of fruits and vegetables each day. At each meal, try to fill half of your plate with fruits and vegetables.  ? Up to 6-8 servings of whole grains each day.  ? Less than 6 oz of lean meat, poultry, or fish each day. A 3-oz serving of meat is about the same size as a deck of cards. One egg equals 1 oz.  ? 2 servings of low-fat dairy each day.  ? A serving of nuts, seeds, or beans 5 times each week.  ? Heart-healthy fats. Healthy fats called Omega-3 fatty acids are found in foods such as flaxseeds and coldwater fish, like sardines, salmon, and mackerel.  · Limit how much you eat of the following:  ? Canned or prepackaged foods.  ? Food that is high in trans fat, such as fried foods.  ? Food that is high in saturated fat, such as fatty meat.  ? Sweets, desserts, sugary drinks, and other foods with added sugar.  ? Full-fat dairy products.  · Do not salt foods before eating.  · Try to eat at least 2 vegetarian meals each week.  · Eat more home-cooked food and less restaurant, buffet, and fast food.  · When eating at a restaurant, ask that your food be prepared with less salt or no salt, if possible.  What foods are recommended?  The items listed may not be a complete list. Talk with your dietitian about what dietary choices are best for you.  Grains  Whole-grain or whole-wheat bread. Whole-grain or whole-wheat pasta. Brown rice. Oatmeal. Quinoa. Bulgur. Whole-grain and low-sodium cereals. Susan bread. Low-fat, low-sodium crackers. Whole-wheat flour tortillas.  Vegetables  Fresh or frozen vegetables (raw, steamed, roasted, or grilled). Low-sodium or reduced-sodium tomato and vegetable juice. Low-sodium or reduced-sodium tomato sauce and tomato paste. Low-sodium or reduced-sodium canned vegetables.  Fruits  All fresh, dried, or frozen fruit. Canned fruit in natural juice (without  added sugar).  Meat and other protein foods  Skinless chicken or turkey. Ground chicken or turkey. Pork with fat trimmed off. Fish and seafood. Egg whites. Dried beans, peas, or lentils. Unsalted nuts, nut butters, and seeds. Unsalted canned beans. Lean cuts of beef with fat trimmed off. Low-sodium, lean deli meat.  Dairy  Low-fat (1%) or fat-free (skim) milk. Fat-free, low-fat, or reduced-fat cheeses. Nonfat, low-sodium ricotta or cottage cheese. Low-fat or nonfat yogurt. Low-fat, low-sodium cheese.  Fats and oils  Soft margarine without trans fats. Vegetable oil. Low-fat, reduced-fat, or light mayonnaise and salad dressings (reduced-sodium). Canola, safflower, olive, soybean, and sunflower oils. Avocado.  Seasoning and other foods  Herbs. Spices. Seasoning mixes without salt. Unsalted popcorn and pretzels. Fat-free sweets.  What foods are not recommended?  The items listed may not be a complete list. Talk with your dietitian about what dietary choices are best for you.  Grains  Baked goods made with fat, such as croissants, muffins, or some breads. Dry pasta or rice meal packs.  Vegetables  Creamed or fried vegetables. Vegetables in a cheese sauce. Regular canned vegetables (not low-sodium or reduced-sodium). Regular canned tomato sauce and paste (not low-sodium or reduced-sodium). Regular tomato and vegetable juice (not low-sodium or reduced-sodium). Pickles. Olives.  Fruits  Canned fruit in a light or heavy syrup. Fried fruit. Fruit in cream or butter sauce.  Meat and other protein foods  Fatty cuts of meat. Ribs. Fried meat. Burrows. Sausage. Bologna and other processed lunch meats. Salami. Fatback. Hotdogs. Bratwurst. Salted nuts and seeds. Canned beans with added salt. Canned or smoked fish. Whole eggs or egg yolks. Chicken or turkey with skin.  Dairy  Whole or 2% milk, cream, and half-and-half. Whole or full-fat cream cheese. Whole-fat or sweetened yogurt. Full-fat cheese. Nondairy creamers. Whipped toppings.  Processed cheese and cheese spreads.  Fats and oils  Butter. Stick margarine. Lard. Shortening. Ghee. Burrows fat. Tropical oils, such as coconut, palm kernel, or palm oil.  Seasoning and other foods  Salted popcorn and pretzels. Onion salt, garlic salt, seasoned salt, table salt, and sea salt. Worcestershire sauce. Tartar sauce. Barbecue sauce. Teriyaki sauce. Soy sauce, including reduced-sodium. Steak sauce. Canned and packaged gravies. Fish sauce. Oyster sauce. Cocktail sauce. Horseradish that you find on the shelf. Ketchup. Mustard. Meat flavorings and tenderizers. Bouillon cubes. Hot sauce and Tabasco sauce. Premade or packaged marinades. Premade or packaged taco seasonings. Relishes. Regular salad dressings.  Where to find more information:  · National Heart, Lung, and [employer] [place]: www.nhlbi.nih.gov  · American Heart Association: www.heart.org  Summary  · The DASH eating plan is a healthy eating plan that has been shown to reduce high blood pressure (hypertension). It may also reduce your risk for type 2 diabetes, heart disease, and stroke.  · With the DASH eating plan, you should limit salt (sodium) intake to 2,300 mg a day. If you have hypertension, you may need to reduce your sodium intake to 1,500 mg a day.  · When on the DASH eating plan, aim to eat more fresh fruits and vegetables, whole grains, lean proteins, low-fat dairy, and heart-healthy fats.  · Work with your health care provider or diet and nutrition specialist (dietitian) to adjust your eating plan to your individual calorie needs.  This information is not intended to replace advice given to you by your health care provider. Make sure you discuss any questions you have with your health care provider.  Document Released: 12/06/2012 Document Revised: 12/11/2017 Document Reviewed: 12/11/2017  [employer] Patient Education © 2018 [employer]

## 2018-11-06 NOTE — PROGRESS NOTES
"    Subjective:     Encounter Date:11/06/2018      Patient ID: Dianne Pritchard is a 73 y.o. female.    Chief Complaint:  The patient reports she is feeling fair overall. She reports her stamina and fatigue have improved slightly since starting amiodarone. She continues to have dyspnea on exertion related to her afib, but this is stable. She denies chest pain. She reports mild pedal edema. She reports she felt like amiodarone 200 mg TID \"was too much\", because it made her very weak and fatigued, so she only took it TID for a \"a few days\" then reduced the dose to BID. She reports compliance with her medications otherwise. She reports good blood pressure and heart rate control. She is scheduled for Watchman placement on 11/27. She is scheduled to see pulmonology 11/30 for mild restrictive lung disease on her PFTs. She reports she is going to see EP after her Watchman placement, but does not have an appointment date.         The following portions of the patient's history were reviewed and updated as appropriate: allergies, current medications, past family history, past medical history, past social history, past surgical history and problem list.  /62   Pulse 76   Ht 160 cm (63\")   Wt 87.1 kg (192 lb)   BMI 34.01 kg/m²   Allergies   Allergen Reactions   • Amoxil [Amoxicillin] Hives   • Penicillins Hives   • Biaxin [Clarithromycin] Other (See Comments)     NOT SURE OF REACTION, \"SORE MOUTH OF DIARRHEA\"   • Lortab [Hydrocodone-Acetaminophen] GI Intolerance       Current Outpatient Prescriptions:   •  amiodarone (PACERONE) 200 MG tablet, Take 1 tablet by mouth Daily., Disp: 90 tablet, Rfl: 1  •  diltiaZEM CD (CARDIZEM CD) 360 MG 24 hr capsule, Take 1 capsule by mouth Daily., Disp: 30 capsule, Rfl: 5  •  insulin detemir (LEVEMIR) 100 UNIT/ML injection, Inject 50 Units under the skin into the appropriate area as directed 2 (Two) Times a Day., Disp: , Rfl:   •  letrozole (FEMARA) 2.5 MG tablet, Take 2.5 mg by mouth " Daily., Disp: , Rfl:   •  levothyroxine (SYNTHROID, LEVOTHROID) 137 MCG tablet, Take 137 mcg by mouth Daily., Disp: , Rfl:   •  losartan (COZAAR) 25 MG tablet, Take 25 mg by mouth Daily., Disp: , Rfl:   •  metoprolol tartrate (LOPRESSOR) 25 MG tablet, Take 1 tablet by mouth Every 12 (Twelve) Hours., Disp: 60 tablet, Rfl: 5  •  Multiple Vitamins-Minerals (CENTRUM SILVER PO), Take 1 tablet by mouth Daily., Disp: , Rfl:   Past Medical History:   Diagnosis Date   • Arrhythmia    • Atrial fibrillation, currently in sinus rhythm    • Breast cancer (CMS/HCC)     right   • Cancer (CMS/HCC)     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)   • Carotid artery occlusion    • Diabetes mellitus, type II (CMS/HCC)    • Dizziness    • Drug therapy    • Essential hypertension    • GERD (gastroesophageal reflux disease)    • GI bleed requiring more than 4 units of blood in 24 hours, ICU, or surgery    • History of chemotherapy    • History of lymphoma    • Hx of radiation therapy    • Hypomagnesemia    • Hypothyroidism    • On amiodarone therapy      Past Surgical History:   Procedure Laterality Date   • BREAST BIOPSY     • MASTECTOMY     • OTHER SURGICAL HISTORY      Mediport insertion X 2   • OTHER SURGICAL HISTORY      remote lymphoma treatment   • REPLACEMENT TOTAL KNEE Right 09/2017   • TUBAL ABDOMINAL LIGATION       Social History     Social History   • Marital status:      Spouse name: N/A   • Number of children: N/A   • Years of education: N/A     Occupational History   • Not on file.     Social History Main Topics   • Smoking status: Former Smoker     Years: 20.00     Types: Cigarettes     Quit date: 1993   • Smokeless tobacco: Never Used   • Alcohol use No   • Drug use: No   • Sexual activity: Defer     Other Topics Concern   • Not on file     Social History Narrative   • No narrative on file     Family History   Problem Relation Age of Onset   • Heart disease Mother    • Cancer Father    • Cancer Sister    • No Known Problems Brother     • No Known Problems Brother    • Heart disease Sister    • Heart attack Sister    • No Known Problems Sister    • No Known Problems Daughter    • No Known Problems Son    • No Known Problems Maternal Grandmother    • No Known Problems Paternal Grandmother    • No Known Problems Maternal Aunt    • Breast cancer Paternal Aunt    • BRCA 1/2 Neg Hx    • Colon cancer Neg Hx    • Endometrial cancer Neg Hx    • Ovarian cancer Neg Hx        Review of Systems   Constitution: Positive for malaise/fatigue. Negative for chills, diaphoresis, fever and weakness.   HENT: Negative for nosebleeds.    Eyes: Negative for visual disturbance.   Cardiovascular: Positive for dyspnea on exertion and leg swelling (mild pedal edema ). Negative for chest pain, claudication, cyanosis, irregular heartbeat, near-syncope, orthopnea, palpitations, paroxysmal nocturnal dyspnea and syncope.   Respiratory: Negative for cough, hemoptysis, shortness of breath, sputum production and wheezing.    Hematologic/Lymphatic: Negative for bleeding problem.   Skin: Negative for color change and flushing.   Musculoskeletal: Negative for falls.   Gastrointestinal: Negative for bloating, abdominal pain, hematemesis, hematochezia, melena, nausea and vomiting.   Genitourinary: Negative for hematuria.   Neurological: Negative for dizziness and light-headedness.   Psychiatric/Behavioral: Negative for altered mental status.         ECG 12 Lead  Date/Time: 11/6/2018 3:47 PM  Performed by: HERNÁN GALAN  Authorized by: HERNÁN GALAN   Comparison: compared with previous ECG from 10/9/2018  Similar to previous ECG  Rhythm: atrial fibrillation               Objective:     Physical Exam   Constitutional: She is oriented to person, place, and time. She appears well-developed and well-nourished. No distress.   HENT:   Head: Normocephalic and atraumatic.   Eyes: Pupils are equal, round, and reactive to light.   Neck: Normal range of motion. Neck supple. No JVD present. No  thyromegaly present.   Cardiovascular: Normal rate, normal heart sounds and intact distal pulses.  An irregularly irregular rhythm present. Exam reveals no gallop and no friction rub.    No murmur heard.  Pulmonary/Chest: Effort normal and breath sounds normal. No respiratory distress. She has no wheezes. She has no rales. She exhibits no tenderness.   Abdominal: Soft. Bowel sounds are normal. She exhibits no distension. There is no tenderness.   Musculoskeletal: Normal range of motion. She exhibits no edema.   Neurological: She is alert and oriented to person, place, and time. No cranial nerve deficit.   Skin: Skin is warm and dry. She is not diaphoretic.   Psychiatric: She has a normal mood and affect. Her behavior is normal.           Assessment:          Diagnosis Plan   1. Persistent atrial fibrillation (CMS/HCC)    Rate controlled  She remains in atrial fibrillation despite initiation of amiodarone 4 weeks ago- reduce dose to 200 mg daily as originally planned.  CHADSVASC 5  Not anticoagulated due to GI bleed history   Keep scheduled appt for Watchman placement on 11/27/18  EP referral made- she reports they want to see her after watchman placement, but she does not yet have an appt date      2. Bilateral carotid artery disease, unspecified type (CMS/HCC)    Followed by vascular surgery   3. Essential hypertension    Controlled    4. Type 2 diabetes mellitus without complication, with long-term current use of insulin (CMS/HCC)    Followed by pcp    5. BMI 33.0-33.9,adult    Patient's Body mass index is 34.01 kg/m². BMI is above normal parameters. Recommendations include: exercise counseling and nutrition counseling   .   6. Restrictive lung disease  Mild per PFTs ; reviewed results with pt; referred to pulmonology - appt 11/30          Plan:       As noted above  Reduce amiodarone to 200 mg daily. Continue current doses of Cardizem and lopressor  Schedule appt with EP   Keep appt with pulmonology   Keep appt  for Watchman placement 11/27/18  Follow up to be scheduled after Watchman placement; tentatively plan for 2 months

## 2018-11-10 PROBLEM — I48.0 PAROXYSMAL ATRIAL FIBRILLATION (HCC): Status: ACTIVE | Noted: 2018-11-05

## 2018-11-26 ENCOUNTER — HOSPITAL ENCOUNTER (OUTPATIENT)
Dept: CARDIOLOGY | Facility: HOSPITAL | Age: 73
Discharge: HOME | End: 2018-11-26
Admitting: INTERNAL MEDICINE

## 2018-11-26 ENCOUNTER — HOSPITAL ENCOUNTER (OUTPATIENT)
Dept: GENERAL RADIOLOGY | Facility: HOSPITAL | Age: 73
Discharge: HOME | End: 2018-11-26
Attending: INTERNAL MEDICINE

## 2018-11-26 ENCOUNTER — ANESTHESIA EVENT (OUTPATIENT)
Dept: CARDIOLOGY | Facility: HOSPITAL | Age: 73
End: 2018-11-26

## 2018-11-26 DIAGNOSIS — I48.0 PAROXYSMAL ATRIAL FIBRILLATION (HCC): ICD-10-CM

## 2018-11-26 LAB
ABO GROUP BLD: NORMAL
ALBUMIN SERPL-MCNC: 4.7 G/DL (ref 3.5–5)
ALBUMIN/GLOB SERPL: 1.2 G/DL (ref 1.1–2.5)
ALP SERPL-CCNC: 105 U/L (ref 24–120)
ALT SERPL W P-5'-P-CCNC: 21 U/L (ref 0–54)
ANION GAP SERPL CALCULATED.3IONS-SCNC: 16 MMOL/L (ref 4–13)
AST SERPL-CCNC: 28 U/L (ref 7–45)
BILIRUB SERPL-MCNC: 0.6 MG/DL (ref 0.1–1)
BLD GP AB SCN SERPL QL: NEGATIVE
BUN BLD-MCNC: 19 MG/DL (ref 5–21)
BUN/CREAT SERPL: 17.8 (ref 7–25)
CALCIUM SPEC-SCNC: 10.2 MG/DL (ref 8.4–10.4)
CHLORIDE SERPL-SCNC: 93 MMOL/L (ref 98–110)
CO2 SERPL-SCNC: 31 MMOL/L (ref 24–31)
CREAT BLD-MCNC: 1.07 MG/DL (ref 0.5–1.4)
DEPRECATED RDW RBC AUTO: 43 FL (ref 40–54)
ERYTHROCYTE [DISTWIDTH] IN BLOOD BY AUTOMATED COUNT: 13.3 % (ref 12–15)
GFR SERPL CREATININE-BSD FRML MDRD: 50 ML/MIN/1.73
GLOBULIN UR ELPH-MCNC: 3.8 GM/DL
GLUCOSE BLD-MCNC: 232 MG/DL (ref 70–100)
HCT VFR BLD AUTO: 42.5 % (ref 37–47)
HGB BLD-MCNC: 14.1 G/DL (ref 12–16)
INR PPP: 1.02 (ref 0.91–1.09)
MCH RBC QN AUTO: 29.2 PG (ref 28–32)
MCHC RBC AUTO-ENTMCNC: 33.2 G/DL (ref 33–36)
MCV RBC AUTO: 88 FL (ref 82–98)
PLATELET # BLD AUTO: 168 10*3/MM3 (ref 130–400)
PMV BLD AUTO: 11.5 FL (ref 6–12)
POTASSIUM BLD-SCNC: 4.5 MMOL/L (ref 3.5–5.3)
PROT SERPL-MCNC: 8.5 G/DL (ref 6.3–8.7)
PROTHROMBIN TIME: 13.7 SECONDS (ref 11.9–14.6)
RBC # BLD AUTO: 4.83 10*6/MM3 (ref 4.2–5.4)
RH BLD: POSITIVE
SODIUM BLD-SCNC: 140 MMOL/L (ref 135–145)
T&S EXPIRATION DATE: NORMAL
WBC NRBC COR # BLD: 9.31 10*3/MM3 (ref 4.8–10.8)

## 2018-11-26 PROCEDURE — 71046 X-RAY EXAM CHEST 2 VIEWS: CPT

## 2018-11-26 PROCEDURE — 85027 COMPLETE CBC AUTOMATED: CPT | Performed by: INTERNAL MEDICINE

## 2018-11-26 PROCEDURE — 86900 BLOOD TYPING SEROLOGIC ABO: CPT | Performed by: INTERNAL MEDICINE

## 2018-11-26 PROCEDURE — 86901 BLOOD TYPING SEROLOGIC RH(D): CPT | Performed by: INTERNAL MEDICINE

## 2018-11-26 PROCEDURE — 36415 COLL VENOUS BLD VENIPUNCTURE: CPT

## 2018-11-26 PROCEDURE — 86850 RBC ANTIBODY SCREEN: CPT | Performed by: INTERNAL MEDICINE

## 2018-11-26 PROCEDURE — 80053 COMPREHEN METABOLIC PANEL: CPT | Performed by: INTERNAL MEDICINE

## 2018-11-26 PROCEDURE — 85610 PROTHROMBIN TIME: CPT | Performed by: INTERNAL MEDICINE

## 2018-11-26 RX ORDER — SODIUM CHLORIDE 0.9 % (FLUSH) 0.9 %
3-10 SYRINGE (ML) INJECTION AS NEEDED
Status: DISCONTINUED | OUTPATIENT
Start: 2018-11-26 | End: 2018-11-27 | Stop reason: HOSPADM

## 2018-11-26 RX ORDER — SODIUM CHLORIDE, SODIUM LACTATE, POTASSIUM CHLORIDE, CALCIUM CHLORIDE 600; 310; 30; 20 MG/100ML; MG/100ML; MG/100ML; MG/100ML
30 INJECTION, SOLUTION INTRAVENOUS CONTINUOUS
Status: DISCONTINUED | OUTPATIENT
Start: 2018-11-26 | End: 2018-11-27 | Stop reason: HOSPADM

## 2018-11-26 RX ORDER — SODIUM CHLORIDE 0.9 % (FLUSH) 0.9 %
3 SYRINGE (ML) INJECTION EVERY 12 HOURS SCHEDULED
Status: DISCONTINUED | OUTPATIENT
Start: 2018-11-26 | End: 2018-11-27 | Stop reason: HOSPADM

## 2018-11-27 ENCOUNTER — ANESTHESIA (OUTPATIENT)
Dept: CARDIOLOGY | Facility: HOSPITAL | Age: 73
End: 2018-11-27

## 2018-11-27 ENCOUNTER — APPOINTMENT (OUTPATIENT)
Dept: CARDIOLOGY | Facility: HOSPITAL | Age: 73
End: 2018-11-27
Attending: INTERNAL MEDICINE

## 2018-11-27 ENCOUNTER — HOSPITAL ENCOUNTER (INPATIENT)
Facility: HOSPITAL | Age: 73
LOS: 1 days | Discharge: HOME | End: 2018-11-27
Attending: INTERNAL MEDICINE | Admitting: INTERNAL MEDICINE

## 2018-11-27 ENCOUNTER — DOCUMENTATION (OUTPATIENT)
Dept: CARDIOLOGY | Facility: CLINIC | Age: 73
End: 2018-11-27

## 2018-11-27 ENCOUNTER — TELEPHONE (OUTPATIENT)
Dept: CARDIOLOGY | Facility: CLINIC | Age: 73
End: 2018-11-27

## 2018-11-27 VITALS
RESPIRATION RATE: 19 BRPM | BODY MASS INDEX: 32.69 KG/M2 | DIASTOLIC BLOOD PRESSURE: 74 MMHG | OXYGEN SATURATION: 94 % | HEIGHT: 64 IN | SYSTOLIC BLOOD PRESSURE: 130 MMHG | HEART RATE: 80 BPM | TEMPERATURE: 98.2 F | WEIGHT: 191.5 LBS

## 2018-11-27 DIAGNOSIS — I48.19 PERSISTENT ATRIAL FIBRILLATION (HCC): Primary | ICD-10-CM

## 2018-11-27 DIAGNOSIS — Z95.818 PRESENCE OF WATCHMAN LEFT ATRIAL APPENDAGE CLOSURE DEVICE: ICD-10-CM

## 2018-11-27 DIAGNOSIS — I48.0 PAROXYSMAL ATRIAL FIBRILLATION (HCC): ICD-10-CM

## 2018-11-27 PROBLEM — I48.91 A-FIB: Status: ACTIVE | Noted: 2018-11-27

## 2018-11-27 PROCEDURE — 93325 DOPPLER ECHO COLOR FLOW MAPG: CPT | Performed by: INTERNAL MEDICINE

## 2018-11-27 PROCEDURE — 93321 DOPPLER ECHO F-UP/LMTD STD: CPT | Performed by: INTERNAL MEDICINE

## 2018-11-27 PROCEDURE — C1769 GUIDE WIRE: HCPCS | Performed by: INTERNAL MEDICINE

## 2018-11-27 PROCEDURE — 25010000002 PERFLUTREN 6.52 MG/ML SUSPENSION: Performed by: INTERNAL MEDICINE

## 2018-11-27 PROCEDURE — 25010000002 FENTANYL CITRATE (PF) 100 MCG/2ML SOLUTION: Performed by: NURSE ANESTHETIST, CERTIFIED REGISTERED

## 2018-11-27 PROCEDURE — 25010000002 PROPOFOL 10 MG/ML EMULSION: Performed by: NURSE ANESTHETIST, CERTIFIED REGISTERED

## 2018-11-27 PROCEDURE — 93355 ECHO TRANSESOPHAGEAL (TEE): CPT

## 2018-11-27 PROCEDURE — 25010000002 SUCCINYLCHOLINE PER 20 MG: Performed by: NURSE ANESTHETIST, CERTIFIED REGISTERED

## 2018-11-27 PROCEDURE — 25010000002 VANCOMYCIN PER 500 MG: Performed by: INTERNAL MEDICINE

## 2018-11-27 PROCEDURE — 25010000002 MIDAZOLAM PER 1 MG: Performed by: NURSE ANESTHETIST, CERTIFIED REGISTERED

## 2018-11-27 PROCEDURE — 25010000002 HEPARIN (PORCINE) PER 1000 UNITS: Performed by: INTERNAL MEDICINE

## 2018-11-27 PROCEDURE — C1893 INTRO/SHEATH, FIXED,NON-PEEL: HCPCS | Performed by: INTERNAL MEDICINE

## 2018-11-27 PROCEDURE — 25010000002 ONDANSETRON PER 1 MG: Performed by: NURSE ANESTHETIST, CERTIFIED REGISTERED

## 2018-11-27 PROCEDURE — B24BZZ4 ULTRASONOGRAPHY OF HEART WITH AORTA, TRANSESOPHAGEAL: ICD-10-PCS | Performed by: INTERNAL MEDICINE

## 2018-11-27 PROCEDURE — 25010000002 ONDANSETRON PER 1 MG: Performed by: ANESTHESIOLOGY

## 2018-11-27 PROCEDURE — 93312 ECHO TRANSESOPHAGEAL: CPT | Performed by: INTERNAL MEDICINE

## 2018-11-27 PROCEDURE — C1894 INTRO/SHEATH, NON-LASER: HCPCS | Performed by: INTERNAL MEDICINE

## 2018-11-27 RX ORDER — FENTANYL CITRATE 50 UG/ML
25 INJECTION, SOLUTION INTRAMUSCULAR; INTRAVENOUS
Status: CANCELLED | OUTPATIENT
Start: 2018-11-27

## 2018-11-27 RX ORDER — ROCURONIUM BROMIDE 10 MG/ML
INJECTION, SOLUTION INTRAVENOUS AS NEEDED
Status: DISCONTINUED | OUTPATIENT
Start: 2018-11-27 | End: 2018-11-27 | Stop reason: SURG

## 2018-11-27 RX ORDER — SODIUM CHLORIDE, SODIUM LACTATE, POTASSIUM CHLORIDE, CALCIUM CHLORIDE 600; 310; 30; 20 MG/100ML; MG/100ML; MG/100ML; MG/100ML
125 INJECTION, SOLUTION INTRAVENOUS CONTINUOUS
Status: DISCONTINUED | OUTPATIENT
Start: 2018-11-27 | End: 2018-11-27 | Stop reason: HOSPADM

## 2018-11-27 RX ORDER — PHENYLEPHRINE HCL IN 0.9% NACL 0.8MG/10ML
SYRINGE (ML) INTRAVENOUS AS NEEDED
Status: DISCONTINUED | OUTPATIENT
Start: 2018-11-27 | End: 2018-11-27 | Stop reason: SURG

## 2018-11-27 RX ORDER — IBUPROFEN 600 MG/1
600 TABLET ORAL ONCE AS NEEDED
Status: DISCONTINUED | OUTPATIENT
Start: 2018-11-27 | End: 2018-11-27 | Stop reason: HOSPADM

## 2018-11-27 RX ORDER — WARFARIN SODIUM 5 MG/1
5 TABLET ORAL NIGHTLY
Qty: 60 TABLET | Refills: 3 | Status: ON HOLD
Start: 2018-11-27 | End: 2019-01-23 | Stop reason: SDUPTHER

## 2018-11-27 RX ORDER — FENTANYL CITRATE 50 UG/ML
INJECTION, SOLUTION INTRAMUSCULAR; INTRAVENOUS AS NEEDED
Status: DISCONTINUED | OUTPATIENT
Start: 2018-11-27 | End: 2018-11-27 | Stop reason: SURG

## 2018-11-27 RX ORDER — PROPOFOL 10 MG/ML
VIAL (ML) INTRAVENOUS AS NEEDED
Status: DISCONTINUED | OUTPATIENT
Start: 2018-11-27 | End: 2018-11-27 | Stop reason: SURG

## 2018-11-27 RX ORDER — SUCCINYLCHOLINE CHLORIDE 20 MG/ML
INJECTION INTRAMUSCULAR; INTRAVENOUS AS NEEDED
Status: DISCONTINUED | OUTPATIENT
Start: 2018-11-27 | End: 2018-11-27 | Stop reason: SURG

## 2018-11-27 RX ORDER — DEXTROSE MONOHYDRATE 25 G/50ML
12.5 INJECTION, SOLUTION INTRAVENOUS AS NEEDED
Status: CANCELLED | OUTPATIENT
Start: 2018-11-27

## 2018-11-27 RX ORDER — MEPERIDINE HYDROCHLORIDE 25 MG/ML
12.5 INJECTION INTRAMUSCULAR; INTRAVENOUS; SUBCUTANEOUS
Status: DISCONTINUED | OUTPATIENT
Start: 2018-11-27 | End: 2018-11-27 | Stop reason: HOSPADM

## 2018-11-27 RX ORDER — IPRATROPIUM BROMIDE AND ALBUTEROL SULFATE 2.5; .5 MG/3ML; MG/3ML
3 SOLUTION RESPIRATORY (INHALATION) ONCE AS NEEDED
Status: DISCONTINUED | OUTPATIENT
Start: 2018-11-27 | End: 2018-11-27 | Stop reason: HOSPADM

## 2018-11-27 RX ORDER — LIDOCAINE HYDROCHLORIDE 20 MG/ML
INJECTION, SOLUTION INFILTRATION; PERINEURAL AS NEEDED
Status: DISCONTINUED | OUTPATIENT
Start: 2018-11-27 | End: 2018-11-27 | Stop reason: SURG

## 2018-11-27 RX ORDER — ONDANSETRON 2 MG/ML
4 INJECTION INTRAMUSCULAR; INTRAVENOUS ONCE AS NEEDED
Status: COMPLETED | OUTPATIENT
Start: 2018-11-27 | End: 2018-11-27

## 2018-11-27 RX ORDER — OXYCODONE AND ACETAMINOPHEN 7.5; 325 MG/1; MG/1
2 TABLET ORAL ONCE AS NEEDED
Status: DISCONTINUED | OUTPATIENT
Start: 2018-11-27 | End: 2018-11-27 | Stop reason: HOSPADM

## 2018-11-27 RX ORDER — MIDAZOLAM HYDROCHLORIDE 1 MG/ML
INJECTION INTRAMUSCULAR; INTRAVENOUS AS NEEDED
Status: DISCONTINUED | OUTPATIENT
Start: 2018-11-27 | End: 2018-11-27 | Stop reason: SURG

## 2018-11-27 RX ORDER — METOCLOPRAMIDE HYDROCHLORIDE 5 MG/ML
5 INJECTION INTRAMUSCULAR; INTRAVENOUS
Status: DISCONTINUED | OUTPATIENT
Start: 2018-11-27 | End: 2018-11-27 | Stop reason: HOSPADM

## 2018-11-27 RX ORDER — SODIUM CHLORIDE, SODIUM LACTATE, POTASSIUM CHLORIDE, CALCIUM CHLORIDE 600; 310; 30; 20 MG/100ML; MG/100ML; MG/100ML; MG/100ML
9 INJECTION, SOLUTION INTRAVENOUS CONTINUOUS
Status: CANCELLED | OUTPATIENT
Start: 2018-11-27

## 2018-11-27 RX ORDER — LIDOCAINE HYDROCHLORIDE 40 MG/ML
SOLUTION TOPICAL AS NEEDED
Status: DISCONTINUED | OUTPATIENT
Start: 2018-11-27 | End: 2018-11-27 | Stop reason: SURG

## 2018-11-27 RX ORDER — LABETALOL HYDROCHLORIDE 5 MG/ML
5 INJECTION, SOLUTION INTRAVENOUS
Status: DISCONTINUED | OUTPATIENT
Start: 2018-11-27 | End: 2018-11-27 | Stop reason: HOSPADM

## 2018-11-27 RX ORDER — NALOXONE HCL 0.4 MG/ML
0.4 VIAL (ML) INJECTION AS NEEDED
Status: DISCONTINUED | OUTPATIENT
Start: 2018-11-27 | End: 2018-11-27 | Stop reason: HOSPADM

## 2018-11-27 RX ORDER — OXYCODONE AND ACETAMINOPHEN 10; 325 MG/1; MG/1
1 TABLET ORAL ONCE AS NEEDED
Status: DISCONTINUED | OUTPATIENT
Start: 2018-11-27 | End: 2018-11-27 | Stop reason: HOSPADM

## 2018-11-27 RX ORDER — SODIUM CHLORIDE 0.9 % (FLUSH) 0.9 %
1-10 SYRINGE (ML) INJECTION AS NEEDED
Status: CANCELLED | OUTPATIENT
Start: 2018-11-27

## 2018-11-27 RX ORDER — ONDANSETRON 2 MG/ML
INJECTION INTRAMUSCULAR; INTRAVENOUS AS NEEDED
Status: DISCONTINUED | OUTPATIENT
Start: 2018-11-27 | End: 2018-11-27 | Stop reason: SURG

## 2018-11-27 RX ORDER — FENTANYL CITRATE 50 UG/ML
25 INJECTION, SOLUTION INTRAMUSCULAR; INTRAVENOUS AS NEEDED
Status: DISCONTINUED | OUTPATIENT
Start: 2018-11-27 | End: 2018-11-27 | Stop reason: HOSPADM

## 2018-11-27 RX ADMIN — Medication 160 MCG: at 08:54

## 2018-11-27 RX ADMIN — ROCURONIUM BROMIDE 10 MG: 10 INJECTION INTRAVENOUS at 08:47

## 2018-11-27 RX ADMIN — SODIUM CHLORIDE, POTASSIUM CHLORIDE, SODIUM LACTATE AND CALCIUM CHLORIDE 125 ML/HR: 600; 310; 30; 20 INJECTION, SOLUTION INTRAVENOUS at 07:33

## 2018-11-27 RX ADMIN — PROPOFOL 150 MG: 10 INJECTION, EMULSION INTRAVENOUS at 08:48

## 2018-11-27 RX ADMIN — VANCOMYCIN HYDROCHLORIDE 1000 MG: 1 INJECTION, POWDER, LYOPHILIZED, FOR SOLUTION INTRAVENOUS at 08:12

## 2018-11-27 RX ADMIN — SODIUM CHLORIDE, POTASSIUM CHLORIDE, SODIUM LACTATE AND CALCIUM CHLORIDE 30 ML/HR: 600; 310; 30; 20 INJECTION, SOLUTION INTRAVENOUS at 08:13

## 2018-11-27 RX ADMIN — Medication 160 MCG: at 08:51

## 2018-11-27 RX ADMIN — LIDOCAINE HYDROCHLORIDE 50 MG: 20 INJECTION, SOLUTION INFILTRATION; PERINEURAL at 08:47

## 2018-11-27 RX ADMIN — MIDAZOLAM HYDROCHLORIDE 2 MG: 1 INJECTION, SOLUTION INTRAMUSCULAR; INTRAVENOUS at 08:10

## 2018-11-27 RX ADMIN — ONDANSETRON HYDROCHLORIDE 4 MG: 2 SOLUTION INTRAMUSCULAR; INTRAVENOUS at 09:23

## 2018-11-27 RX ADMIN — ROCURONIUM BROMIDE 20 MG: 10 INJECTION INTRAVENOUS at 08:52

## 2018-11-27 RX ADMIN — ONDANSETRON 4 MG: 2 INJECTION, SOLUTION INTRAMUSCULAR; INTRAVENOUS at 11:40

## 2018-11-27 RX ADMIN — SUGAMMADEX 200 MG: 100 INJECTION, SOLUTION INTRAVENOUS at 09:21

## 2018-11-27 RX ADMIN — FENTANYL CITRATE 100 MCG: 50 INJECTION, SOLUTION INTRAMUSCULAR; INTRAVENOUS at 08:46

## 2018-11-27 RX ADMIN — PERFLUTREN 8.48 MG: 6.52 INJECTION, SUSPENSION INTRAVENOUS at 09:37

## 2018-11-27 RX ADMIN — Medication 160 MCG: at 09:23

## 2018-11-27 RX ADMIN — SUCCINYLCHOLINE CHLORIDE 120 MG: 20 INJECTION, SOLUTION INTRAMUSCULAR; INTRAVENOUS at 08:48

## 2018-11-27 RX ADMIN — LIDOCAINE HYDROCHLORIDE 1 EACH: 40 SOLUTION TOPICAL at 08:48

## 2018-11-27 NOTE — PROGRESS NOTES
Patient called stating that she is at the pharmacy, but that they have not received the prescription for Warfarin. Call placed to [facility]'s pharmacy. Verbal order for Warfarin 5 mg PO nightly, with extra tablet PRN as directed by [facility], given over the phone with readback to Peyman Landeros.

## 2018-11-27 NOTE — TELEPHONE ENCOUNTER
Pts pharmacy called asking about the amiodarone and warfarin interaction.  Is it ok to take both, she will be at a higher risk for bleeding, that's what they are concerned about.  Abdirahman Graves, CMA

## 2018-11-27 NOTE — ANESTHESIA PREPROCEDURE EVALUATION
Anesthesia Evaluation     Patient summary reviewed   history of anesthetic complications: PONV  NPO Solid Status: > 8 hours             Airway   Mallampati: II  TM distance: >3 FB  Neck ROM: full  Dental      Pulmonary    (-) COPD, sleep apnea, not a smoker  Cardiovascular   Exercise tolerance: good (4-7 METS)    ECG reviewed  Patient on routine beta blocker and Beta blocker given within 24 hours of surgery    (+) hypertension, valvular problems/murmurs MS, dysrhythmias Atrial Fib,   (-) pacemaker, past MI, angina, cardiac stents      Neuro/Psych  (-) seizures, TIA, CVA  GI/Hepatic/Renal/Endo    (+) obesity,   diabetes mellitus using insulin, hypothyroidism,   (-) GERD, liver disease, no renal disease    Musculoskeletal     Abdominal    Substance History      OB/GYN          Other                        Anesthesia Plan    ASA 3     general     intravenous induction   Anesthetic plan, all risks, benefits, and alternatives have been provided, discussed and informed consent has been obtained with: patient.

## 2018-11-27 NOTE — ANESTHESIA PROCEDURE NOTES
Arterial Line      Patient location during procedure: holding area  Start time: 11/27/2018 8:10 AM  Stop Time:11/27/2018 8:15 AM       Performed By   CRNA: Geoffrey Martini CRNA  Preanesthetic Checklist  Completed: patient identified, site marked, surgical consent, pre-op evaluation, timeout performed, IV checked, risks and benefits discussed and monitors and equipment checked  Arterial Line Prep   Sterile Tech: cap, gloves and sterile barriers  Prep: ChloraPrep  Patient monitoring: EKG, continuous pulse oximetry and blood pressure monitoring  Arterial Line Procedure   Laterality:left  Location:  radial artery  Catheter size: 20 G   Guidance: ultrasound guided  PROCEDURE NOTE/ULTRASOUND INTERPRETATION.  Using ultrasound guidance the potential vascular sites for insertion of the catheter were visualized to determine the patency of the vessel to be used for vascular access.  After selecting the appropriate site for insertion, the needle was visualized under ultrasound being inserted into the radial artery, followed by ultrasound confirmation of wire and catheter placement. There were no abnormalities seen on ultrasound; an image was taken; and the patient tolerated the procedure with no complications.   Number of attempts: 2  Successful placement: yes          Post Assessment   Dressing Type: wrist guard applied, secured with tape and occlusive dressing applied.   Complications no  Circ/Move/Sens Assessment: normal and unchanged.   Patient Tolerance: patient tolerated the procedure well with no apparent complications

## 2018-11-27 NOTE — ANESTHESIA PROCEDURE NOTES
ANESTHESIA INTUBATION  Urgency: elective      General Information and Staff    Patient location during procedure: OR  CRNA: Geoffrey Martini CRNA    Indications and Patient Condition  Indications for airway management: airway protection    Preoxygenated: yes  Mask difficulty assessment: 1 - vent by mask    Final Airway Details  Final airway type: endotracheal airway      Successful airway: ETT  Cuffed: yes   Successful intubation technique: video laryngoscopy  Facilitating devices/methods: intubating stylet  Endotracheal tube insertion site: oral  Blade: Duenas  Blade size: 2  ETT size (mm): 7.0  Cormack-Lehane Classification: grade IIb - view of arytenoids or posterior of glottis only  Placement verified by: capnometry   Cuff volume (mL): 6  Measured from: teeth  ETT to teeth (cm): 22  Number of attempts at approach: 1

## 2018-11-28 LAB
BH CV ECHO MEAS - BSA(HAYCOCK): 2.4 M^2
BH CV ECHO MEAS - BSA: 2.2 M^2
BH CV ECHO MEAS - BZI_BMI: 37.9 KILOGRAMS/M^2
BH CV ECHO MEAS - BZI_METRIC_HEIGHT: 172.7 CM
BH CV ECHO MEAS - BZI_METRIC_WEIGHT: 112.9 KG

## 2018-11-28 RX ORDER — INSULIN DETEMIR 100 [IU]/ML
INJECTION, SOLUTION SUBCUTANEOUS
Qty: 3 ML | Refills: 5 | Status: SHIPPED | OUTPATIENT
Start: 2018-11-28 | End: 2019-02-01 | Stop reason: SDUPTHER

## 2018-11-28 NOTE — ANESTHESIA POSTPROCEDURE EVALUATION
"Patient: Dianne Pritchard    Procedure Summary     Date:  11/27/18 Room / Location:  [facility] CATH LAB  /  [facility] CATH INVASIVE LOCATION    Anesthesia Start:  0827 Anesthesia Stop:  0939    Procedure:  Atrial Appendage Occlusion (Left ) Diagnosis:       Paroxysmal atrial fibrillation (CMS/HCC)      (Paroxysmal atrial fibrillation (CMS/HCC) [I48.0])    Provider:  Mick Orantes MD Provider:  Geoffrey Martini CRNA    Anesthesia Type:  general ASA Status:  3          Anesthesia Type: general  Last vitals  BP   130/74 (11/27/18 1210)   Temp   98.2 °F (36.8 °C) (11/27/18 0726)   Pulse   80 (11/27/18 1210)   Resp   19 (11/27/18 1210)     SpO2   94 % (11/27/18 1210)     Post Anesthesia Care and Evaluation    Patient location during evaluation: PACU  Patient participation: complete - patient participated  Level of consciousness: awake and alert  Pain management: adequate  Airway patency: patent  Anesthetic complications: No anesthetic complications    Cardiovascular status: acceptable  Respiratory status: acceptable  Hydration status: acceptable    Comments: Blood pressure 130/74, pulse 80, temperature 98.2 °F (36.8 °C), temperature source Temporal, resp. rate 19, height 162.6 cm (64\"), weight 86.9 kg (191 lb 8 oz), SpO2 94 %, not currently breastfeeding.    Pt discharged from PACU based on jerome score >8      "

## 2018-11-29 ENCOUNTER — ANTICOAGULATION VISIT (OUTPATIENT)
Dept: CARDIOLOGY | Facility: CLINIC | Age: 73
End: 2018-11-29

## 2018-11-29 NOTE — TELEPHONE ENCOUNTER
I called [facility] and told them its ok to fill the Rx.  I called Elisha arvizu RN and she said pt was instructed about the risk of increased bleeding and is aware of what to look for when taking both of these medications.  I spoke with Dorie at [facility].  Abdirahman Graves, CMA

## 2018-12-03 ENCOUNTER — ANTICOAGULATION VISIT (OUTPATIENT)
Dept: CARDIOLOGY | Facility: CLINIC | Age: 73
End: 2018-12-03

## 2018-12-03 ENCOUNTER — CLINICAL SUPPORT (OUTPATIENT)
Dept: FAMILY MEDICINE CLINIC | Facility: CLINIC | Age: 73
End: 2018-12-03

## 2018-12-03 DIAGNOSIS — I48.19 PERSISTENT ATRIAL FIBRILLATION (HCC): ICD-10-CM

## 2018-12-03 LAB
INR PPP: 2.4 (ref 0.9–1.1)
PROTHROMBIN TIME: 29.2 SECONDS

## 2018-12-03 PROCEDURE — 85610 PROTHROMBIN TIME: CPT | Performed by: FAMILY MEDICINE

## 2018-12-07 ENCOUNTER — PREP FOR SURGERY (OUTPATIENT)
Dept: OTHER | Facility: HOSPITAL | Age: 73
End: 2018-12-07

## 2018-12-07 ENCOUNTER — CLINICAL SUPPORT (OUTPATIENT)
Dept: FAMILY MEDICINE CLINIC | Facility: CLINIC | Age: 73
End: 2018-12-07

## 2018-12-07 ENCOUNTER — ANTICOAGULATION VISIT (OUTPATIENT)
Dept: CARDIOLOGY | Facility: CLINIC | Age: 73
End: 2018-12-07

## 2018-12-07 DIAGNOSIS — I48.20 CHRONIC ATRIAL FIBRILLATION (HCC): Primary | ICD-10-CM

## 2018-12-07 DIAGNOSIS — I48.19 PERSISTENT ATRIAL FIBRILLATION (HCC): ICD-10-CM

## 2018-12-07 DIAGNOSIS — I48.91 ATRIAL FIBRILLATION WITH RAPID VENTRICULAR RESPONSE (HCC): ICD-10-CM

## 2018-12-07 LAB
INR PPP: 3.8 (ref 0.9–1.1)
PROTHROMBIN TIME: 45.5 SECONDS

## 2018-12-07 PROCEDURE — 85610 PROTHROMBIN TIME: CPT | Performed by: FAMILY MEDICINE

## 2018-12-07 RX ORDER — SODIUM CHLORIDE, SODIUM LACTATE, POTASSIUM CHLORIDE, CALCIUM CHLORIDE 600; 310; 30; 20 MG/100ML; MG/100ML; MG/100ML; MG/100ML
125 INJECTION, SOLUTION INTRAVENOUS CONTINUOUS
Status: CANCELLED | OUTPATIENT
Start: 2018-12-07

## 2018-12-07 NOTE — PROGRESS NOTES
Pt got a protime/inr today-45.5,inr 3.8 dr. lomeli is the one taking care of coumadin dosing she as been on 5mg alt with 2.5mg

## 2018-12-11 ENCOUNTER — HOSPITAL ENCOUNTER (OUTPATIENT)
Facility: HOSPITAL | Age: 73
Setting detail: SURGERY ADMIT
End: 2018-12-11
Attending: INTERNAL MEDICINE | Admitting: INTERNAL MEDICINE

## 2018-12-11 DIAGNOSIS — I48.20 CHRONIC ATRIAL FIBRILLATION (HCC): ICD-10-CM

## 2018-12-12 ENCOUNTER — ANTICOAGULATION VISIT (OUTPATIENT)
Dept: CARDIOLOGY | Facility: CLINIC | Age: 73
End: 2018-12-12

## 2018-12-12 ENCOUNTER — CLINICAL SUPPORT (OUTPATIENT)
Dept: FAMILY MEDICINE CLINIC | Facility: CLINIC | Age: 73
End: 2018-12-12

## 2018-12-12 DIAGNOSIS — I48.19 PERSISTENT ATRIAL FIBRILLATION (HCC): ICD-10-CM

## 2018-12-12 LAB
INR PPP: 2.8 (ref 0.9–1.1)
PROTHROMBIN TIME: 33.5 SECONDS

## 2018-12-12 PROCEDURE — 85610 PROTHROMBIN TIME: CPT | Performed by: FAMILY MEDICINE

## 2018-12-12 NOTE — PROGRESS NOTES
Pt presented to office for PT/INR.  PT-33.5  INR-2.8   Dr. Mick Orantes is the ordering provider.

## 2018-12-14 ENCOUNTER — ANTICOAGULATION VISIT (OUTPATIENT)
Dept: CARDIOLOGY | Facility: CLINIC | Age: 73
End: 2018-12-14

## 2018-12-14 ENCOUNTER — CLINICAL SUPPORT (OUTPATIENT)
Dept: FAMILY MEDICINE CLINIC | Facility: CLINIC | Age: 73
End: 2018-12-14

## 2018-12-14 DIAGNOSIS — I48.19 PERSISTENT ATRIAL FIBRILLATION (HCC): ICD-10-CM

## 2018-12-14 DIAGNOSIS — I48.91 ATRIAL FIBRILLATION WITH RAPID VENTRICULAR RESPONSE (HCC): ICD-10-CM

## 2018-12-14 LAB
INR PPP: 2.9 (ref 0.9–1.1)
PROTHROMBIN TIME: 34.5 SECONDS

## 2018-12-14 PROCEDURE — 85610 PROTHROMBIN TIME: CPT | Performed by: FAMILY MEDICINE

## 2018-12-14 NOTE — PROGRESS NOTES
Pt got a pt/inr today-results are pt-34.5 inr-2.9 pt is currently on 2.5mg coumadin daily dr ford does orders for coumadin..

## 2018-12-17 ENCOUNTER — HOSPITAL ENCOUNTER (OUTPATIENT)
Dept: CARDIOLOGY | Facility: HOSPITAL | Age: 73
Discharge: HOME | End: 2018-12-17
Admitting: INTERNAL MEDICINE

## 2018-12-17 DIAGNOSIS — I48.20 CHRONIC ATRIAL FIBRILLATION (HCC): ICD-10-CM

## 2018-12-17 LAB
ABO GROUP BLD: NORMAL
ALBUMIN SERPL-MCNC: 4.3 G/DL (ref 3.5–5)
ALBUMIN/GLOB SERPL: 1.2 G/DL (ref 1.1–2.5)
ALP SERPL-CCNC: 102 U/L (ref 24–120)
ALT SERPL W P-5'-P-CCNC: 19 U/L (ref 0–54)
ANION GAP SERPL CALCULATED.3IONS-SCNC: 14 MMOL/L (ref 4–13)
AST SERPL-CCNC: 25 U/L (ref 7–45)
BILIRUB SERPL-MCNC: 0.6 MG/DL (ref 0.1–1)
BLD GP AB SCN SERPL QL: NEGATIVE
BUN BLD-MCNC: 20 MG/DL (ref 5–21)
BUN/CREAT SERPL: 17.5 (ref 7–25)
CALCIUM SPEC-SCNC: 9.2 MG/DL (ref 8.4–10.4)
CHLORIDE SERPL-SCNC: 92 MMOL/L (ref 98–110)
CO2 SERPL-SCNC: 30 MMOL/L (ref 24–31)
CREAT BLD-MCNC: 1.14 MG/DL (ref 0.5–1.4)
DEPRECATED RDW RBC AUTO: 41.2 FL (ref 40–54)
ERYTHROCYTE [DISTWIDTH] IN BLOOD BY AUTOMATED COUNT: 13.4 % (ref 12–15)
GFR SERPL CREATININE-BSD FRML MDRD: 47 ML/MIN/1.73
GLOBULIN UR ELPH-MCNC: 3.6 GM/DL
GLUCOSE BLD-MCNC: 319 MG/DL (ref 70–100)
HCT VFR BLD AUTO: 41.6 % (ref 37–47)
HGB BLD-MCNC: 13.8 G/DL (ref 12–16)
INR PPP: 1.44 (ref 0.91–1.09)
MCH RBC QN AUTO: 28.1 PG (ref 28–32)
MCHC RBC AUTO-ENTMCNC: 33.2 G/DL (ref 33–36)
MCV RBC AUTO: 84.7 FL (ref 82–98)
PLATELET # BLD AUTO: 180 10*3/MM3 (ref 130–400)
PMV BLD AUTO: 10.9 FL (ref 6–12)
POTASSIUM BLD-SCNC: 4.6 MMOL/L (ref 3.5–5.3)
PROT SERPL-MCNC: 7.9 G/DL (ref 6.3–8.7)
PROTHROMBIN TIME: 18 SECONDS (ref 11.9–14.6)
RBC # BLD AUTO: 4.91 10*6/MM3 (ref 4.2–5.4)
RH BLD: POSITIVE
SODIUM BLD-SCNC: 136 MMOL/L (ref 135–145)
T&S EXPIRATION DATE: NORMAL
WBC NRBC COR # BLD: 10.51 10*3/MM3 (ref 4.8–10.8)

## 2018-12-17 PROCEDURE — 86900 BLOOD TYPING SEROLOGIC ABO: CPT | Performed by: INTERNAL MEDICINE

## 2018-12-17 PROCEDURE — 86850 RBC ANTIBODY SCREEN: CPT | Performed by: INTERNAL MEDICINE

## 2018-12-17 PROCEDURE — 86901 BLOOD TYPING SEROLOGIC RH(D): CPT | Performed by: INTERNAL MEDICINE

## 2018-12-17 PROCEDURE — 36415 COLL VENOUS BLD VENIPUNCTURE: CPT

## 2018-12-17 PROCEDURE — 93010 ELECTROCARDIOGRAM REPORT: CPT | Performed by: INTERNAL MEDICINE

## 2018-12-17 PROCEDURE — 85610 PROTHROMBIN TIME: CPT | Performed by: INTERNAL MEDICINE

## 2018-12-17 PROCEDURE — 93005 ELECTROCARDIOGRAM TRACING: CPT | Performed by: INTERNAL MEDICINE

## 2018-12-17 PROCEDURE — 85027 COMPLETE CBC AUTOMATED: CPT | Performed by: INTERNAL MEDICINE

## 2018-12-17 PROCEDURE — 80053 COMPREHEN METABOLIC PANEL: CPT | Performed by: INTERNAL MEDICINE

## 2018-12-21 ENCOUNTER — ANTICOAGULATION VISIT (OUTPATIENT)
Dept: CARDIOLOGY | Facility: CLINIC | Age: 73
End: 2018-12-21

## 2018-12-28 ENCOUNTER — TELEPHONE (OUTPATIENT)
Dept: FAMILY MEDICINE CLINIC | Facility: CLINIC | Age: 73
End: 2018-12-28

## 2018-12-28 RX ORDER — AZITHROMYCIN 250 MG/1
TABLET, FILM COATED ORAL
Qty: 6 TABLET | Refills: 0 | Status: ON HOLD | OUTPATIENT
Start: 2018-12-28 | End: 2019-01-22

## 2018-12-28 NOTE — TELEPHONE ENCOUNTER
She is wanting to get something for her cold. Has a lot of head and chest congestion. Coughing up dark yellow mucous. Daniel

## 2019-01-02 ENCOUNTER — PREP FOR SURGERY (OUTPATIENT)
Dept: OTHER | Facility: HOSPITAL | Age: 74
End: 2019-01-02

## 2019-01-02 DIAGNOSIS — I48.0 PAROXYSMAL ATRIAL FIBRILLATION (HCC): Primary | ICD-10-CM

## 2019-01-02 RX ORDER — SODIUM CHLORIDE, SODIUM LACTATE, POTASSIUM CHLORIDE, CALCIUM CHLORIDE 600; 310; 30; 20 MG/100ML; MG/100ML; MG/100ML; MG/100ML
125 INJECTION, SOLUTION INTRAVENOUS CONTINUOUS
Status: CANCELLED | OUTPATIENT
Start: 2019-01-02

## 2019-01-03 PROBLEM — I48.0 PAROXYSMAL ATRIAL FIBRILLATION: Status: ACTIVE | Noted: 2019-01-03

## 2019-01-07 ENCOUNTER — TELEPHONE (OUTPATIENT)
Dept: FAMILY MEDICINE CLINIC | Facility: CLINIC | Age: 74
End: 2019-01-07

## 2019-01-07 RX ORDER — LEVOTHYROXINE SODIUM 137 UG/1
137 TABLET ORAL DAILY
Qty: 14 TABLET | Refills: 0 | Status: SHIPPED | OUTPATIENT
Start: 2019-01-07 | End: 2019-01-16 | Stop reason: SDUPTHER

## 2019-01-07 RX ORDER — PEN NEEDLE, DIABETIC 30 GX3/16"
1 NEEDLE, DISPOSABLE MISCELLANEOUS 2 TIMES DAILY
Qty: 100 EACH | Refills: 5 | Status: SHIPPED | OUTPATIENT
Start: 2019-01-07

## 2019-01-07 NOTE — TELEPHONE ENCOUNTER
Needing her synthroid refilled enough until she has her labs this week. Also needing her Levemir refilled also. Pt informed this has been done

## 2019-01-16 RX ORDER — LEVOTHYROXINE SODIUM 137 UG/1
137 TABLET ORAL DAILY
Qty: 30 TABLET | Refills: 5 | Status: SHIPPED | OUTPATIENT
Start: 2019-01-16 | End: 2019-01-30

## 2019-01-17 ENCOUNTER — TELEPHONE (OUTPATIENT)
Dept: FAMILY MEDICINE CLINIC | Facility: CLINIC | Age: 74
End: 2019-01-17

## 2019-01-17 NOTE — TELEPHONE ENCOUNTER
Marta from Dr. Harry office called & said that Dianne is scheduled for surgery on 3/5/19 with pre work on 2/26/19.  Marta wants to know if you are going to clear her for surgery based on her last OV ( 10/3/18) or will she need another OV for surg clearance?    [phone]

## 2019-01-18 NOTE — TELEPHONE ENCOUNTER
This was a error it was sujit sharp pt.This pt is not having any surgery so I let rebecca speak to this nurse about mrs red

## 2019-01-21 ENCOUNTER — HOSPITAL ENCOUNTER (OUTPATIENT)
Dept: CARDIOLOGY | Facility: HOSPITAL | Age: 74
Discharge: HOME | End: 2019-01-21
Admitting: INTERNAL MEDICINE

## 2019-01-21 ENCOUNTER — HOSPITAL ENCOUNTER (OUTPATIENT)
Dept: GENERAL RADIOLOGY | Facility: HOSPITAL | Age: 74
Discharge: HOME | End: 2019-01-21

## 2019-01-21 DIAGNOSIS — I48.0 PAROXYSMAL ATRIAL FIBRILLATION (HCC): ICD-10-CM

## 2019-01-21 LAB
ABO GROUP BLD: NORMAL
ALBUMIN SERPL-MCNC: 4.7 G/DL (ref 3.5–5)
ALBUMIN/GLOB SERPL: 1.2 G/DL (ref 1.1–2.5)
ALP SERPL-CCNC: 105 U/L (ref 24–120)
ALT SERPL W P-5'-P-CCNC: 19 U/L (ref 0–54)
ANION GAP SERPL CALCULATED.3IONS-SCNC: 11 MMOL/L (ref 4–13)
APTT PPP: 29.4 SECONDS (ref 24.1–34.8)
AST SERPL-CCNC: 31 U/L (ref 7–45)
BILIRUB SERPL-MCNC: 0.5 MG/DL (ref 0.1–1)
BLD GP AB SCN SERPL QL: NEGATIVE
BUN BLD-MCNC: 22 MG/DL (ref 5–21)
BUN/CREAT SERPL: 18.2 (ref 7–25)
CALCIUM SPEC-SCNC: 10.3 MG/DL (ref 8.4–10.4)
CHLORIDE SERPL-SCNC: 93 MMOL/L (ref 98–110)
CO2 SERPL-SCNC: 35 MMOL/L (ref 24–31)
CREAT BLD-MCNC: 1.21 MG/DL (ref 0.5–1.4)
DEPRECATED RDW RBC AUTO: 43.2 FL (ref 40–54)
ERYTHROCYTE [DISTWIDTH] IN BLOOD BY AUTOMATED COUNT: 13.9 % (ref 12–15)
GFR SERPL CREATININE-BSD FRML MDRD: 44 ML/MIN/1.73
GLOBULIN UR ELPH-MCNC: 4 GM/DL
GLUCOSE BLD-MCNC: 225 MG/DL (ref 70–100)
HCT VFR BLD AUTO: 42.4 % (ref 37–47)
HGB BLD-MCNC: 14.1 G/DL (ref 12–16)
INR PPP: 0.96 (ref 0.91–1.09)
MCH RBC QN AUTO: 28.4 PG (ref 28–32)
MCHC RBC AUTO-ENTMCNC: 33.3 G/DL (ref 33–36)
MCV RBC AUTO: 85.3 FL (ref 82–98)
PLATELET # BLD AUTO: 170 10*3/MM3 (ref 130–400)
PMV BLD AUTO: 11.4 FL (ref 6–12)
POTASSIUM BLD-SCNC: 4.7 MMOL/L (ref 3.5–5.3)
PROT SERPL-MCNC: 8.7 G/DL (ref 6.3–8.7)
PROTHROMBIN TIME: 13.1 SECONDS (ref 11.9–14.6)
RBC # BLD AUTO: 4.97 10*6/MM3 (ref 4.2–5.4)
RH BLD: POSITIVE
SODIUM BLD-SCNC: 139 MMOL/L (ref 135–145)
T&S EXPIRATION DATE: NORMAL
WBC NRBC COR # BLD: 8.19 10*3/MM3 (ref 4.8–10.8)

## 2019-01-21 PROCEDURE — 93005 ELECTROCARDIOGRAM TRACING: CPT | Performed by: INTERNAL MEDICINE

## 2019-01-21 PROCEDURE — 85027 COMPLETE CBC AUTOMATED: CPT | Performed by: INTERNAL MEDICINE

## 2019-01-21 PROCEDURE — 86901 BLOOD TYPING SEROLOGIC RH(D): CPT | Performed by: INTERNAL MEDICINE

## 2019-01-21 PROCEDURE — 85610 PROTHROMBIN TIME: CPT | Performed by: INTERNAL MEDICINE

## 2019-01-21 PROCEDURE — 71046 X-RAY EXAM CHEST 2 VIEWS: CPT

## 2019-01-21 PROCEDURE — 86900 BLOOD TYPING SEROLOGIC ABO: CPT | Performed by: INTERNAL MEDICINE

## 2019-01-21 PROCEDURE — 86850 RBC ANTIBODY SCREEN: CPT | Performed by: INTERNAL MEDICINE

## 2019-01-21 PROCEDURE — 85730 THROMBOPLASTIN TIME PARTIAL: CPT | Performed by: INTERNAL MEDICINE

## 2019-01-21 PROCEDURE — 80053 COMPREHEN METABOLIC PANEL: CPT | Performed by: INTERNAL MEDICINE

## 2019-01-21 PROCEDURE — 93010 ELECTROCARDIOGRAM REPORT: CPT | Performed by: INTERNAL MEDICINE

## 2019-01-21 PROCEDURE — 36415 COLL VENOUS BLD VENIPUNCTURE: CPT

## 2019-01-22 ENCOUNTER — ANESTHESIA EVENT (OUTPATIENT)
Dept: CARDIOLOGY | Facility: HOSPITAL | Age: 74
End: 2019-01-22

## 2019-01-22 ENCOUNTER — APPOINTMENT (OUTPATIENT)
Dept: CARDIOLOGY | Facility: HOSPITAL | Age: 74
End: 2019-01-22
Attending: INTERNAL MEDICINE

## 2019-01-22 ENCOUNTER — HOSPITAL ENCOUNTER (INPATIENT)
Facility: HOSPITAL | Age: 74
LOS: 1 days | Discharge: HOME | End: 2019-01-23
Attending: INTERNAL MEDICINE | Admitting: INTERNAL MEDICINE

## 2019-01-22 ENCOUNTER — ANESTHESIA (OUTPATIENT)
Dept: CARDIOLOGY | Facility: HOSPITAL | Age: 74
End: 2019-01-22

## 2019-01-22 DIAGNOSIS — I48.0 PAROXYSMAL ATRIAL FIBRILLATION (HCC): Primary | ICD-10-CM

## 2019-01-22 DIAGNOSIS — Z95.9 CARDIAC DEVICE IN SITU: ICD-10-CM

## 2019-01-22 PROBLEM — I48.91 AFIB: Status: ACTIVE | Noted: 2019-01-22

## 2019-01-22 LAB
ACT BLD: 235 SECONDS (ref 82–152)
GLUCOSE BLDC GLUCOMTR-MCNC: 252 MG/DL (ref 70–130)
GLUCOSE BLDC GLUCOMTR-MCNC: 349 MG/DL (ref 70–130)
GLUCOSE BLDC GLUCOMTR-MCNC: 401 MG/DL (ref 70–130)
INR PPP: 1.05 (ref 0.91–1.09)
PROTHROMBIN TIME: 14 SECONDS (ref 11.9–14.6)

## 2019-01-22 PROCEDURE — 25010000002 IOPAMIDOL 61 % SOLUTION: Performed by: INTERNAL MEDICINE

## 2019-01-22 PROCEDURE — C1894 INTRO/SHEATH, NON-LASER: HCPCS | Performed by: INTERNAL MEDICINE

## 2019-01-22 PROCEDURE — 93355 ECHO TRANSESOPHAGEAL (TEE): CPT | Performed by: INTERNAL MEDICINE

## 2019-01-22 PROCEDURE — C1893 INTRO/SHEATH, FIXED,NON-PEEL: HCPCS | Performed by: INTERNAL MEDICINE

## 2019-01-22 PROCEDURE — 33340 PERQ CLSR TCAT L ATR APNDGE: CPT | Performed by: INTERNAL MEDICINE

## 2019-01-22 PROCEDURE — 94760 N-INVAS EAR/PLS OXIMETRY 1: CPT

## 2019-01-22 PROCEDURE — B246ZZ4 ULTRASONOGRAPHY OF RIGHT AND LEFT HEART, TRANSESOPHAGEAL: ICD-10-PCS | Performed by: INTERNAL MEDICINE

## 2019-01-22 PROCEDURE — 93355 ECHO TRANSESOPHAGEAL (TEE): CPT

## 2019-01-22 PROCEDURE — 25010000002 HEPARIN (PORCINE) PER 1000 UNITS: Performed by: NURSE ANESTHETIST, CERTIFIED REGISTERED

## 2019-01-22 PROCEDURE — 25010000002 NEOSTIGMINE PER 0.5 MG: Performed by: NURSE ANESTHETIST, CERTIFIED REGISTERED

## 2019-01-22 PROCEDURE — 25010000002 DEXAMETHASONE PER 1 MG: Performed by: NURSE ANESTHETIST, CERTIFIED REGISTERED

## 2019-01-22 PROCEDURE — 82962 GLUCOSE BLOOD TEST: CPT

## 2019-01-22 PROCEDURE — 25010000002 MIDAZOLAM PER 1 MG

## 2019-01-22 PROCEDURE — 85347 COAGULATION TIME ACTIVATED: CPT

## 2019-01-22 PROCEDURE — 25010000002 HEPARIN (PORCINE) PER 1000 UNITS: Performed by: INTERNAL MEDICINE

## 2019-01-22 PROCEDURE — C1769 GUIDE WIRE: HCPCS | Performed by: INTERNAL MEDICINE

## 2019-01-22 PROCEDURE — 02L73DK OCCLUSION OF LEFT ATRIAL APPENDAGE WITH INTRALUMINAL DEVICE, PERCUTANEOUS APPROACH: ICD-10-PCS | Performed by: INTERNAL MEDICINE

## 2019-01-22 PROCEDURE — 25010000002 PROPOFOL 10 MG/ML EMULSION: Performed by: NURSE ANESTHETIST, CERTIFIED REGISTERED

## 2019-01-22 PROCEDURE — 25010000002 ONDANSETRON PER 1 MG: Performed by: NURSE ANESTHETIST, CERTIFIED REGISTERED

## 2019-01-22 PROCEDURE — 63710000001 INSULIN DETEMIR PER 5 UNITS: Performed by: INTERNAL MEDICINE

## 2019-01-22 PROCEDURE — 85610 PROTHROMBIN TIME: CPT | Performed by: INTERNAL MEDICINE

## 2019-01-22 PROCEDURE — S0260 H&P FOR SURGERY: HCPCS | Performed by: INTERNAL MEDICINE

## 2019-01-22 PROCEDURE — 94799 UNLISTED PULMONARY SVC/PX: CPT

## 2019-01-22 PROCEDURE — 25010000002 VANCOMYCIN PER 500 MG: Performed by: INTERNAL MEDICINE

## 2019-01-22 PROCEDURE — 76937 US GUIDE VASCULAR ACCESS: CPT | Performed by: INTERNAL MEDICINE

## 2019-01-22 DEVICE — LEFT ATRIAL APPENDAGE CLOSURE DEVICE WITH DELIVERY SYSTEM
Type: IMPLANTABLE DEVICE | Status: FUNCTIONAL
Brand: WATCHMAN®

## 2019-01-22 RX ORDER — FENTANYL CITRATE 50 UG/ML
25 INJECTION, SOLUTION INTRAMUSCULAR; INTRAVENOUS AS NEEDED
Status: DISCONTINUED | OUTPATIENT
Start: 2019-01-22 | End: 2019-01-22 | Stop reason: HOSPADM

## 2019-01-22 RX ORDER — VANCOMYCIN HYDROCHLORIDE 1 G/200ML
1000 INJECTION, SOLUTION INTRAVENOUS ONCE
Status: COMPLETED | OUTPATIENT
Start: 2019-01-22 | End: 2019-01-22

## 2019-01-22 RX ORDER — IPRATROPIUM BROMIDE AND ALBUTEROL SULFATE 2.5; .5 MG/3ML; MG/3ML
3 SOLUTION RESPIRATORY (INHALATION) ONCE AS NEEDED
Status: DISCONTINUED | OUTPATIENT
Start: 2019-01-22 | End: 2019-01-22 | Stop reason: HOSPADM

## 2019-01-22 RX ORDER — ACETAMINOPHEN 325 MG/1
650 TABLET ORAL EVERY 4 HOURS PRN
Status: DISCONTINUED | OUTPATIENT
Start: 2019-01-22 | End: 2019-01-23 | Stop reason: HOSPADM

## 2019-01-22 RX ORDER — DILTIAZEM HYDROCHLORIDE 180 MG/1
360 CAPSULE, COATED, EXTENDED RELEASE ORAL
Status: DISCONTINUED | OUTPATIENT
Start: 2019-01-22 | End: 2019-01-23 | Stop reason: HOSPADM

## 2019-01-22 RX ORDER — ROCURONIUM BROMIDE 10 MG/ML
INJECTION, SOLUTION INTRAVENOUS AS NEEDED
Status: DISCONTINUED | OUTPATIENT
Start: 2019-01-22 | End: 2019-01-22 | Stop reason: SURG

## 2019-01-22 RX ORDER — LIDOCAINE HYDROCHLORIDE 40 MG/ML
SOLUTION TOPICAL AS NEEDED
Status: DISCONTINUED | OUTPATIENT
Start: 2019-01-22 | End: 2019-01-22 | Stop reason: SURG

## 2019-01-22 RX ORDER — SODIUM CHLORIDE 0.9 % (FLUSH) 0.9 %
1-10 SYRINGE (ML) INJECTION AS NEEDED
Status: DISCONTINUED | OUTPATIENT
Start: 2019-01-22 | End: 2019-01-22

## 2019-01-22 RX ORDER — HYDROCODONE BITARTRATE AND ACETAMINOPHEN 5; 325 MG/1; MG/1
1 TABLET ORAL EVERY 4 HOURS PRN
Status: DISCONTINUED | OUTPATIENT
Start: 2019-01-22 | End: 2019-01-23 | Stop reason: HOSPADM

## 2019-01-22 RX ORDER — PROPOFOL 10 MG/ML
VIAL (ML) INTRAVENOUS AS NEEDED
Status: DISCONTINUED | OUTPATIENT
Start: 2019-01-22 | End: 2019-01-22 | Stop reason: SURG

## 2019-01-22 RX ORDER — WARFARIN SODIUM 5 MG/1
5 TABLET ORAL
Status: DISCONTINUED | OUTPATIENT
Start: 2019-01-22 | End: 2019-01-23 | Stop reason: HOSPADM

## 2019-01-22 RX ORDER — SODIUM CHLORIDE, SODIUM LACTATE, POTASSIUM CHLORIDE, CALCIUM CHLORIDE 600; 310; 30; 20 MG/100ML; MG/100ML; MG/100ML; MG/100ML
100 INJECTION, SOLUTION INTRAVENOUS CONTINUOUS
Status: DISCONTINUED | OUTPATIENT
Start: 2019-01-22 | End: 2019-01-22 | Stop reason: HOSPADM

## 2019-01-22 RX ORDER — SODIUM CHLORIDE 9 MG/ML
100 INJECTION, SOLUTION INTRAVENOUS CONTINUOUS
Status: DISPENSED | OUTPATIENT
Start: 2019-01-22 | End: 2019-01-22

## 2019-01-22 RX ORDER — ASPIRIN 81 MG/1
81 TABLET, CHEWABLE ORAL DAILY
Status: DISCONTINUED | OUTPATIENT
Start: 2019-01-22 | End: 2019-01-23 | Stop reason: HOSPADM

## 2019-01-22 RX ORDER — MIDAZOLAM HYDROCHLORIDE 1 MG/ML
2 INJECTION INTRAMUSCULAR; INTRAVENOUS
Status: DISCONTINUED | OUTPATIENT
Start: 2019-01-22 | End: 2019-01-22

## 2019-01-22 RX ORDER — MIDAZOLAM HYDROCHLORIDE 1 MG/ML
INJECTION INTRAMUSCULAR; INTRAVENOUS
Status: COMPLETED
Start: 2019-01-22 | End: 2019-01-22

## 2019-01-22 RX ORDER — LEVOTHYROXINE SODIUM 137 UG/1
137 TABLET ORAL
Status: DISCONTINUED | OUTPATIENT
Start: 2019-01-23 | End: 2019-01-23 | Stop reason: HOSPADM

## 2019-01-22 RX ORDER — LIDOCAINE HYDROCHLORIDE 20 MG/ML
INJECTION, SOLUTION INFILTRATION; PERINEURAL AS NEEDED
Status: DISCONTINUED | OUTPATIENT
Start: 2019-01-22 | End: 2019-01-22 | Stop reason: HOSPADM

## 2019-01-22 RX ORDER — MIDAZOLAM HYDROCHLORIDE 1 MG/ML
1 INJECTION INTRAMUSCULAR; INTRAVENOUS
Status: DISCONTINUED | OUTPATIENT
Start: 2019-01-22 | End: 2019-01-22

## 2019-01-22 RX ORDER — LETROZOLE 2.5 MG/1
2.5 TABLET, FILM COATED ORAL DAILY
Status: DISCONTINUED | OUTPATIENT
Start: 2019-01-22 | End: 2019-01-23 | Stop reason: HOSPADM

## 2019-01-22 RX ORDER — GLYCOPYRROLATE 0.2 MG/ML
INJECTION INTRAMUSCULAR; INTRAVENOUS AS NEEDED
Status: DISCONTINUED | OUTPATIENT
Start: 2019-01-22 | End: 2019-01-22 | Stop reason: SURG

## 2019-01-22 RX ORDER — ONDANSETRON 2 MG/ML
4 INJECTION INTRAMUSCULAR; INTRAVENOUS ONCE AS NEEDED
Status: DISCONTINUED | OUTPATIENT
Start: 2019-01-22 | End: 2019-01-22 | Stop reason: HOSPADM

## 2019-01-22 RX ORDER — AMIODARONE HYDROCHLORIDE 200 MG/1
200 TABLET ORAL DAILY
Status: DISCONTINUED | OUTPATIENT
Start: 2019-01-22 | End: 2019-01-23

## 2019-01-22 RX ORDER — DEXAMETHASONE SODIUM PHOSPHATE 4 MG/ML
INJECTION, SOLUTION INTRA-ARTICULAR; INTRALESIONAL; INTRAMUSCULAR; INTRAVENOUS; SOFT TISSUE AS NEEDED
Status: DISCONTINUED | OUTPATIENT
Start: 2019-01-22 | End: 2019-01-22 | Stop reason: SURG

## 2019-01-22 RX ORDER — SODIUM CHLORIDE 0.9 % (FLUSH) 0.9 %
3 SYRINGE (ML) INJECTION EVERY 12 HOURS SCHEDULED
Status: DISCONTINUED | OUTPATIENT
Start: 2019-01-22 | End: 2019-01-22

## 2019-01-22 RX ORDER — LABETALOL HYDROCHLORIDE 5 MG/ML
5 INJECTION, SOLUTION INTRAVENOUS
Status: DISCONTINUED | OUTPATIENT
Start: 2019-01-22 | End: 2019-01-22 | Stop reason: HOSPADM

## 2019-01-22 RX ORDER — LIDOCAINE HYDROCHLORIDE 20 MG/ML
INJECTION, SOLUTION INFILTRATION; PERINEURAL AS NEEDED
Status: DISCONTINUED | OUTPATIENT
Start: 2019-01-22 | End: 2019-01-22 | Stop reason: SURG

## 2019-01-22 RX ORDER — SODIUM CHLORIDE, SODIUM LACTATE, POTASSIUM CHLORIDE, CALCIUM CHLORIDE 600; 310; 30; 20 MG/100ML; MG/100ML; MG/100ML; MG/100ML
125 INJECTION, SOLUTION INTRAVENOUS CONTINUOUS
Status: DISCONTINUED | OUTPATIENT
Start: 2019-01-22 | End: 2019-01-22 | Stop reason: HOSPADM

## 2019-01-22 RX ORDER — PHENYLEPHRINE HCL IN 0.9% NACL 0.8MG/10ML
SYRINGE (ML) INTRAVENOUS AS NEEDED
Status: DISCONTINUED | OUTPATIENT
Start: 2019-01-22 | End: 2019-01-22 | Stop reason: SURG

## 2019-01-22 RX ORDER — LOSARTAN POTASSIUM 25 MG/1
25 TABLET ORAL DAILY
Status: DISCONTINUED | OUTPATIENT
Start: 2019-01-22 | End: 2019-01-23 | Stop reason: HOSPADM

## 2019-01-22 RX ORDER — ONDANSETRON 2 MG/ML
INJECTION INTRAMUSCULAR; INTRAVENOUS AS NEEDED
Status: DISCONTINUED | OUTPATIENT
Start: 2019-01-22 | End: 2019-01-22 | Stop reason: SURG

## 2019-01-22 RX ORDER — METOCLOPRAMIDE HYDROCHLORIDE 5 MG/ML
5 INJECTION INTRAMUSCULAR; INTRAVENOUS
Status: DISCONTINUED | OUTPATIENT
Start: 2019-01-22 | End: 2019-01-22 | Stop reason: HOSPADM

## 2019-01-22 RX ORDER — NALOXONE HCL 0.4 MG/ML
0.4 VIAL (ML) INJECTION AS NEEDED
Status: DISCONTINUED | OUTPATIENT
Start: 2019-01-22 | End: 2019-01-22 | Stop reason: HOSPADM

## 2019-01-22 RX ORDER — HEPARIN SODIUM 1000 [USP'U]/ML
INJECTION, SOLUTION INTRAVENOUS; SUBCUTANEOUS AS NEEDED
Status: DISCONTINUED | OUTPATIENT
Start: 2019-01-22 | End: 2019-01-22 | Stop reason: SURG

## 2019-01-22 RX ORDER — OXYCODONE AND ACETAMINOPHEN 10; 325 MG/1; MG/1
1 TABLET ORAL ONCE AS NEEDED
Status: DISCONTINUED | OUTPATIENT
Start: 2019-01-22 | End: 2019-01-22 | Stop reason: HOSPADM

## 2019-01-22 RX ADMIN — MIDAZOLAM HYDROCHLORIDE 2 MG: 1 INJECTION INTRAMUSCULAR; INTRAVENOUS at 07:23

## 2019-01-22 RX ADMIN — Medication 80 MCG: at 08:36

## 2019-01-22 RX ADMIN — LIDOCAINE HYDROCHLORIDE 50 MG: 20 INJECTION, SOLUTION INFILTRATION; PERINEURAL at 08:18

## 2019-01-22 RX ADMIN — DEXAMETHASONE SODIUM PHOSPHATE 4 MG: 4 INJECTION, SOLUTION INTRAMUSCULAR; INTRAVENOUS at 08:31

## 2019-01-22 RX ADMIN — MIDAZOLAM HYDROCHLORIDE 2 MG: 1 INJECTION, SOLUTION INTRAMUSCULAR; INTRAVENOUS at 07:23

## 2019-01-22 RX ADMIN — LOSARTAN POTASSIUM 25 MG: 25 TABLET ORAL at 13:34

## 2019-01-22 RX ADMIN — GLYCOPYRROLATE 0.4 MG: 0.2 INJECTION, SOLUTION INTRAMUSCULAR; INTRAVENOUS at 09:23

## 2019-01-22 RX ADMIN — HEPARIN SODIUM 4000 UNITS: 1000 INJECTION, SOLUTION INTRAVENOUS; SUBCUTANEOUS at 08:48

## 2019-01-22 RX ADMIN — ROCURONIUM BROMIDE 30 MG: 10 INJECTION INTRAVENOUS at 08:18

## 2019-01-22 RX ADMIN — SODIUM CHLORIDE, POTASSIUM CHLORIDE, SODIUM LACTATE AND CALCIUM CHLORIDE 100 ML/HR: 600; 310; 30; 20 INJECTION, SOLUTION INTRAVENOUS at 07:26

## 2019-01-22 RX ADMIN — SODIUM CHLORIDE 100 ML/HR: 9 INJECTION, SOLUTION INTRAVENOUS at 12:28

## 2019-01-22 RX ADMIN — PROPOFOL 120 MG: 10 INJECTION, EMULSION INTRAVENOUS at 08:18

## 2019-01-22 RX ADMIN — LIDOCAINE HYDROCHLORIDE 1 EACH: 40 SOLUTION TOPICAL at 08:20

## 2019-01-22 RX ADMIN — VANCOMYCIN HYDROCHLORIDE 1000 MG: 1 INJECTION, SOLUTION INTRAVENOUS at 07:52

## 2019-01-22 RX ADMIN — ONDANSETRON HYDROCHLORIDE 4 MG: 2 SOLUTION INTRAMUSCULAR; INTRAVENOUS at 09:16

## 2019-01-22 RX ADMIN — Medication 80 MCG: at 09:12

## 2019-01-22 RX ADMIN — Medication 81 MG: at 18:38

## 2019-01-22 RX ADMIN — Medication 3 MG: at 09:23

## 2019-01-22 RX ADMIN — WARFARIN SODIUM 5 MG: 5 TABLET ORAL at 18:38

## 2019-01-22 RX ADMIN — LETROZOLE 2.5 MG: 2.5 TABLET ORAL at 13:34

## 2019-01-22 RX ADMIN — AMIODARONE HYDROCHLORIDE 200 MG: 200 TABLET ORAL at 13:34

## 2019-01-22 RX ADMIN — METOPROLOL TARTRATE 25 MG: 25 TABLET, FILM COATED ORAL at 20:02

## 2019-01-22 RX ADMIN — INSULIN DETEMIR 30 UNITS: 100 INJECTION, SOLUTION SUBCUTANEOUS at 20:02

## 2019-01-22 RX ADMIN — ROCURONIUM BROMIDE 20 MG: 10 INJECTION INTRAVENOUS at 08:53

## 2019-01-22 RX ADMIN — HEPARIN SODIUM 4500 UNITS: 1000 INJECTION, SOLUTION INTRAVENOUS; SUBCUTANEOUS at 08:34

## 2019-01-22 RX ADMIN — SUGAMMADEX 200 MG: 100 INJECTION, SOLUTION INTRAVENOUS at 09:26

## 2019-01-22 RX ADMIN — DILTIAZEM HYDROCHLORIDE 360 MG: 180 CAPSULE, COATED, EXTENDED RELEASE ORAL at 13:34

## 2019-01-22 RX ADMIN — SODIUM CHLORIDE, POTASSIUM CHLORIDE, SODIUM LACTATE AND CALCIUM CHLORIDE 125 ML/HR: 600; 310; 30; 20 INJECTION, SOLUTION INTRAVENOUS at 07:06

## 2019-01-22 RX ADMIN — Medication 80 MCG: at 08:43

## 2019-01-22 RX ADMIN — Medication 160 MCG: at 09:05

## 2019-01-22 RX ADMIN — INSULIN DETEMIR 30 UNITS: 100 INJECTION, SOLUTION SUBCUTANEOUS at 13:33

## 2019-01-22 NOTE — ANESTHESIA POSTPROCEDURE EVALUATION
"Patient: Dianne Pritchard    Procedure Summary     Date:  01/22/19 Room / Location:  [facility] CATH LAB  /  [facility] CATH INVASIVE LOCATION    Anesthesia Start:  0810 Anesthesia Stop:  0935    Procedure:  Atrial Appendage Occlusion (Left ) Diagnosis:       Paroxysmal atrial fibrillation (CMS/HCC)      (Paroxysmal atrial fibrillation (CMS/HCC) [I48.0])    Provider:  Mick Orantes MD Provider:  Lisa Mckeon CRNA    Anesthesia Type:  general ASA Status:  3          Anesthesia Type: general  Last vitals  BP   142/72 (01/22/19 1125)   Temp   98.2 °F (36.8 °C) (01/22/19 1125)   Pulse   72 (01/22/19 1125)   Resp   18 (01/22/19 1125)     SpO2   92 % (01/22/19 1125)     Post Anesthesia Care and Evaluation    Patient location during evaluation: PACU  Patient participation: complete - patient participated  Level of consciousness: awake and alert  Pain management: adequate  Airway patency: patent  Anesthetic complications: No anesthetic complications    Cardiovascular status: acceptable  Respiratory status: acceptable  Hydration status: acceptable    Comments: Blood pressure 142/72, pulse 72, temperature 98.2 °F (36.8 °C), temperature source Temporal, resp. rate 18, height 162.6 cm (64\"), weight 91.3 kg (201 lb 4.8 oz), SpO2 92 %, not currently breastfeeding.    Pt discharged from PACU based on jerome score >8      "

## 2019-01-22 NOTE — ANESTHESIA PREPROCEDURE EVALUATION
Anesthesia Evaluation     Patient summary reviewed   history of anesthetic complications: PONV  NPO Solid Status: > 8 hours  NPO Liquid Status: < 2 hours           Airway   Mallampati: II  TM distance: >3 FB  Neck ROM: full  No difficulty expected  Dental - normal exam     Pulmonary    (-) COPD, asthma, sleep apnea, not a smoker  Cardiovascular   Exercise tolerance: good (4-7 METS)    ECG reviewed  Patient on routine beta blocker and Beta blocker given within 24 hours of surgery    (+) hypertension, valvular problems/murmurs MS, dysrhythmias Atrial Fib,   (-) pacemaker, past MI, angina, cardiac stents      Neuro/Psych  (-) seizures, TIA, CVA  GI/Hepatic/Renal/Endo    (+) obesity,  GI bleeding, diabetes mellitus using insulin, hypothyroidism,   (-) GERD, liver disease, no renal disease    Musculoskeletal     Abdominal    Substance History      OB/GYN          Other      history of cancer (lymphoma, breast)                      Anesthesia Plan    ASA 3     general   ([place])  intravenous induction   Anesthetic plan, all risks, benefits, and alternatives have been provided, discussed and informed consent has been obtained with: patient.

## 2019-01-22 NOTE — ANESTHESIA PROCEDURE NOTES
Airway  Urgency: elective    Airway not difficult    General Information and Staff    Patient location during procedure: OR  CRNA: Lisa Mckeon CRNA    Indications and Patient Condition  Indications for airway management: airway protection    Preoxygenated: yes  Mask difficulty assessment: 1 - vent by mask    Final Airway Details  Final airway type: endotracheal airway      Successful airway: ETT  Cuffed: yes   Successful intubation technique: direct laryngoscopy  Facilitating devices/methods: intubating stylet  Endotracheal tube insertion site: oral  Blade: Duenas  Blade size: 2  ETT size (mm): 7.0  Cormack-Lehane Classification: grade IIa - partial view of glottis  Placement verified by: chest auscultation and capnometry   Cuff volume (mL): 8  Measured from: lips  ETT to lips (cm): 20  Number of attempts at approach: 1    Additional Comments  Easy, atraumatic intubation.

## 2019-01-23 ENCOUNTER — ANTICOAGULATION VISIT (OUTPATIENT)
Dept: CARDIOLOGY | Facility: CLINIC | Age: 74
End: 2019-01-23

## 2019-01-23 VITALS
DIASTOLIC BLOOD PRESSURE: 53 MMHG | HEIGHT: 64 IN | TEMPERATURE: 97.8 F | SYSTOLIC BLOOD PRESSURE: 124 MMHG | RESPIRATION RATE: 16 BRPM | HEART RATE: 72 BPM | WEIGHT: 201.3 LBS | OXYGEN SATURATION: 95 % | BODY MASS INDEX: 34.37 KG/M2

## 2019-01-23 LAB
ANION GAP SERPL CALCULATED.3IONS-SCNC: 12 MMOL/L (ref 4–13)
BUN BLD-MCNC: 22 MG/DL (ref 5–21)
BUN/CREAT SERPL: 21.4 (ref 7–25)
CALCIUM SPEC-SCNC: 8.9 MG/DL (ref 8.4–10.4)
CHLORIDE SERPL-SCNC: 93 MMOL/L (ref 98–110)
CO2 SERPL-SCNC: 28 MMOL/L (ref 24–31)
CREAT BLD-MCNC: 1.03 MG/DL (ref 0.5–1.4)
DEPRECATED RDW RBC AUTO: 43.2 FL (ref 40–54)
ERYTHROCYTE [DISTWIDTH] IN BLOOD BY AUTOMATED COUNT: 14.1 % (ref 12–15)
GFR SERPL CREATININE-BSD FRML MDRD: 53 ML/MIN/1.73
GLUCOSE BLD-MCNC: 249 MG/DL (ref 70–100)
HCT VFR BLD AUTO: 34.5 % (ref 37–47)
HGB BLD-MCNC: 11.6 G/DL (ref 12–16)
INR PPP: 1.09 (ref 0.91–1.09)
MCH RBC QN AUTO: 28.4 PG (ref 28–32)
MCHC RBC AUTO-ENTMCNC: 33.6 G/DL (ref 33–36)
MCV RBC AUTO: 84.6 FL (ref 82–98)
PLATELET # BLD AUTO: 142 10*3/MM3 (ref 130–400)
PMV BLD AUTO: 11.4 FL (ref 6–12)
POTASSIUM BLD-SCNC: 5.1 MMOL/L (ref 3.5–5.3)
PROTHROMBIN TIME: 14.5 SECONDS (ref 11.9–14.6)
RBC # BLD AUTO: 4.08 10*6/MM3 (ref 4.2–5.4)
SODIUM BLD-SCNC: 133 MMOL/L (ref 135–145)
WBC NRBC COR # BLD: 9.61 10*3/MM3 (ref 4.8–10.8)

## 2019-01-23 PROCEDURE — 63710000001 INSULIN DETEMIR PER 5 UNITS: Performed by: INTERNAL MEDICINE

## 2019-01-23 PROCEDURE — 99238 HOSP IP/OBS DSCHRG MGMT 30/<: CPT | Performed by: INTERNAL MEDICINE

## 2019-01-23 PROCEDURE — 85610 PROTHROMBIN TIME: CPT | Performed by: INTERNAL MEDICINE

## 2019-01-23 PROCEDURE — 80048 BASIC METABOLIC PNL TOTAL CA: CPT | Performed by: INTERNAL MEDICINE

## 2019-01-23 PROCEDURE — 85027 COMPLETE CBC AUTOMATED: CPT | Performed by: INTERNAL MEDICINE

## 2019-01-23 RX ORDER — WARFARIN SODIUM 5 MG/1
5 TABLET ORAL NIGHTLY
Qty: 30 TABLET | Refills: 3 | Status: SHIPPED | OUTPATIENT
Start: 2019-01-23 | End: 2019-03-08 | Stop reason: HOSPADM

## 2019-01-23 RX ORDER — ASPIRIN 81 MG/1
81 TABLET, CHEWABLE ORAL DAILY
Start: 2019-01-24 | End: 2020-05-18

## 2019-01-23 RX ORDER — WARFARIN SODIUM 5 MG/1
5 TABLET ORAL NIGHTLY
Qty: 30 TABLET | Refills: 3 | Status: SHIPPED | OUTPATIENT
Start: 2019-01-23 | End: 2019-01-23 | Stop reason: SDUPTHER

## 2019-01-23 RX ADMIN — DILTIAZEM HYDROCHLORIDE 360 MG: 180 CAPSULE, COATED, EXTENDED RELEASE ORAL at 05:04

## 2019-01-23 RX ADMIN — METOPROLOL TARTRATE 25 MG: 25 TABLET, FILM COATED ORAL at 08:39

## 2019-01-23 RX ADMIN — Medication 81 MG: at 08:40

## 2019-01-23 RX ADMIN — LEVOTHYROXINE SODIUM 137 MCG: 137 TABLET ORAL at 05:04

## 2019-01-23 RX ADMIN — LETROZOLE 2.5 MG: 2.5 TABLET ORAL at 08:40

## 2019-01-23 RX ADMIN — INSULIN DETEMIR 30 UNITS: 100 INJECTION, SOLUTION SUBCUTANEOUS at 08:40

## 2019-01-23 RX ADMIN — AMIODARONE HYDROCHLORIDE 200 MG: 200 TABLET ORAL at 08:39

## 2019-01-23 RX ADMIN — LOSARTAN POTASSIUM 25 MG: 25 TABLET ORAL at 08:40

## 2019-01-24 ENCOUNTER — READMISSION MANAGEMENT (OUTPATIENT)
Dept: CALL CENTER | Facility: HOSPITAL | Age: 74
End: 2019-01-24

## 2019-01-24 NOTE — OUTREACH NOTE
Prep Survey      Responses   Facility patient discharged from?  [facility]   Is patient eligible?  Yes   Discharge diagnosis  Persistent Afib, Cardiac device in situ, left atrial appendage occluder placed 01/22/19, hx of GI bleed, PVD, Essential HTN, DM II, obesity Restrictive lung disease   Does the patient have one of the following disease processes/diagnoses(primary or secondary)?  Other   Does the patient have Home health ordered?  No   Is there a DME ordered?  No   Comments regarding appointments  See AVS   Prep survey completed?  Yes          Karen Callahan RN

## 2019-01-25 ENCOUNTER — READMISSION MANAGEMENT (OUTPATIENT)
Dept: CALL CENTER | Facility: HOSPITAL | Age: 74
End: 2019-01-25

## 2019-01-25 NOTE — OUTREACH NOTE
Medical Week 1 Survey      Responses   Facility patient discharged from?  [facility]   Does the patient have one of the following disease processes/diagnoses(primary or secondary)?  Other   Is there a successful TCM telephone encounter documented?  No   Week 1 attempt successful?  No   Unsuccessful attempts  Attempt 1          Karen Stauffer RN

## 2019-01-26 ENCOUNTER — READMISSION MANAGEMENT (OUTPATIENT)
Dept: CALL CENTER | Facility: HOSPITAL | Age: 74
End: 2019-01-26

## 2019-01-26 NOTE — OUTREACH NOTE
Medical Week 1 Survey      Responses   Facility patient discharged from?  [facility]   Does the patient have one of the following disease processes/diagnoses(primary or secondary)?  Other   Is there a successful TCM telephone encounter documented?  No   Week 1 attempt successful?  No   Unsuccessful attempts  Attempt 2          Heather Tinoco RN

## 2019-01-27 ENCOUNTER — READMISSION MANAGEMENT (OUTPATIENT)
Dept: CALL CENTER | Facility: HOSPITAL | Age: 74
End: 2019-01-27

## 2019-01-27 NOTE — OUTREACH NOTE
Medical Week 1 Survey      Responses   Facility patient discharged from?  [facility]   Does the patient have one of the following disease processes/diagnoses(primary or secondary)?  Other   Is there a successful TCM telephone encounter documented?  No   Week 1 attempt successful?  Yes   Call start time  1235   Call end time  1239   General alerts for this patient  Watchman procedure   Discharge diagnosis  Persistent Afib, Cardiac device in situ, left atrial appendage occluder placed 01/22/19, hx of GI bleed, PVD, Essential HTN, DM II, obesity Restrictive lung disease   Is patient permission given to speak with other caregiver?  No   Meds reviewed with patient/caregiver?  Yes   Is the patient having any side effects they believe may be caused by any medication additions or changes?  No   Does the patient have all medications ordered at discharge?  Yes   Is the patient taking all medications as directed (includes completed medication regime)?  Yes   Comments regarding appointments  See AVS   Does the patient have a primary care provider?   Yes   Does the patient have an appointment with their PCP within 7 days of discharge?  Greater than 7 days   What is preventing the patient from scheduling follow up appointments within 7 days of discharge?  Earlier appointment not available   Has the patient kept scheduled appointments due by today?  N/A   Psychosocial issues?  No   Did the patient receive a copy of their discharge instructions?  Yes   Nursing interventions  Reviewed instructions with patient   What is the patient's perception of their health status since discharge?  Improving   Is the patient/caregiver able to teach back signs and symptoms related to disease process for when to call PCP?  Yes   Is the patient/caregiver able to teach back signs and symptoms related to disease process for when to call 911?  Yes   Is the patient/caregiver able to teach back the hierarchy of who to call/visit for symptoms/problems? PCP,  Specialist, Home health nurse, Urgent Care, ED, 911  Yes   Week 1 call completed?  Yes   Wrap up additional comments  Very pleasant lady. Doing well after watchman procedure. Still having afib as expected just protected from stroke. Will have JOSSELYN in 45 days. Complimentary of entire staff and enjoys us checking on her via phone.           Heather Tinoco RN

## 2019-01-28 ENCOUNTER — ANTICOAGULATION VISIT (OUTPATIENT)
Dept: CARDIOLOGY | Facility: CLINIC | Age: 74
End: 2019-01-28

## 2019-01-28 ENCOUNTER — CLINICAL SUPPORT (OUTPATIENT)
Dept: FAMILY MEDICINE CLINIC | Facility: CLINIC | Age: 74
End: 2019-01-28

## 2019-01-28 DIAGNOSIS — I48.19 PERSISTENT ATRIAL FIBRILLATION (HCC): ICD-10-CM

## 2019-01-28 LAB
INR PPP: 1.5 (ref 0.9–1.1)
PROTHROMBIN TIME: 17.9 SECONDS

## 2019-01-28 PROCEDURE — 85610 PROTHROMBIN TIME: CPT | Performed by: FAMILY MEDICINE

## 2019-01-28 RX ORDER — LEVOTHYROXINE SODIUM 137 UG/1
137 TABLET ORAL DAILY
Qty: 30 TABLET | Refills: 0 | Status: SHIPPED | OUTPATIENT
Start: 2019-01-28 | End: 2019-02-23 | Stop reason: SDUPTHER

## 2019-01-30 ENCOUNTER — OFFICE VISIT (OUTPATIENT)
Dept: FAMILY MEDICINE CLINIC | Facility: CLINIC | Age: 74
End: 2019-01-30

## 2019-01-30 ENCOUNTER — ANTICOAGULATION VISIT (OUTPATIENT)
Dept: CARDIOLOGY | Facility: CLINIC | Age: 74
End: 2019-01-30

## 2019-01-30 VITALS
HEART RATE: 68 BPM | DIASTOLIC BLOOD PRESSURE: 78 MMHG | RESPIRATION RATE: 16 BRPM | BODY MASS INDEX: 33.97 KG/M2 | SYSTOLIC BLOOD PRESSURE: 122 MMHG | HEIGHT: 64 IN | WEIGHT: 199 LBS | OXYGEN SATURATION: 97 %

## 2019-01-30 DIAGNOSIS — I10 ESSENTIAL HYPERTENSION: Primary | ICD-10-CM

## 2019-01-30 DIAGNOSIS — N18.30 CHRONIC KIDNEY DISEASE, STAGE III (MODERATE) (HCC): ICD-10-CM

## 2019-01-30 DIAGNOSIS — I48.19 PERSISTENT ATRIAL FIBRILLATION (HCC): ICD-10-CM

## 2019-01-30 LAB
INR PPP: 2.5 (ref 0.9–1.1)
PROTHROMBIN TIME: 29.7 SECONDS

## 2019-01-30 PROCEDURE — 99213 OFFICE O/P EST LOW 20 MIN: CPT | Performed by: FAMILY MEDICINE

## 2019-01-30 PROCEDURE — 85610 PROTHROMBIN TIME: CPT | Performed by: FAMILY MEDICINE

## 2019-01-30 RX ORDER — DILTIAZEM HYDROCHLORIDE 180 MG/1
CAPSULE, EXTENDED RELEASE ORAL
Refills: 1 | COMMUNITY
Start: 2019-01-08 | End: 2019-02-20 | Stop reason: SDUPTHER

## 2019-01-30 NOTE — PROGRESS NOTES
"Glenn Pritchard is a 73 y.o. female.     Chief Complaint   Patient presents with   • Follow-up     [facility] f/u        History of Present Illness     she had watchman procedure and is doing well with the couamdin---denies any bleeding      Current Outpatient Medications:   •  aspirin 81 MG chewable tablet, Chew 1 tablet Daily., Disp: , Rfl:   •  CARTIA  MG 24 hr capsule, TK 2 CS PO QD, Disp: , Rfl: 1  •  Insulin Pen Needle (PEN NEEDLES) 31G X 5 MM misc, 1 each 2 (Two) Times a Day., Disp: 100 each, Rfl: 5  •  letrozole (FEMARA) 2.5 MG tablet, Take 2.5 mg by mouth Daily., Disp: , Rfl:   •  LEVEMIR FLEXTOUCH 100 UNIT/ML injection, INJECT 50 UNITS TWICE DAILY, Disp: 3 mL, Rfl: 5  •  levothyroxine (SYNTHROID, LEVOTHROID) 137 MCG tablet, TAKE 1 TABLET BY MOUTH DAILY, Disp: 30 tablet, Rfl: 0  •  losartan (COZAAR) 25 MG tablet, Take 25 mg by mouth Daily., Disp: , Rfl:   •  metoprolol tartrate (LOPRESSOR) 25 MG tablet, Take 1 tablet by mouth Every 12 (Twelve) Hours., Disp: 60 tablet, Rfl: 5  •  Multiple Vitamins-Minerals (CENTRUM SILVER PO), Take 1 tablet by mouth Daily., Disp: , Rfl:   •  warfarin (COUMADIN) 5 MG tablet, Take 1 tablet by mouth Every Night. 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)   • Carotid artery occlusion    • Diabetes mellitus, type II (CMS/HCC)    • Dizziness    • Drug therapy    • Essential hypertension    • GERD (gastroesophageal reflux disease)    • GI bleed requiring more than 4 units of " "blood in 24 hours, ICU, or surgery    • History of chemotherapy    • History of lymphoma    • Hx of radiation therapy    • Hypomagnesemia    • Hypothyroidism    • On amiodarone therapy      Past Surgical History:   Procedure Laterality Date   • ATRIAL APPENDAGE EXCLUSION LEFT WITH TRANSESOPHAGEAL ECHOCARDIOGRAM Left 11/27/2018    Procedure: Atrial Appendage Occlusion;  Surgeon: Mick Orantes MD;  Location:  [facility];  Service: Cardiology   • ATRIAL APPENDAGE EXCLUSION LEFT WITH TRANSESOPHAGEAL ECHOCARDIOGRAM Left 1/22/2019    Procedure: Atrial Appendage Occlusion;  Surgeon: Mick Orantes MD;  Location:  [facility] C[facility];  Service: Cardiology   • BREAST BIOPSY     • MASTECTOMY     • OTHER SURGICAL HISTORY      Mediport insertion X 2   • OTHER SURGICAL HISTORY      remote lymphoma treatment   • REPLACEMENT TOTAL KNEE Right 09/2017   • TUBAL ABDOMINAL LIGATION         Review of Systems   Constitutional: Negative.    HENT: Negative.    Eyes: Negative.    Respiratory: Negative.    Cardiovascular: Negative.    Gastrointestinal: Negative.    Endocrine: Negative.    Genitourinary: Negative.    Musculoskeletal: Negative.    Skin: Negative.    Allergic/Immunologic: Negative.    Neurological: Negative.    Hematological: Negative.    Psychiatric/Behavioral: Negative.        Objective  /78   Pulse 68   Resp 16   Ht 162.6 cm (64\")   Wt 90.3 kg (199 lb)   SpO2 97%   BMI 34.16 kg/m²   Physical Exam   Constitutional: She is oriented to person, place, and time. She appears well-developed and well-nourished.   HENT:   Head: Normocephalic and atraumatic.   Right Ear: External ear normal.   Left Ear: External ear normal.   Nose: Nose normal.   Mouth/Throat: Oropharynx is clear and moist.   Eyes: Conjunctivae and EOM are normal. Pupils are equal, round, and reactive to light.   Neck: Normal range of motion. Neck supple.   Cardiovascular: Normal rate, normal heart sounds and intact distal " pulses.   Pulmonary/Chest: Effort normal and breath sounds normal.   Abdominal: Soft. Bowel sounds are normal.   Musculoskeletal: Normal range of motion.   Neurological: She is alert and oriented to person, place, and time.   Skin: Skin is warm. Capillary refill takes less than 2 seconds.   Psychiatric: She has a normal mood and affect. Her behavior is normal. Judgment and thought content normal.   Vitals reviewed.      Assessment/Plan   Dianne was seen today for follow-up.    Diagnoses and all orders for this visit:    Essential hypertension  -     Basic Metabolic Panel  -     Magnesium  -     Uric acid  -     CBC w AUTO Differential  -     Vitamin D 25 hydroxy  -     PTH, Intact  -     Protein / Creatinine Ratio, Urine - Urine, Clean Catch  -     Urinalysis without microscopic (no culture) - Urine, Clean Catch  -     Protime-INR    Persistent atrial fibrillation (CMS/HCC)  -     Protime-INR    Chronic kidney disease, stage III (moderate) (CMS/HCC)   -     Vitamin D 25 hydroxy                 Orders Placed This Encounter   Procedures   • Basic Metabolic Panel   • Magnesium   • Uric acid   • Vitamin D 25 hydroxy   • PTH, Intact   • Protein / Creatinine Ratio, Urine - Urine, Clean Catch   • Urinalysis without microscopic (no culture) - Urine, Clean Catch   • Protime-INR     Order Specific Question:   Is the Patient on Coumadin (Warfarin) therapy?     Answer:   Yes   • CBC w AUTO Differential     Order Specific Question:   Manual Differential     Answer:   No       Follow up: 3 month(s)

## 2019-01-31 LAB
25(OH)D3+25(OH)D2 SERPL-MCNC: 42.4 NG/ML (ref 30–100)
APPEARANCE UR: CLEAR
BASOPHILS # BLD AUTO: 0.08 10*3/MM3 (ref 0–0.2)
BASOPHILS NFR BLD AUTO: 0.7 % (ref 0–2)
BILIRUB UR QL STRIP: NEGATIVE
BUN SERPL-MCNC: 24 MG/DL (ref 5–21)
BUN/CREAT SERPL: 21.6 (ref 7–25)
CALCIUM SERPL-MCNC: 9.3 MG/DL (ref 8.4–10.4)
CHLORIDE SERPL-SCNC: 93 MMOL/L (ref 98–110)
CO2 SERPL-SCNC: 30 MMOL/L (ref 24–31)
COLOR UR: ABNORMAL
CREAT SERPL-MCNC: 1.11 MG/DL (ref 0.5–1.4)
CREAT UR-MCNC: 120 MG/DL
EOSINOPHIL # BLD AUTO: 0.2 10*3/MM3 (ref 0–0.7)
EOSINOPHIL NFR BLD AUTO: 1.7 % (ref 0–4)
ERYTHROCYTE [DISTWIDTH] IN BLOOD BY AUTOMATED COUNT: 14.7 % (ref 12–15)
GLUCOSE SERPL-MCNC: 281 MG/DL (ref 70–100)
GLUCOSE UR QL: ABNORMAL
HCT VFR BLD AUTO: 41.6 % (ref 37–47)
HGB BLD-MCNC: 13.1 G/DL (ref 12–16)
HGB UR QL STRIP: NEGATIVE
IMM GRANULOCYTES # BLD AUTO: 0.05 10*3/MM3 (ref 0–0.03)
IMM GRANULOCYTES NFR BLD AUTO: 0.4 % (ref 0–5)
KETONES UR QL STRIP: NEGATIVE
LEUKOCYTE ESTERASE UR QL STRIP: ABNORMAL
LYMPHOCYTES # BLD AUTO: 1.99 10*3/MM3 (ref 0.72–4.86)
LYMPHOCYTES NFR BLD AUTO: 17.2 % (ref 15–45)
MAGNESIUM SERPL-MCNC: 1.7 MG/DL (ref 1.4–2.2)
MCH RBC QN AUTO: 28.4 PG (ref 28–32)
MCHC RBC AUTO-ENTMCNC: 31.5 G/DL (ref 33–36)
MCV RBC AUTO: 90.2 FL (ref 82–98)
MONOCYTES # BLD AUTO: 0.74 10*3/MM3 (ref 0.19–1.3)
MONOCYTES NFR BLD AUTO: 6.4 % (ref 4–12)
NEUTROPHILS # BLD AUTO: 8.49 10*3/MM3 (ref 1.87–8.4)
NEUTROPHILS NFR BLD AUTO: 73.6 % (ref 39–78)
NITRITE UR QL STRIP: NEGATIVE
NRBC BLD AUTO-RTO: 0 /100 WBC (ref 0–0)
PH UR STRIP: 6 [PH] (ref 5–8)
PLATELET # BLD AUTO: 201 10*3/MM3 (ref 130–400)
POTASSIUM SERPL-SCNC: 4.5 MMOL/L (ref 3.5–5.3)
PROT UR QL STRIP: NEGATIVE
PROT UR-MCNC: 17 MG/DL (ref 0–13.5)
PROT/CREAT UR: 141.7 MG/G CREA
PTH-INTACT SERPL-MCNC: 83 PG/ML (ref 15–65)
RBC # BLD AUTO: 4.61 10*6/MM3 (ref 4.2–5.4)
SODIUM SERPL-SCNC: 135 MMOL/L (ref 135–145)
SP GR UR: 1.02 (ref 1–1.03)
URATE SERPL-MCNC: 5.6 MG/DL (ref 2.7–7.5)
UROBILINOGEN UR STRIP-MCNC: ABNORMAL MG/DL
WBC # BLD AUTO: 11.55 10*3/MM3 (ref 4.8–10.8)

## 2019-02-01 RX ORDER — INSULIN DETEMIR 100 [IU]/ML
INJECTION, SOLUTION SUBCUTANEOUS
Qty: 9 ML | Refills: 0 | Status: SHIPPED | OUTPATIENT
Start: 2019-02-01 | End: 2019-02-14 | Stop reason: SDUPTHER

## 2019-02-04 ENCOUNTER — CLINICAL SUPPORT (OUTPATIENT)
Dept: FAMILY MEDICINE CLINIC | Facility: CLINIC | Age: 74
End: 2019-02-04

## 2019-02-04 ENCOUNTER — ANTICOAGULATION VISIT (OUTPATIENT)
Dept: CARDIOLOGY | Facility: CLINIC | Age: 74
End: 2019-02-04

## 2019-02-04 DIAGNOSIS — I48.19 PERSISTENT ATRIAL FIBRILLATION (HCC): ICD-10-CM

## 2019-02-04 DIAGNOSIS — I48.91 ATRIAL FIBRILLATION WITH RAPID VENTRICULAR RESPONSE (HCC): ICD-10-CM

## 2019-02-04 LAB
INR PPP: 3 (ref 0.9–1.1)
PROTHROMBIN TIME: 36 SECONDS

## 2019-02-04 PROCEDURE — 85610 PROTHROMBIN TIME: CPT | Performed by: FAMILY MEDICINE

## 2019-02-05 ENCOUNTER — READMISSION MANAGEMENT (OUTPATIENT)
Dept: CALL CENTER | Facility: HOSPITAL | Age: 74
End: 2019-02-05

## 2019-02-05 NOTE — OUTREACH NOTE
Medical Week 1 Survey      Responses   Facility patient discharged from?  [facility]   Does the patient have one of the following disease processes/diagnoses(primary or secondary)?  Other          Lydia Soares RN

## 2019-02-06 ENCOUNTER — READMISSION MANAGEMENT (OUTPATIENT)
Dept: CALL CENTER | Facility: HOSPITAL | Age: 74
End: 2019-02-06

## 2019-02-06 ENCOUNTER — OFFICE VISIT (OUTPATIENT)
Dept: CARDIOLOGY | Facility: CLINIC | Age: 74
End: 2019-02-06

## 2019-02-06 VITALS
OXYGEN SATURATION: 97 % | DIASTOLIC BLOOD PRESSURE: 75 MMHG | HEART RATE: 84 BPM | SYSTOLIC BLOOD PRESSURE: 120 MMHG | BODY MASS INDEX: 33.8 KG/M2 | WEIGHT: 198 LBS | HEIGHT: 64 IN | RESPIRATION RATE: 18 BRPM

## 2019-02-06 DIAGNOSIS — Z95.818 PRESENCE OF WATCHMAN LEFT ATRIAL APPENDAGE CLOSURE DEVICE: ICD-10-CM

## 2019-02-06 DIAGNOSIS — I48.19 PERSISTENT ATRIAL FIBRILLATION (HCC): Primary | ICD-10-CM

## 2019-02-06 PROCEDURE — 99214 OFFICE O/P EST MOD 30 MIN: CPT | Performed by: NURSE PRACTITIONER

## 2019-02-06 PROCEDURE — 93000 ELECTROCARDIOGRAM COMPLETE: CPT | Performed by: NURSE PRACTITIONER

## 2019-02-06 NOTE — PROGRESS NOTES
"    Subjective:     Encounter Date:02/6/2019      Patient ID: Dianne Pritchard is a 73 y.o. female.    Chief Complaint: follow up afib and Watchman implantation   The patient reports she is feeling well overall. She states she is starting to get some of her energy back since stopping amiodarone. She admits some mild LLE edema over the past 6 months, which will resolve with elevation of the leg. She denies chest pain, palpitations, orthopnea, PND, syncope, or pre syncope. She admits chronic mild dyspnea on exertion. She reports compliance with her warfarin, aspirin, and INR monitoring. She denies any signs or symptoms of bleeding. She is scheduled for her upcoming 45 day post op JOSSELYN.         The following portions of the patient's history were reviewed and updated as appropriate: allergies, current medications, past family history, past medical history, past social history, past surgical history and problem list.  /75 (BP Location: Right arm, Patient Position: Sitting, Cuff Size: Adult)   Pulse 84   Resp 18   Ht 162.6 cm (64\")   Wt 89.8 kg (198 lb)   SpO2 97%   Breastfeeding? No   BMI 33.99 kg/m²   Allergies   Allergen Reactions   • Amoxil [Amoxicillin] Hives   • Penicillins Hives   • Biaxin [Clarithromycin] Other (See Comments)     NOT SURE OF REACTION, \"SORE MOUTH OF DIARRHEA\"   • Lortab [Hydrocodone-Acetaminophen] GI Intolerance       Current Outpatient Medications:   •  aspirin 81 MG chewable tablet, Chew 1 tablet Daily., Disp: , Rfl:   •  CARTIA  MG 24 hr capsule, TK 2 CS PO QD, Disp: , Rfl: 1  •  Insulin Pen Needle (PEN NEEDLES) 31G X 5 MM misc, 1 each 2 (Two) Times a Day., Disp: 100 each, Rfl: 5  •  letrozole (FEMARA) 2.5 MG tablet, Take 2.5 mg by mouth Daily., Disp: , Rfl:   •  LEVEMIR FLEXTOUCH 100 UNIT/ML injection, INJECT 50 UNITS SUBCUTANEOUS TWICE DAILY, Disp: 9 mL, Rfl: 0  •  levothyroxine (SYNTHROID, LEVOTHROID) 137 MCG tablet, TAKE 1 TABLET BY MOUTH DAILY, Disp: 30 tablet, Rfl: 0  •  " losartan (COZAAR) 25 MG tablet, Take 25 mg by mouth Daily., Disp: , Rfl:   •  metoprolol tartrate (LOPRESSOR) 25 MG tablet, Take 1 tablet by mouth Every 12 (Twelve) Hours., Disp: 60 tablet, Rfl: 5  •  Multiple Vitamins-Minerals (CENTRUM SILVER PO), Take 1 tablet by mouth Daily., Disp: , Rfl:   •  warfarin (COUMADIN) 5 MG tablet, Take 1 tablet by mouth Every Night. 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)   • Carotid artery occlusion    • Diabetes mellitus, type II (CMS/HCC)    • Dizziness    • Drug therapy    • Essential hypertension    • GERD (gastroesophageal reflux disease)    • GI bleed requiring more than 4 units of blood in 24 hours, ICU, or surgery    • History of chemotherapy    • History of lymphoma    • Hx of radiation therapy    • Hypomagnesemia    • Hypothyroidism    • On amiodarone therapy      Past Surgical History:   Procedure Laterality Date   • ATRIAL APPENDAGE EXCLUSION LEFT WITH TRANSESOPHAGEAL ECHOCARDIOGRAM Left 11/27/2018    Procedure: Atrial Appendage Occlusion;  Surgeon: Mick Orantes MD;  Location:  [facility] CATH INVASIVE LOC[facility];  Service: Cardiology   • ATRIAL APPENDAGE EXCLUSION LEFT WITH TRANSESOPHAGEAL ECHOCARDIOGRAM Left 1/22/2019    Procedure: Atrial Appendage Occlusion;  Surgeon: Mick Orantes MD;  Location:  [facility] C[facility] INVAS[facility];  Service: Cardiology   • BREAST BIOPSY     • MASTECTOMY     • OTHER SURGICAL HISTORY      Mediport insertion X 2   • OTHER SURGICAL HISTORY      remote lymphoma treatment   • REPLACEMENT TOTAL KNEE Right 09/2017   • TUBAL ABDOMINAL LIGATION       Social History     Socioeconomic History   • Marital status:      Spouse name: Not on file   • Number of children: Not on file   • Years of  education: Not on file   • Highest education level: Not on file   Social Needs   • Financial resource strain: Not on file   • Food insecurity - worry: Not on file   • Food insecurity - inability: Not on file   • Transportation needs - medical: Not on file   • Transportation needs - non-medical: Not on file   Occupational History   • Not on file   Tobacco Use   • Smoking status: Former Smoker     Years: 20.00     Types: Cigarettes     Last attempt to quit:      Years since quittin.1   • Smokeless tobacco: Never Used   Substance and Sexual Activity   • Alcohol use: No   • Drug use: No   • Sexual activity: Defer   Other Topics Concern   • Not on file   Social History Narrative   • Not on file     Family History   Problem Relation Age of Onset   • Heart disease Mother    • Cancer Father    • Cancer Sister    • No Known Problems Brother    • No Known Problems Brother    • Heart disease Sister    • Heart attack Sister    • No Known Problems Sister    • No Known Problems Daughter    • No Known Problems Son    • No Known Problems Maternal Grandmother    • No Known Problems Paternal Grandmother    • No Known Problems Maternal Aunt    • Breast cancer Paternal Aunt    • BRCA 1/2 Neg Hx    • Colon cancer Neg Hx    • Endometrial cancer Neg Hx    • Ovarian cancer Neg Hx        Review of Systems   Constitution: Positive for malaise/fatigue. Negative for chills, diaphoresis, fever and weakness.   HENT: Negative for nosebleeds.    Eyes: Negative for visual disturbance.   Cardiovascular: Positive for dyspnea on exertion and leg swelling (mild left pedal edema ). Negative for chest pain, claudication, cyanosis, irregular heartbeat, near-syncope, orthopnea, palpitations, paroxysmal nocturnal dyspnea and syncope.   Respiratory: Negative for cough, hemoptysis, shortness of breath, sputum production and wheezing.    Hematologic/Lymphatic: Negative for bleeding problem.   Skin: Negative for color change and flushing.    Musculoskeletal: Negative for falls.   Gastrointestinal: Negative for bloating, abdominal pain, hematemesis, hematochezia, melena, nausea and vomiting.   Genitourinary: Negative for hematuria.   Neurological: Negative for dizziness and light-headedness.   Psychiatric/Behavioral: Negative for altered mental status.         ECG 12 Lead  Date/Time: 2/6/2019 4:33 PM  Performed by: Leisa Contreras APRN  Authorized by: Leisa Contreras APRN   Comparison: compared with previous ECG from 1/21/2019  Similar to previous ECG  Rhythm: atrial fibrillation               Objective:     Physical Exam   Constitutional: She is oriented to person, place, and time. She appears well-developed and well-nourished. No distress.   HENT:   Head: Normocephalic and atraumatic.   Eyes: Pupils are equal, round, and reactive to light.   Neck: Normal range of motion. Neck supple. No JVD present. No thyromegaly present.   Cardiovascular: Normal rate, normal heart sounds and intact distal pulses. An irregularly irregular rhythm present. Exam reveals no gallop and no friction rub.   No murmur heard.  Pulmonary/Chest: Effort normal and breath sounds normal. No respiratory distress. She has no wheezes. She has no rales. She exhibits no tenderness.   Abdominal: Soft. Bowel sounds are normal. She exhibits no distension. There is no tenderness.   Musculoskeletal: Normal range of motion. She exhibits edema (trace/1+ LLE edema ).   Neurological: She is alert and oriented to person, place, and time. No cranial nerve deficit.   Skin: Skin is warm and dry. She is not diaphoretic.   Psychiatric: She has a normal mood and affect. Her behavior is normal.           Assessment:          Diagnosis Plan   1. Persistent atrial fibrillation (CMS/HCC)      She remains in rate controlled asymptomatic afib ;  She remained in atrial fibrillation despite 3 months of amiodarone therapy, therefore this was discontinued 1-2 weeks ago at the time of her discharge following  Watchman placement and she is feeling better off of amiodarone. Will hold off on EP referral for now, as previously discussed with Dr. Orantes  Continue anticoagulation with warfarin  Continue aspirin        2. Bilateral carotid artery disease, unspecified type (CMS/[facility])    Followed by vascular surgery   3. Essential hypertension    Controlled    4. Type 2 diabetes mellitus without complication, with long-term current use of insulin (CMS/[facility])    Followed by pcp    5. BMI 33.0-33.9,adult    Patient's Body mass index is 33.99 kg/m². BMI is above normal parameters. Recommendations include: exercise counseling and nutrition counseling   .   6. Restrictive lung disease  Previously referred to pulmonology  Mild per PFTs      7. History of GI bleed      8. Watchman implantation 1/22/19 - continue ASA and warfarin, keep upcoming appt for 45 day post op JOSSELYN     Plan:       As noted above  Continue ASA, warfarin, beta blocker, Cardizem, and ARB   Keep upcoming appt for post op JOSSELYN  Keep follow up with Dr. Orantes in 1 month

## 2019-02-06 NOTE — OUTREACH NOTE
Medical Week 2 Survey      Responses   Facility patient discharged from?  [facility]   Does the patient have one of the following disease processes/diagnoses(primary or secondary)?  Other   Week 2 attempt successful?  No   Unsuccessful attempts  Attempt 2          Ángel Cage RN

## 2019-02-11 ENCOUNTER — ANTICOAGULATION VISIT (OUTPATIENT)
Dept: CARDIOLOGY | Facility: CLINIC | Age: 74
End: 2019-02-11

## 2019-02-11 ENCOUNTER — CLINICAL SUPPORT (OUTPATIENT)
Dept: FAMILY MEDICINE CLINIC | Facility: CLINIC | Age: 74
End: 2019-02-11

## 2019-02-11 DIAGNOSIS — I48.19 PERSISTENT ATRIAL FIBRILLATION (HCC): ICD-10-CM

## 2019-02-11 DIAGNOSIS — I48.91 ATRIAL FIBRILLATION WITH RAPID VENTRICULAR RESPONSE (HCC): ICD-10-CM

## 2019-02-11 LAB
INR PPP: 3 (ref 0.9–1.1)
PROTHROMBIN TIME: 35.9 SECONDS

## 2019-02-11 PROCEDURE — 85610 PROTHROMBIN TIME: CPT | Performed by: FAMILY MEDICINE

## 2019-02-18 ENCOUNTER — CLINICAL SUPPORT (OUTPATIENT)
Dept: FAMILY MEDICINE CLINIC | Facility: CLINIC | Age: 74
End: 2019-02-18

## 2019-02-18 DIAGNOSIS — I48.19 PERSISTENT ATRIAL FIBRILLATION (HCC): ICD-10-CM

## 2019-02-18 PROCEDURE — 85610 PROTHROMBIN TIME: CPT | Performed by: FAMILY MEDICINE

## 2019-02-19 ENCOUNTER — ANTICOAGULATION VISIT (OUTPATIENT)
Dept: CARDIOLOGY | Facility: CLINIC | Age: 74
End: 2019-02-19

## 2019-02-19 LAB
INR PPP: 3.4 (ref 0.9–1.1)
PROTHROMBIN TIME: 40.2 SECONDS

## 2019-02-20 RX ORDER — DILTIAZEM HYDROCHLORIDE 180 MG/1
360 CAPSULE, EXTENDED RELEASE ORAL DAILY
Qty: 90 CAPSULE | Refills: 3 | Status: SHIPPED | OUTPATIENT
Start: 2019-02-20 | End: 2019-08-17 | Stop reason: SDUPTHER

## 2019-02-22 ENCOUNTER — ANTICOAGULATION VISIT (OUTPATIENT)
Dept: CARDIOLOGY | Facility: CLINIC | Age: 74
End: 2019-02-22

## 2019-02-22 ENCOUNTER — CLINICAL SUPPORT (OUTPATIENT)
Dept: FAMILY MEDICINE CLINIC | Facility: CLINIC | Age: 74
End: 2019-02-22

## 2019-02-22 DIAGNOSIS — I48.91 ATRIAL FIBRILLATION WITH RAPID VENTRICULAR RESPONSE (HCC): ICD-10-CM

## 2019-02-22 DIAGNOSIS — I48.19 PERSISTENT ATRIAL FIBRILLATION (HCC): ICD-10-CM

## 2019-02-22 LAB
INR PPP: 2 (ref 0.9–1.1)
PROTHROMBIN TIME: 24.1 SECONDS

## 2019-02-22 PROCEDURE — 85610 PROTHROMBIN TIME: CPT | Performed by: FAMILY MEDICINE

## 2019-02-25 RX ORDER — LEVOTHYROXINE SODIUM 137 UG/1
137 TABLET ORAL DAILY
Qty: 30 TABLET | Refills: 0 | Status: SHIPPED | OUTPATIENT
Start: 2019-02-25 | End: 2019-03-25 | Stop reason: SDUPTHER

## 2019-03-01 ENCOUNTER — CLINICAL SUPPORT (OUTPATIENT)
Dept: FAMILY MEDICINE CLINIC | Facility: CLINIC | Age: 74
End: 2019-03-01

## 2019-03-01 ENCOUNTER — ANTICOAGULATION VISIT (OUTPATIENT)
Dept: CARDIOLOGY | Facility: CLINIC | Age: 74
End: 2019-03-01

## 2019-03-01 DIAGNOSIS — I48.19 PERSISTENT ATRIAL FIBRILLATION (HCC): ICD-10-CM

## 2019-03-01 LAB
INR PPP: 1.5 (ref 0.9–1.1)
PROTHROMBIN TIME: 18 SECONDS

## 2019-03-01 PROCEDURE — 85610 PROTHROMBIN TIME: CPT | Performed by: FAMILY MEDICINE

## 2019-03-08 ENCOUNTER — HOSPITAL ENCOUNTER (OUTPATIENT)
Dept: CARDIOLOGY | Facility: HOSPITAL | Age: 74
Setting detail: HOSPITAL OUTPATIENT SURGERY
Discharge: HOME | End: 2019-03-08
Admitting: INTERNAL MEDICINE

## 2019-03-08 ENCOUNTER — ANTICOAGULATION VISIT (OUTPATIENT)
Dept: CARDIOLOGY | Facility: CLINIC | Age: 74
End: 2019-03-08

## 2019-03-08 VITALS
RESPIRATION RATE: 18 BRPM | SYSTOLIC BLOOD PRESSURE: 127 MMHG | HEART RATE: 73 BPM | OXYGEN SATURATION: 100 % | DIASTOLIC BLOOD PRESSURE: 85 MMHG

## 2019-03-08 DIAGNOSIS — Z95.9 CARDIAC DEVICE IN SITU: ICD-10-CM

## 2019-03-08 DIAGNOSIS — I48.0 PAROXYSMAL ATRIAL FIBRILLATION (HCC): ICD-10-CM

## 2019-03-08 LAB — LV EF 2D ECHO EST: 60 %

## 2019-03-08 PROCEDURE — 93325 DOPPLER ECHO COLOR FLOW MAPG: CPT | Performed by: INTERNAL MEDICINE

## 2019-03-08 PROCEDURE — 25010000002 MIDAZOLAM PER 1 MG: Performed by: INTERNAL MEDICINE

## 2019-03-08 PROCEDURE — 93320 DOPPLER ECHO COMPLETE: CPT | Performed by: INTERNAL MEDICINE

## 2019-03-08 PROCEDURE — 93312 ECHO TRANSESOPHAGEAL: CPT

## 2019-03-08 PROCEDURE — 93325 DOPPLER ECHO COLOR FLOW MAPG: CPT

## 2019-03-08 PROCEDURE — 25010000002 FENTANYL CITRATE (PF) 100 MCG/2ML SOLUTION: Performed by: INTERNAL MEDICINE

## 2019-03-08 PROCEDURE — 93312 ECHO TRANSESOPHAGEAL: CPT | Performed by: INTERNAL MEDICINE

## 2019-03-08 PROCEDURE — 93320 DOPPLER ECHO COMPLETE: CPT

## 2019-03-08 RX ORDER — CLOPIDOGREL BISULFATE 75 MG/1
75 TABLET ORAL DAILY
Qty: 30 TABLET | Refills: 5 | Status: SHIPPED | OUTPATIENT
Start: 2019-03-08 | End: 2019-07-06 | Stop reason: SDUPTHER

## 2019-03-08 RX ORDER — FENTANYL CITRATE 50 UG/ML
INJECTION, SOLUTION INTRAMUSCULAR; INTRAVENOUS
Status: COMPLETED | OUTPATIENT
Start: 2019-03-08 | End: 2019-03-08

## 2019-03-08 RX ORDER — SODIUM CHLORIDE 0.9 % (FLUSH) 0.9 %
5 SYRINGE (ML) INJECTION AS NEEDED
Status: DISCONTINUED | OUTPATIENT
Start: 2019-03-08 | End: 2019-03-09 | Stop reason: HOSPADM

## 2019-03-08 RX ORDER — MIDAZOLAM HYDROCHLORIDE 1 MG/ML
INJECTION INTRAMUSCULAR; INTRAVENOUS
Status: COMPLETED | OUTPATIENT
Start: 2019-03-08 | End: 2019-03-08

## 2019-03-08 RX ORDER — FENTANYL CITRATE 50 UG/ML
INJECTION, SOLUTION INTRAMUSCULAR; INTRAVENOUS
Status: DISPENSED
Start: 2019-03-08 | End: 2019-03-08

## 2019-03-08 RX ORDER — SODIUM CHLORIDE 9 MG/ML
50 INJECTION, SOLUTION INTRAVENOUS CONTINUOUS
Status: DISCONTINUED | OUTPATIENT
Start: 2019-03-08 | End: 2019-03-09 | Stop reason: HOSPADM

## 2019-03-08 RX ORDER — MIDAZOLAM HYDROCHLORIDE 1 MG/ML
INJECTION INTRAMUSCULAR; INTRAVENOUS
Status: DISPENSED
Start: 2019-03-08 | End: 2019-03-08

## 2019-03-08 RX ORDER — SODIUM CHLORIDE 0.9 % (FLUSH) 0.9 %
3 SYRINGE (ML) INJECTION EVERY 12 HOURS SCHEDULED
Status: DISCONTINUED | OUTPATIENT
Start: 2019-03-08 | End: 2019-03-09 | Stop reason: HOSPADM

## 2019-03-08 RX ADMIN — FENTANYL CITRATE 50 MCG: 50 INJECTION, SOLUTION INTRAMUSCULAR; INTRAVENOUS at 12:42

## 2019-03-08 RX ADMIN — MIDAZOLAM 2 MG: 1 INJECTION INTRAMUSCULAR; INTRAVENOUS at 12:42

## 2019-03-08 RX ADMIN — MIDAZOLAM 1 MG: 1 INJECTION INTRAMUSCULAR; INTRAVENOUS at 12:44

## 2019-03-08 RX ADMIN — MIDAZOLAM 1 MG: 1 INJECTION INTRAMUSCULAR; INTRAVENOUS at 12:45

## 2019-03-26 RX ORDER — LEVOTHYROXINE SODIUM 137 UG/1
137 TABLET ORAL DAILY
Qty: 90 TABLET | Refills: 0 | Status: SHIPPED | OUTPATIENT
Start: 2019-03-26 | End: 2019-06-27 | Stop reason: SDUPTHER

## 2019-03-29 ENCOUNTER — OFFICE VISIT (OUTPATIENT)
Dept: CARDIOLOGY | Facility: CLINIC | Age: 74
End: 2019-03-29

## 2019-03-29 VITALS
BODY MASS INDEX: 33.63 KG/M2 | OXYGEN SATURATION: 96 % | HEART RATE: 82 BPM | HEIGHT: 64 IN | WEIGHT: 197 LBS | SYSTOLIC BLOOD PRESSURE: 128 MMHG | DIASTOLIC BLOOD PRESSURE: 66 MMHG

## 2019-03-29 DIAGNOSIS — I10 ESSENTIAL HYPERTENSION: ICD-10-CM

## 2019-03-29 DIAGNOSIS — Z95.818 PRESENCE OF WATCHMAN LEFT ATRIAL APPENDAGE CLOSURE DEVICE: ICD-10-CM

## 2019-03-29 DIAGNOSIS — I48.20 CHRONIC ATRIAL FIBRILLATION (HCC): Primary | ICD-10-CM

## 2019-03-29 PROBLEM — I48.0 PAROXYSMAL ATRIAL FIBRILLATION (HCC): Status: RESOLVED | Noted: 2019-01-03 | Resolved: 2019-03-29

## 2019-03-29 PROBLEM — I48.91 AFIB (HCC): Status: RESOLVED | Noted: 2019-01-22 | Resolved: 2019-03-29

## 2019-03-29 PROBLEM — I48.91 ATRIAL FIBRILLATION WITH RAPID VENTRICULAR RESPONSE: Status: RESOLVED | Noted: 2018-09-25 | Resolved: 2019-03-29

## 2019-03-29 PROBLEM — I48.19 PERSISTENT ATRIAL FIBRILLATION (HCC): Status: RESOLVED | Noted: 2017-02-09 | Resolved: 2019-03-29

## 2019-03-29 PROCEDURE — 93000 ELECTROCARDIOGRAM COMPLETE: CPT | Performed by: INTERNAL MEDICINE

## 2019-03-29 PROCEDURE — 99214 OFFICE O/P EST MOD 30 MIN: CPT | Performed by: INTERNAL MEDICINE

## 2019-03-29 NOTE — PROGRESS NOTES
Referring Provider: Darryl Bang MD    Reason for Follow-up Visit: s/p watchman device in Jan     Subjective .   Chief Complaint:   Chief Complaint   Patient presents with   • Follow-up     6 week fu s/p Watchman 1/22/19  had a JOSSELYN on 3/8/19   • Atrial Fibrillation     no palpitations doing well after the Watchman.   • Fatigue     feels weak at times   • Shortness of Breath     with to much exertion, will go sit down and rest.       History of present illness:  Dianne Pritchard is a 73 y.o. yo female with history of chronic afib and inability to take chronic anticoagulation, s/p watchman device in Jan in for routine follow up. 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)   • Carotid artery occlusion    • Diabetes mellitus, type II (CMS/HCC)    • Dizziness    • Drug therapy    • Essential hypertension    • GERD (gastroesophageal reflux disease)    • GI bleed requiring more than 4 units of blood in 24 hours, ICU, or surgery    • History of chemotherapy    • History of lymphoma    • Hx of radiation therapy    • Hypomagnesemia    • Hypothyroidism    • On amiodarone therapy    ,   Past Surgical History:   Procedure Laterality Date   • ATRIAL APPENDAGE EXCLUSION LEFT WITH TRANSESOPHAGEAL ECHOCARDIOGRAM Left 11/27/2018    Procedure: Atrial Appendage Occlusion;  Surgeon: Mick Orantes MD;  Location:  [facility];  Service: Cardiology   • ATRIAL APPENDAGE EXCLUSION LEFT WITH TRANSESOPHAGEAL ECHOCARDIOGRAM Left 1/22/2019    Procedure: Atrial Appendage Occlusion;  Surgeon: Mick Orantes MD;  Location:  [facility];  Service: Cardiology   • BREAST BIOPSY     • MASTECTOMY     • OTHER SURGICAL HISTORY      Mediport insertion X 2   • OTHER SURGICAL HISTORY      remote lymphoma treatment   • REPLACEMENT  TOTAL KNEE Right 2017   • TUBAL ABDOMINAL LIGATION     ,   Family History   Problem Relation Age of Onset   • Heart disease Mother    • Cancer Father    • Cancer Sister    • No Known Problems Brother    • No Known Problems Brother    • Heart disease Sister    • Heart attack Sister    • No Known Problems Sister    • No Known Problems Daughter    • No Known Problems Son    • No Known Problems Maternal Grandmother    • No Known Problems Paternal Grandmother    • No Known Problems Maternal Aunt    • Breast cancer Paternal Aunt    • BRCA 1/2 Neg Hx    • Colon cancer Neg Hx    • Endometrial cancer Neg Hx    • Ovarian cancer Neg Hx    ,   Social History     Tobacco Use   • Smoking status: Former Smoker     Years: 20.00     Types: Cigarettes     Last attempt to quit:      Years since quittin.2   • Smokeless tobacco: Never Used   Substance Use Topics   • Alcohol use: No   • Drug use: No   ,     Medications  Current Outpatient Medications   Medication Sig Dispense Refill   • aspirin 81 MG chewable tablet Chew 1 tablet Daily.     • Calcium Carbonate-Vitamin D (CALCIUM 500 + D) 500-125 MG-UNIT tablet Take 1 tablet by mouth Daily.     • CARTIA  MG 24 hr capsule Take 2 capsules by mouth Daily. 90 capsule 3   • clopidogrel (PLAVIX) 75 MG tablet Take 1 tablet by mouth Daily. 30 tablet 5   • insulin detemir (LEVEMIR FLEXTOUCH) 100 UNIT/ML injection Inject 50 Units under the skin into the appropriate area as directed 2 (Two) Times a Day. 3 pen 2   • Insulin Pen Needle (PEN NEEDLES) 31G X 5 MM misc 1 each 2 (Two) Times a Day. 100 each 5   • letrozole (FEMARA) 2.5 MG tablet Take 2.5 mg by mouth Daily.     • levothyroxine (SYNTHROID, LEVOTHROID) 137 MCG tablet TAKE 1 TABLET BY MOUTH DAILY 90 tablet 0   • losartan (COZAAR) 25 MG tablet Take 25 mg by mouth Daily.     • metoprolol tartrate (LOPRESSOR) 25 MG tablet Take 1 tablet by mouth Every 12 (Twelve) Hours. 60 tablet 5   • Multiple Vitamins-Minerals (CENTRUM SILVER  "PO) Take 1 tablet by mouth Daily.       No current facility-administered medications for this visit.        Allergies:  Amoxil [amoxicillin]; Penicillins; Biaxin [clarithromycin]; and Lortab [hydrocodone-acetaminophen]    Review of Systems  Review of Systems   HENT: Negative for nosebleeds.    Cardiovascular: Negative for chest pain, claudication, dyspnea on exertion, irregular heartbeat, leg swelling, near-syncope, orthopnea, palpitations, paroxysmal nocturnal dyspnea and syncope.   Respiratory: Negative for cough, hemoptysis and shortness of breath.    Musculoskeletal: Positive for joint pain.   Gastrointestinal: Negative for dysphagia, hematemesis and melena.   Genitourinary: Negative for hematuria.   All other systems reviewed and are negative.      Objective     Physical Exam:  /66 (BP Location: Left arm, Patient Position: Sitting, Cuff Size: Adult)   Pulse 82   Ht 162.6 cm (64\")   Wt 89.4 kg (197 lb)   SpO2 96%   BMI 33.81 kg/m²   Physical Exam   Constitutional: She is oriented to person, place, and time. She appears well-developed and well-nourished. No distress.   HENT:   Head: Normocephalic and atraumatic.   Eyes: No scleral icterus.   Neck: Normal range of motion.   Cardiovascular: Normal rate and normal heart sounds. An irregularly irregular rhythm present. PMI is not displaced. Exam reveals no gallop and no friction rub.   No murmur heard.  Pulmonary/Chest: Effort normal and breath sounds normal. No respiratory distress. She has no wheezes. She has no rales.   Abdominal: Soft. Normal appearance and bowel sounds are normal. She exhibits no distension. There is no hepatosplenomegaly. There is no tenderness. There is no CVA tenderness.   Musculoskeletal: She exhibits no edema.   Neurological: She is alert and oriented to person, place, and time.   Skin: Skin is warm and dry. She is not diaphoretic. No erythema.   Psychiatric: She has a normal mood and affect. Her behavior is normal.       Results " Review:    ECG 12 Lead  Date/Time: 3/29/2019 10:08 AM  Performed by: Mick Orantes MD  Authorized by: Mick Orantes MD   Comparison: compared with previous ECG   Similar to previous ECG  Rhythm: atrial fibrillation  Rate: normal  Conduction: conduction normal  ST Segments: ST segments normal  T Waves: T waves normal  QRS axis: normal    Clinical impression: abnormal EKG            Hospital Outpatient Visit on 03/08/2019   Component Date Value Ref Range Status   • Echo EF Estimated 03/08/2019 60  % Final       Assessment/Plan   Dianne was seen today for follow-up, atrial fibrillation, fatigue and shortness of breath.    Diagnoses and all orders for this visit:    Chronic atrial fibrillation (CMS/HCC), s/p watchman now on plavix. She can stop plavix and continue asa after 6 mo.    Essential hypertension, good control    Presence of Watchman left atrial appendage closure device, doing well        Patient's Body mass index is 33.81 kg/m². BMI is above normal parameters. Recommendations include: exercise counseling.

## 2019-04-04 ENCOUNTER — TELEPHONE (OUTPATIENT)
Dept: CARDIOLOGY | Facility: CLINIC | Age: 74
End: 2019-04-04

## 2019-04-04 NOTE — TELEPHONE ENCOUNTER
Dr Gardner would like for you to call about this patient at your earliest convenience. Her cell# is [phone]    Thank you

## 2019-04-15 ENCOUNTER — TELEPHONE (OUTPATIENT)
Dept: VASCULAR SURGERY | Facility: CLINIC | Age: 74
End: 2019-04-15

## 2019-04-15 NOTE — TELEPHONE ENCOUNTER
Left message reminding Mrs Pritchard of her appointments for Tuesday, April 16th, 2019. Reminded [facility] for [facility] and follow up afterwards at 1030 am with Dr Oviedo. Also advised if she had any questions or needed to reschedule to please call the office at [phone].

## 2019-04-16 ENCOUNTER — HOSPITAL ENCOUNTER (OUTPATIENT)
Dept: ULTRASOUND IMAGING | Facility: HOSPITAL | Age: 74
Discharge: HOME | End: 2019-04-16
Admitting: NURSE PRACTITIONER

## 2019-04-16 ENCOUNTER — OFFICE VISIT (OUTPATIENT)
Dept: VASCULAR SURGERY | Facility: CLINIC | Age: 74
End: 2019-04-16

## 2019-04-16 VITALS
HEART RATE: 73 BPM | SYSTOLIC BLOOD PRESSURE: 118 MMHG | OXYGEN SATURATION: 98 % | HEIGHT: 64 IN | WEIGHT: 196 LBS | DIASTOLIC BLOOD PRESSURE: 70 MMHG | BODY MASS INDEX: 33.46 KG/M2

## 2019-04-16 DIAGNOSIS — I65.23 BILATERAL CAROTID ARTERY STENOSIS: Primary | ICD-10-CM

## 2019-04-16 DIAGNOSIS — I65.23 BILATERAL CAROTID ARTERY STENOSIS: ICD-10-CM

## 2019-04-16 DIAGNOSIS — I10 ESSENTIAL HYPERTENSION: ICD-10-CM

## 2019-04-16 PROCEDURE — 93880 EXTRACRANIAL BILAT STUDY: CPT

## 2019-04-16 PROCEDURE — 93880 EXTRACRANIAL BILAT STUDY: CPT | Performed by: SURGERY

## 2019-04-16 PROCEDURE — 99214 OFFICE O/P EST MOD 30 MIN: CPT | Performed by: NURSE PRACTITIONER

## 2019-04-16 NOTE — PROGRESS NOTES
"4/16/2019       Darryl Bang MD   [address] [place] [id number]    Dianne Pritchard  1945    Chief Complaint   Patient presents with   • Follow-up     1 Year Follow UP For Bilateral Carotid Artery Stenosis. Test 04/16/19 US pad carotid bilateral. Patient denies any stroke like symptoms.       Dear Darryl Bang MD       HPI  I had the pleasure of seeing your patient Dianne Pritchard in the office today.  As you recall, Dianne Pritchard is a 73 y.o.  female who you are currently following for routine health maintenance.  She is is also followed by cardiology for paroxysmal atrial fibrillation.  She has since had a Watchman procedure. She denies any strokelike symptoms.  She denies any claudication to her lower extremities.  She does have a previous history of smoking.  She is maintained on aspirin, and Plavix.  She did have noninvasive testing perfomed today, which I did review in office.      Review of Systems   Constitutional: Negative.    HENT: Negative.    Eyes: Negative.    Respiratory: Negative.    Cardiovascular: Negative.    Gastrointestinal: Negative.    Endocrine: Negative.    Genitourinary: Negative.    Musculoskeletal: Negative.    Skin: Negative.    Allergic/Immunologic: Negative.    Neurological: Negative.    Hematological: Negative.    Psychiatric/Behavioral: Negative.        /70 (BP Location: Left arm, Patient Position: Sitting, Cuff Size: Adult)   Pulse 73   Ht 162.6 cm (64\")   Wt 88.9 kg (196 lb)   SpO2 98%   BMI 33.64 kg/m²   Physical Exam   Constitutional: She is oriented to person, place, and time. She appears well-developed and well-nourished. No distress.   HENT:   Head: Normocephalic and atraumatic.   Mouth/Throat: Oropharynx is clear and moist.   Eyes: Pupils are equal, round, and reactive to light. No scleral icterus.   Neck: Normal range of motion. Neck supple. No JVD present. Carotid bruit is not present. No thyromegaly present.   Cardiovascular: Normal rate, " regular rhythm, S2 normal, normal heart sounds, intact distal pulses and normal pulses. Exam reveals no gallop and no friction rub.   No murmur heard.  Pulses:       Carotid pulses are on the left side with bruit.  Abdominal: Soft. Normal aorta and bowel sounds are normal. There is no hepatosplenomegaly.   Musculoskeletal: Normal range of motion.   Neurological: She is alert and oriented to person, place, and time. No cranial nerve deficit.   Skin: Skin is warm and dry. She is not diaphoretic.   Psychiatric: She has a normal mood and affect. Her behavior is normal. Judgment and thought content normal.   Nursing note and vitals reviewed.      Diagnostic Data:  Noninvasive testing including a carotid duplex shows 50-69% stenosis on the right and left tight stenosis on the left.         Patient Active Problem List   Diagnosis   • Tremor   • Essential hypertension   • Type 2 diabetes mellitus without complication (CMS/HCC)   • Acquired hypothyroidism   • Dizziness   • Multiple falls   • Bruit of left carotid artery   • Acute pain of right knee   • Right knee pain   • Status post right knee replacement   • BMI 33.0-33.9,adult   • Bilateral carotid artery disease (CMS/HCC)   • Atrial fibrillation with RVR (CMS/HCC)   • Restrictive lung disease   • A-fib (CMS/HCC)   • Presence of Watchman left atrial appendage closure device         ICD-10-CM ICD-9-CM   1. Bilateral carotid artery stenosis I65.23 433.10     433.30   2. Essential hypertension I10 401.9       Plan: After thoroughly evaluating Dianne Pritchard, I believe the best course of action is to proceed with a CTA of the neck to further evaluate her left carotid.  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  We will see her back in 2 weeks to review her testing and discuss further plans.  I did discuss vascular risk factors as they pertain to the progression of vascular disease  including controlling her hypertension and diabetes mellitus.  The patient can continue taking her current medication regimen as previously planned.  This was all discussed in full with complete understanding.    Thank you for allowing me to participate in the care of your patient.  Please do not hesitate with any questions or concerns.  I will keep you aware of any further encounters with Dianne Pritchard.        Sincerely yours,         ДМИТРИЙ Jc Thomas Waldon, MD

## 2019-04-29 ENCOUNTER — TELEPHONE (OUTPATIENT)
Dept: VASCULAR SURGERY | Facility: CLINIC | Age: 74
End: 2019-04-29

## 2019-04-29 NOTE — TELEPHONE ENCOUNTER
Spoke with Ms Pritchard reminding her of her appointments for Tuesday, April 30th, 2019. Reminded Ms Pritchard to arrive at the [facility] at 1130 am for testing with nothing to eat or drink 6 hours prior and follow up afterwards at 115 pm with Kalina CHOE. I did advise Ms Pritchard she would be seeing Kalina CHOE as Dr Oviedo would be out of office. Ms Pritchard verbalized understanding stated that was just fine and confirmed she would be here.

## 2019-04-30 ENCOUNTER — OFFICE VISIT (OUTPATIENT)
Dept: VASCULAR SURGERY | Facility: CLINIC | Age: 74
End: 2019-04-30

## 2019-04-30 ENCOUNTER — HOSPITAL ENCOUNTER (OUTPATIENT)
Dept: CT IMAGING | Facility: HOSPITAL | Age: 74
Discharge: HOME | End: 2019-04-30
Admitting: NURSE PRACTITIONER

## 2019-04-30 ENCOUNTER — OFFICE VISIT (OUTPATIENT)
Dept: FAMILY MEDICINE CLINIC | Facility: CLINIC | Age: 74
End: 2019-04-30

## 2019-04-30 VITALS
HEART RATE: 71 BPM | RESPIRATION RATE: 16 BRPM | OXYGEN SATURATION: 98 % | HEIGHT: 64 IN | WEIGHT: 198 LBS | SYSTOLIC BLOOD PRESSURE: 128 MMHG | DIASTOLIC BLOOD PRESSURE: 88 MMHG | BODY MASS INDEX: 33.8 KG/M2

## 2019-04-30 VITALS
OXYGEN SATURATION: 97 % | DIASTOLIC BLOOD PRESSURE: 74 MMHG | SYSTOLIC BLOOD PRESSURE: 118 MMHG | BODY MASS INDEX: 33.46 KG/M2 | HEART RATE: 85 BPM | HEIGHT: 64 IN | WEIGHT: 196 LBS

## 2019-04-30 DIAGNOSIS — I10 ESSENTIAL HYPERTENSION: ICD-10-CM

## 2019-04-30 DIAGNOSIS — Z79.4 TYPE 2 DIABETES MELLITUS WITHOUT COMPLICATION, WITH LONG-TERM CURRENT USE OF INSULIN (HCC): ICD-10-CM

## 2019-04-30 DIAGNOSIS — I65.23 BILATERAL CAROTID ARTERY STENOSIS: ICD-10-CM

## 2019-04-30 DIAGNOSIS — E11.9 TYPE 2 DIABETES MELLITUS WITHOUT COMPLICATION, WITH LONG-TERM CURRENT USE OF INSULIN (HCC): ICD-10-CM

## 2019-04-30 DIAGNOSIS — Z95.818 PRESENCE OF WATCHMAN LEFT ATRIAL APPENDAGE CLOSURE DEVICE: Primary | ICD-10-CM

## 2019-04-30 DIAGNOSIS — I65.23 BILATERAL CAROTID ARTERY STENOSIS: Primary | ICD-10-CM

## 2019-04-30 LAB — CREAT BLDA-MCNC: 1.1 MG/DL (ref 0.6–1.3)

## 2019-04-30 PROCEDURE — 99214 OFFICE O/P EST MOD 30 MIN: CPT | Performed by: NURSE PRACTITIONER

## 2019-04-30 PROCEDURE — 82565 ASSAY OF CREATININE: CPT

## 2019-04-30 PROCEDURE — 99213 OFFICE O/P EST LOW 20 MIN: CPT | Performed by: FAMILY MEDICINE

## 2019-04-30 PROCEDURE — 70498 CT ANGIOGRAPHY NECK: CPT

## 2019-04-30 PROCEDURE — 0 IOPAMIDOL PER 1 ML: Performed by: NURSE PRACTITIONER

## 2019-04-30 RX ADMIN — IOPAMIDOL 100 ML: 755 INJECTION, SOLUTION INTRAVENOUS at 12:06

## 2019-05-03 DIAGNOSIS — I65.23 BILATERAL CAROTID ARTERY STENOSIS: Primary | ICD-10-CM

## 2019-06-13 ENCOUNTER — TELEPHONE (OUTPATIENT)
Dept: CARDIOLOGY | Facility: CLINIC | Age: 74
End: 2019-06-13

## 2019-06-13 DIAGNOSIS — I48.0 PAROXYSMAL ATRIAL FIBRILLATION (HCC): Primary | ICD-10-CM

## 2019-06-13 NOTE — TELEPHONE ENCOUNTER
Patient called and left a voicemail stating that she has been having left chest pain since her Watchman procedure on 1/22/19. She describes the pain as being sharp and squeezing on the left side of her sternum that radiates to her back. The pain is intermittent. She also reports SOB, but relates it to her afib as she has always had some SOB. The patient is concerned that the chest pain might be related to her Watchman procedure. Please advise.

## 2019-06-20 ENCOUNTER — HOSPITAL ENCOUNTER (OUTPATIENT)
Dept: CARDIOLOGY | Facility: HOSPITAL | Age: 74
Discharge: HOME | End: 2019-06-20
Admitting: INTERNAL MEDICINE

## 2019-06-20 VITALS
DIASTOLIC BLOOD PRESSURE: 74 MMHG | HEIGHT: 64 IN | WEIGHT: 196 LBS | SYSTOLIC BLOOD PRESSURE: 118 MMHG | BODY MASS INDEX: 33.46 KG/M2

## 2019-06-20 DIAGNOSIS — I48.0 PAROXYSMAL ATRIAL FIBRILLATION (HCC): ICD-10-CM

## 2019-06-20 PROCEDURE — 93306 TTE W/DOPPLER COMPLETE: CPT | Performed by: INTERNAL MEDICINE

## 2019-06-20 PROCEDURE — 93306 TTE W/DOPPLER COMPLETE: CPT

## 2019-06-21 ENCOUNTER — TRANSCRIBE ORDERS (OUTPATIENT)
Dept: ADMINISTRATIVE | Facility: HOSPITAL | Age: 74
End: 2019-06-21

## 2019-06-21 ENCOUNTER — HOSPITAL ENCOUNTER (OUTPATIENT)
Dept: INFUSION THERAPY | Age: 74
Discharge: HOME | End: 2019-06-21
Payer: MEDICARE

## 2019-06-21 ENCOUNTER — APPOINTMENT (OUTPATIENT)
Dept: CARDIOLOGY | Facility: HOSPITAL | Age: 74
End: 2019-06-21

## 2019-06-21 DIAGNOSIS — Z12.31 ENCOUNTER FOR SCREENING MAMMOGRAM FOR MALIGNANT NEOPLASM OF BREAST: Primary | ICD-10-CM

## 2019-06-21 DIAGNOSIS — Z85.3 PERSONAL HISTORY OF MALIGNANT NEOPLASM OF BREAST: ICD-10-CM

## 2019-06-21 PROCEDURE — 99211 OFF/OP EST MAY X REQ PHY/QHP: CPT

## 2019-06-25 LAB
BH CV ECHO MEAS - AO MAX PG (FULL): 7.7 MMHG
BH CV ECHO MEAS - AO MAX PG: 12.3 MMHG
BH CV ECHO MEAS - AO MEAN PG (FULL): 0 MMHG
BH CV ECHO MEAS - AO MEAN PG: 3 MMHG
BH CV ECHO MEAS - AO ROOT AREA (BSA CORRECTED): 1.5
BH CV ECHO MEAS - AO ROOT AREA: 7.1 CM^2
BH CV ECHO MEAS - AO ROOT DIAM: 3 CM
BH CV ECHO MEAS - AO V2 MAX: 175 CM/SEC
BH CV ECHO MEAS - AO V2 MEAN: 77.7 CM/SEC
BH CV ECHO MEAS - AO V2 VTI: 26.3 CM
BH CV ECHO MEAS - AVA(I,A): 3 CM^2
BH CV ECHO MEAS - AVA(I,D): 3 CM^2
BH CV ECHO MEAS - AVA(V,A): 1.9 CM^2
BH CV ECHO MEAS - AVA(V,D): 1.9 CM^2
BH CV ECHO MEAS - BSA(HAYCOCK): 2 M^2
BH CV ECHO MEAS - BSA: 1.9 M^2
BH CV ECHO MEAS - BZI_BMI: 33.6 KILOGRAMS/M^2
BH CV ECHO MEAS - BZI_METRIC_HEIGHT: 162.6 CM
BH CV ECHO MEAS - BZI_METRIC_WEIGHT: 88.9 KG
BH CV ECHO MEAS - EDV(CUBED): 91.1 ML
BH CV ECHO MEAS - EDV(MOD-SP4): 50.7 ML
BH CV ECHO MEAS - EDV(TEICH): 92.4 ML
BH CV ECHO MEAS - EF(CUBED): 83.3 %
BH CV ECHO MEAS - EF(MOD-SP4): 64.9 %
BH CV ECHO MEAS - EF(TEICH): 76.3 %
BH CV ECHO MEAS - ESV(CUBED): 15.3 ML
BH CV ECHO MEAS - ESV(MOD-SP4): 17.8 ML
BH CV ECHO MEAS - ESV(TEICH): 21.9 ML
BH CV ECHO MEAS - FS: 44.9 %
BH CV ECHO MEAS - IVS/LVPW: 1.2
BH CV ECHO MEAS - IVSD: 1.2 CM
BH CV ECHO MEAS - LA DIMENSION: 4.3 CM
BH CV ECHO MEAS - LA/AO: 1.4
BH CV ECHO MEAS - LAT PEAK E' VEL: 9.2 CM/SEC
BH CV ECHO MEAS - LV DIASTOLIC VOL/BSA (35-75): 26.1 ML/M^2
BH CV ECHO MEAS - LV MASS(C)D: 169 GRAMS
BH CV ECHO MEAS - LV MASS(C)DI: 87.2 GRAMS/M^2
BH CV ECHO MEAS - LV MAX PG: 4.6 MMHG
BH CV ECHO MEAS - LV MEAN PG: 3 MMHG
BH CV ECHO MEAS - LV SYSTOLIC VOL/BSA (12-30): 9.2 ML/M^2
BH CV ECHO MEAS - LV V1 MAX: 107 CM/SEC
BH CV ECHO MEAS - LV V1 MEAN: 78.1 CM/SEC
BH CV ECHO MEAS - LV V1 VTI: 24.7 CM
BH CV ECHO MEAS - LVIDD: 4.5 CM
BH CV ECHO MEAS - LVIDS: 2.5 CM
BH CV ECHO MEAS - LVLD AP4: 5.9 CM
BH CV ECHO MEAS - LVLS AP4: 4.9 CM
BH CV ECHO MEAS - LVOT AREA (M): 3.1 CM^2
BH CV ECHO MEAS - LVOT AREA: 3.1 CM^2
BH CV ECHO MEAS - LVOT DIAM: 2 CM
BH CV ECHO MEAS - LVPWD: 1 CM
BH CV ECHO MEAS - MED PEAK E' VEL: 6.3 CM/SEC
BH CV ECHO MEAS - MV DEC SLOPE: 573 CM/SEC^2
BH CV ECHO MEAS - MV DEC TIME: 0.38 SEC
BH CV ECHO MEAS - MV E MAX VEL: 223 CM/SEC
BH CV ECHO MEAS - MV MAX PG: 21.2 MMHG
BH CV ECHO MEAS - MV MEAN PG: 6 MMHG
BH CV ECHO MEAS - MV P1/2T MAX VEL: 232 CM/SEC
BH CV ECHO MEAS - MV P1/2T: 118.6 MSEC
BH CV ECHO MEAS - MV V2 MAX: 230 CM/SEC
BH CV ECHO MEAS - MV V2 MEAN: 96.5 CM/SEC
BH CV ECHO MEAS - MV V2 VTI: 67.5 CM
BH CV ECHO MEAS - MVA P1/2T LCG: 0.95 CM^2
BH CV ECHO MEAS - MVA(P1/2T): 1.9 CM^2
BH CV ECHO MEAS - MVA(VTI): 1.1 CM^2
BH CV ECHO MEAS - RAP SYSTOLE: 5 MMHG
BH CV ECHO MEAS - RVSP: 38.6 MMHG
BH CV ECHO MEAS - SI(AO): 95.9 ML/M^2
BH CV ECHO MEAS - SI(CUBED): 39.1 ML/M^2
BH CV ECHO MEAS - SI(LVOT): 40 ML/M^2
BH CV ECHO MEAS - SI(MOD-SP4): 17 ML/M^2
BH CV ECHO MEAS - SI(TEICH): 36.4 ML/M^2
BH CV ECHO MEAS - SV(AO): 185.9 ML
BH CV ECHO MEAS - SV(CUBED): 75.9 ML
BH CV ECHO MEAS - SV(LVOT): 77.6 ML
BH CV ECHO MEAS - SV(MOD-SP4): 32.9 ML
BH CV ECHO MEAS - SV(TEICH): 70.6 ML
BH CV ECHO MEAS - TR MAX VEL: 290 CM/SEC
BH CV ECHO MEASUREMENTS AVERAGE E/E' RATIO: 28.77
LEFT ATRIUM VOLUME INDEX: 27.1 ML/M2
LV EF 2D ECHO EST: 70 %
MAXIMAL PREDICTED HEART RATE: 147 BPM
STRESS TARGET HR: 125 BPM

## 2019-06-27 RX ORDER — LEVOTHYROXINE SODIUM 137 UG/1
137 TABLET ORAL DAILY
Qty: 90 TABLET | Refills: 0 | Status: SHIPPED | OUTPATIENT
Start: 2019-06-27

## 2019-07-08 ENCOUNTER — HOSPITAL ENCOUNTER (OUTPATIENT)
Dept: MAMMOGRAPHY | Facility: HOSPITAL | Age: 74
Discharge: HOME | End: 2019-07-08
Admitting: INTERNAL MEDICINE

## 2019-07-08 DIAGNOSIS — Z85.3 PERSONAL HISTORY OF MALIGNANT NEOPLASM OF BREAST: ICD-10-CM

## 2019-07-08 DIAGNOSIS — Z12.31 ENCOUNTER FOR SCREENING MAMMOGRAM FOR MALIGNANT NEOPLASM OF BREAST: ICD-10-CM

## 2019-07-08 PROCEDURE — 77063 BREAST TOMOSYNTHESIS BI: CPT

## 2019-07-08 PROCEDURE — 77067 SCR MAMMO BI INCL CAD: CPT

## 2019-07-10 RX ORDER — CLOPIDOGREL BISULFATE 75 MG/1
75 TABLET ORAL DAILY
Qty: 90 TABLET | Refills: 3 | Status: SHIPPED | OUTPATIENT
Start: 2019-07-10 | End: 2019-07-29

## 2019-07-23 ENCOUNTER — TELEPHONE (OUTPATIENT)
Dept: CARDIOLOGY | Facility: CLINIC | Age: 74
End: 2019-07-23

## 2019-07-29 ENCOUNTER — TELEPHONE (OUTPATIENT)
Dept: CARDIOLOGY | Facility: CLINIC | Age: 74
End: 2019-07-29

## 2019-07-29 NOTE — TELEPHONE ENCOUNTER
Call to patient to let her know that she is 6 months post Watchman implant and that she can stop taking her plavix. She will continue her ASA daily. She verbalized understanding.

## 2019-08-26 RX ORDER — DILTIAZEM HYDROCHLORIDE 180 MG/1
CAPSULE, COATED, EXTENDED RELEASE ORAL
Qty: 180 CAPSULE | Refills: 3 | Status: SHIPPED | OUTPATIENT
Start: 2019-08-26 | End: 2020-05-18 | Stop reason: SDUPTHER

## 2019-10-01 NOTE — TELEPHONE ENCOUNTER
Got a refill request for Amiodarone from [facility].  Pt is no longer taking this medication.  Abdirahman Graves, CMA

## 2019-10-15 ENCOUNTER — FLU SHOT (OUTPATIENT)
Dept: FAMILY MEDICINE CLINIC | Facility: CLINIC | Age: 74
End: 2019-10-15

## 2019-10-15 DIAGNOSIS — Z23 IMMUNIZATION, PNEUMOCOCCUS AND INFLUENZA: ICD-10-CM

## 2019-10-15 PROCEDURE — G0008 ADMIN INFLUENZA VIRUS VAC: HCPCS | Performed by: FAMILY MEDICINE

## 2019-10-15 PROCEDURE — 90653 IIV ADJUVANT VACCINE IM: CPT | Performed by: FAMILY MEDICINE

## 2019-10-24 ENCOUNTER — HOSPITAL ENCOUNTER (OUTPATIENT)
Dept: ULTRASOUND IMAGING | Facility: HOSPITAL | Age: 74
Discharge: HOME | End: 2019-10-24
Admitting: NURSE PRACTITIONER

## 2019-10-24 DIAGNOSIS — I65.23 BILATERAL CAROTID ARTERY STENOSIS: ICD-10-CM

## 2019-10-24 PROCEDURE — 93880 EXTRACRANIAL BILAT STUDY: CPT

## 2019-10-24 PROCEDURE — 93880 EXTRACRANIAL BILAT STUDY: CPT | Performed by: SURGERY

## 2019-10-28 ENCOUNTER — TELEPHONE (OUTPATIENT)
Dept: VASCULAR SURGERY | Facility: CLINIC | Age: 74
End: 2019-10-28

## 2019-10-28 NOTE — TELEPHONE ENCOUNTER
Left message reminding Mrs Pritchard of her appointment for Tuesday, October 29th, 2019 at 830 am with Dr Oviedo. Also advised if she had any questions or needed to reschedule to please call the office at [phone].      Test Complete [id number]

## 2019-10-29 ENCOUNTER — OFFICE VISIT (OUTPATIENT)
Dept: VASCULAR SURGERY | Facility: CLINIC | Age: 74
End: 2019-10-29

## 2019-10-29 VITALS
BODY MASS INDEX: 33.87 KG/M2 | SYSTOLIC BLOOD PRESSURE: 132 MMHG | DIASTOLIC BLOOD PRESSURE: 82 MMHG | OXYGEN SATURATION: 96 % | HEART RATE: 52 BPM | WEIGHT: 198.4 LBS | HEIGHT: 64 IN

## 2019-10-29 DIAGNOSIS — I65.23 BILATERAL CAROTID ARTERY STENOSIS: Primary | ICD-10-CM

## 2019-10-29 DIAGNOSIS — I10 ESSENTIAL HYPERTENSION: ICD-10-CM

## 2019-10-29 PROCEDURE — 99214 OFFICE O/P EST MOD 30 MIN: CPT | Performed by: NURSE PRACTITIONER

## 2019-10-29 RX ORDER — INSULIN GLARGINE 300 U/ML
INJECTION, SOLUTION SUBCUTANEOUS
Refills: 1 | COMMUNITY
Start: 2019-09-23

## 2019-10-29 NOTE — PROGRESS NOTES
".10/29/2019       Darryl Bang MD   [address] [place] [id number]    Dianne Pritchard  1945    Chief Complaint   Patient presents with   • Follow-up     6 Month Follow Up For Bilateral Carotid Artery Stenosis. Test 033327 US pad carotid bilateral. Patient denies any stroke like symptoms.        Dear Darryl Bang MD       HPI  I had the pleasure of seeing your patient Dianne Pritchard in the office today.  As you recall, Dianne Pritchard is a 74 y.o.  female who you are currently following for routine health maintenance.  She is is also followed by cardiology for paroxysmal atrial fibrillation.  She has since had a Watchman procedure. She denies any strokelike symptoms.  She denies any claudication to her lower extremities.  She does have a previous history of smoking.  She is maintained on aspirin, and Plavix.  She does have a history of previous radiation to her left neck.  She did have noninvasive testing perfomed today, which I did review in office.         Review of Systems   Constitutional: Negative.    HENT: Negative.    Eyes: Negative.    Respiratory: Negative.    Cardiovascular: Negative.    Gastrointestinal: Negative.    Endocrine: Negative.    Genitourinary: Negative.    Musculoskeletal: Negative.    Skin: Negative.    Allergic/Immunologic: Negative.    Neurological: Negative.    Hematological: Negative.    Psychiatric/Behavioral: Negative.        /82 (BP Location: Left arm, Patient Position: Sitting, Cuff Size: Adult)   Pulse 52   Ht 162.6 cm (64\")   Wt 90 kg (198 lb 6.4 oz)   SpO2 96%   BMI 34.06 kg/m²   Physical Exam   Constitutional: She is oriented to person, place, and time. Vital signs are normal. She appears well-developed and well-nourished. No distress.   HENT:   Head: Normocephalic and atraumatic.   Mouth/Throat: Oropharynx is clear and moist.   Eyes: Pupils are equal, round, and reactive to light. No scleral icterus.   Neck: Normal range of motion. Neck supple. No " JVD present. Carotid bruit is not present. No thyromegaly present.   Cardiovascular: Normal rate, regular rhythm, S2 normal, normal heart sounds, intact distal pulses and normal pulses. Exam reveals no gallop and no friction rub.   No murmur heard.  Pulses:       Carotid pulses are on the left side with bruit.  Pulmonary/Chest: Effort normal.   Abdominal: Soft. Normal aorta and bowel sounds are normal. There is no hepatosplenomegaly.   Musculoskeletal: Normal range of motion.   Neurological: She is alert and oriented to person, place, and time. No cranial nerve deficit.   Skin: Skin is warm and dry. She is not diaphoretic.   Psychiatric: She has a normal mood and affect. Her behavior is normal. Judgment and thought content normal.   Nursing note and vitals reviewed.      Diagnostic Data:  Us Carotid Bilateral    Result Date: 10/24/2019  Narrative: History: Carotid occlusive disease      Impression: Impression: 1. There is 50-69% stenosis of the right internal carotid artery. 2. There is less than 50% stenosis of the left internal carotid artery. 3. Antegrade flow is demonstrated in bilateral vertebral arteries.  Comments: Bilateral carotid vertebral arterial duplex scan was performed.  Grayscale imaging shows intimal thickening and calcified elements at the carotid bifurcation. The right internal carotid artery peak systolic velocity is 179 cm/sec. The end-diastolic velocity is 30.1 cm/sec. The right ICA/CCA ratio is approximately 1.72 . These findings correlate with less than 50% stenosis of the right internal carotid artery.  Grayscale imaging shows intimal thickening and calcified elements at the carotid bifurcation. The left internal carotid artery peak systolic velocity is 30.6 cm/sec. The end-diastolic velocity is 5.11 cm/sec. The left ICA/CCA ratio is approximately 0.30 . These findings correlate with less than 50% stenosis of the left internal carotid artery. There is significant atherosclerotic disease in  both the left common carotid and internal carotid artery. The waveforms within the common carotid artery are significantly dampened and likely underestimate the degree of stenosis present.  Antegrade flow is demonstrated in bilateral vertebral arteries. Greater than 50% stenosis of the proximal left external carotid artery. This report was finalized on 10/24/2019 14:27 by Dr. Grzegorz Espinosa MD.      Patient Active Problem List   Diagnosis   • Tremor   • Essential hypertension   • Type 2 diabetes mellitus without complication (CMS/HCC)   • Acquired hypothyroidism   • Dizziness   • Multiple falls   • Bruit of left carotid artery   • Acute pain of right knee   • Right knee pain   • Status post right knee replacement   • BMI 33.0-33.9,adult   • Bilateral carotid artery disease (CMS/HCC)   • Atrial fibrillation with RVR (CMS/HCC)   • Restrictive lung disease   • A-fib (CMS/[facility])   • Presence of Watchman left atrial appendage closure device         ICD-10-CM ICD-9-CM   1. Bilateral carotid artery stenosis I65.23 433.10     433.30   2. Essential hypertension I10 401.9       Plan: After thoroughly evaluating Dianne Pritchard, I believe the best course of action is to remain conservative from a vascular standpoint.  I did review the testing, which remains unchanged.  Previously, her left carotid is severely narrowed even into the cavernous portion on CTA neck, and for this reason we did not recommend surgical intervention.  She does have history of radiation therapy to her left side.  Dr. Oviedo did review imaging and is in agreement.  We will see her back in 6 months with repeat noninvasive testing including a carotid duplex.  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  I did discuss vascular risk factors as they pertain to the progression of vascular disease including controlling her hypertension and diabetes mellitus.  The patient can  continue taking her current medication regimen as previously planned.  This was all discussed in full with complete understanding.    Thank you for allowing me to participate in the care of your patient.  Please do not hesitate with any questions or concerns.  I will keep you aware of any further encounters with Dianne Pritchard.        Sincerely yours,         ДМИТРИЙ Jc Thomas Waldon, MD

## 2019-10-30 ENCOUNTER — OFFICE VISIT (OUTPATIENT)
Dept: FAMILY MEDICINE CLINIC | Facility: CLINIC | Age: 74
End: 2019-10-30

## 2019-10-30 VITALS
BODY MASS INDEX: 33.29 KG/M2 | OXYGEN SATURATION: 97 % | DIASTOLIC BLOOD PRESSURE: 86 MMHG | HEART RATE: 81 BPM | SYSTOLIC BLOOD PRESSURE: 140 MMHG | WEIGHT: 195 LBS | HEIGHT: 64 IN | RESPIRATION RATE: 16 BRPM

## 2019-10-30 DIAGNOSIS — I10 ESSENTIAL HYPERTENSION: Primary | ICD-10-CM

## 2019-10-30 DIAGNOSIS — I48.11 LONGSTANDING PERSISTENT ATRIAL FIBRILLATION (HCC): ICD-10-CM

## 2019-10-30 DIAGNOSIS — E11.9 TYPE 2 DIABETES MELLITUS WITHOUT COMPLICATION, WITH LONG-TERM CURRENT USE OF INSULIN (HCC): ICD-10-CM

## 2019-10-30 DIAGNOSIS — Z79.4 TYPE 2 DIABETES MELLITUS WITHOUT COMPLICATION, WITH LONG-TERM CURRENT USE OF INSULIN (HCC): ICD-10-CM

## 2019-10-30 DIAGNOSIS — E03.9 ACQUIRED HYPOTHYROIDISM: ICD-10-CM

## 2019-10-30 PROCEDURE — 99214 OFFICE O/P EST MOD 30 MIN: CPT | Performed by: FAMILY MEDICINE

## 2019-10-30 PROCEDURE — G0439 PPPS, SUBSEQ VISIT: HCPCS | Performed by: FAMILY MEDICINE

## 2019-10-30 NOTE — PROGRESS NOTES
"Glenn Pritchard is a 74 y.o. female.     Chief Complaint   Patient presents with   • Follow-up     6 mo afib        History of Present Illness     she had watchman procedure---taking asa daily---she thinks her bs are contooled and sees endocrinologist in [facility]---she is toleragin synthroid wituout heat or cold intoelances      Current Outpatient Medications:   •  aspirin 81 MG chewable tablet, Chew 1 tablet Daily., Disp: , Rfl:   •  diltiaZEM CD (CARDIZEM CD) 180 MG 24 hr capsule, TAKE 2 CAPSULES BY MOUTH DAILY, Disp: 180 capsule, Rfl: 3  •  Insulin Pen Needle (PEN NEEDLES) 31G X 5 MM misc, 1 each 2 (Two) Times a Day., Disp: 100 each, Rfl: 5  •  letrozole (FEMARA) 2.5 MG tablet, Take 2.5 mg by mouth Daily., Disp: , Rfl:   •  levothyroxine (SYNTHROID, LEVOTHROID) 137 MCG tablet, TAKE 1 TABLET BY MOUTH DAILY, Disp: 90 tablet, Rfl: 0  •  losartan (COZAAR) 25 MG tablet, Take 25 mg by mouth Daily., Disp: , Rfl:   •  metoprolol tartrate (LOPRESSOR) 25 MG tablet, TAKE 1 TABLET BY MOUTH EVERY 12 HOURS, Disp: 180 tablet, Rfl: 0  •  Multiple Vitamins-Minerals (CENTRUM SILVER PO), Take 1 tablet by mouth Daily., Disp: , Rfl:   •  TOUJEO SOLOSTAR 300 UNIT/ML solution pen-injector injection, INJECT 80 UNITS UNDER THE SKIN UTD QD, Disp: , Rfl: 1  Allergies   Allergen Reactions   • Amoxil [Amoxicillin] Hives   • Penicillins Hives   • Biaxin [Clarithromycin] Other (See Comments)     NOT SURE OF REACTION, \"SORE MOUTH OF DIARRHEA\"   • Lortab [Hydrocodone-Acetaminophen] GI Intolerance       Past Medical History:   Diagnosis Date   • Arrhythmia    • Atrial fibrillation, currently in sinus rhythm    • Breast cancer (CMS/HCC)     right   • Cancer (CMS/HCC)     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)   • Carotid artery occlusion    • Diabetes mellitus, type II (CMS/HCC)    • Dizziness    • Drug therapy    • Essential hypertension    • GERD (gastroesophageal reflux disease)    • GI bleed requiring more than 4 units of blood in 24 " "hours, ICU, or surgery    • History of chemotherapy    • History of lymphoma    • Hx of radiation therapy    • Hypomagnesemia    • Hypothyroidism    • On amiodarone therapy      Past Surgical History:   Procedure Laterality Date   • ATRIAL APPENDAGE EXCLUSION LEFT WITH TRANSESOPHAGEAL ECHOCARDIOGRAM Left 11/27/2018    Procedure: Atrial Appendage Occlusion;  Surgeon: Mick Orantes MD;  Location:  [facility] CATH INVASIVE LOC[facility];  Service: Cardiology   • ATRIAL APPENDAGE EXCLUSION LEFT WITH TRANSESOPHAGEAL ECHOCARDIOGRAM Left 1/22/2019    Procedure: Atrial Appendage Occlusion;  Surgeon: Mick Orantes MD;  Location:  [facility] CATH INVASIVE LOC[facility];  Service: Cardiology   • BREAST BIOPSY     • MASTECTOMY     • OTHER SURGICAL HISTORY      Mediport insertion X 2   • OTHER SURGICAL HISTORY      remote lymphoma treatment   • REPLACEMENT TOTAL KNEE Right 09/2017   • TUBAL ABDOMINAL LIGATION         Review of Systems   Constitutional: Negative.    HENT: Negative.    Eyes: Negative.    Respiratory: Negative.    Cardiovascular: Negative.    Gastrointestinal: Negative.    Endocrine: Negative.    Genitourinary: Negative.    Musculoskeletal: Negative.    Skin: Negative.    Allergic/Immunologic: Negative.    Neurological: Negative.    Hematological: Negative.    Psychiatric/Behavioral: Negative.        Objective  /86   Pulse 81   Resp 16   Ht 162.6 cm (64\")   Wt 88.5 kg (195 lb)   SpO2 97%   BMI 33.47 kg/m²   Physical Exam   Constitutional: She is oriented to person, place, and time. She appears well-developed and well-nourished.   HENT:   Head: Normocephalic and atraumatic.   Right Ear: External ear normal.   Left Ear: External ear normal.   Nose: Nose normal.   Mouth/Throat: Oropharynx is clear and moist.   Eyes: Conjunctivae and EOM are normal. Pupils are equal, round, and reactive to light.   Neck: Normal range of motion. Neck supple.   Cardiovascular: Normal rate, regular rhythm, normal heart sounds and intact distal " pulses.   Pulmonary/Chest: Effort normal and breath sounds normal.   Abdominal: Soft. Bowel sounds are normal.   Musculoskeletal: Normal range of motion.   Neurological: She is alert and oriented to person, place, and time.   Skin: Skin is warm. Capillary refill takes less than 2 seconds.   Psychiatric: She has a normal mood and affect. Her behavior is normal. Judgment and thought content normal.   Vitals reviewed.      Assessment/Plan   Dianne was seen today for follow-up.    Diagnoses and all orders for this visit:    Essential hypertension    Acquired hypothyroidism    Type 2 diabetes mellitus without complication, with long-term current use of insulin (CMS/[facility])    Longstanding persistent atrial fibrillation      She will get labs soon for her endocrinlogist  Patient's Body mass index is 33.47 kg/m². BMI is above normal parameters. Recommendations include: nutrition counseling.           No orders of the defined types were placed in this encounter.    Current outpatient and discharge medications have been reconciled for the patient.  Reviewed by: Darryl Bang MD  Follow up: 6 month(s)

## 2019-10-30 NOTE — PROGRESS NOTES
The ABCs of the Annual Wellness Visit  Subsequent Medicare Wellness Visit    Chief Complaint   Patient presents with   • Follow-up     6 mo afib       Subjective   History of Present Illness:  Dianne Pritchard is a 74 y.o. female who presents for a Subsequent Medicare Wellness Visit.    HEALTH RISK ASSESSMENT    Recent Hospitalizations:  No hospitalization(s) within the last year.    Current Medical Providers:  Patient Care Team:  Darryl Bang MD as PCP - General (Family Medicine)  Darryl Bang MD as PCP - Claims Attributed  Xiang Fernandes APRN as Nurse Practitioner (Pulmonary Disease)    Smoking Status:  Social History     Tobacco Use   Smoking Status Former Smoker   • Years: 20.00   • Types: Cigarettes   • Last attempt to quit:    • Years since quittin.8   Smokeless Tobacco Never Used       Alcohol Consumption:  Social History     Substance and Sexual Activity   Alcohol Use No       Depression Screen:   PHQ-2/PHQ-9 Depression Screening 10/30/2019   Little interest or pleasure in doing things 0   Feeling down, depressed, or hopeless 0   Trouble falling or staying asleep, or sleeping too much 0   Feeling tired or having little energy 2   Poor appetite or overeating 0   Feeling bad about yourself - or that you are a failure or have let yourself or your family down 0   Trouble concentrating on things, such as reading the newspaper or watching television 0   Moving or speaking so slowly that other people could have noticed. Or the opposite - being so fidgety or restless that you have been moving around a lot more than usual 0   Thoughts that you would be better off dead, or of hurting yourself in some way 0   Total Score 2   If you checked off any problems, how difficult have these problems made it for you to do your work, take care of things at home, or get along with other people? Not difficult at all       Fall Risk Screen:  STEADI Fall Risk Assessment was completed, and patient  is at LOW risk for falls.Assessment completed on:10/30/2019    Health Habits and Functional and Cognitive Screening:  Functional & Cognitive Status 10/30/2019   Do you have difficulty preparing food and eating? No   Do you have difficulty bathing yourself, getting dressed or grooming yourself? No   Do you have difficulty using the toilet? No   Do you have difficulty moving around from place to place? No   Do you have trouble with steps or getting out of a bed or a chair? No   Current Diet Well Balanced Diet   Dental Exam Up to date   Eye Exam Up to date   Exercise (times per week) 3 times per week   Current Exercise Activities Include Walking   Do you need help using the phone?  No   Are you deaf or do you have serious difficulty hearing?  No   Do you need help with transportation? No   Do you need help shopping? No   Do you need help preparing meals?  No   Do you need help with housework?  No   Do you need help with laundry? No   Do you need help taking your medications? No   Do you need help managing money? No   Do you ever drive or ride in a car without wearing a seat belt? No   Have you felt unusual stress, anger or loneliness in the last month? No   Who do you live with? Child   If you need help, do you have trouble finding someone available to you? No   Have you been bothered in the last four weeks by sexual problems? No   Do you have difficulty concentrating, remembering or making decisions? No         Does the patient have evidence of cognitive impairment? No    Asprin use counseling:Taking ASA appropriately as indicated    Age-appropriate Screening Schedule:  Refer to the list below for future screening recommendations based on patient's age, sex and/or medical conditions. Orders for these recommended tests are listed in the plan section. The patient has been provided with a written plan.    Health Maintenance   Topic Date Due   • TDAP/TD VACCINES (1 - Tdap) 06/24/1964   • ZOSTER VACCINE (1 of 2) 06/24/1995    • PNEUMOCOCCAL VACCINES (65+ LOW/MEDIUM RISK) (1 of 2 - PCV13) 06/24/2010   • HEMOGLOBIN A1C  03/25/2019   • URINE MICROALBUMIN  01/30/2020   • DIABETIC EYE EXAM  05/07/2020   • DIABETIC FOOT EXAM  06/18/2020   • MAMMOGRAM  07/08/2021   • COLONOSCOPY  06/28/2026   • INFLUENZA VACCINE  Completed          The following portions of the patient's history were reviewed and updated as appropriate: allergies, current medications, past family history, past medical history, past social history, past surgical history and problem list.    Outpatient Medications Prior to Visit   Medication Sig Dispense Refill   • aspirin 81 MG chewable tablet Chew 1 tablet Daily.     • diltiaZEM CD (CARDIZEM CD) 180 MG 24 hr capsule TAKE 2 CAPSULES BY MOUTH DAILY 180 capsule 3   • Insulin Pen Needle (PEN NEEDLES) 31G X 5 MM misc 1 each 2 (Two) Times a Day. 100 each 5   • letrozole (FEMARA) 2.5 MG tablet Take 2.5 mg by mouth Daily.     • levothyroxine (SYNTHROID, LEVOTHROID) 137 MCG tablet TAKE 1 TABLET BY MOUTH DAILY 90 tablet 0   • losartan (COZAAR) 25 MG tablet Take 25 mg by mouth Daily.     • metoprolol tartrate (LOPRESSOR) 25 MG tablet TAKE 1 TABLET BY MOUTH EVERY 12 HOURS 180 tablet 0   • Multiple Vitamins-Minerals (CENTRUM SILVER PO) Take 1 tablet by mouth Daily.     • TOUJEO SOLOSTAR 300 UNIT/ML solution pen-injector injection INJECT 80 UNITS UNDER THE SKIN UTD QD  1     No facility-administered medications prior to visit.        Patient Active Problem List   Diagnosis   • Tremor   • Essential hypertension   • Type 2 diabetes mellitus without complication (CMS/HCC)   • Acquired hypothyroidism   • Dizziness   • Multiple falls   • Bruit of left carotid artery   • Acute pain of right knee   • Right knee pain   • Status post right knee replacement   • BMI 33.0-33.9,adult   • Bilateral carotid artery disease (CMS/HCC)   • Atrial fibrillation with RVR (CMS/HCC)   • Restrictive lung disease   • A-fib (CMS/HCC)   • Presence of Watchman left  "atrial appendage closure device       Advanced Care Planning:  Patient does not have an advance directive - information provided to the patient today    Review of Systems    Compared to one year ago, the patient feels her physical health is the same.  Compared to one year ago, the patient feels her mental health is the same.    Reviewed chart for potential of high risk medication in the elderly: yes  Reviewed chart for potential of harmful drug interactions in the elderly:yes    Objective         Vitals:    10/30/19 1009   BP: 140/86   Pulse: 81   Resp: 16   SpO2: 97%   Weight: 88.5 kg (195 lb)   Height: 162.6 cm (64\")       Body mass index is 33.47 kg/m².  Discussed the patient's BMI with her. The BMI is above average; BMI management plan is completed.    Physical Exam          Assessment/Plan   Medicare Risks and Personalized Health Plan  CMS Preventative Services Quick Reference  Advance Directive Discussion    The above risks/problems have been discussed with the patient.  Pertinent information has been shared with the patient in the After Visit Summary.  Follow up plans and orders are seen below in the Assessment/Plan Section.    Diagnoses and all orders for this visit:    1. Essential hypertension (Primary)    2. Acquired hypothyroidism    3. Type 2 diabetes mellitus without complication, with long-term current use of insulin (CMS/[facility])    4. Longstanding persistent atrial fibrillation      Follow Up:  No Follow-up on file.     An After Visit Summary and PPPS were given to the patient.             "

## 2019-12-10 DIAGNOSIS — E03.9 HYPOTHYROIDISM, UNSPECIFIED TYPE: ICD-10-CM

## 2019-12-10 DIAGNOSIS — E11.65 UNCONTROLLED TYPE 2 DIABETES MELLITUS WITH HYPERGLYCEMIA (HCC): ICD-10-CM

## 2019-12-10 DIAGNOSIS — E55.9 VITAMIN D INSUFFICIENCY: ICD-10-CM

## 2019-12-10 DIAGNOSIS — I10 ESSENTIAL HYPERTENSION: Primary | ICD-10-CM

## 2019-12-10 DIAGNOSIS — E04.1 THYROID NODULE: ICD-10-CM

## 2019-12-11 LAB
ALBUMIN SERPL-MCNC: 4.6 G/DL (ref 3.5–5.2)
ALBUMIN/GLOB SERPL: 1.5 G/DL
ALP SERPL-CCNC: 115 U/L (ref 39–117)
ALT SERPL-CCNC: 14 U/L (ref 1–33)
AST SERPL-CCNC: 21 U/L (ref 1–32)
BILIRUB SERPL-MCNC: 0.5 MG/DL (ref 0.2–1.2)
BUN SERPL-MCNC: 16 MG/DL (ref 8–23)
BUN/CREAT SERPL: 13.9 (ref 7–25)
CALCIUM SERPL-MCNC: 9.7 MG/DL (ref 8.6–10.5)
CHLORIDE SERPL-SCNC: 97 MMOL/L (ref 98–107)
CO2 SERPL-SCNC: 27.7 MMOL/L (ref 22–29)
CREAT SERPL-MCNC: 1.15 MG/DL (ref 0.57–1)
GLOBULIN SER CALC-MCNC: 3.1 GM/DL
GLUCOSE SERPL-MCNC: 162 MG/DL (ref 65–99)
HBA1C MFR BLD: 9 % (ref 4.8–5.6)
POTASSIUM SERPL-SCNC: 4.7 MMOL/L (ref 3.5–5.2)
PROT SERPL-MCNC: 7.7 G/DL (ref 6–8.5)
SODIUM SERPL-SCNC: 140 MMOL/L (ref 136–145)
TSH SERPL DL<=0.005 MIU/L-ACNC: 1.75 UIU/ML (ref 0.27–4.2)

## 2019-12-16 RX ORDER — LETROZOLE 2.5 MG/1
TABLET, FILM COATED ORAL
Qty: 90 TABLET | Refills: 0 | Status: SHIPPED | OUTPATIENT
Start: 2019-12-16 | End: 2020-03-31 | Stop reason: SDUPTHER

## 2019-12-19 DIAGNOSIS — C50.919 MALIGNANT NEOPLASM OF FEMALE BREAST, UNSPECIFIED ESTROGEN RECEPTOR STATUS, UNSPECIFIED LATERALITY, UNSPECIFIED SITE OF BREAST (HCC): Primary | ICD-10-CM

## 2019-12-20 ENCOUNTER — HOSPITAL ENCOUNTER (OUTPATIENT)
Dept: INFUSION THERAPY | Age: 74
Discharge: HOME | End: 2019-12-20
Payer: MEDICARE

## 2019-12-20 ENCOUNTER — OFFICE VISIT (OUTPATIENT)
Dept: HEMATOLOGY | Age: 74
End: 2019-12-20
Payer: MEDICARE

## 2019-12-20 ENCOUNTER — TRANSCRIBE ORDERS (OUTPATIENT)
Dept: ADMINISTRATIVE | Facility: HOSPITAL | Age: 74
End: 2019-12-20

## 2019-12-20 VITALS
HEART RATE: 95 BPM | OXYGEN SATURATION: 95 % | BODY MASS INDEX: 33.44 KG/M2 | WEIGHT: 195.9 LBS | DIASTOLIC BLOOD PRESSURE: 72 MMHG | HEIGHT: 64 IN | SYSTOLIC BLOOD PRESSURE: 122 MMHG

## 2019-12-20 DIAGNOSIS — C50.511 MALIGNANT NEOPLASM OF LOWER-OUTER QUADRANT OF RIGHT BREAST OF FEMALE, ESTROGEN RECEPTOR POSITIVE (HCC): Primary | ICD-10-CM

## 2019-12-20 DIAGNOSIS — D69.6 THROMBOCYTOPENIA (HCC): ICD-10-CM

## 2019-12-20 DIAGNOSIS — N63.21 UNSPECIFIED LUMP IN THE LEFT BREAST, UPPER OUTER QUADRANT: ICD-10-CM

## 2019-12-20 DIAGNOSIS — R22.32 MASS OF AXILLA, LEFT: ICD-10-CM

## 2019-12-20 DIAGNOSIS — N63.20 LEFT BREAST MASS: Primary | ICD-10-CM

## 2019-12-20 DIAGNOSIS — Z17.0 MALIGNANT NEOPLASM OF LOWER-OUTER QUADRANT OF RIGHT BREAST OF FEMALE, ESTROGEN RECEPTOR POSITIVE (HCC): Primary | ICD-10-CM

## 2019-12-20 DIAGNOSIS — N63.32 UNSPECIFIED LUMP IN AXILLARY TAIL OF THE LEFT BREAST: ICD-10-CM

## 2019-12-20 DIAGNOSIS — Z17.0 MALIGNANT NEOPLASM OF LOWER-OUTER QUADRANT OF RIGHT BREAST OF FEMALE, ESTROGEN RECEPTOR POSITIVE (HCC): ICD-10-CM

## 2019-12-20 DIAGNOSIS — D50.0 IRON DEFICIENCY ANEMIA DUE TO CHRONIC BLOOD LOSS: ICD-10-CM

## 2019-12-20 DIAGNOSIS — C50.511 MALIGNANT NEOPLASM OF LOWER-OUTER QUADRANT OF RIGHT BREAST OF FEMALE, ESTROGEN RECEPTOR POSITIVE (HCC): ICD-10-CM

## 2019-12-20 DIAGNOSIS — C50.919 MALIGNANT NEOPLASM OF FEMALE BREAST, UNSPECIFIED ESTROGEN RECEPTOR STATUS, UNSPECIFIED LATERALITY, UNSPECIFIED SITE OF BREAST (HCC): ICD-10-CM

## 2019-12-20 PROCEDURE — 3017F COLORECTAL CA SCREEN DOC REV: CPT | Performed by: PHYSICIAN ASSISTANT

## 2019-12-20 PROCEDURE — 1036F TOBACCO NON-USER: CPT | Performed by: PHYSICIAN ASSISTANT

## 2019-12-20 PROCEDURE — G8427 DOCREV CUR MEDS BY ELIG CLIN: HCPCS | Performed by: PHYSICIAN ASSISTANT

## 2019-12-20 PROCEDURE — 1090F PRES/ABSN URINE INCON ASSESS: CPT | Performed by: PHYSICIAN ASSISTANT

## 2019-12-20 PROCEDURE — G8417 CALC BMI ABV UP PARAM F/U: HCPCS | Performed by: PHYSICIAN ASSISTANT

## 2019-12-20 PROCEDURE — G8484 FLU IMMUNIZE NO ADMIN: HCPCS | Performed by: PHYSICIAN ASSISTANT

## 2019-12-20 PROCEDURE — 4040F PNEUMOC VAC/ADMIN/RCVD: CPT | Performed by: PHYSICIAN ASSISTANT

## 2019-12-20 PROCEDURE — 1123F ACP DISCUSS/DSCN MKR DOCD: CPT | Performed by: PHYSICIAN ASSISTANT

## 2019-12-20 PROCEDURE — G8399 PT W/DXA RESULTS DOCUMENT: HCPCS | Performed by: PHYSICIAN ASSISTANT

## 2019-12-20 PROCEDURE — 85025 COMPLETE CBC W/AUTO DIFF WBC: CPT

## 2019-12-20 PROCEDURE — 99211 OFF/OP EST MAY X REQ PHY/QHP: CPT

## 2019-12-20 PROCEDURE — 99214 OFFICE O/P EST MOD 30 MIN: CPT | Performed by: PHYSICIAN ASSISTANT

## 2019-12-20 ASSESSMENT — ENCOUNTER SYMPTOMS
CONSTIPATION: 0
SORE THROAT: 0
EYE ITCHING: 0
COUGH: 0
BACK PAIN: 0
VOICE CHANGE: 0
ABDOMINAL PAIN: 0
NAUSEA: 0
SHORTNESS OF BREATH: 0
BLOOD IN STOOL: 0
EYE DISCHARGE: 0
TROUBLE SWALLOWING: 0
PHOTOPHOBIA: 0
DIARRHEA: 0
COLOR CHANGE: 0
WHEEZING: 0
ABDOMINAL DISTENTION: 0
VOMITING: 0

## 2020-01-10 ENCOUNTER — HOSPITAL ENCOUNTER (OUTPATIENT)
Dept: ULTRASOUND IMAGING | Facility: HOSPITAL | Age: 75
Discharge: HOME | End: 2020-01-10
Admitting: PHYSICIAN ASSISTANT

## 2020-01-10 ENCOUNTER — HOSPITAL ENCOUNTER (OUTPATIENT)
Dept: MAMMOGRAPHY | Facility: HOSPITAL | Age: 75
Discharge: HOME | End: 2020-01-10

## 2020-01-10 ENCOUNTER — TRANSCRIBE ORDERS (OUTPATIENT)
Dept: ADMINISTRATIVE | Facility: HOSPITAL | Age: 75
End: 2020-01-10

## 2020-01-10 DIAGNOSIS — N63.20 LEFT BREAST MASS: ICD-10-CM

## 2020-01-10 DIAGNOSIS — R22.32 MASS OF LEFT AXILLA: Primary | ICD-10-CM

## 2020-01-10 DIAGNOSIS — N63.20 MASS OF LEFT BREAST: ICD-10-CM

## 2020-01-10 DIAGNOSIS — N63.32 UNSPECIFIED LUMP IN AXILLARY TAIL OF THE LEFT BREAST: ICD-10-CM

## 2020-01-10 DIAGNOSIS — N63.20 LEFT BREAST LUMP: ICD-10-CM

## 2020-01-10 PROCEDURE — 77065 DX MAMMO INCL CAD UNI: CPT

## 2020-01-10 PROCEDURE — 76642 ULTRASOUND BREAST LIMITED: CPT

## 2020-01-10 PROCEDURE — G0279 TOMOSYNTHESIS, MAMMO: HCPCS

## 2020-01-16 VITALS
SYSTOLIC BLOOD PRESSURE: 124 MMHG | HEART RATE: 96 BPM | DIASTOLIC BLOOD PRESSURE: 80 MMHG | WEIGHT: 200 LBS | OXYGEN SATURATION: 99 % | BODY MASS INDEX: 34.33 KG/M2

## 2020-01-16 RX ORDER — OMEPRAZOLE 20 MG/1
20 CAPSULE, DELAYED RELEASE ORAL DAILY
COMMUNITY
End: 2020-02-24 | Stop reason: ALTCHOICE

## 2020-01-16 NOTE — PROGRESS NOTES
Progress Note      Pt Name: Mary Kay Fregoso  YOB: 1945  MRN: [id number]    Date of evaluation: 1/17/2020  History Obtained From:  patient, electronic medical record    CHIEF COMPLAINT:    Chief Complaint   Patient presents with    Breast Cancer     Current active problems  History of breast cancer  Abnormal left mammogram    HISTORY OF PRESENT ILLNESS:    Mary Kay Fregoso is a 76 y.o.  female with stage II A breast carcinoma from 3/21/2012. She was here on 12/20/2019 and unfortunately was found to have a mass in her left axillary region, sent for mammogram and ultrasound evaluation. TARGET LYMPHOMA SITES:  1. Left supraclavicular area. 2. Left axilla. 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 Ellyn Ramirez with a left supraclavicular lymph node. She was referred to Dr. Shireen Kathleen who performed a biopsy on 10/13/93 that revealed a large cell malignant immunoblastic lymphoma, CD20 was 53% positive  A bone marrow biopsy and aspirate on 11/1/1993 was negative for any evidence of malignant lymphoma. She received 6 cycles of chemotherapy with CHOP/Bleomycin. The last 3 cycles were done without Vincristine due to peripheral neuropathy problems. Following the chemotherapy she received XRT to the neck and upper chest due to the stage II-A presentation in the left supraclavicular area and left axilla. She received a total of 3,960cGy under the direction of Dr. Ady Vanegas that was completed on 6/22/94. TREATMENT SUMMARY:  1. CHOP/ Bleomycin x 6.   2. Mantle radiation therapy.     2ND PRIMARY TUMOR: Right stage II (pT2 N1 M0) infiltrating high grade breast cancer 03/21/12  Tram had an abnormal mammogram at [facility] on 02/22/12 revealing a 1.6 cm worrisome mass at the 7:00 position in the right lower quadrant of the right breast. On 03/21/12 Dr. Jose Coleman took Tram to the OR and performed an intraoperative biopsy with recommended- BI-RADS 5. The palpable abnormality on examination appears to be a benign hamartoma. However there was an irregular focus that is suspicious in the upper outer quadrant of the left breast and she will be referred to surgery for evaluation. Her CA-15-3 above which is minimally elevated a 25.5 and lymphoma markers were negative. I discussed the abnormality with Tram today. I recommended referral to a surgeon and she would like to see Dr. Nora Cyr. That referral is being made. CA-15-3 25.5 (0-25)      1st HEMATOLOGY HISTORY: Iron deficiency/ Acute GI bleed secondary to gastric AVM, 06/27/16  Mrs. Aponte presented to [facility] Emergency Department on 06/24/16 with weakness, fatigue and exertional dyspnea. She was profoundly anemic with a hemoglobin of 6.0, MCV of 84.3. WBC and platelets were normal at 7.97 and 163,000, respectively. GI evaluation was sought, with an endoscopy on 06/27/16 by Dr. Cleave Schwab remarkable for angiodysplastic lesions of the gastric body. Colonoscopy on 06/28/16 was negative. Mrs. Aponte was transfused as warranted, given parenteral iron supplementation and anticoagulation with Xarelto for paroxysmal atrial fibrillation was discontinued due to UGI bleed with AVM. 2nd HEMATOLOGY HISTORY: Thrombocytopenia  Tram is had a chronically low platelet count between 120 and 150. DEONDRE - negative  Antiplatelet antibody - negative  SPEP - negative  Hepatitis A, B, C - negative      Past Medical History:   Diagnosis Date    Arteriovenous malformation, other site     Arthritis     Atrial fibrillation [facility])     sees dr. Selina Rubalcava [facility])     breast; surgery and chemo x 2 and radiation    Diabetes mellitus ([facility] [address]     H/O: GI bleed     2016 while on xarelto for a.fib; no longer on any anti-coags.     Hypertension     Iron deficiency anemia     Kidney disease     sees dr. Kimberlee Ross [facility]) 46    Osteopenia of the elderly     Osteoporosis     Post-menopausal     Thrombocytopenia ([facility] [address]     Thyroid disease         Past Surgical History:   Procedure Laterality Date    BREAST SURGERY      right mastectomy; no sticks/bp right arm    COLONOSCOPY      ENDOSCOPY, COLON, DIAGNOSTIC      TOTAL KNEE ARTHROPLASTY Right 9/7/2017    KNEE TOTAL ARTHROPLASTY performed by Frank Painting MD at [place] [address] TUNNELED VENOUS PORT PLACEMENT      x2 and removed           Current Outpatient Medications:     omeprazole (PRILOSEC) 20 MG delayed release capsule, Take 20 mg by mouth daily, Disp: , Rfl:     UNABLE TO FIND, Insulin Inj., Disp: , Rfl:     letrozole (FEMARA) 2.5 MG tablet, TAKE 1 TABLET BY MOUTH EVERY DAY, Disp: 90 tablet, Rfl: 0    aspirin EC 81 MG EC tablet, Take 1 tablet by mouth 2 times daily, Disp: 60 tablet, Rfl: 0    diltiazem (CARDIZEM) 30 MG tablet, Take 180 mg by mouth 2 times daily Indications: High Blood Pressure Disorder , Disp: , Rfl:     levothyroxine (SYNTHROID) 88 MCG tablet, Take 88 mcg by mouth daily Indications: Underactive Thyroid , Disp: , Rfl:     losartan (COZAAR) 25 MG tablet, Take 25 mg by mouth daily Indications: High Blood Pressure Disorder , Disp: , Rfl:     metFORMIN (GLUCOPHAGE) 1000 MG tablet, Take 1,000 mg by mouth 2 times daily (with meals) Indications: Diabetes , Disp: , Rfl:     Multiple Vitamins-Minerals (MULTI FOR HER PO), Take 1 tablet by mouth daily , Disp: , Rfl:     propafenone (RYTHMOL) 225 MG tablet, TAKE 1 TABLET BY MOUTH TWICE DAILY, Disp: , Rfl:     vitamin D (CHOLECALCIFEROL) 1000 UNIT TABS tablet, Take 1,000 Units by mouth daily, Disp: , Rfl:     insulin detemir (LEVEMIR) 100 UNIT/ML injection vial, Inject 30-40 Units into the skin nightly Indications: Diabetes, partial sliding scale, Disp: , Rfl:      Allergies   Allergen Reactions    Hydrocodone-Acetaminophen Nausea And Vomiting    Amoxicillin Hives    Clarithromycin     Penicillins Hives       Social History     Tobacco Use    Smoking status: Former Smoker     Types: Cigarettes     Last attempt to quit: 1993     Years since quittin.2    Smokeless tobacco: Never Used   Substance Use Topics    Alcohol use: No    Drug use: No       Family History   Problem Relation Age of Onset    Heart Disease Mother     Cancer Father     Cancer Paternal Aunt     Cancer Paternal Uncle     Lung Cancer Sister         Non-small cell lung cancer    Heart Attack Brother     Pancreatic Cancer Child        Subjective   REVIEW OF SYSTEMS:   Review of Systems   Constitutional: Positive for fatigue. Negative for chills, diaphoresis, fever and unexpected weight change. HENT: Negative for mouth sores, nosebleeds, sore throat, trouble swallowing and voice change. Eyes: Negative for photophobia, discharge and itching. Respiratory: Negative for cough, shortness of breath and wheezing. Cardiovascular: Negative for chest pain, palpitations and leg swelling. Gastrointestinal: Negative for abdominal distention, abdominal pain, blood in stool, constipation, diarrhea, nausea and vomiting. Endocrine: Negative for cold intolerance, heat intolerance, polydipsia and polyuria. Genitourinary: Negative for difficulty urinating, dysuria, hematuria and urgency. Musculoskeletal: Positive for arthralgias. Negative for back pain, joint swelling and myalgias. Skin: Negative for color change and rash. Neurological: Negative for dizziness, tremors, seizures, syncope and light-headedness. Hematological: Negative for adenopathy. Does not bruise/bleed easily. Psychiatric/Behavioral: Negative for behavioral problems and suicidal ideas. The patient is not nervous/anxious. All other systems reviewed and are negative. Objective   /70   Pulse 80   Ht 5' 4\" (1.626 m)   Wt 197 lb (89.4 kg)   SpO2 96%   BMI 33.81 kg/m²     PHYSICAL EXAM:  Physical Exam  Constitutional:       Appearance: She is well-developed.    HENT:      Head:

## 2020-01-17 ENCOUNTER — OFFICE VISIT (OUTPATIENT)
Dept: HEMATOLOGY | Age: 75
End: 2020-01-17
Payer: MEDICARE

## 2020-01-17 ENCOUNTER — HOSPITAL ENCOUNTER (OUTPATIENT)
Dept: INFUSION THERAPY | Age: 75
Discharge: HOME | End: 2020-01-17
Payer: MEDICARE

## 2020-01-17 VITALS
HEIGHT: 64 IN | SYSTOLIC BLOOD PRESSURE: 134 MMHG | OXYGEN SATURATION: 96 % | BODY MASS INDEX: 33.63 KG/M2 | DIASTOLIC BLOOD PRESSURE: 70 MMHG | HEART RATE: 80 BPM | WEIGHT: 197 LBS

## 2020-01-17 DIAGNOSIS — C50.919 MALIGNANT NEOPLASM OF FEMALE BREAST, UNSPECIFIED ESTROGEN RECEPTOR STATUS, UNSPECIFIED LATERALITY, UNSPECIFIED SITE OF BREAST (HCC): ICD-10-CM

## 2020-01-17 PROCEDURE — 85025 COMPLETE CBC W/AUTO DIFF WBC: CPT

## 2020-01-17 PROCEDURE — 1036F TOBACCO NON-USER: CPT | Performed by: PHYSICIAN ASSISTANT

## 2020-01-17 PROCEDURE — 4040F PNEUMOC VAC/ADMIN/RCVD: CPT | Performed by: PHYSICIAN ASSISTANT

## 2020-01-17 PROCEDURE — G8427 DOCREV CUR MEDS BY ELIG CLIN: HCPCS | Performed by: PHYSICIAN ASSISTANT

## 2020-01-17 PROCEDURE — 1090F PRES/ABSN URINE INCON ASSESS: CPT | Performed by: PHYSICIAN ASSISTANT

## 2020-01-17 PROCEDURE — G8399 PT W/DXA RESULTS DOCUMENT: HCPCS | Performed by: PHYSICIAN ASSISTANT

## 2020-01-17 PROCEDURE — 99213 OFFICE O/P EST LOW 20 MIN: CPT | Performed by: PHYSICIAN ASSISTANT

## 2020-01-17 PROCEDURE — 1123F ACP DISCUSS/DSCN MKR DOCD: CPT | Performed by: PHYSICIAN ASSISTANT

## 2020-01-17 PROCEDURE — G8417 CALC BMI ABV UP PARAM F/U: HCPCS | Performed by: PHYSICIAN ASSISTANT

## 2020-01-17 PROCEDURE — 3017F COLORECTAL CA SCREEN DOC REV: CPT | Performed by: PHYSICIAN ASSISTANT

## 2020-01-17 PROCEDURE — G8484 FLU IMMUNIZE NO ADMIN: HCPCS | Performed by: PHYSICIAN ASSISTANT

## 2020-01-17 PROCEDURE — 99211 OFF/OP EST MAY X REQ PHY/QHP: CPT

## 2020-01-17 ASSESSMENT — ENCOUNTER SYMPTOMS
DIARRHEA: 0
VOICE CHANGE: 0
ABDOMINAL PAIN: 0
PHOTOPHOBIA: 0
NAUSEA: 0
COUGH: 0
VOMITING: 0
COLOR CHANGE: 0
WHEEZING: 0
BLOOD IN STOOL: 0
EYE DISCHARGE: 0
ABDOMINAL DISTENTION: 0
CONSTIPATION: 0
EYE ITCHING: 0
SHORTNESS OF BREATH: 0
SORE THROAT: 0
BACK PAIN: 0
TROUBLE SWALLOWING: 0

## 2020-01-21 ENCOUNTER — OFFICE VISIT (OUTPATIENT)
Dept: SURGERY | Age: 75
End: 2020-01-21
Payer: MEDICARE

## 2020-01-21 VITALS
SYSTOLIC BLOOD PRESSURE: 130 MMHG | HEIGHT: 64 IN | TEMPERATURE: 97.4 F | DIASTOLIC BLOOD PRESSURE: 79 MMHG | BODY MASS INDEX: 33.46 KG/M2 | HEART RATE: 63 BPM | WEIGHT: 196 LBS

## 2020-01-21 PROCEDURE — 1090F PRES/ABSN URINE INCON ASSESS: CPT | Performed by: PHYSICIAN ASSISTANT

## 2020-01-21 PROCEDURE — G8399 PT W/DXA RESULTS DOCUMENT: HCPCS | Performed by: PHYSICIAN ASSISTANT

## 2020-01-21 PROCEDURE — G8427 DOCREV CUR MEDS BY ELIG CLIN: HCPCS | Performed by: PHYSICIAN ASSISTANT

## 2020-01-21 PROCEDURE — 1123F ACP DISCUSS/DSCN MKR DOCD: CPT | Performed by: PHYSICIAN ASSISTANT

## 2020-01-21 PROCEDURE — G8484 FLU IMMUNIZE NO ADMIN: HCPCS | Performed by: PHYSICIAN ASSISTANT

## 2020-01-21 PROCEDURE — 99213 OFFICE O/P EST LOW 20 MIN: CPT | Performed by: PHYSICIAN ASSISTANT

## 2020-01-21 PROCEDURE — 3017F COLORECTAL CA SCREEN DOC REV: CPT | Performed by: PHYSICIAN ASSISTANT

## 2020-01-21 PROCEDURE — G8417 CALC BMI ABV UP PARAM F/U: HCPCS | Performed by: PHYSICIAN ASSISTANT

## 2020-01-21 PROCEDURE — 1036F TOBACCO NON-USER: CPT | Performed by: PHYSICIAN ASSISTANT

## 2020-01-21 PROCEDURE — 4040F PNEUMOC VAC/ADMIN/RCVD: CPT | Performed by: PHYSICIAN ASSISTANT

## 2020-01-29 ENCOUNTER — HOSPITAL ENCOUNTER (OUTPATIENT)
Dept: WOMENS IMAGING | Age: 75
Discharge: HOME | End: 2020-01-29
Payer: MEDICARE

## 2020-01-29 PROCEDURE — 88305 TISSUE EXAM BY PATHOLOGIST: CPT

## 2020-01-29 PROCEDURE — 88342 IMHCHEM/IMCYTCHM 1ST ANTB: CPT

## 2020-01-29 PROCEDURE — 88361 TUMOR IMMUNOHISTOCHEM/COMPUT: CPT

## 2020-01-29 PROCEDURE — 77065 DX MAMMO INCL CAD UNI: CPT

## 2020-01-29 PROCEDURE — 19083 BX BREAST 1ST LESION US IMAG: CPT | Performed by: SURGERY

## 2020-01-29 PROCEDURE — 2709999900 US BREAST BIOPSY W LOC DEVICE 1ST LESION LEFT

## 2020-01-29 PROCEDURE — 88341 IMHCHEM/IMCYTCHM EA ADD ANTB: CPT

## 2020-01-31 ENCOUNTER — TELEPHONE (OUTPATIENT)
Dept: SURGERY | Age: 75
End: 2020-01-31

## 2020-02-04 ENCOUNTER — TELEPHONE (OUTPATIENT)
Dept: CARDIOLOGY | Facility: CLINIC | Age: 75
End: 2020-02-04

## 2020-02-04 DIAGNOSIS — I48.0 PAF (PAROXYSMAL ATRIAL FIBRILLATION) (HCC): Primary | ICD-10-CM

## 2020-02-04 NOTE — TELEPHONE ENCOUNTER
Call to pt to let her know that she is 1 year post Watchman and needs to have her 1 year JOSSELYN. She verbalized understanding and is agreeable to move forward.

## 2020-02-06 PROBLEM — Z17.0 MALIGNANT NEOPLASM OF UPPER-OUTER QUADRANT OF LEFT BREAST IN FEMALE, ESTROGEN RECEPTOR POSITIVE (HCC): Status: ACTIVE | Noted: 2020-02-06

## 2020-02-06 PROBLEM — C50.412 MALIGNANT NEOPLASM OF UPPER-OUTER QUADRANT OF LEFT BREAST IN FEMALE, ESTROGEN RECEPTOR POSITIVE (HCC): Status: ACTIVE | Noted: 2020-02-06

## 2020-02-07 ENCOUNTER — TELEPHONE (OUTPATIENT)
Dept: SURGERY | Age: 75
End: 2020-02-07

## 2020-02-07 NOTE — TELEPHONE ENCOUNTER
Patient was informed:    MRI scheduled on 2/14/2020 at 1 Pm. All instructions for test were given. Patient states she has a Watchman/A plug in her left atrium. I called and spoke with [employer] said she could still have MRI.   Breast US Scheduled before breast talk at [facility] on 2/17/2020 @ 10:30 AM. She has a Breast talk with Dr. Wale Ernst at 11 AM.

## 2020-02-10 ENCOUNTER — TELEPHONE (OUTPATIENT)
Dept: OTHER | Age: 75
End: 2020-02-10

## 2020-02-14 ENCOUNTER — HOSPITAL ENCOUNTER (OUTPATIENT)
Dept: MRI IMAGING | Age: 75
Discharge: HOME | End: 2020-02-14
Payer: MEDICARE

## 2020-02-14 LAB
GFR NON-AFRICAN AMERICAN: 54
PERFORMED ON: ABNORMAL
POC CREATININE: 1 MG/DL (ref 0.3–1.3)
POC SAMPLE TYPE: ABNORMAL

## 2020-02-14 PROCEDURE — 6360000004 HC RX CONTRAST MEDICATION: Performed by: PHYSICIAN ASSISTANT

## 2020-02-14 PROCEDURE — 77049 MRI BREAST C-+ W/CAD BI: CPT

## 2020-02-14 PROCEDURE — 82565 ASSAY OF CREATININE: CPT

## 2020-02-14 PROCEDURE — A9577 INJ MULTIHANCE: HCPCS | Performed by: PHYSICIAN ASSISTANT

## 2020-02-14 RX ADMIN — GADOBENATE DIMEGLUMINE 18 ML: 529 INJECTION, SOLUTION INTRAVENOUS at 15:00

## 2020-02-14 NOTE — PROGRESS NOTES
HISTORY OF PRESENT ILLNESS:    Ms. Sheryl Frazier  is recently status post ultrasound guided breast biopsy  on the left which revealed a 2.9 cm low grade invasive lobular carcinoma . ER is positive at 94%. VA is negative. HER-2 is negative by IHC. Ki-67 is low at 8%. MammaPrint demonstrates a low risk luminal-A phenotype    Of note is a previous right mastectomy and axillary node dissection in 2012 by Dr. PEREZ [facility] and she is still on letrozole at this time    MRI-2/14/2020  FINDINGS:   Amount of Fibroglandular Tissue: Heterogeneous fibroglandular tissue. Background Parenchymal Enhancement:  Minimal.   Changes of prior right mastectomy and right axillary surgery. Left breast with a 2.9 x 1.6 cm (AP by transverse) area of nonmass   enhancement in the middle to posterior third, upper outer quadrant,   1:00-2:00 position. The anterior aspect is 5.5 cm from the nipple. Posterior aspect is 3.5 cm from the pectoralis musculature. This area   has a vague distorted appearance and an adjacent biopsy marker. Findings consistent with biopsy proven malignancy and correlates in in   positioning character with the ultrasound abnormality on 1/29/2020   exam, where it measured 1.1 x 0.8 x 1.4 cm. Note that the area of   concern measures larger on the MRI. No axillary or internal mammary adenopathy. Cardiomegaly. Otherwise, the partially visualized chest and abdomen   appear grossly within normal limits, but are limited in assessment due   to technique.       Impression   1. Left breast nonmass enhancement distortion measures 2.9 x 1.6 cm,   larger than appreciated on prior ultrasound. 2. No evidence of adenopathy. 3. Cardiomegaly. DISCUSSION:  I had a lengthy discussion with Ms. Aponte  and her family about the ramifications of the diagnosis of breast cancer.   We discussed the pathophysiology of cancer in general and also the ways in which surgery, radiation therapy, and chemotherapy are utilized in the treatment of different types of cancers. We also explained how these modalities related to her situation in particular. We discussed the pathophysiology of breast cancer and some length, including what is known about the causes of breast cancer, its relationship to fibrocystic disease, its relationship to hormone replacement therapy, and some of the genetic aspects involved in familial breast cancers. We discussed the BrCa genetic analysis and why it is appropriate for her. We discussed breast MRI and how it assists in evaluation of breast cancers and the results of her MRI if done. We discussed the surgical options including simple mastectomy and lumpectomy with  sentinel lymph node biopsy as well as the possibility of axillary lymph node dissection. We explained in depth why breast conservation therapy requires radiation treatments for the majority of women. These treatments may be external beam for 6 weeks, partial breast for 5 days,  or intraoperative. I explained that most women treated for invasive malignancy do receive systemic therapy, hormonal therapy or  chemotherapy postoperatively depending upon the final pathology, the lymph node status, and the hormone receptor status. I discussed Oncotype Dx, Mammoprint, and Adjuvant Online as tools which aid in the decision for chemotherapy or hormonal therapy. We also discussed the possibility of breast reconstruction if a mastectomy was required. .  I explained to her the different techniques including placement of a subpectoral implant with a Alloderm sling  versus TRAM flap reconstruction as welll as other methods of reconstruction. She does not wish to pursue reconstruction at this time.         After a prolonged discussion lasting 100 minutes  we felt it was most appropriate that she undergo left simple mastectomy and and sentinel node biopsy      We discussed the risks and benefits of the surgery including but not limited to bleeding, infection, pain, lymphedema, numbness, and also the risks of general anesthesia including pneumonia, blood clots, heart attack, stroke, and death. She expresses good understanding and is agreeable to proceed with surgery.       We will schedule this to be done when convenient  at [facility].

## 2020-02-17 ENCOUNTER — TELEPHONE (OUTPATIENT)
Dept: OTHER | Age: 75
End: 2020-02-17

## 2020-02-17 ENCOUNTER — INITIAL CONSULT (OUTPATIENT)
Dept: SURGERY | Age: 75
End: 2020-02-17
Payer: MEDICARE

## 2020-02-17 ENCOUNTER — HOSPITAL ENCOUNTER (OUTPATIENT)
Dept: WOMENS IMAGING | Age: 75
Discharge: HOME | End: 2020-02-17
Payer: MEDICARE

## 2020-02-17 PROCEDURE — G8399 PT W/DXA RESULTS DOCUMENT: HCPCS | Performed by: SURGERY

## 2020-02-17 PROCEDURE — 1036F TOBACCO NON-USER: CPT | Performed by: SURGERY

## 2020-02-17 PROCEDURE — 1123F ACP DISCUSS/DSCN MKR DOCD: CPT | Performed by: SURGERY

## 2020-02-17 PROCEDURE — G8484 FLU IMMUNIZE NO ADMIN: HCPCS | Performed by: SURGERY

## 2020-02-17 PROCEDURE — 3017F COLORECTAL CA SCREEN DOC REV: CPT | Performed by: SURGERY

## 2020-02-17 PROCEDURE — 1090F PRES/ABSN URINE INCON ASSESS: CPT | Performed by: SURGERY

## 2020-02-17 PROCEDURE — 99215 OFFICE O/P EST HI 40 MIN: CPT | Performed by: SURGERY

## 2020-02-17 PROCEDURE — 99354 PR PROLONGED SVC OUTPATIENT SETTING 1ST HOUR: CPT | Performed by: SURGERY

## 2020-02-17 PROCEDURE — G8417 CALC BMI ABV UP PARAM F/U: HCPCS | Performed by: SURGERY

## 2020-02-17 PROCEDURE — G8428 CUR MEDS NOT DOCUMENT: HCPCS | Performed by: SURGERY

## 2020-02-17 PROCEDURE — 4040F PNEUMOC VAC/ADMIN/RCVD: CPT | Performed by: SURGERY

## 2020-02-17 NOTE — Clinical Note
Left lumpectomy and sentinel node when space available. Day before to [facility] for marking, PT, etc.Pectoral block, soft tissue flaps

## 2020-02-19 ENCOUNTER — HOSPITAL ENCOUNTER (OUTPATIENT)
Dept: INFUSION THERAPY | Age: 75
End: 2020-02-19

## 2020-02-24 ENCOUNTER — HOSPITAL ENCOUNTER (OUTPATIENT)
Dept: PREADMISSION TESTING | Age: 75
Discharge: HOME | End: 2020-02-28
Payer: MEDICARE

## 2020-02-24 VITALS — HEIGHT: 64 IN | BODY MASS INDEX: 31.92 KG/M2 | WEIGHT: 187 LBS

## 2020-02-24 LAB
BASOPHILS ABSOLUTE: 0.1 K/UL (ref 0–0.2)
BASOPHILS RELATIVE PERCENT: 0.6 % (ref 0–1)
EKG P AXIS: NORMAL DEGREES
EKG P-R INTERVAL: NORMAL MS
EKG Q-T INTERVAL: 372 MS
EKG QRS DURATION: 82 MS
EKG QTC CALCULATION (BAZETT): 394 MS
EKG T AXIS: 54 DEGREES
EOSINOPHILS ABSOLUTE: 0.2 K/UL (ref 0–0.6)
EOSINOPHILS RELATIVE PERCENT: 1.7 % (ref 0–5)
HCT VFR BLD CALC: 42.5 % (ref 37–47)
HEMOGLOBIN: 13.6 G/DL (ref 12–16)
IMMATURE GRANULOCYTES #: 0 K/UL
LYMPHOCYTES ABSOLUTE: 1.4 K/UL (ref 1.1–4.5)
LYMPHOCYTES RELATIVE PERCENT: 15 % (ref 20–40)
MCH RBC QN AUTO: 29.1 PG (ref 27–31)
MCHC RBC AUTO-ENTMCNC: 32 G/DL (ref 33–37)
MCV RBC AUTO: 90.8 FL (ref 81–99)
MONOCYTES ABSOLUTE: 0.5 K/UL (ref 0–0.9)
MONOCYTES RELATIVE PERCENT: 5.2 % (ref 0–10)
NEUTROPHILS ABSOLUTE: 7.4 K/UL (ref 1.5–7.5)
NEUTROPHILS RELATIVE PERCENT: 77.3 % (ref 50–65)
PDW BLD-RTO: 13.2 % (ref 11.5–14.5)
PLATELET # BLD: 156 K/UL (ref 130–400)
PMV BLD AUTO: 11.2 FL (ref 9.4–12.3)
RBC # BLD: 4.68 M/UL (ref 4.2–5.4)
WBC # BLD: 9.5 K/UL (ref 4.8–10.8)

## 2020-02-24 PROCEDURE — 85025 COMPLETE CBC W/AUTO DIFF WBC: CPT

## 2020-02-24 PROCEDURE — 93005 ELECTROCARDIOGRAM TRACING: CPT | Performed by: ANESTHESIOLOGY

## 2020-02-24 PROCEDURE — 93010 ELECTROCARDIOGRAM REPORT: CPT | Performed by: INTERNAL MEDICINE

## 2020-02-24 RX ORDER — DILTIAZEM HYDROCHLORIDE 180 MG/1
360 CAPSULE, COATED, EXTENDED RELEASE ORAL DAILY
COMMUNITY

## 2020-02-24 RX ORDER — LEVOTHYROXINE SODIUM 137 UG/1
137 TABLET ORAL DAILY
COMMUNITY

## 2020-03-09 ENCOUNTER — HOSPITAL ENCOUNTER (OUTPATIENT)
Dept: WOMENS IMAGING | Age: 75
Discharge: HOME | End: 2020-03-09
Payer: MEDICARE

## 2020-03-09 PROCEDURE — 76642 ULTRASOUND BREAST LIMITED: CPT

## 2020-03-10 ENCOUNTER — HOSPITAL ENCOUNTER (OUTPATIENT)
Age: 75
Setting detail: OBSERVATION
Discharge: HOME | End: 2020-03-11
Attending: SURGERY | Admitting: SURGERY
Payer: MEDICARE

## 2020-03-10 ENCOUNTER — ANESTHESIA EVENT (OUTPATIENT)
Dept: OPERATING ROOM | Age: 75
End: 2020-03-10
Payer: MEDICARE

## 2020-03-10 ENCOUNTER — ANESTHESIA (OUTPATIENT)
Dept: OPERATING ROOM | Age: 75
End: 2020-03-10
Payer: MEDICARE

## 2020-03-10 ENCOUNTER — HOSPITAL ENCOUNTER (OUTPATIENT)
Dept: NUCLEAR MEDICINE | Age: 75
Discharge: HOME | End: 2020-03-12
Payer: MEDICARE

## 2020-03-10 VITALS
DIASTOLIC BLOOD PRESSURE: 50 MMHG | SYSTOLIC BLOOD PRESSURE: 103 MMHG | TEMPERATURE: 97 F | OXYGEN SATURATION: 95 % | RESPIRATION RATE: 9 BRPM

## 2020-03-10 PROBLEM — Z90.12 STATUS POST LEFT MASTECTOMY: Status: ACTIVE | Noted: 2020-03-10

## 2020-03-10 LAB
ANION GAP SERPL CALCULATED.3IONS-SCNC: 16 MMOL/L (ref 7–19)
BUN BLDV-MCNC: 21 MG/DL (ref 8–23)
CALCIUM SERPL-MCNC: 9.4 MG/DL (ref 8.8–10.2)
CHLORIDE BLD-SCNC: 99 MMOL/L (ref 98–111)
CO2: 22 MMOL/L (ref 22–29)
CREAT SERPL-MCNC: 1.1 MG/DL (ref 0.5–0.9)
GFR NON-AFRICAN AMERICAN: 48
GLUCOSE BLD-MCNC: 166 MG/DL (ref 74–109)
POTASSIUM SERPL-SCNC: 4.9 MMOL/L (ref 3.5–4.9)
SODIUM BLD-SCNC: 137 MMOL/L (ref 136–145)

## 2020-03-10 PROCEDURE — 80048 BASIC METABOLIC PNL TOTAL CA: CPT

## 2020-03-10 PROCEDURE — 6360000002 HC RX W HCPCS: Performed by: SURGERY

## 2020-03-10 PROCEDURE — 6370000000 HC RX 637 (ALT 250 FOR IP): Performed by: SURGERY

## 2020-03-10 PROCEDURE — 2720000010 HC SURG SUPPLY STERILE: Performed by: SURGERY

## 2020-03-10 PROCEDURE — 6360000002 HC RX W HCPCS: Performed by: ANESTHESIOLOGY

## 2020-03-10 PROCEDURE — 2500000003 HC RX 250 WO HCPCS: Performed by: SURGERY

## 2020-03-10 PROCEDURE — 88341 IMHCHEM/IMCYTCHM EA ADD ANTB: CPT

## 2020-03-10 PROCEDURE — G0378 HOSPITAL OBSERVATION PER HR: HCPCS

## 2020-03-10 PROCEDURE — 3600000005 HC SURGERY LEVEL 5 BASE: Performed by: SURGERY

## 2020-03-10 PROCEDURE — 38900 IO MAP OF SENT LYMPH NODE: CPT | Performed by: SURGERY

## 2020-03-10 PROCEDURE — 36415 COLL VENOUS BLD VENIPUNCTURE: CPT

## 2020-03-10 PROCEDURE — 19303 MAST SIMPLE COMPLETE: CPT | Performed by: PHYSICIAN ASSISTANT

## 2020-03-10 PROCEDURE — 6360000002 HC RX W HCPCS: Performed by: NURSE ANESTHETIST, CERTIFIED REGISTERED

## 2020-03-10 PROCEDURE — 2580000003 HC RX 258: Performed by: SURGERY

## 2020-03-10 PROCEDURE — 6370000000 HC RX 637 (ALT 250 FOR IP): Performed by: ANESTHESIOLOGY

## 2020-03-10 PROCEDURE — 19303 MAST SIMPLE COMPLETE: CPT | Performed by: SURGERY

## 2020-03-10 PROCEDURE — 2500000003 HC RX 250 WO HCPCS: Performed by: ANESTHESIOLOGY

## 2020-03-10 PROCEDURE — 2500000003 HC RX 250 WO HCPCS: Performed by: NURSE ANESTHETIST, CERTIFIED REGISTERED

## 2020-03-10 PROCEDURE — 3600000015 HC SURGERY LEVEL 5 ADDTL 15MIN: Performed by: SURGERY

## 2020-03-10 PROCEDURE — 7100000001 HC PACU RECOVERY - ADDTL 15 MIN: Performed by: SURGERY

## 2020-03-10 PROCEDURE — 2580000003 HC RX 258: Performed by: ANESTHESIOLOGY

## 2020-03-10 PROCEDURE — 3700000001 HC ADD 15 MINUTES (ANESTHESIA): Performed by: SURGERY

## 2020-03-10 PROCEDURE — 88329 PATH CONSLTJ DRG SURG: CPT

## 2020-03-10 PROCEDURE — 7100000000 HC PACU RECOVERY - FIRST 15 MIN: Performed by: SURGERY

## 2020-03-10 PROCEDURE — A9520 TC99 TILMANOCEPT DIAG 0.5MCI: HCPCS | Performed by: SURGERY

## 2020-03-10 PROCEDURE — 2700000000 HC OXYGEN THERAPY PER DAY

## 2020-03-10 PROCEDURE — 3430000000 HC RX DIAGNOSTIC RADIOPHARMACEUTICAL: Performed by: SURGERY

## 2020-03-10 PROCEDURE — 2709999900 HC NON-CHARGEABLE SUPPLY: Performed by: SURGERY

## 2020-03-10 PROCEDURE — 3700000000 HC ANESTHESIA ATTENDED CARE: Performed by: SURGERY

## 2020-03-10 PROCEDURE — 38525 BIOPSY/REMOVAL LYMPH NODES: CPT | Performed by: SURGERY

## 2020-03-10 PROCEDURE — 38792 RA TRACER ID OF SENTINL NODE: CPT

## 2020-03-10 PROCEDURE — 88307 TISSUE EXAM BY PATHOLOGIST: CPT

## 2020-03-10 PROCEDURE — 88342 IMHCHEM/IMCYTCHM 1ST ANTB: CPT

## 2020-03-10 PROCEDURE — 38525 BIOPSY/REMOVAL LYMPH NODES: CPT | Performed by: PHYSICIAN ASSISTANT

## 2020-03-10 RX ORDER — ONDANSETRON 2 MG/ML
INJECTION INTRAMUSCULAR; INTRAVENOUS PRN
Status: DISCONTINUED | OUTPATIENT
Start: 2020-03-10 | End: 2020-03-10 | Stop reason: SDUPTHER

## 2020-03-10 RX ORDER — LEVOFLOXACIN 500 MG/1
500 TABLET, FILM COATED ORAL ONCE
Status: DISCONTINUED | OUTPATIENT
Start: 2020-03-11 | End: 2020-03-11 | Stop reason: HOSPADM

## 2020-03-10 RX ORDER — LOSARTAN POTASSIUM 25 MG/1
25 TABLET ORAL DAILY
Status: DISCONTINUED | OUTPATIENT
Start: 2020-03-10 | End: 2020-03-11 | Stop reason: HOSPADM

## 2020-03-10 RX ORDER — CELECOXIB 200 MG/1
200 CAPSULE ORAL ONCE
Status: COMPLETED | OUTPATIENT
Start: 2020-03-10 | End: 2020-03-10

## 2020-03-10 RX ORDER — INSULIN GLARGINE 100 [IU]/ML
64 INJECTION, SOLUTION SUBCUTANEOUS DAILY
Status: DISCONTINUED | OUTPATIENT
Start: 2020-03-10 | End: 2020-03-11 | Stop reason: HOSPADM

## 2020-03-10 RX ORDER — TRAMADOL HYDROCHLORIDE 50 MG/1
100 TABLET ORAL EVERY 6 HOURS PRN
Status: DISCONTINUED | OUTPATIENT
Start: 2020-03-10 | End: 2020-03-11 | Stop reason: HOSPADM

## 2020-03-10 RX ORDER — MORPHINE SULFATE 4 MG/ML
4 INJECTION, SOLUTION INTRAMUSCULAR; INTRAVENOUS EVERY 5 MIN PRN
Status: DISCONTINUED | OUTPATIENT
Start: 2020-03-10 | End: 2020-03-10 | Stop reason: HOSPADM

## 2020-03-10 RX ORDER — KETOROLAC TROMETHAMINE 30 MG/ML
INJECTION, SOLUTION INTRAMUSCULAR; INTRAVENOUS PRN
Status: DISCONTINUED | OUTPATIENT
Start: 2020-03-10 | End: 2020-03-10 | Stop reason: SDUPTHER

## 2020-03-10 RX ORDER — SODIUM CHLORIDE 0.9 % (FLUSH) 0.9 %
10 SYRINGE (ML) INJECTION PRN
Status: DISCONTINUED | OUTPATIENT
Start: 2020-03-10 | End: 2020-03-11 | Stop reason: HOSPADM

## 2020-03-10 RX ORDER — LIDOCAINE HYDROCHLORIDE 10 MG/ML
1 INJECTION, SOLUTION EPIDURAL; INFILTRATION; INTRACAUDAL; PERINEURAL
Status: DISCONTINUED | OUTPATIENT
Start: 2020-03-10 | End: 2020-03-10 | Stop reason: HOSPADM

## 2020-03-10 RX ORDER — SODIUM CHLORIDE, SODIUM LACTATE, POTASSIUM CHLORIDE, CALCIUM CHLORIDE 600; 310; 30; 20 MG/100ML; MG/100ML; MG/100ML; MG/100ML
INJECTION, SOLUTION INTRAVENOUS CONTINUOUS
Status: DISCONTINUED | OUTPATIENT
Start: 2020-03-10 | End: 2020-03-10

## 2020-03-10 RX ORDER — METOCLOPRAMIDE HYDROCHLORIDE 5 MG/ML
10 INJECTION INTRAMUSCULAR; INTRAVENOUS
Status: ACTIVE | OUTPATIENT
Start: 2020-03-10 | End: 2020-03-10

## 2020-03-10 RX ORDER — APREPITANT 40 MG/1
40 CAPSULE ORAL ONCE
Status: COMPLETED | OUTPATIENT
Start: 2020-03-10 | End: 2020-03-10

## 2020-03-10 RX ORDER — MORPHINE SULFATE 4 MG/ML
2 INJECTION, SOLUTION INTRAMUSCULAR; INTRAVENOUS EVERY 5 MIN PRN
Status: DISCONTINUED | OUTPATIENT
Start: 2020-03-10 | End: 2020-03-10 | Stop reason: HOSPADM

## 2020-03-10 RX ORDER — SODIUM CHLORIDE 9 MG/ML
INJECTION, SOLUTION INTRAVENOUS CONTINUOUS
Status: DISCONTINUED | OUTPATIENT
Start: 2020-03-10 | End: 2020-03-11 | Stop reason: HOSPADM

## 2020-03-10 RX ORDER — HYDRALAZINE HYDROCHLORIDE 20 MG/ML
5 INJECTION INTRAMUSCULAR; INTRAVENOUS EVERY 10 MIN PRN
Status: DISCONTINUED | OUTPATIENT
Start: 2020-03-10 | End: 2020-03-10 | Stop reason: HOSPADM

## 2020-03-10 RX ORDER — TRAMADOL HYDROCHLORIDE 50 MG/1
50 TABLET ORAL EVERY 6 HOURS PRN
Status: DISCONTINUED | OUTPATIENT
Start: 2020-03-10 | End: 2020-03-11 | Stop reason: HOSPADM

## 2020-03-10 RX ORDER — MIDAZOLAM HYDROCHLORIDE 1 MG/ML
2 INJECTION INTRAMUSCULAR; INTRAVENOUS
Status: DISCONTINUED | OUTPATIENT
Start: 2020-03-10 | End: 2020-03-10 | Stop reason: HOSPADM

## 2020-03-10 RX ORDER — GABAPENTIN 300 MG/1
300 CAPSULE ORAL ONCE
Status: COMPLETED | OUTPATIENT
Start: 2020-03-10 | End: 2020-03-10

## 2020-03-10 RX ORDER — LABETALOL 20 MG/4 ML (5 MG/ML) INTRAVENOUS SYRINGE
5 EVERY 10 MIN PRN
Status: DISCONTINUED | OUTPATIENT
Start: 2020-03-10 | End: 2020-03-10 | Stop reason: HOSPADM

## 2020-03-10 RX ORDER — ISOSULFAN BLUE 50 MG/5ML
INJECTION, SOLUTION SUBCUTANEOUS PRN
Status: DISCONTINUED | OUTPATIENT
Start: 2020-03-10 | End: 2020-03-10 | Stop reason: HOSPADM

## 2020-03-10 RX ORDER — MORPHINE SULFATE 4 MG/ML
4 INJECTION, SOLUTION INTRAMUSCULAR; INTRAVENOUS
Status: DISCONTINUED | OUTPATIENT
Start: 2020-03-10 | End: 2020-03-11 | Stop reason: HOSPADM

## 2020-03-10 RX ORDER — SODIUM CHLORIDE 0.9 % (FLUSH) 0.9 %
10 SYRINGE (ML) INJECTION EVERY 12 HOURS SCHEDULED
Status: DISCONTINUED | OUTPATIENT
Start: 2020-03-10 | End: 2020-03-11 | Stop reason: HOSPADM

## 2020-03-10 RX ORDER — ONDANSETRON 2 MG/ML
4 INJECTION INTRAMUSCULAR; INTRAVENOUS EVERY 6 HOURS PRN
Status: DISCONTINUED | OUTPATIENT
Start: 2020-03-10 | End: 2020-03-11 | Stop reason: HOSPADM

## 2020-03-10 RX ORDER — MORPHINE SULFATE 4 MG/ML
4 INJECTION, SOLUTION INTRAMUSCULAR; INTRAVENOUS
Status: DISCONTINUED | OUTPATIENT
Start: 2020-03-10 | End: 2020-03-10 | Stop reason: HOSPADM

## 2020-03-10 RX ORDER — LIDOCAINE HYDROCHLORIDE 10 MG/ML
INJECTION, SOLUTION EPIDURAL; INFILTRATION; INTRACAUDAL; PERINEURAL PRN
Status: DISCONTINUED | OUTPATIENT
Start: 2020-03-10 | End: 2020-03-10 | Stop reason: SDUPTHER

## 2020-03-10 RX ORDER — LEVOTHYROXINE SODIUM 137 UG/1
137 TABLET ORAL DAILY
Status: DISCONTINUED | OUTPATIENT
Start: 2020-03-11 | End: 2020-03-11 | Stop reason: HOSPADM

## 2020-03-10 RX ORDER — PROPOFOL 10 MG/ML
INJECTION, EMULSION INTRAVENOUS PRN
Status: DISCONTINUED | OUTPATIENT
Start: 2020-03-10 | End: 2020-03-10 | Stop reason: SDUPTHER

## 2020-03-10 RX ORDER — METOCLOPRAMIDE HYDROCHLORIDE 5 MG/ML
10 INJECTION INTRAMUSCULAR; INTRAVENOUS
Status: COMPLETED | OUTPATIENT
Start: 2020-03-10 | End: 2020-03-10

## 2020-03-10 RX ORDER — SODIUM CHLORIDE 0.9 % (FLUSH) 0.9 %
10 SYRINGE (ML) INJECTION PRN
Status: DISCONTINUED | OUTPATIENT
Start: 2020-03-10 | End: 2020-03-10 | Stop reason: HOSPADM

## 2020-03-10 RX ORDER — FENTANYL CITRATE 50 UG/ML
INJECTION, SOLUTION INTRAMUSCULAR; INTRAVENOUS PRN
Status: DISCONTINUED | OUTPATIENT
Start: 2020-03-10 | End: 2020-03-10 | Stop reason: SDUPTHER

## 2020-03-10 RX ORDER — MEPERIDINE HYDROCHLORIDE 50 MG/ML
12.5 INJECTION INTRAMUSCULAR; INTRAVENOUS; SUBCUTANEOUS EVERY 5 MIN PRN
Status: DISCONTINUED | OUTPATIENT
Start: 2020-03-10 | End: 2020-03-10 | Stop reason: HOSPADM

## 2020-03-10 RX ORDER — ACETAMINOPHEN 500 MG
1000 TABLET ORAL ONCE
Status: COMPLETED | OUTPATIENT
Start: 2020-03-10 | End: 2020-03-10

## 2020-03-10 RX ORDER — GLYCOPYRROLATE 0.2 MG/ML
INJECTION INTRAMUSCULAR; INTRAVENOUS PRN
Status: DISCONTINUED | OUTPATIENT
Start: 2020-03-10 | End: 2020-03-10 | Stop reason: SDUPTHER

## 2020-03-10 RX ORDER — LETROZOLE 2.5 MG/1
2.5 TABLET, FILM COATED ORAL DAILY
Status: DISCONTINUED | OUTPATIENT
Start: 2020-03-10 | End: 2020-03-11 | Stop reason: HOSPADM

## 2020-03-10 RX ORDER — SODIUM CHLORIDE 0.9 % (FLUSH) 0.9 %
10 SYRINGE (ML) INJECTION EVERY 12 HOURS SCHEDULED
Status: DISCONTINUED | OUTPATIENT
Start: 2020-03-10 | End: 2020-03-10 | Stop reason: HOSPADM

## 2020-03-10 RX ORDER — FENTANYL CITRATE 50 UG/ML
50 INJECTION, SOLUTION INTRAMUSCULAR; INTRAVENOUS
Status: DISCONTINUED | OUTPATIENT
Start: 2020-03-10 | End: 2020-03-10 | Stop reason: HOSPADM

## 2020-03-10 RX ORDER — MORPHINE SULFATE 4 MG/ML
2 INJECTION, SOLUTION INTRAMUSCULAR; INTRAVENOUS
Status: DISCONTINUED | OUTPATIENT
Start: 2020-03-10 | End: 2020-03-11 | Stop reason: HOSPADM

## 2020-03-10 RX ORDER — KETAMINE HYDROCHLORIDE 50 MG/ML
INJECTION, SOLUTION, CONCENTRATE INTRAMUSCULAR; INTRAVENOUS PRN
Status: DISCONTINUED | OUTPATIENT
Start: 2020-03-10 | End: 2020-03-10 | Stop reason: SDUPTHER

## 2020-03-10 RX ORDER — EPHEDRINE SULFATE 50 MG/ML
INJECTION, SOLUTION INTRAVENOUS PRN
Status: DISCONTINUED | OUTPATIENT
Start: 2020-03-10 | End: 2020-03-10 | Stop reason: SDUPTHER

## 2020-03-10 RX ORDER — DIPHENHYDRAMINE HYDROCHLORIDE 50 MG/ML
12.5 INJECTION INTRAMUSCULAR; INTRAVENOUS
Status: DISCONTINUED | OUTPATIENT
Start: 2020-03-10 | End: 2020-03-10 | Stop reason: HOSPADM

## 2020-03-10 RX ORDER — SCOLOPAMINE TRANSDERMAL SYSTEM 1 MG/1
1 PATCH, EXTENDED RELEASE TRANSDERMAL ONCE
Status: DISCONTINUED | OUTPATIENT
Start: 2020-03-10 | End: 2020-03-10

## 2020-03-10 RX ORDER — DILTIAZEM HYDROCHLORIDE 180 MG/1
360 CAPSULE, COATED, EXTENDED RELEASE ORAL DAILY
Status: DISCONTINUED | OUTPATIENT
Start: 2020-03-11 | End: 2020-03-11 | Stop reason: HOSPADM

## 2020-03-10 RX ORDER — LEVOFLOXACIN 5 MG/ML
500 INJECTION, SOLUTION INTRAVENOUS
Status: COMPLETED | OUTPATIENT
Start: 2020-03-10 | End: 2020-03-10

## 2020-03-10 RX ORDER — PROMETHAZINE HYDROCHLORIDE 25 MG/ML
6.25 INJECTION, SOLUTION INTRAMUSCULAR; INTRAVENOUS
Status: DISCONTINUED | OUTPATIENT
Start: 2020-03-10 | End: 2020-03-10 | Stop reason: HOSPADM

## 2020-03-10 RX ORDER — FAMOTIDINE 20 MG/1
20 TABLET, FILM COATED ORAL
Status: DISCONTINUED | OUTPATIENT
Start: 2020-03-10 | End: 2020-03-10 | Stop reason: HOSPADM

## 2020-03-10 RX ADMIN — SODIUM CHLORIDE: 9 INJECTION, SOLUTION INTRAVENOUS at 15:03

## 2020-03-10 RX ADMIN — SODIUM CHLORIDE, SODIUM LACTATE, POTASSIUM CHLORIDE, AND CALCIUM CHLORIDE: 600; 310; 30; 20 INJECTION, SOLUTION INTRAVENOUS at 09:36

## 2020-03-10 RX ADMIN — ACETAMINOPHEN 1000 MG: 500 TABLET, FILM COATED ORAL at 09:36

## 2020-03-10 RX ADMIN — EPHEDRINE SULFATE 10 MG: 50 INJECTION INTRAMUSCULAR; INTRAVENOUS; SUBCUTANEOUS at 11:53

## 2020-03-10 RX ADMIN — ONDANSETRON HYDROCHLORIDE 4 MG: 2 INJECTION, SOLUTION INTRAMUSCULAR; INTRAVENOUS at 12:37

## 2020-03-10 RX ADMIN — FENTANYL CITRATE 25 MCG: 50 INJECTION INTRAMUSCULAR; INTRAVENOUS at 12:05

## 2020-03-10 RX ADMIN — ONDANSETRON 4 MG: 2 INJECTION INTRAMUSCULAR; INTRAVENOUS at 21:57

## 2020-03-10 RX ADMIN — GABAPENTIN 300 MG: 300 CAPSULE ORAL at 09:36

## 2020-03-10 RX ADMIN — LEVOFLOXACIN 500 MG: 5 INJECTION, SOLUTION INTRAVENOUS at 11:11

## 2020-03-10 RX ADMIN — FENTANYL CITRATE 25 MCG: 50 INJECTION INTRAMUSCULAR; INTRAVENOUS at 12:04

## 2020-03-10 RX ADMIN — LETROZOLE 2.5 MG: 2.5 TABLET, FILM COATED ORAL at 21:19

## 2020-03-10 RX ADMIN — CELECOXIB 200 MG: 200 CAPSULE ORAL at 09:36

## 2020-03-10 RX ADMIN — LIDOCAINE HYDROCHLORIDE 50 MG: 10 INJECTION, SOLUTION EPIDURAL; INFILTRATION; INTRACAUDAL; PERINEURAL at 11:08

## 2020-03-10 RX ADMIN — TILMANOCEPT 0.5 MILLICURIE: KIT at 13:10

## 2020-03-10 RX ADMIN — GLYCOPYRROLATE 0.2 MG: 1 INJECTION INTRAMUSCULAR; INTRAVENOUS at 11:21

## 2020-03-10 RX ADMIN — FENTANYL CITRATE 25 MCG: 50 INJECTION INTRAMUSCULAR; INTRAVENOUS at 11:47

## 2020-03-10 RX ADMIN — PROPOFOL 130 MG: 10 INJECTION, EMULSION INTRAVENOUS at 11:08

## 2020-03-10 RX ADMIN — METOCLOPRAMIDE 10 MG: 5 INJECTION, SOLUTION INTRAMUSCULAR; INTRAVENOUS at 14:10

## 2020-03-10 RX ADMIN — EPHEDRINE SULFATE 10 MG: 50 INJECTION INTRAMUSCULAR; INTRAVENOUS; SUBCUTANEOUS at 12:42

## 2020-03-10 RX ADMIN — SODIUM CHLORIDE, SODIUM LACTATE, POTASSIUM CHLORIDE, AND CALCIUM CHLORIDE: 600; 310; 30; 20 INJECTION, SOLUTION INTRAVENOUS at 12:37

## 2020-03-10 RX ADMIN — FAMOTIDINE 20 MG: 10 INJECTION, SOLUTION INTRAVENOUS at 10:54

## 2020-03-10 RX ADMIN — APREPITANT 40 MG: 40 CAPSULE ORAL at 10:53

## 2020-03-10 RX ADMIN — KETOROLAC TROMETHAMINE 10 MG: 30 INJECTION, SOLUTION INTRAMUSCULAR; INTRAVENOUS at 12:50

## 2020-03-10 RX ADMIN — FENTANYL CITRATE 25 MCG: 50 INJECTION INTRAMUSCULAR; INTRAVENOUS at 11:16

## 2020-03-10 RX ADMIN — EPHEDRINE SULFATE 10 MG: 50 INJECTION INTRAMUSCULAR; INTRAVENOUS; SUBCUTANEOUS at 11:23

## 2020-03-10 RX ADMIN — ONDANSETRON 4 MG: 2 INJECTION INTRAMUSCULAR; INTRAVENOUS at 15:03

## 2020-03-10 RX ADMIN — Medication 20 MG: at 11:40

## 2020-03-10 RX ADMIN — EPHEDRINE SULFATE 10 MG: 50 INJECTION INTRAMUSCULAR; INTRAVENOUS; SUBCUTANEOUS at 11:25

## 2020-03-10 ASSESSMENT — PAIN SCALES - GENERAL
PAINLEVEL_OUTOF10: 0
PAINLEVEL_OUTOF10: 0

## 2020-03-10 ASSESSMENT — ENCOUNTER SYMPTOMS: SHORTNESS OF BREATH: 0

## 2020-03-10 ASSESSMENT — LIFESTYLE VARIABLES: SMOKING_STATUS: 0

## 2020-03-10 NOTE — ANESTHESIA PRE PROCEDURE
DO Carlene 100 mL/hr at 03/10/20 0936      levofloxacin (LEVAQUIN) 500 MG/100ML infusion 500 mg  500 mg Intravenous On Call to [address] [place], MD         Facility-Administered Medications Ordered in Other Encounters   Medication Dose Route Frequency Provider Last Rate Last Dose    technetium Tc 99m tilmanocept (LYMPHOSEEK) injection 0.5 millicurie  658 micro curie Subcutaneous ONCE PRN Cadence Antonio MD           Allergies: Allergies   Allergen Reactions    Hydrocodone-Acetaminophen Nausea And Vomiting    Amoxicillin Hives    Clarithromycin     Penicillins Hives    Multihance [Gadobenate] Nausea Only     NAUSEOUS AFTER MRI CONTRAST (MULTIHANCE)       Problem List:    Patient Active Problem List   Diagnosis Code    Malignant neoplasm of upper-outer quadrant of left breast in female, estrogen receptor positive ([facility] C50.412, Z17.0       Past Medical History:        Diagnosis Date    Arteriovenous malformation, other site     Arthritis     [facility] 04/28/2016    sees dr. Bacilio Elias [facility])     breast; surgery and chemo x 2 and radiation    COPD [facility]chronic obstructive pulmonary disease) [facility]     Diabetes mellitus ([facility]     H/O screening mammography 07/08/2019    Brad Saavedra H/O: GI bleed 06/24/2016    2016 while on xarelto for a.fib; no longer on any anti-coags.  Xarelto discontinued     Hypertension     Hypothyroidism     Iron deficiency anemia     Kidney disease     sees dr. Horacio Shepherd [facility]) [place]    sees dr. Debbie Isabel Osteopenia of the elderly     Osteoporosis     Post-menopausal     Post-menopausal     Thrombocytopenia ([facility]     Thyroid disease        Past Surgical History:        Procedure Laterality Date    BREAST SURGERY      right mastectomy; no sticks/bp right arm    CARDIAC SURGERY      \"watchman's device\" per dr. Khoi Martinez COLONOSCOPY  06/28/2016    per  recall 10 years     ENDOSCOPY, COLON, DIAGNOSTIC  IVC FILTER INSERTION      JOINT REPLACEMENT      LYMPH NODE BIOPSY Left     Supraclavicular     TOTAL KNEE ARTHROPLASTY Right 2017    KNEE TOTAL ARTHROPLASTY performed by Bart Ward MD at [place] [address] TUNNELED VENOUS PORT PLACEMENT      x2 and removed       Social History:    Social History     Tobacco Use    Smoking status: Former Smoker     Types: Cigarettes     Last attempt to quit: 1993     Years since quittin.3    Smokeless tobacco: Never Used   Substance Use Topics    Alcohol use: No                                Counseling given: Not Answered      Vital Signs (Current):   Vitals:    03/10/20 0924   BP: 132/64   Pulse: 76   Resp: 16   Temp: 97.8 °F (36.6 °C)   TempSrc: Temporal   SpO2: 98%   Weight: 187 lb (84.8 kg)   Height: 5' 4\" (1.626 m)                                              BP Readings from Last 3 Encounters:   03/10/20 132/64   20 130/79   20 134/70       NPO Status: Time of last liquid consumption:                         Time of last solid consumption:                         Date of last liquid consumption: 20                        Date of last solid food consumption: 20    BMI:   Wt Readings from Last 3 Encounters:   03/10/20 187 lb (84.8 kg)   20 187 lb (84.8 kg)   20 196 lb (88.9 kg)     Body mass index is 32.1 kg/m².     CBC:   Lab Results   Component Value Date    WBC 9.5 2020    RBC 4.68 2020    HGB 13.6 2020    HCT 42.5 2020    MCV 90.8 2020    RDW 13.2 2020     2020       CMP:   Lab Results   Component Value Date     2019    K 4.8 2019    CL 95 2019    CO2 28 2019    BUN 16 2019    CREATININE 1.0 2020    CREATININE 1.12 2019    GFRAA 56 2019    AGRATIO 1.6 2019    LABGLOM 54 2020    GLUCOSE 155 2019    PROT 7.7 2019    CALCIUM 10.3 2019    BILITOT 0.6 products discussed with patient whom. Plan discussed with CRNA.     Attending anesthesiologist reviewed and agrees with Pre Eval content              Oj Tomlinson MD   3/10/2020

## 2020-03-10 NOTE — ANESTHESIA POSTPROCEDURE EVALUATION
Department of Anesthesiology  Postprocedure Note    Patient: Letty Llanes  MRN: [id number]  YOB: 1945  Date of evaluation: 3/10/2020  Time:  1:18 PM     Procedure Summary     Date:  03/10/20 Room / Location:  [facility] OR [address] [place] [address]    Anesthesia Start:  1104 Anesthesia Stop:  8420    Procedure:  FLAPS WITH LEFT MASTECTOMY, SENTINEL NODE BIOPSY, PEC BLOCK (Left Breast) Diagnosis:  (Z08.381)    Surgeon:  Karyn Bonilla MD Responsible Provider:  DANISHA Woo CRNA    Anesthesia Type:  general ASA Status:  3          Anesthesia Type: general    Glenna Phase I: Glenna Score: 10    Glenna Phase II:      Last vitals: Reviewed and per EMR flowsheets.        Anesthesia Post Evaluation    Patient location during evaluation: PACU  Patient participation: complete - patient participated  Level of consciousness: sleepy but conscious  Pain score: 0  Airway patency: patent  Nausea & Vomiting: no nausea and no vomiting  Complications: no  Cardiovascular status: hemodynamically stable  Respiratory status: acceptable and nasal cannula  Hydration status: euvolemic

## 2020-03-10 NOTE — BRIEF OP NOTE
Brief Postoperative Note      DATE OF PROCEDURE: 3/10/2020     SURGEON: Lisa Rousseau MD    PREOPERATIVE DIAGNOSIS:  C50.412    POSTOPERATIVE DIAGNOSIS: Same     OPERATION: Procedure(s):  FLAPS WITH LEFT MASTECTOMY, SENTINEL NODE BIOPSY, PEC BLOCK    ANESTHESIA: General    ESTIMATED BLOOD LOSS: Minimal    COMPLICATIONS: None. SPECIMENS:   ID Type Source Tests Collected by Time Destination   A : left breast tissue Tissue Breast SURGICAL PATHOLOGY Lisa Rousseau MD 3/10/2020 1148    B : left sentinel node Tissue Breast SURGICAL PATHOLOGY Lisa Rousseau MD 3/10/2020 1148        DRAINS: 2 JPs    The patient tolerated the procedure well.     Electronically signed by Lisa Rousseau MD  on 3/10/2020 at 1:08 PM

## 2020-03-10 NOTE — H&P
 Heart Attack Brother     Cancer Brother         Venal Cell-Age Unknown    Pancreatic Cancer Child 52     Social History     Tobacco Use    Smoking status: Former Smoker     Types: Cigarettes     Last attempt to quit: 1993     Years since quittin.3    Smokeless tobacco: Never Used   Substance Use Topics    Alcohol use: No       ROS:  14 point review of systems is negative except for the above. PHYSICAL EXAM:    The patient is a 76 y.o. female  in no acute distress. She is alert oriented and cooperative. Mood and affect are appropriate. Skin is warm and dry without rashes. .VS    HEENT: Normocephalic and atraumatic. EOMs intact. Pupils equal and round and reactive to light and accommodation. External ears and nose are normal.  Sclera nonicteric. Conjunctiva normal  Oropharynx without masses or lesions. Neck: Neck is supple without masses or thyromegaly    Chest: Lungs are clear to auscultation. Respiratory effort normal    Cardiac: Regular rate and rhythm without rubs, murmurs, or gallops    Breasts: status post right mastectomy    Abdomen: The abdomen is soft and nontender with no hepatosplenomegaly. There are no abdominal hernias noted. Extremities: The extremities are normal. There are no signs of clubbing, cyanosis, or edema. IMPRESSION:Cancer left breast       DISCUSSION:  I had a lengthy discussion with Ms. Aponte  and her family about the ramifications of the diagnosis of breast cancer. We discussed the pathophysiology of cancer in general and also the ways in which surgery, radiation therapy, and chemotherapy are utilized in the treatment of different types of cancers. We also explained how these modalities related to her situation in particular.   We discussed the pathophysiology of breast cancer and some length, including what is known about the causes of breast cancer, its relationship to fibrocystic disease, its relationship to hormone replacement therapy, and some of the genetic aspects involved in familial breast cancers. We discussed the BrCa genetic analysis and why it is appropriate for her. We discussed breast MRI and how it assists in evaluation of breast cancers and the results of her MRI if done.       We discussed the surgical options including simple mastectomy and lumpectomy with  sentinel lymph node biopsy as well as the possibility of axillary lymph node dissection. We explained in depth why breast conservation therapy requires radiation treatments for the majority of women. These treatments may be external beam for 6 weeks, partial breast for 5 days,  or intraoperative. I explained that most women treated for invasive malignancy do receive systemic therapy, hormonal therapy or  chemotherapy postoperatively depending upon the final pathology, the lymph node status, and the hormone receptor status. I discussed Oncotype Dx, Mammoprint, and Adjuvant Online as tools which aid in the decision for chemotherapy or hormonal therapy. We also discussed the possibility of breast reconstruction if a mastectomy was required. .  I explained to her the different techniques including placement of a subpectoral implant with a Alloderm sling  versus TRAM flap reconstruction as welll as other methods of reconstruction. She does not wish to pursue reconstruction at this time.         After a prolonged discussion lasting 100 minutes  we felt it was most appropriate that she undergo left simple mastectomy and and sentinel node biopsy       We discussed the risks and benefits of the surgery including but not limited to bleeding, infection, pain, lymphedema, numbness, and also the risks of general anesthesia including pneumonia, blood clots, heart attack, stroke, and death.   She expresses good understanding and is agreeable to proceed with surgery.       We will schedule this to be done when convenient  at [facility].

## 2020-03-11 VITALS
HEIGHT: 64 IN | WEIGHT: 187 LBS | DIASTOLIC BLOOD PRESSURE: 77 MMHG | BODY MASS INDEX: 31.92 KG/M2 | OXYGEN SATURATION: 97 % | RESPIRATION RATE: 18 BRPM | HEART RATE: 107 BPM | SYSTOLIC BLOOD PRESSURE: 116 MMHG | TEMPERATURE: 96.9 F

## 2020-03-11 LAB
GLUCOSE BLD-MCNC: 248 MG/DL (ref 70–99)
PERFORMED ON: ABNORMAL

## 2020-03-11 PROCEDURE — 6360000002 HC RX W HCPCS: Performed by: SURGERY

## 2020-03-11 PROCEDURE — 96372 THER/PROPH/DIAG INJ SC/IM: CPT

## 2020-03-11 PROCEDURE — 99024 POSTOP FOLLOW-UP VISIT: CPT | Performed by: SURGERY

## 2020-03-11 PROCEDURE — 2580000003 HC RX 258: Performed by: SURGERY

## 2020-03-11 PROCEDURE — G0378 HOSPITAL OBSERVATION PER HR: HCPCS

## 2020-03-11 PROCEDURE — 99024 POSTOP FOLLOW-UP VISIT: CPT | Performed by: PHYSICIAN ASSISTANT

## 2020-03-11 PROCEDURE — 6370000000 HC RX 637 (ALT 250 FOR IP): Performed by: SURGERY

## 2020-03-11 PROCEDURE — 82947 ASSAY GLUCOSE BLOOD QUANT: CPT

## 2020-03-11 RX ADMIN — LEVOTHYROXINE SODIUM 137 MCG: 137 TABLET ORAL at 05:30

## 2020-03-11 RX ADMIN — INSULIN GLARGINE 64 UNITS: 100 INJECTION, SOLUTION SUBCUTANEOUS at 08:46

## 2020-03-11 RX ADMIN — METOPROLOL TARTRATE 25 MG: 25 TABLET ORAL at 08:47

## 2020-03-11 RX ADMIN — ENOXAPARIN SODIUM 40 MG: 40 INJECTION SUBCUTANEOUS at 08:47

## 2020-03-11 RX ADMIN — LOSARTAN POTASSIUM 25 MG: 25 TABLET ORAL at 08:47

## 2020-03-11 RX ADMIN — SODIUM CHLORIDE, PRESERVATIVE FREE 10 ML: 5 INJECTION INTRAVENOUS at 08:48

## 2020-03-11 RX ADMIN — LETROZOLE 2.5 MG: 2.5 TABLET, FILM COATED ORAL at 08:49

## 2020-03-11 RX ADMIN — DILTIAZEM HYDROCHLORIDE 360 MG: 180 CAPSULE, COATED, EXTENDED RELEASE ORAL at 08:48

## 2020-03-11 NOTE — OP NOTE
[facility] [employer] [facility] [address] [facility]                                OPERATIVE REPORT    PATIENT NAME: Hermilo Palacios                       :        1945  MED REC NO:   [id number]                              ROOM:       [facility]  ACCOUNT NO:   [de-identified]                           ADMIT DATE: 03/10/2020  PROVIDER:     Brendon Rebollar MD    DATE OF PROCEDURE:  03/10/2020    PREOPERATIVE DIAGNOSIS:  Invasive lobular carcinoma, left breast.    POSTOPERATIVE DIAGNOSIS:  Invasive lobular carcinoma, left breast.    PROCEDURES:  1. Injection of radionuclide. 2.  Lymphatic mapping. 3.  Left-sided pectoral block. 4.  Left simple mastectomy. 5.  Left sentinel lymph node biopsy. SURGEON:  Brendon Rebollar MD    ASSISTANT:  Tree Monsalve PA-C. He was present for all portions of  the case including all critical portions as well as closure. ANESTHESIA:  General.    INDICATIONS:  The patient is a 80-year-old lady who is 8 years status  post previous right mastectomy and axillary node dissection in  and  a new left breast cancer; it is a 2 cm low-grade invasive lobular  carcinoma. We discussed the risks and benefits of lumpectomy versus  mastectomy and because she had had a prior mastectomy, she wished a  simple mastectomy on this side. We discussed reconstruction and she had  no interest.    OPERATIVE PROCEDURE:  Today, she was brought to the operating room,  underwent adequate general anesthesia, and we then injected the left  periareolar dermis with 500 microcuries of Lymphoseek. We then  performed a left pectoral block utilizing a solution of 50 mL of 0.2%  ropivacaine, 8 mg of Decadron, and 50 mL of saline. We injected first  along the lateral sternal border getting the intercostal perforators  using ultrasound guidance. We injected the inframammary crease and the  infraclavicular area.   We then injected interpectorally and along the  lateral

## 2020-03-11 NOTE — PROGRESS NOTES
Discharge instructions, prescriptions, and follow up appointments reviewed with patient who verbalized understanding. Patient stable and agreeable to discharge. Patient discharged home with son.

## 2020-03-12 NOTE — DISCHARGE SUMMARY
Physician Discharge Summary         Patient ID:  Rod Valdez  [id number]  76 y.o. Admit date: 3/10/2020    Discharge date and time: 3/11/2020 12:41 PM     Admitting Physician: Raman Lea MD       Discharge Diagnoses:   Lobular carcinoma left breast, final pathology report pending    Patient Active Problem List   Diagnosis    Malignant neoplasm of upper-outer quadrant of left breast in female, estrogen receptor positive ([address]    Status post left mastectomy       Procedure:  1. Injection of radionuclide. 2.  Lymphatic mapping. 3.  Left-sided pectoral block. 4.  Left simple mastectomy. 5.  Left sentinel lymph node biopsy. Discharged Condition: good    Hospital Course:   Please see hospital chart    Consults:  none    Disposition: home    Patient Instructions: Activity and wound care per mastectomy instruction sheet  Diet: regular diet    [facility]  [phone]    On 3/20/2020  @ 2:30 pm    Raman Lea MD  [address] [facility] [address] [place] [address] [place]  [phone]    On 3/18/2020  @ 1:45pm       Medication List      CHANGE how you take these medications    aspirin EC 81 MG EC tablet  Take 1 tablet by mouth 2 times daily  What changed:    · when to take this  · additional instructions     letrozole 2.5 MG tablet  Commonly known as:  [address] 1 TABLET BY MOUTH EVERY DAY  What changed:  See the new instructions.         CONTINUE taking these medications    dilTIAZem 180 MG extended release capsule  Commonly known as:  CARDIZEM CD     levothyroxine 137 MCG tablet  Commonly known as:  SYNTHROID     losartan 25 MG tablet  Commonly known as:  COZAAR     metoprolol tartrate 25 MG tablet  Commonly known as:  LOPRESSOR     MULTI FOR HER PO     Toujeo Max SoloStar 300 UNIT/ML Sopn  Generic drug:  Insulin Glargine (2 Unit Dial)     vitamin D 1000 UNIT Tabs tablet  Commonly known as:  CHOLECALCIFEROL            Signed:  Electronically signed by Lizy Patiño PA-C on 3/12/20 at 2:18 PM CDT

## 2020-03-13 ENCOUNTER — TELEPHONE (OUTPATIENT)
Dept: SURGERY | Age: 75
End: 2020-03-13

## 2020-03-13 NOTE — TELEPHONE ENCOUNTER
Spoke with pt and encouraged her to keep stripping the drain tube and she can reinforce the dressing. I told her to call us back if she had any other questions.

## 2020-03-13 NOTE — TELEPHONE ENCOUNTER
Patient called in and stated that she is having leaking around where the drain is. Please call the patient at [phone].   Thank you  TREVOR [place]

## 2020-03-18 ENCOUNTER — OFFICE VISIT (OUTPATIENT)
Dept: SURGERY | Age: 75
End: 2020-03-18

## 2020-03-18 VITALS
TEMPERATURE: 97.5 F | OXYGEN SATURATION: 98 % | HEART RATE: 69 BPM | BODY MASS INDEX: 30.39 KG/M2 | WEIGHT: 178 LBS | HEIGHT: 64 IN

## 2020-03-18 PROCEDURE — 99024 POSTOP FOLLOW-UP VISIT: CPT | Performed by: PHYSICIAN ASSISTANT

## 2020-03-20 ENCOUNTER — OFFICE VISIT (OUTPATIENT)
Dept: FAMILY MEDICINE CLINIC | Facility: CLINIC | Age: 75
End: 2020-03-20

## 2020-03-20 VITALS
HEIGHT: 64 IN | BODY MASS INDEX: 31.92 KG/M2 | RESPIRATION RATE: 16 BRPM | WEIGHT: 187 LBS | SYSTOLIC BLOOD PRESSURE: 128 MMHG | HEART RATE: 72 BPM | TEMPERATURE: 98.3 F | OXYGEN SATURATION: 98 % | DIASTOLIC BLOOD PRESSURE: 82 MMHG

## 2020-03-20 DIAGNOSIS — C50.919 MALIGNANT NEOPLASM OF FEMALE BREAST, UNSPECIFIED ESTROGEN RECEPTOR STATUS, UNSPECIFIED LATERALITY, UNSPECIFIED SITE OF BREAST (HCC): ICD-10-CM

## 2020-03-20 DIAGNOSIS — E03.9 ACQUIRED HYPOTHYROIDISM: ICD-10-CM

## 2020-03-20 DIAGNOSIS — Z79.4 TYPE 2 DIABETES MELLITUS WITHOUT COMPLICATION, WITH LONG-TERM CURRENT USE OF INSULIN (HCC): ICD-10-CM

## 2020-03-20 DIAGNOSIS — E11.9 TYPE 2 DIABETES MELLITUS WITHOUT COMPLICATION, WITH LONG-TERM CURRENT USE OF INSULIN (HCC): ICD-10-CM

## 2020-03-20 DIAGNOSIS — I10 ESSENTIAL HYPERTENSION: Primary | ICD-10-CM

## 2020-03-20 PROCEDURE — 99214 OFFICE O/P EST MOD 30 MIN: CPT | Performed by: FAMILY MEDICINE

## 2020-03-20 NOTE — PROGRESS NOTES
"Glenn Pritchard is a 74 y.o. female.     Chief Complaint   Patient presents with   • Follow-up     mastectomy   • Fatigue        History of Present Illness     she recently was dx with breast cancer----waiting on path report.--she thinks bs and bp has been stable ..      Current Outpatient Medications:   •  aspirin 81 MG chewable tablet, Chew 1 tablet Daily., Disp: , Rfl:   •  diltiaZEM CD (CARDIZEM CD) 180 MG 24 hr capsule, TAKE 2 CAPSULES BY MOUTH DAILY, Disp: 180 capsule, Rfl: 3  •  Insulin Pen Needle (PEN NEEDLES) 31G X 5 MM misc, 1 each 2 (Two) Times a Day., Disp: 100 each, Rfl: 5  •  letrozole (FEMARA) 2.5 MG tablet, Take 2.5 mg by mouth Daily., Disp: , Rfl:   •  levothyroxine (SYNTHROID, LEVOTHROID) 137 MCG tablet, TAKE 1 TABLET BY MOUTH DAILY, Disp: 90 tablet, Rfl: 0  •  losartan (COZAAR) 25 MG tablet, Take 25 mg by mouth Daily., Disp: , Rfl:   •  metoprolol tartrate (LOPRESSOR) 25 MG tablet, TAKE 1 TABLET BY MOUTH EVERY 12 HOURS, Disp: 180 tablet, Rfl: 1  •  Multiple Vitamins-Minerals (CENTRUM SILVER PO), Take 1 tablet by mouth Daily., Disp: , Rfl:   •  TOUJEO SOLOSTAR 300 UNIT/ML solution pen-injector injection, INJECT 80 UNITS UNDER THE SKIN UTD QD, Disp: , Rfl: 1  Allergies   Allergen Reactions   • Amoxil [Amoxicillin] Hives   • Penicillins Hives   • Biaxin [Clarithromycin] Other (See Comments)     NOT SURE OF REACTION, \"SORE MOUTH OF DIARRHEA\"   • Lortab [Hydrocodone-Acetaminophen] GI Intolerance       Past Medical History:   Diagnosis Date   • Arrhythmia    • Atrial fibrillation, currently in sinus rhythm    • Breast cancer (CMS/HCC)     right   • Cancer (CMS/HCC)     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)   • Carotid artery occlusion    • Diabetes mellitus, type II (CMS/HCC)    • Dizziness    • Drug therapy    • Essential hypertension    • GERD (gastroesophageal reflux disease)    • GI bleed requiring more than 4 units of blood in 24 hours, ICU, or surgery    • History of chemotherapy    • History of " "lymphoma    • Hx of radiation therapy    • Hypomagnesemia    • Hypothyroidism    • On amiodarone therapy      Past Surgical History:   Procedure Laterality Date   • ATRIAL APPENDAGE EXCLUSION LEFT WITH TRANSESOPHAGEAL ECHOCARDIOGRAM Left 11/27/2018    Procedure: Atrial Appendage Occlusion;  Surgeon: Mick Orantes MD;  Location:  [facility];  Service: Cardiology   • ATRIAL APPENDAGE EXCLUSION LEFT WITH TRANSESOPHAGEAL ECHOCARDIOGRAM Left 1/22/2019    Procedure: Atrial Appendage Occlusion;  Surgeon: Mick Orantes MD;  Location:  [facility];  Service: Cardiology   • BREAST BIOPSY     • MASTECTOMY     • OTHER SURGICAL HISTORY      Mediport insertion X 2   • OTHER SURGICAL HISTORY      remote lymphoma treatment   • REPLACEMENT TOTAL KNEE Right 09/2017   • TUBAL ABDOMINAL LIGATION         Review of Systems   Constitutional: Positive for fatigue.   HENT: Negative.    Eyes: Negative.    Respiratory: Negative.    Cardiovascular: Negative.    Gastrointestinal: Negative.    Endocrine: Negative.    Genitourinary: Negative.    Musculoskeletal: Negative.    Skin: Negative.    Allergic/Immunologic: Negative.    Neurological: Negative.    Hematological: Negative.    Psychiatric/Behavioral: Negative.        Objective  /82 (BP Location: Right arm, Patient Position: Sitting)   Pulse 72   Temp 98.3 °F (36.8 °C) (Temporal)   Resp 16   Ht 162.6 cm (64\")   Wt 84.8 kg (187 lb)   SpO2 98%   BMI 32.10 kg/m²   Physical Exam   Constitutional: She is oriented to person, place, and time. She appears well-developed and well-nourished.   HENT:   Head: Normocephalic and atraumatic.   Right Ear: External ear normal.   Left Ear: External ear normal.   Nose: Nose normal.   Mouth/Throat: Oropharynx is clear and moist.   Eyes: Pupils are equal, round, and reactive to light. Conjunctivae and EOM are normal.   Neck: Normal range of motion. Neck supple.   Cardiovascular: Normal rate, regular rhythm, normal " heart sounds and intact distal pulses.   Pulmonary/Chest: Effort normal and breath sounds normal.   Abdominal: Soft. Bowel sounds are normal.   Musculoskeletal: Normal range of motion.   Neurological: She is alert and oriented to person, place, and time.   Skin: Skin is warm and dry. Capillary refill takes less than 2 seconds.   Psychiatric: She has a normal mood and affect. Her behavior is normal. Judgment and thought content normal.   Nursing note and vitals reviewed.      Assessment/Plan   Dianne was seen today for follow-up and fatigue.    Diagnoses and all orders for this visit:    Essential hypertension    Type 2 diabetes mellitus without complication, with long-term current use of insulin (CMS/[facility])    Acquired hypothyroidism    Malignant neoplasm of female breast, unspecified estrogen receptor status, unspecified laterality, unspecified site of breast (CMS/[facility])      Advance Care Planning   ACP discussion was held with the patient during this visit. Patient does not have an advance directive, declines further assistance.    Patient's Body mass index is 32.1 kg/m². BMI is above normal parameters. Recommendations include: nutrition counseling.   She declines labs today    No orders of the defined types were placed in this encounter.  Current outpatient and discharge medications have been reconciled for the patient.  Reviewed by: Darryl Bang MD  Follow up: 3 month(s)

## 2020-03-25 ENCOUNTER — OFFICE VISIT (OUTPATIENT)
Dept: SURGERY | Age: 75
End: 2020-03-25

## 2020-03-25 VITALS
WEIGHT: 178 LBS | TEMPERATURE: 98 F | HEIGHT: 64 IN | HEART RATE: 60 BPM | OXYGEN SATURATION: 98 % | BODY MASS INDEX: 30.39 KG/M2

## 2020-03-25 PROCEDURE — 99024 POSTOP FOLLOW-UP VISIT: CPT | Performed by: PHYSICIAN ASSISTANT

## 2020-03-25 NOTE — PROGRESS NOTES
Subjective:      Patient ID: Apryl Ramos is a 76 y.o. female. HPI   Patient presents in follow-up from left mastectomy. She is doing well. She has minimal drainage. Review of Systems  She denied any fevers or chills. She has had no incisional drainage. Patient Active Problem List    Diagnosis Date Noted    Status post left mastectomy 03/10/2020    Malignant neoplasm of upper-outer quadrant of left breast in female, estrogen receptor positive ([facility] [address] 02/06/2020     Current Outpatient Medications   Medication Sig Dispense Refill    Insulin Glargine, 2 Unit Dial, (TOUJEO MAX SOLOSTAR) 300 UNIT/ML SOPN Inject 80 Units into the skin daily Indications: Diabetes      levothyroxine (SYNTHROID) 137 MCG tablet Take 137 mcg by mouth Daily Indications: Underactive Thyroid      dilTIAZem (CARDIZEM CD) 180 MG extended release capsule Take 360 mg by mouth daily Indications: High Blood Pressure Disorder      metoprolol tartrate (LOPRESSOR) 25 MG tablet Take 25 mg by mouth daily Indications: High Blood Pressure Disorder      letrozole (FEMARA) 2.5 MG tablet TAKE 1 TABLET BY MOUTH EVERY DAY (Patient taking differently: Take 2.5 mg by mouth daily ) 90 tablet 0    aspirin EC 81 MG EC tablet Take 1 tablet by mouth 2 times daily (Patient taking differently: Take 81 mg by mouth daily Pt takes sometimes) 60 tablet 0    losartan (COZAAR) 25 MG tablet Take 25 mg by mouth daily Indications: High Blood Pressure Disorder       Multiple Vitamins-Minerals (MULTI FOR HER PO) Take 1 tablet by mouth daily       vitamin D (CHOLECALCIFEROL) 1000 UNIT TABS tablet Take 1,000 Units by mouth daily       No current facility-administered medications for this visit. Allergies: Hydrocodone-acetaminophen; Amoxicillin; Clarithromycin;  Penicillins; and Multihance [gadobenate]     Past Medical History:   Diagnosis Date    Arteriovenous malformation, other site     Arthritis     Atrial fibrillation ([facility] [address] 04/28/2016    sees dr. Benjamin Gonzalez Substance Use Topics    Alcohol use: No     Objective:   Physical Exam  Pulse 60, temperature 98 °F (36.7 °C), temperature source Temporal, height 5' 4\" (1.626 m), weight 178 lb (80.7 kg), SpO2 98 %. On examination her incision is healing well. There is no evidence of infection. Christo-Pinto drain shows minimal drainage. It was removed today without difficulty. Assessment:      Status post left mastectomy doing well      Plan: We will plan to see her back in 1 week. This visit may be an evisit. She will call us if she has any changes.           Peterson Villaseñor PA-C

## 2020-03-31 RX ORDER — LETROZOLE 2.5 MG/1
TABLET, FILM COATED ORAL
Qty: 90 TABLET | Refills: 0 | Status: SHIPPED | OUTPATIENT
Start: 2020-03-31 | End: 2020-04-14 | Stop reason: SDUPTHER

## 2020-04-02 ENCOUNTER — TELEPHONE (OUTPATIENT)
Dept: SURGERY | Age: 75
End: 2020-04-02

## 2020-04-07 ENCOUNTER — TELEPHONE (OUTPATIENT)
Dept: VASCULAR SURGERY | Facility: CLINIC | Age: 75
End: 2020-04-07

## 2020-04-07 NOTE — TELEPHONE ENCOUNTER
PT IS AWARE OF APPT CHANGE TIME AND DATE. PT STATED SHE IS DOING FINE AND SHE WILL CALL THE OFFICE IF SHE NEEDS ANYTHING. WILL MAIL APPT REMINDER TO PT

## 2020-04-14 RX ORDER — LETROZOLE 2.5 MG/1
TABLET, FILM COATED ORAL
Qty: 90 TABLET | Refills: 0 | Status: SHIPPED | OUTPATIENT
Start: 2020-04-14 | End: 2020-09-29 | Stop reason: SDUPTHER

## 2020-04-15 NOTE — PROGRESS NOTES
[facility] on 02/22/12 revealing a 1.6 cm worrisome mass at the 7:00 position in the right lower quadrant of the right breast. On 03/21/12 Dr. Sotero Harrell took Tram to the OR and performed an intraoperative biopsy with frozen section that revealed an infiltrating breast cancer. The procedure then followed immediately with a right modified radical mastectomy with pathology revealing an invasive ductal carcinoma, high grade, grade III 3.3 cm tumor. Nineteen right axillary lymph nodes were submitted, one was positive for metastatic carcinoma. This places Tram in a stage II (pT2 N1 M0) breast cancer. The ER is positive, SC is also positive, Her2 Radha by IHC is negative and Her2 Radha by FISH is unamplified. 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 In addition to that she had mantle radiation therapy. Although the 2D echo shows an EF of 60%, I discussed with Tram the concerns of anthracycline based therapy and cardiac disease and she agrees and desires to receive adjuvant therapy without an anthracycline. Taxotere and Cytoxan x 6 cycles are recommended and initiated on 04/20/12. The last cycle of chemotherapy was delivered on 08/09/12. Femara is initiated on 09/06/12 with 10 years planned.     TREATMENT SUMMARY:  1. Right modified radical mastectomy 03/21/12. 2. Taxotere and Cytoxan given 04/20/12 through 08/09/12 x 6 cycles.    3. Femara is initiated on 09/06/12.     3RD PRIMARY TUMOR: Left Stage I (pT1c, pN0, pMx),  grade 1, ER positive HER-2/radha negative Invasive lobular carcinoma of the breast 1/29/2020  Tram was seen in routine follow-up on 12/20/2019 and physical exam revealed a 4 x 5 cm palpable mass anteriorly in the left axillary region.     Ultrasound left breast at [facility] on 1/10/2020 revealed an oblong 4.7 x 1.3 x 5.5 cm fat-containing mass in the upper outer quadrant of the left breast felt to be stable from a prior mammogram and likely representing a nervous/anxious. Objective:  Vital Signs: Blood pressure 122/70, pulse 60, temperature 98.9 °F (37.2 °C), temperature source Oral, height 5' 4\" (1.626 m), weight 185 lb 1.6 oz (84 kg), SpO2 95 %. Physical Exam  Constitutional:       Appearance: Normal appearance. HENT:      Right Ear: External ear normal.      Left Ear: External ear normal.      Mouth/Throat:      Mouth: Mucous membranes are moist.      Pharynx: Oropharynx is clear. Eyes:      General: No scleral icterus. Extraocular Movements: Extraocular movements intact. Conjunctiva/sclera: Conjunctivae normal.   Neck:      Musculoskeletal: Normal range of motion and neck supple. Cardiovascular:      Rate and Rhythm: Rhythm irregular. Pulses: Normal pulses. Comments: History of atrial fibrillation   Pulmonary:      Effort: Pulmonary effort is normal.      Breath sounds: Normal breath sounds. Abdominal:      General: Abdomen is flat. Bowel sounds are normal.      Palpations: Abdomen is soft. Musculoskeletal:      Right lower leg: No edema. Left lower leg: No edema. Skin:     General: Skin is warm and dry. Neurological:      General: No focal deficit present. Mental Status: She is alert and oriented to person, place, and time. Psychiatric:         Mood and Affect: Mood normal.       Labs:  BMP: No results for input(s): NA, K, CL, CO2, PHOS, BUN, CREATININE, CALCIUM in the last 72 hours. CBC: No results for input(s): WBC, HGB, HCT, MCV, PLT in the last 72 hours. LIVER PROFILE: No results for input(s): AST, ALT, LIPASE, BILIDIR, BILITOT, ALKPHOS in the last 72 hours. Invalid input(s): AMYLASE,  ALB  PT/INR: No results for input(s): PROTIME, INR in the last 72 hours. APTT: No results for input(s): APTT in the last 72 hours. BNP:  No results for input(s): BNP in the last 72 hours. Ionized Calcium:No results for input(s): IONCA in the last 72 hours.   Magnesium:No results for input(s): MG in the last 72 both accurate and complete.

## 2020-04-16 ENCOUNTER — OFFICE VISIT (OUTPATIENT)
Dept: HEMATOLOGY | Age: 75
End: 2020-04-16
Payer: MEDICARE

## 2020-04-16 ENCOUNTER — HOSPITAL ENCOUNTER (OUTPATIENT)
Dept: INFUSION THERAPY | Age: 75
Discharge: HOME | End: 2020-04-16
Payer: MEDICARE

## 2020-04-16 VITALS
HEIGHT: 64 IN | WEIGHT: 185.1 LBS | SYSTOLIC BLOOD PRESSURE: 122 MMHG | TEMPERATURE: 98.9 F | OXYGEN SATURATION: 95 % | HEART RATE: 60 BPM | DIASTOLIC BLOOD PRESSURE: 70 MMHG | BODY MASS INDEX: 31.6 KG/M2

## 2020-04-16 DIAGNOSIS — C50.919 MALIGNANT NEOPLASM OF FEMALE BREAST, UNSPECIFIED ESTROGEN RECEPTOR STATUS, UNSPECIFIED LATERALITY, UNSPECIFIED SITE OF BREAST (HCC): ICD-10-CM

## 2020-04-16 PROCEDURE — 1036F TOBACCO NON-USER: CPT | Performed by: INTERNAL MEDICINE

## 2020-04-16 PROCEDURE — 99212 OFFICE O/P EST SF 10 MIN: CPT

## 2020-04-16 PROCEDURE — 1123F ACP DISCUSS/DSCN MKR DOCD: CPT | Performed by: INTERNAL MEDICINE

## 2020-04-16 PROCEDURE — G8427 DOCREV CUR MEDS BY ELIG CLIN: HCPCS | Performed by: INTERNAL MEDICINE

## 2020-04-16 PROCEDURE — G8417 CALC BMI ABV UP PARAM F/U: HCPCS | Performed by: INTERNAL MEDICINE

## 2020-04-16 PROCEDURE — 3017F COLORECTAL CA SCREEN DOC REV: CPT | Performed by: INTERNAL MEDICINE

## 2020-04-16 PROCEDURE — 99215 OFFICE O/P EST HI 40 MIN: CPT | Performed by: INTERNAL MEDICINE

## 2020-04-16 PROCEDURE — G8399 PT W/DXA RESULTS DOCUMENT: HCPCS | Performed by: INTERNAL MEDICINE

## 2020-04-16 PROCEDURE — 1090F PRES/ABSN URINE INCON ASSESS: CPT | Performed by: INTERNAL MEDICINE

## 2020-04-16 PROCEDURE — 85025 COMPLETE CBC W/AUTO DIFF WBC: CPT

## 2020-04-16 PROCEDURE — 4040F PNEUMOC VAC/ADMIN/RCVD: CPT | Performed by: INTERNAL MEDICINE

## 2020-04-16 ASSESSMENT — ENCOUNTER SYMPTOMS
BACK PAIN: 0
WHEEZING: 0
COLOR CHANGE: 0
ABDOMINAL PAIN: 0
SORE THROAT: 0
SHORTNESS OF BREATH: 0
CONSTIPATION: 0
EYE REDNESS: 0
NAUSEA: 0
COUGH: 0
VOMITING: 0
CHEST TIGHTNESS: 0
TROUBLE SWALLOWING: 0
EYE PAIN: 0
DIARRHEA: 0
EYE ITCHING: 0

## 2020-04-27 ENCOUNTER — APPOINTMENT (OUTPATIENT)
Dept: ULTRASOUND IMAGING | Facility: HOSPITAL | Age: 75
End: 2020-04-27

## 2020-05-18 ENCOUNTER — OFFICE VISIT (OUTPATIENT)
Dept: SURGERY | Age: 75
End: 2020-05-18

## 2020-05-18 ENCOUNTER — OFFICE VISIT (OUTPATIENT)
Dept: CARDIOLOGY | Facility: CLINIC | Age: 75
End: 2020-05-18

## 2020-05-18 VITALS — HEIGHT: 64 IN | WEIGHT: 183 LBS | BODY MASS INDEX: 31.24 KG/M2

## 2020-05-18 VITALS — DIASTOLIC BLOOD PRESSURE: 70 MMHG | HEART RATE: 72 BPM | SYSTOLIC BLOOD PRESSURE: 132 MMHG

## 2020-05-18 DIAGNOSIS — Z95.818 PRESENCE OF WATCHMAN LEFT ATRIAL APPENDAGE CLOSURE DEVICE: ICD-10-CM

## 2020-05-18 DIAGNOSIS — E11.59 TYPE 2 DIABETES MELLITUS WITH OTHER CIRCULATORY COMPLICATION, WITHOUT LONG-TERM CURRENT USE OF INSULIN (HCC): ICD-10-CM

## 2020-05-18 DIAGNOSIS — Z90.13 STATUS POST BILATERAL MASTECTOMY: ICD-10-CM

## 2020-05-18 DIAGNOSIS — I65.23 BILATERAL CAROTID ARTERY STENOSIS: ICD-10-CM

## 2020-05-18 DIAGNOSIS — I48.21 PERMANENT ATRIAL FIBRILLATION (HCC): Primary | ICD-10-CM

## 2020-05-18 DIAGNOSIS — I10 ESSENTIAL HYPERTENSION: ICD-10-CM

## 2020-05-18 DIAGNOSIS — E66.09 CLASS 1 OBESITY DUE TO EXCESS CALORIES WITH SERIOUS COMORBIDITY AND BODY MASS INDEX (BMI) OF 31.0 TO 31.9 IN ADULT: ICD-10-CM

## 2020-05-18 DIAGNOSIS — C50.912 MALIGNANT NEOPLASM OF LEFT FEMALE BREAST, UNSPECIFIED ESTROGEN RECEPTOR STATUS, UNSPECIFIED SITE OF BREAST (HCC): ICD-10-CM

## 2020-05-18 PROBLEM — E66.811 CLASS 1 OBESITY DUE TO EXCESS CALORIES WITH SERIOUS COMORBIDITY AND BODY MASS INDEX (BMI) OF 31.0 TO 31.9 IN ADULT: Status: ACTIVE | Noted: 2020-05-18

## 2020-05-18 PROCEDURE — 99441 PR PHYS/QHP TELEPHONE EVALUATION 5-10 MIN: CPT | Performed by: NURSE PRACTITIONER

## 2020-05-18 PROCEDURE — 99024 POSTOP FOLLOW-UP VISIT: CPT | Performed by: SURGERY

## 2020-05-18 RX ORDER — ASPIRIN 81 MG/1
81 TABLET, CHEWABLE ORAL DAILY
Start: 2020-05-18

## 2020-05-18 RX ORDER — DILTIAZEM HYDROCHLORIDE 180 MG/1
360 CAPSULE, COATED, EXTENDED RELEASE ORAL DAILY
Qty: 180 CAPSULE | Refills: 3 | Status: SHIPPED | OUTPATIENT
Start: 2020-05-18 | End: 2021-05-10

## 2020-05-18 NOTE — PROGRESS NOTES
Subjective:     Encounter Date:05/18/2020      Patient ID: Dianne Pritchard is a 74 y.o. female.    HPI: Her telephone visit today is for routine follow up. She has a history of permanent atrial fibrillation s/p Watchman left atrial appendage occlusion device 1/22/19, bilateral carotid artery stenosis, hypertension, type 2 diabetes mellitus, left breast cancer with recent left mastectomy 3/10/20 and history of right mastectomy 2013, lymphoma and obesity. She complains of chronic dyspnea with moderate exertion and intermittent left lower extremity edema. She denies chest pain, palpitations, syncope, orthopnea, PND fatigue or decreased exercise tolerance.  She states that her BP is managed by her PCP and is well controlled. She denies any signs of bleeding.     Chief Complaint: Routine follow up  Atrial Fibrillation   Presents for follow-up visit. Symptoms are negative for an AICD problem, bradycardia, chest pain, dizziness, hemodynamic instability, hypertension, hypotension, pacemaker problem, palpitations, shortness of breath, syncope, tachycardia and weakness. The symptoms have been stable. Past medical history includes atrial fibrillation. There are no medication compliance problems.   Hypertension   This is a chronic problem. The current episode started more than 1 year ago. The problem is controlled. Pertinent negatives include no anxiety, blurred vision, chest pain, headaches, malaise/fatigue, neck pain, orthopnea, palpitations, peripheral edema, PND, shortness of breath or sweats. Risk factors for coronary artery disease include post-menopausal state. Current antihypertension treatment includes diuretics and beta blockers. The current treatment provides significant improvement.       The following portions of the patient's history were reviewed and updated as appropriate: allergies, current medications, past family history, past medical history, past social history, past surgical history and problem list.  "    Allergies   Allergen Reactions   • Amoxil [Amoxicillin] Hives   • Penicillins Hives   • Biaxin [Clarithromycin] Other (See Comments)     NOT SURE OF REACTION, \"SORE MOUTH OF DIARRHEA\"   • Lortab [Hydrocodone-Acetaminophen] GI Intolerance       Current Outpatient Medications:   •  letrozole (FEMARA) 2.5 MG tablet, Take 2.5 mg by mouth Daily., Disp: , Rfl:   •  levothyroxine (SYNTHROID, LEVOTHROID) 137 MCG tablet, TAKE 1 TABLET BY MOUTH DAILY, Disp: 90 tablet, Rfl: 0  •  losartan (COZAAR) 25 MG tablet, Take 25 mg by mouth Daily., Disp: , Rfl:   •  metoprolol tartrate (LOPRESSOR) 25 MG tablet, TAKE 1 TABLET BY MOUTH EVERY 12 HOURS, Disp: 180 tablet, Rfl: 1  •  Multiple Vitamins-Minerals (CENTRUM SILVER PO), Take 1 tablet by mouth Daily., Disp: , Rfl:   •  TOUJEO SOLOSTAR 300 UNIT/ML solution pen-injector injection, INJECT 80 UNITS UNDER THE SKIN UTD QD, Disp: , Rfl: 1  •  aspirin 81 MG chewable tablet, Chew 1 tablet Daily., Disp: , Rfl:   •  dilTIAZem CD (CARDIZEM CD) 180 MG 24 hr capsule, Take 2 capsules by mouth Daily., Disp: 180 capsule, Rfl: 3  •  Insulin Pen Needle (PEN NEEDLES) 31G X 5 MM misc, 1 each 2 (Two) Times a Day., Disp: 100 each, Rfl: 5  Past Medical History:   Diagnosis Date   • Arrhythmia    • Atrial fibrillation, currently in sinus rhythm    • Breast cancer (CMS/HCC)     right   • Cancer (CMS/HCC)     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)   • Carotid artery occlusion    • Diabetes mellitus, type II (CMS/HCC)    • Dizziness    • Drug therapy    • Essential hypertension    • GERD (gastroesophageal reflux disease)    • GI bleed requiring more than 4 units of blood in 24 hours, ICU, or surgery    • History of chemotherapy    • History of lymphoma    • Hx of radiation therapy    • Hypomagnesemia    • Hypothyroidism    • On amiodarone therapy      Past Surgical History:   Procedure Laterality Date   • ATRIAL APPENDAGE EXCLUSION LEFT WITH TRANSESOPHAGEAL ECHOCARDIOGRAM Left 11/27/2018    Procedure: Atrial Appendage " Occlusion;  Surgeon: Mick Orantes MD;  Location:  [facility];  Service: Cardiology   • ATRIAL APPENDAGE EXCLUSION LEFT WITH TRANSESOPHAGEAL ECHOCARDIOGRAM Left 2019    Procedure: Atrial Appendage Occlusion;  Surgeon: Mick Orantes MD;  Location:  [facility];  Service: Cardiology   • BREAST BIOPSY     • MASTECTOMY     • OTHER SURGICAL HISTORY      Mediport insertion X 2   • OTHER SURGICAL HISTORY      remote lymphoma treatment   • REPLACEMENT TOTAL KNEE Right 2017   • TUBAL ABDOMINAL LIGATION       Family History   Problem Relation Age of Onset   • Heart disease Mother    • Cancer Father    • Cancer Sister    • No Known Problems Brother    • No Known Problems Brother    • Heart disease Sister    • Heart attack Sister    • No Known Problems Sister    • No Known Problems Daughter    • No Known Problems Son    • No Known Problems Maternal Grandmother    • No Known Problems Paternal Grandmother    • No Known Problems Maternal Aunt    • Breast cancer Paternal Aunt    • BRCA 1/2 Neg Hx    • Colon cancer Neg Hx    • Endometrial cancer Neg Hx    • Ovarian cancer Neg Hx      Social History     Socioeconomic History   • Marital status:      Spouse name: Not on file   • Number of children: Not on file   • Years of education: Not on file   • Highest education level: Not on file   Tobacco Use   • Smoking status: Former Smoker     Years: 20.00     Types: Cigarettes     Last attempt to quit:      Years since quittin.3   • Smokeless tobacco: Never Used   Substance and Sexual Activity   • Alcohol use: No   • Drug use: No   • Sexual activity: Defer         Review of Systems   Constitution: Negative for chills, decreased appetite, fever, malaise/fatigue, night sweats, weight gain and weight loss.   HENT: Negative for nosebleeds.    Eyes: Negative for blurred vision and visual disturbance.   Cardiovascular: Positive for dyspnea on exertion and leg swelling (Left leg).  "Negative for chest pain, near-syncope, orthopnea, palpitations, paroxysmal nocturnal dyspnea and syncope.   Respiratory: Negative for cough, hemoptysis, shortness of breath, snoring and wheezing.    Endocrine: Negative for cold intolerance and heat intolerance.   Hematologic/Lymphatic: Does not bruise/bleed easily.   Skin: Negative for rash.   Musculoskeletal: Negative for back pain and neck pain.   Gastrointestinal: Negative for abdominal pain, change in bowel habit, constipation, diarrhea, dysphagia, heartburn, nausea and vomiting.   Genitourinary: Negative for hematuria.   Neurological: Negative for dizziness, headaches, light-headedness, loss of balance, tremors and weakness.   Psychiatric/Behavioral: Negative for altered mental status.   Allergic/Immunologic: Negative for persistent infections.       Procedures  Ht 162.6 cm (64.02\")   Wt 83 kg (183 lb)   BMI 31.40 kg/m²        Objective:     Physical Exam      Assessment:          Diagnosis Plan   1. Permanent atrial fibrillation  Rate controlled. No anticoagulation due to history of GI bleed requiring hospitalization and greater than 4 unit blood transfusion   2. Presence of Watchman left atrial appendage closure device  1/19. Pt was cleared to be off of plavix six months after her last office visit with Dr. Orantes on 3/29/19. However she was to continue on aspirin 81 mg daily. The pt reports she is not taking this and does not recall when it was stopped. Discussed with Dr. Orantes, and he does recommend she take aspirin 81 mg daily unless serious bleeding occurs.    3. Bilateral carotid artery stenosis Follows with vascular.    4. Hypertension Well controlled.    5. Type 2 diabetes mellitus with other circulatory complication, without long-term current use of insulin (CMS/[facility]) Managed by PCP.    6. Malignant neoplasm of left female breast, unspecified estrogen receptor status, unspecified site of breast (CMS/[facility])  Follows with oncology.    7. Status post " bilateral mastectomy S/p right mastectomy 2013 and left mastectomy 3/10/20.    8. Class 1 obesity due to excess calories with serious comorbidity and body mass index (BMI) of 31.0 to 31.9 in adult Patient's Body mass index is 31.4 kg/m². BMI is above normal parameters. Recommendations include: educational material.            Plan:       1. Resume aspirin 81 mg daily. Continue all other medications as previously ordered.  2. Report any worsening symptoms.  3. Report any signs of bleeding.  4. Continue heart healthy diet and regular exercise as tolerated.   5. Follow up with PCP for blood pressure and cholesterol management and routine lab work.  6. Follow up with Dr. Orantes in one year, or sooner if needed.    You have chosen to receive care through a telephone visit. Do you consent to use a telephone visit for your medical care today? Yes  This visit has been rescheduled as a phone visit to comply with patient safety concerns in accordance with CDC recommendations. Total time of discussion was 8 minutes.

## 2020-05-18 NOTE — PATIENT INSTRUCTIONS
Obesity, Adult  Obesity is the condition of having too much total body fat. Being overweight or obese means that your weight is greater than what is considered healthy for your body size. Obesity is determined by a measurement called BMI. BMI is an estimate of body fat and is calculated from height and weight. For adults, a BMI of 30 or higher is considered obese.  Obesity can lead to other health concerns and major illnesses, including:  · Stroke.  · Coronary artery disease (CAD).  · Type 2 diabetes.  · Some types of cancer, including cancers of the colon, breast, uterus, and gallbladder.  · Osteoarthritis.  · High blood pressure (hypertension).  · High cholesterol.  · Sleep apnea.  · Gallbladder stones.  · Infertility problems.  What are the causes?  Common causes of this condition include:  · Eating daily meals that are high in calories, sugar, and fat.  · Being born with genes that may make you more likely to become obese.  · Having a medical condition that causes obesity, including:  ? Hypothyroidism.  ? Polycystic ovarian syndrome (PCOS).  ? Binge-eating disorder.  ? Cushing syndrome.  · Taking certain medicines, such as steroids, antidepressants, and seizure medicines.  · Not being physically active (sedentary lifestyle).  · Not getting enough sleep.  · Drinking high amounts of sugar-sweetened beverages, such as soft drinks.  What increases the risk?  The following factors may make you more likely to develop this condition:  · Having a family history of obesity.  · Being a woman of  descent.  · Being a man of  descent.  · Living in an area with limited access to:  ? [place], recreation centers, or sidewalks.  ? Healthy food choices, such as grocery stores and farmers' markets.  What are the signs or symptoms?  The main sign of this condition is having too much body fat.  How is this diagnosed?  This condition is diagnosed based on:  · Your BMI. If you are an adult with a BMI of 30 or  higher, you are considered obese.  · Your waist circumference. This measures the distance around your waistline.  · Your skinfold thickness. Your health care provider may gently pinch a fold of your skin and measure it.  You may have other tests to check for underlying conditions.  How is this treated?  Treatment for this condition often includes changing your lifestyle. Treatment may include some or all of the following:  · Dietary changes. This may include developing a healthy meal plan.  · Regular physical activity. This may include activity that causes your heart to beat faster (aerobic exercise) and strength training. Work with your health care provider to design an exercise program that works for you.  · Medicine to help you lose weight if you are unable to lose 1 pound a week after 6 weeks of healthy eating and more physical activity.  · Treating conditions that cause the obesity (underlying conditions).  · Surgery. Surgical options may include gastric banding and gastric bypass. Surgery may be done if:  ? Other treatments have not helped to improve your condition.  ? You have a BMI of 40 or higher.  ? You have life-threatening health problems related to obesity.  Follow these instructions at home:  Eating and drinking    · Follow recommendations from your health care provider about what you eat and drink. Your health care provider may advise you to:  ? Limit fast food, sweets, and processed snack foods.  ? Choose low-fat options, such as low-fat milk instead of whole milk.  ? Eat 5 or more servings of fruits or vegetables every day.  ? Eat at home more often. This gives you more control over what you eat.  ? Choose healthy foods when you eat out.  ? Learn to read food labels. This will help you understand how much food is considered 1 serving.  ? Learn what a healthy serving size is.  ? Keep low-fat snacks available.  ? Limit sugary drinks, such as soda, fruit juice, sweetened iced tea, and flavored  milk.  · Drink enough water to keep your urine pale yellow.  · Do not follow a fad diet. Fad diets can be unhealthy and even dangerous.  Physical activity  · Exercise regularly, as told by your health care provider.  ? Most adults should get up to 150 minutes of moderate-intensity exercise every week.  ? Ask your health care provider what types of exercise are safe for you and how often you should exercise.  · Warm up and stretch before being active.  · Cool down and stretch after being active.  · Rest between periods of activity.  Lifestyle  · Work with your health care provider and a dietitian to set a weight-loss goal that is healthy and reasonable for you.  · Limit your screen time.  · Find ways to reward yourself that do not involve food.  · Do not drink alcohol if:  ? Your health care provider tells you not to drink.  ? You are pregnant, may be pregnant, or are planning to become pregnant.  · If you drink alcohol:  ? Limit how much you use to:  § 0-1 drink a day for women.  § 0-2 drinks a day for men.  ? Be aware of how much alcohol is in your drink. In the [place] one drink equals one 12 oz bottle of beer (355 mL), one 5 oz glass of wine (148 mL), or one 1½ oz glass of hard liquor (44 mL).  General instructions  · Keep a weight-loss journal to keep track of the food you eat and how much exercise you get.  · Take over-the-counter and prescription medicines only as told by your health care provider.  · Take vitamins and supplements only as told by your health care provider.  · Consider joining a support group. Your health care provider may be able to recommend a support group.  · Keep all follow-up visits as told by your health care provider. This is important.  Contact a health care provider if:  · You are unable to meet your weight loss goal after 6 weeks of dietary and lifestyle changes.  Get help right away if you are having:  · Trouble breathing.  · Suicidal thoughts or behaviors.  Summary  · Obesity is the  condition of having too much total body fat.  · Being overweight or obese means that your weight is greater than what is considered healthy for your body size.  · Work with your health care provider and a dietitian to set a weight-loss goal that is healthy and reasonable for you.  · Exercise regularly, as told by your health care provider. Ask your health care provider what types of exercise are safe for you and how often you should exercise.  This information is not intended to replace advice given to you by your health care provider. Make sure you discuss any questions you have with your health care provider.  Document Released: 01/25/2006 Document Revised: 08/22/2019 Document Reviewed: 08/22/2019  [employer] Patient Education © 2020 [employer]    Heart-Healthy Eating Plan  Many factors influence your heart (coronary) health, including eating and exercise habits. Coronary risk increases with abnormal blood fat (lipid) levels. Heart-healthy meal planning includes limiting unhealthy fats, increasing healthy fats, and making other diet and lifestyle changes.  What is my plan?  Your health care provider may recommend that you:  · Limit your fat intake to _________% or less of your total calories each day.  · Limit your saturated fat intake to _________% or less of your total calories each day.  · Limit the amount of cholesterol in your diet to less than _________ mg per day.  What are tips for following this plan?  Cooking  Cook foods using methods other than frying. Baking, boiling, grilling, and broiling are all good options. Other ways to reduce fat include:  · Removing the skin from poultry.  · Removing all visible fats from meats.  · Steaming vegetables in water or broth.  Meal planning    · At meals, imagine dividing your plate into fourths:  ? Fill one-half of your plate with vegetables and green salads.  ? Fill one-fourth of your plate with whole grains.  ? Fill one-fourth of your plate with lean  protein foods.  · Eat 4-5 servings of vegetables per day. One serving equals 1 cup raw or cooked vegetable, or 2 cups raw leafy greens.  · Eat 4-5 servings of fruit per day. One serving equals 1 medium whole fruit, ¼ cup dried fruit, ½ cup fresh, frozen, or canned fruit, or ½ cup 100% fruit juice.  · Eat more foods that contain soluble fiber. Examples include apples, broccoli, carrots, beans, peas, and barley. Aim to get 25-30 g of fiber per day.  · Increase your consumption of legumes, nuts, and seeds to 4-5 servings per week. One serving of dried beans or legumes equals ½ cup cooked, 1 serving of nuts is ¼ cup, and 1 serving of seeds equals 1 tablespoon.  Fats  · Choose healthy fats more often. Choose monounsaturated and polyunsaturated fats, such as olive and canola oils, flaxseeds, walnuts, almonds, and seeds.  · Eat more omega-3 fats. Choose salmon, mackerel, sardines, tuna, flaxseed oil, and ground flaxseeds. Aim to eat fish at least 2 times each week.  · Check food labels carefully to identify foods with trans fats or high amounts of saturated fat.  · Limit saturated fats. These are found in animal products, such as meats, butter, and cream. Plant sources of saturated fats include palm oil, palm kernel oil, and coconut oil.  · Avoid foods with partially hydrogenated oils in them. These contain trans fats. Examples are stick margarine, some tub margarines, cookies, crackers, and other baked goods.  · Avoid fried foods.  General information  · Eat more home-cooked food and less restaurant, buffet, and fast food.  · Limit or avoid alcohol.  · Limit foods that are high in starch and sugar.  · Lose weight if you are overweight. Losing just 5-10% of your body weight can help your overall health and prevent diseases such as diabetes and heart disease.  · Monitor your salt (sodium) intake, especially if you have high blood pressure. Talk with your health care provider about your sodium intake.  · Try to incorporate  more vegetarian meals weekly.  What foods can I eat?  Fruits  All fresh, canned (in natural juice), or frozen fruits.  Vegetables  Fresh or frozen vegetables (raw, steamed, roasted, or grilled). Green salads.  Grains  Most grains. Choose whole wheat and whole grains most of the time. Rice and pasta, including brown rice and pastas made with whole wheat.  Meats and other proteins  Lean, well-trimmed beef, veal, pork, and lamb. Chicken and turkey without skin. All fish and shellfish. Wild duck, rabbit, pheasant, and venison. Egg whites or low-cholesterol egg substitutes. Dried beans, peas, lentils, and tofu. Seeds and most nuts.  Dairy  Low-fat or nonfat cheeses, including ricotta and mozzarella. Skim or 1% milk (liquid, powdered, or evaporated). Buttermilk made with low-fat milk. Nonfat or low-fat yogurt.  Fats and oils  Non-hydrogenated (trans-free) margarines. Vegetable oils, including soybean, sesame, sunflower, olive, peanut, safflower, corn, canola, and cottonseed. Salad dressings or mayonnaise made with a vegetable oil.  Beverages  Water (mineral or sparkling). Coffee and tea. Diet carbonated beverages.  Sweets and desserts  Sherbet, gelatin, and fruit ice. Small amounts of dark chocolate.  Limit all sweets and desserts.  Seasonings and condiments  All seasonings and condiments.  The items listed above may not be a complete list of foods and beverages you can eat. Contact a dietitian for more options.  What foods are not recommended?  Fruits  Canned fruit in heavy syrup. Fruit in cream or butter sauce. Fried fruit. Limit coconut.  Vegetables  Vegetables cooked in cheese, cream, or butter sauce. Fried vegetables.  Grains  Breads made with saturated or trans fats, oils, or whole milk. Croissants. Sweet rolls. Donuts. High-fat crackers, such as cheese crackers.  Meats and other proteins  Fatty meats, such as hot dogs, ribs, sausage, zavaleta, rib-eye roast or steak. High-fat deli meats, such as salami and bologna.  Caviar. Domestic duck and goose. Organ meats, such as liver.  Dairy  Cream, sour cream, cream cheese, and creamed cottage cheese. Whole milk cheeses. Whole or 2% milk (liquid, evaporated, or condensed). Whole buttermilk. Cream sauce or high-fat cheese sauce. Whole-milk yogurt.  Fats and oils  Meat fat, or shortening. Cocoa butter, hydrogenated oils, palm oil, coconut oil, palm kernel oil. Solid fats and shortenings, including zavaleta fat, salt pork, lard, and butter. Nondairy cream substitutes. Salad dressings with cheese or sour cream.  Beverages  Regular sodas and any drinks with added sugar.  Sweets and desserts  Frosting. Pudding. Cookies. Cakes. Pies. Milk chocolate or white chocolate. Buttered syrups. Full-fat ice cream or ice cream drinks.  The items listed above may not be a complete list of foods and beverages to avoid. Contact a dietitian for more information.  Summary  · Heart-healthy meal planning includes limiting unhealthy fats, increasing healthy fats, and making other diet and lifestyle changes.  · Lose weight if you are overweight. Losing just 5-10% of your body weight can help your overall health and prevent diseases such as diabetes and heart disease.  · Focus on eating a balance of foods, including fruits and vegetables, low-fat or nonfat dairy, lean protein, nuts and legumes, whole grains, and heart-healthy oils and fats.  This information is not intended to replace advice given to you by your health care provider. Make sure you discuss any questions you have with your health care provider.  Document Released: 09/26/2009 Document Revised: 01/25/2019 Document Reviewed: 01/25/2019  [employer] Interactive Patient Education © 2020 [employer]    Exercising to Lose Weight  Exercise is structured, repetitive physical activity to improve fitness and health. Getting regular exercise is important for everyone. It is especially important if you are overweight. Being overweight increases your risk of heart  disease, stroke, diabetes, high blood pressure, and several types of cancer. Reducing your calorie intake and exercising can help you lose weight.  Exercise is usually categorized as moderate or vigorous intensity. To lose weight, most people need to do a certain amount of moderate-intensity or vigorous-intensity exercise each week.  Moderate-intensity exercise    Moderate-intensity exercise is any activity that gets you moving enough to burn at least three times more energy (calories) than if you were sitting.  Examples of moderate exercise include:  · Walking a mile in 15 minutes.  · Doing light yard work.  · Biking at an easy pace.  Most people should get at least 150 minutes (2 hours and 30 minutes) a week of moderate-intensity exercise to maintain their body weight.  Vigorous-intensity exercise  Vigorous-intensity exercise is any activity that gets you moving enough to burn at least six times more calories than if you were sitting. When you exercise at this intensity, you should be working hard enough that you are not able to carry on a conversation.  Examples of vigorous exercise include:  · Running.  · Playing a team sport, such as football, basketball, and soccer.  · Jumping rope.  Most people should get at least 75 minutes (1 hour and 15 minutes) a week of vigorous-intensity exercise to maintain their body weight.  How can exercise affect me?  When you exercise enough to burn more calories than you eat, you lose weight. Exercise also reduces body fat and builds muscle. The more muscle you have, the more calories you burn. Exercise also:  · Improves mood.  · Reduces stress and tension.  · Improves your overall fitness, flexibility, and endurance.  · Increases bone strength.  The amount of exercise you need to lose weight depends on:  · Your age.  · The type of exercise.  · Any health conditions you have.  · Your overall physical ability.  Talk to your health care provider about how much exercise you need and  what types of activities are safe for you.  What actions can I take to lose weight?  Nutrition    · Make changes to your diet as told by your health care provider or diet and nutrition specialist (dietitian). This may include:  ? Eating fewer calories.  ? Eating more protein.  ? Eating less unhealthy fats.  ? Eating a diet that includes fresh fruits and vegetables, whole grains, low-fat dairy products, and lean protein.  ? Avoiding foods with added fat, salt, and sugar.  · Drink plenty of water while you exercise to prevent dehydration or heat stroke.  Activity  · Choose an activity that you enjoy and set realistic goals. Your health care provider can help you make an exercise plan that works for you.  · Exercise at a moderate or vigorous intensity most days of the week.  ? The intensity of exercise may vary from person to person. You can tell how intense a workout is for you by paying attention to your breathing and heartbeat. Most people will notice their breathing and heartbeat get faster with more intense exercise.  · Do resistance training twice each week, such as:  ? Push-ups.  ? Sit-ups.  ? Lifting weights.  ? Using resistance bands.  · Getting short amounts of exercise can be just as helpful as long structured periods of exercise. If you have trouble finding time to exercise, try to include exercise in your daily routine.  ? Get up, stretch, and walk around every 30 minutes throughout the day.  ? Go for a walk during your lunch break.  ? Park your car farther away from your destination.  ? If you take public transportation, get off one stop early and walk the rest of the way.  ? Make phone calls while standing up and walking around.  ? Take the stairs instead of elevators or escalators.  · Wear comfortable clothes and shoes with good support.  · Do not exercise so much that you hurt yourself, feel dizzy, or get very short of breath.  Where to find more information  · [place] Department of Health and Human  Services: www.[facility].gov  · Centers for Disease Control and Prevention (CDC): www.cdc.gov  Contact a health care provider:  · Before starting a new exercise program.  · If you have questions or concerns about your weight.  · If you have a medical problem that keeps you from exercising.  Get help right away if you have any of the following while exercising:  · Injury.  · Dizziness.  · Difficulty breathing or shortness of breath that does not go away when you stop exercising.  · Chest pain.  · Rapid heartbeat.  Summary  · Being overweight increases your risk of heart disease, stroke, diabetes, high blood pressure, and several types of cancer.  · Losing weight happens when you burn more calories than you eat.  · Reducing the amount of calories you eat in addition to getting regular moderate or vigorous exercise each week helps you lose weight.  This information is not intended to replace advice given to you by your health care provider. Make sure you discuss any questions you have with your health care provider.  Document Released: 01/20/2012 Document Revised: 12/31/2018 Document Reviewed: 12/31/2018  [employer] Interactive Patient Education © 2020 [employer]

## 2020-06-09 ENCOUNTER — TRANSCRIBE ORDERS (OUTPATIENT)
Dept: PHYSICAL THERAPY | Facility: CLINIC | Age: 75
End: 2020-06-09

## 2020-06-09 ENCOUNTER — TELEPHONE (OUTPATIENT)
Dept: SURGERY | Age: 75
End: 2020-06-09

## 2020-06-09 DIAGNOSIS — M79.89 LEFT ARM SWELLING: Primary | ICD-10-CM

## 2020-06-09 DIAGNOSIS — Z90.12 STATUS POST LEFT MASTECTOMY: ICD-10-CM

## 2020-06-17 ENCOUNTER — TREATMENT (OUTPATIENT)
Dept: PHYSICAL THERAPY | Facility: CLINIC | Age: 75
End: 2020-06-17

## 2020-06-17 DIAGNOSIS — Z92.3 HISTORY OF RADIATION THERAPY: ICD-10-CM

## 2020-06-17 DIAGNOSIS — C50.912 MALIGNANT NEOPLASM OF LEFT FEMALE BREAST, UNSPECIFIED ESTROGEN RECEPTOR STATUS, UNSPECIFIED SITE OF BREAST (HCC): Primary | ICD-10-CM

## 2020-06-17 DIAGNOSIS — I97.2 POST-MASTECTOMY LYMPHEDEMA SYNDROME: ICD-10-CM

## 2020-06-17 DIAGNOSIS — Z90.13 STATUS POST BILATERAL MASTECTOMY: ICD-10-CM

## 2020-06-17 PROCEDURE — 97162 PT EVAL MOD COMPLEX 30 MIN: CPT | Performed by: PHYSICAL THERAPIST

## 2020-06-17 NOTE — PROGRESS NOTES
Physical Therapy Lymphedema Initial Evaluation       Patient Name: Dianne Pritchard  : 1945  MRN: [id number]  Today's Date: 2020      Visit Date: 2020    Visit Dx:    ICD-10-CM ICD-9-CM   1. Malignant neoplasm of left female breast, unspecified estrogen receptor status, unspecified site of breast (CMS/HCC) C50.912 174.9   2. Post-mastectomy lymphedema syndrome I97.2 457.0   3. Status post bilateral mastectomy Z90.13 V45.71   4. History of radiation therapy Z92.3 V15.3       Patient Active Problem List   Diagnosis   • Tremor   • Essential hypertension   • Type 2 diabetes mellitus with circulatory disorder, without long-term current use of insulin (CMS/HCC)   • Acquired hypothyroidism   • Dizziness   • Multiple falls   • Bruit of left carotid artery   • Acute pain of right knee   • Right knee pain   • Status post right knee replacement   • BMI 33.0-33.9,adult   • Bilateral carotid artery disease (CMS/HCC)   • Atrial fibrillation with RVR (CMS/HCC)   • Restrictive lung disease   • A-fib (CMS/HCC)   • Presence of Watchman left atrial appendage closure device   • Malignant neoplasm of female breast (CMS/HCC)   • Status post bilateral mastectomy   • Class 1 obesity due to excess calories with serious comorbidity and body mass index (BMI) of 31.0 to 31.9 in adult        Past Medical History:   Diagnosis Date   • Arrhythmia    • Atrial fibrillation, currently in sinus rhythm    • Breast cancer (CMS/HCC)     right   • Cancer (CMS/HCC)     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)   • Carotid artery occlusion    • Diabetes mellitus, type II (CMS/HCC)    • Dizziness    • Drug therapy    • Essential hypertension    • GERD (gastroesophageal reflux disease)    • GI bleed requiring more than 4 units of blood in 24 hours, ICU, or surgery    • History of chemotherapy    • History of lymphoma    • Hx of radiation therapy    • Hypomagnesemia    • Hypothyroidism    • On amiodarone therapy         Past Surgical History:   Procedure  Laterality Date   • ATRIAL APPENDAGE EXCLUSION LEFT WITH TRANSESOPHAGEAL ECHOCARDIOGRAM Left 11/27/2018    Procedure: Atrial Appendage Occlusion;  Surgeon: Mick Orantes MD;  Location:  [facility] CATH [facility]VASIVE [facility];  Service: Cardiology   • ATRIAL APPENDAGE EXCLUSION LEFT WITH TRANSESOPHAGEAL ECHOCARDIOGRAM Left 1/22/2019    Procedure: Atrial Appendage Occlusion;  Surgeon: Mick Orantes MD;  Location:  [facility] INVASIVE [facility];  Service: Cardiology   • BREAST BIOPSY     • MASTECTOMY     • OTHER SURGICAL HISTORY      Mediport insertion X 2   • OTHER SURGICAL HISTORY      remote lymphoma treatment   • REPLACEMENT TOTAL KNEE Right 09/2017   • TUBAL ABDOMINAL LIGATION         Visit Dx:    ICD-10-CM ICD-9-CM   1. Malignant neoplasm of left female breast, unspecified estrogen receptor status, unspecified site of breast (CMS/[facility]) C50.912 174.9   2. Post-mastectomy lymphedema syndrome I97.2 457.0   3. Status post bilateral mastectomy Z90.13 V45.71   4. History of radiation therapy Z92.3 V15.3       Patient History     Row Name 06/17/20 0900             History    Chief Complaint  Swelling  -HR      Date Current Problem(s) Began  03/10/20  -HR      Brief Description of Current Complaint  She has had a L mastectomy and AND by Dr. Renteria on 3/10/2020. Her upper arm has started swelling.   -HR      Patient/Caregiver Goals  Know what to do to help the symptoms;Decrease swelling  -HR      Patient's Rating of General Health  Good  -HR      Hand Dominance  right-handed  -HR         Pain     Pain Location  Arm  -HR      Pain at Present  8  -HR      Pain Frequency  Intermittent  -HR        User Key  (r) = Recorded By, (t) = Taken By, (c) = Cosigned By    Initials Name Provider Type    HR La Delgado, PT, DPT, CLT-LUANA Physical Therapist          Lymphedema     Row Name 06/17/20 0900             Subjective Pain    Able to rate subjective pain?  yes  -HR      Pre-Treatment Pain Level  8  -HR         Lymphedema  Assessment    Lymphedema Classification  LUE:;secondary;RUE:;at risk/stage 0;Trunk:  -HR      Lymphedema Cancer Related Sx  left;axillary dissection;modified radical mastectomy  -HR      Lymphedema Surgery Comments  Dr. Renteria 3/10/2020  -HR      Lymph Nodes Removed #  10  -HR      Positive Lymph Nodes #  0  -HR      Cancer Comments  This is 3rd cancer diagnosis and treatment  -HR      Chemo Received  no  -HR      Radiation Therapy Received  yes  -HR      Radiation Treatments #/Timeframe  22 in 1993 for lymphoma to L chest  -HR      Infections or Cellulitis?  no  -HR         Lymphedema Edema Assessment    Edema Assessment Comment  Measurements are deceiving as L upper arm measures smaller than R but has obvious edema. RUE most likely has chronic low grade lymphedema. Abdomen does protrude quite a lot and with her history could definitely be swollen as well.   -HR         Skin Changes/Observations    Location/Assessment  Upper Extremity  -HR      Upper Extremity Conditions  left:;clean;dry;shiny  -HR      Upper Extremity Color/Pigment  left:;blanchable pink, almost p'eau d'orange  -HR      Upper Extremity Skin Details  upper inner L arm pink and swollen  -HR         Lymphedema Measurements    Measurement Type(s)  Quick Girth;Circumferential  -HR      Quick Girth Areas  Upper extremities  -HR      Circumferential Areas  Trunk  -HR         LUE Quick Girth (cm)    Axilla  33.5 cm  -HR      Mid upper arm  33 cm  -HR      Elbow  25.2 cm  -HR      Mid forearm  21 cm  -HR      Wrist crease  16.8 cm  -HR      Web space  18.3 cm  -HR      LUE Quick Girth Total  147.8  -HR         RUE Quick Girth (cm)    Axilla  36 cm  -HR      Mid upper arm  32.4 cm  -HR      Elbow  25.2 cm  -HR      Mid forearm  22.2 cm  -HR      Wrist crease  16.7 cm  -HR      Web space  19.2 cm  -HR      RUE Quick Girth Total  151.7  -HR         Trunk Circumferential (cm)    Measurement Location 1  at axillae-sitting EOB  -HR      Trunk 1  96 cm  -HR       Measurement Location 2  at umbilicus- standing  -HR      Trunk 2  97.8 cm  -HR      Trunk Circumferential Total  193.8 cm  -HR         Manual Lymphatic Drainage    Manual Lymphatic Drainage Comments  Discussed MLD  -HR         Compression/Skin Care    Compression/Skin Care Comments  Discussed compression garments for arms as well as abdomen. She has a sleeve for the R arm that she has worn in the past.   -HR        User Key  (r) = Recorded By, (t) = Taken By, (c) = Cosigned By    Initials Name Provider Type    HR La Delgado, PT, DPT, CLLIBBY-LUANA Physical Therapist                          Therapy Education  Education Details: Reviewed risk reduction practices and what treatment includes.       OP Exercises     Row Name 06/17/20 0900             Subjective Pain    Able to rate subjective pain?  yes  -HR      Pre-Treatment Pain Level  8  -HR        User Key  (r) = Recorded By, (t) = Taken By, (c) = Cosigned By    Initials Name Provider Type    HR La Delgado, PT, DPT, CLMATTHEW Physical Therapist                      PT OP Goals     Row Name 06/17/20 0900          PT Short Term Goals    STG Date to Achieve  07/01/20  -HR     STG 1  Patient will be independent with remedial HEP for lymphedema.  -HR     STG 1 Progress  New  -HR     STG 2  Patient will have a good basic understanding of lymphedema including risk reduction practices and elevated infection risk.   -HR     STG 2 Progress  New  -HR     STG 3  Patient will be independent with self MLD with assist of family as needed.  -HR     STG 3 Progress  New  -HR     STG 4  Patient will be able to apply compression bandages with assist of family.   -HR     STG 4 Progress  New  -HR        Long Term Goals    LTG Date to Achieve  07/29/20  -HR     LTG 1  Patient will have compression garment for abdominal edema.  -HR     LTG 1 Progress  New  -HR     LTG 2  Patient will have well fitting compression garments for BUEs.  -HR     LTG 2 Progress  New  -HR     LTG  3  Patient will obtain a flexitouch lymphedema pump and be independent with its use for maintenance.   -HR     LTG 3 Progress  New  -HR     LTG 4  She will be independent and comfortable with maintenance program for lymphedema after DC.  -HR     LTG 4 Progress  New  -HR        Time Calculation    PT Goal Re-Cert Due Date  07/17/20  -HR       User Key  (r) = Recorded By, (t) = Taken By, (c) = Cosigned By    Initials Name Provider Type    HR La Delgado, PT, DPT, CLT-LUANA Physical Therapist          PT Assessment/Plan     Row Name 06/17/20 0900          PT Assessment    Impairments  Edema;Impaired lymphatic circulation;Impaired postural alignment;Integumentary integrity  -HR     Assessment Comments  Mrs. Pritchard has had cancer 3 times. She has had radiation to her chest for lymphoma, R breast mastectomy with 19 lymph nodes removed and now a L mastectomy with 10 nodes removed. Her LUE is beginning to show signs of lymphedema in the upper arm. She would definitely benefit from a course of CDT. Her symptomatic L arm will be the focus, but she needs her truncal congestion and edema addressed as well. Her RUE has swelled in the past and she has worn a sleeve without ever having treatment. She needs to learn how to manage her symptoms and good compression that  she can manage on her own. She will also need a flexitouch pump as a maintenance tool due to her extensive history of cancer, surgeries and radiation. Her trunk is already edematous and her bilateral mastectomies increase the risk of this swelling to increase. Truncal clearance is going to be a big portion of her treatment since she has swelling in both UEs. She has a supportive family and a great outlook for treatment!  -HR     Rehab Potential  Good  -HR     Patient/caregiver participated in establishment of treatment plan and goals  Yes  -HR     Patient would benefit from skilled therapy intervention  Yes  -HR        PT Plan    PT Frequency  3x/week;2x/week   -HR     Predicted Duration of Therapy Intervention (Therapy Eval)  6-8 weeks  -HR     Planned CPT's?  PT RE-EVAL: 96772;PT THER PROC EA 15 MIN: 00176;PT MANUAL THERAPY EA 15 MIN: 54652;PT SELF CARE/HOME MGMT/TRAIN EA 15: 61292  -HR     PT Plan Comments  PT to initiate CDT for LUE, truncal and RUE lymphedema.  -HR       User Key  (r) = Recorded By, (t) = Taken By, (c) = Cosigned By    Initials Name Provider Type    HR La Delgado, PT, DPT, CLMATTHEW Physical Therapist                       Time Calculation:                     La Delgado, PT, DPT, LUIS F  6/17/2020

## 2020-06-17 NOTE — PROGRESS NOTES
Progress Note      Pt Name: Renae Aguayo  YOB: 1945  MRN: [id number]    Date of evaluation: 6/18/2020  History Obtained From:  patient, electronic medical record    CHIEF COMPLAINT:    Chief Complaint   Patient presents with    Breast Cancer     Malignant neoplasm of lower-outer quadrant of right breast of female, estrogen receptor positive     Current active problems  History of breast cancer  Abnormal left mammogram    HISTORY OF PRESENT ILLNESS:    Renae Aguayo is a 76 y.o.  female with stage II A breast carcinoma from 3/21/2012. She was also diagnosed with a left stage I breast carcinoma 1/29/2020 and did have a left simple mastectomy. She is on Femara 2.5 mg daily. She reports that she has not felt any new nodules on her chest wall. She is breathing okay. She is diabetic but has not had her A1c checked in some time because she has not been able get back to her physician due to COVID-19 restrictions. She does have hypothyroidism and is on Synthroid 137 mcg daily. She follows up with Dr. Suzie Andres to have her renal function reevaluated this afternoon. She reports her blood pressure has been stable on current medication. She takes vitamin D but does not take calcium anymore because of an elevated calcium level due to her renal function. TARGET LYMPHOMA SITES:  1. Left supraclavicular area. 2. Left axilla. 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 Gabbi Roth with a left supraclavicular lymph node. She was referred to Dr. Priyanka Vasquez who performed a biopsy on 10/13/93 that revealed a large cell malignant immunoblastic lymphoma, CD20 was 53% positive  A bone marrow biopsy and aspirate on 11/1/1993 was negative for any evidence of malignant lymphoma. She received 6 cycles of chemotherapy with CHOP/Bleomycin. The last 3 cycles were done without Vincristine due to peripheral neuropathy problems.  Following 3/10/2020 Dr. Palmira Pierre performed the following procedures:  1. Injection of radionuclide. 2. Lymphatic mapping. 3. Left-sided pectoral block. 4. Left simple mastectomy. 5. Left sentinel lymph node biopsy.     Pathology revealed the following:  Breast, left mastectomy:  · Invasive lobular carcinoma, grade 1  · Invasive carcinoma measures 1.6 cm in greatest dimension. · Invasive carcinoma is located greater than 1.0 cm from the nearest deep surgical excision margin. · Incidental complex sclerosing lesion, margins negative. · Changes consistent with fibrocystic mastopathy involving nonneoplastic breast parenchyma  · Sections of skin, negative for evidence of malignancy. · Sections of nipple, negative for evidence of malignancy  Lymph node, left sentinel lymph node biopsy:   · 10 lymph nodes, negative for evidence of malignancy      AJCC STAGE: pT1c, pN0, pMx     Adjuvant endocrine therapy with aromatase inhibitor letrozole (Femara) 2.5 mg daily is prescribed (continued)      1st HEMATOLOGY HISTORY: Iron deficiency/ Acute GI bleed secondary to gastric AVM, 06/27/16  Mrs. Aponte presented to [facility] Emergency Department on 06/24/16 with weakness, fatigue and exertional dyspnea. She was profoundly anemic with a hemoglobin of 6.0, MCV of 84.3. WBC and platelets were normal at 7.97 and 163,000, respectively. GI evaluation was sought, with an endoscopy on 06/27/16 by Dr. Candelaria Jewell remarkable for angiodysplastic lesions of the gastric body. Colonoscopy on 06/28/16 was negative. Mrs. Aponte was transfused as warranted, given parenteral iron supplementation and anticoagulation with Xarelto for paroxysmal atrial fibrillation was discontinued due to UGI bleed with AVM. 2nd HEMATOLOGY HISTORY: Thrombocytopenia  Tram fam had a chronically low platelet count between 120 and 150.   DEONDRE - negative  Antiplatelet antibody - negative  SPEP - negative  Hepatitis A, B, C - negative      Past Medical History:   Diagnosis Date    Arteriovenous malformation, other site     Arthritis     Atrial fibrillation ([facility] 04/28/2016    sees dr. Mark Pickard [facility])     breast; surgery and chemo x 2 and radiation    COPD (chronic obstructive pulmonary disease) ([facility]     Diabetes mellitus ([facility]     H/O screening mammography 07/08/2019    Rosalina Saavedra H/O: GI bleed 06/24/2016 2016 while on xarelto for a.fib; no longer on any anti-coags.  Xarelto discontinued     Hypertension     Hypothyroidism     Iron deficiency anemia     Kidney disease     sees dr. Yolande Yu [facility]) Lucita Bell    sees dr. Lew Mendoza Osteopenia of the elderly     Osteoporosis     Post-menopausal     Post-menopausal     Thrombocytopenia ([facility]     Thyroid disease         Past Surgical History:   Procedure Laterality Date    BREAST SURGERY      right mastectomy; no sticks/bp right arm    CARDIAC SURGERY      \"watchman's device\" per dr. Mukesh Malin COLONOSCOPY  06/28/2016    per  recall 10 years     ENDOSCOPY, COLON, DIAGNOSTIC      IVC FILTER INSERTION      JOINT REPLACEMENT      LYMPH NODE BIOPSY Left     Supraclavicular     MASTECTOMY Left 3/10/2020    FLAPS WITH LEFT MASTECTOMY, SENTINEL NODE BIOPSY, PEC BLOCK performed by Sierra Carpio MD at [facility] Right 9/7/2017    KNEE TOTAL ARTHROPLASTY performed by Kristen Parisi MD at [facility] TUNNELED VENOUS PORT PLACEMENT      x2 and removed           Current Outpatient Medications:     letrozole (FEMARA) 2.5 MG tablet, TAKE 1 TABLET BY MOUTH EVERY DAY, Disp: 90 tablet, Rfl: 0    Insulin Glargine, 2 Unit Dial, (TOUJEO MAX SOLOSTAR) 300 UNIT/ML SOPN, Inject 80 Units into the skin daily Indications: Diabetes, Disp: , Rfl:     levothyroxine (SYNTHROID) 137 MCG tablet, Take 137 mcg by mouth Daily Indications: Underactive Thyroid, Disp: , Rfl:     dilTIAZem (CARDIZEM CD) 180 MG extended release capsule, Take 360 mg supraclavicular or axillary adenopathy. Left upper body: No supraclavicular or axillary adenopathy. Skin:     General: Skin is warm and dry. Findings: No rash. Neurological:      Mental Status: She is alert and oriented to person, place, and time. Coordination: Coordination normal.   Psychiatric:         Behavior: Behavior normal.         Thought Content: Thought content normal.             VISIT DIAGNOSES  1. Malignant neoplasm of upper-outer quadrant of left breast in female, estrogen receptor positive ([address]    2. Malignant neoplasm of lower-outer quadrant of right breast of female, estrogen receptor positive ([address]    3. Iron deficiency anemia due to chronic blood loss    4. Thrombocytopenia ([facility]    5. Type 2 diabetes mellitus with stage 3 chronic kidney disease, with long-term current use of insulin (HCC)        ASSESSMENT/PLAN:    1. Left Stage I (pT1c, pN0, pMx),  grade 1, ER positive HER-2/radha negative Invasive lobular carcinoma of the breast 1/29/2020  2. HX Stage II A right breast carcinoma. Serology 12/20/2019  CA-15-3 --   25.5 (0-25) physical examination today is unrevealing for any new findings. I will recheck her CA-15-3.    3. Long-term use of aromatase inhibitor. I had a discussion with her previously about getting Prolia and she declined. She is not on any bisphosphonate. She was on calcium but her calcium level had apparently risen and Dr. Charlane Paget has taken her off.    4. Remote history of large B-cell immunoblastic lymphoma in 1993. She does not have any B symptoms. Serology 12/20/2019  CMP - crt 1.12 with GFR 49, alk phos 126 ()  LDH -171 -WNL  B2 M - 2.8(0.6-2.4)    She does not have any B symptoms. Physical examination is unrevealing for any performed adenopathy. 5. History of iron deficiency anemia. Hgb is stable at 13.7 with MCV 92    6. Thrombocytopenia.      PLT is 140,000.    7. Her colonoscopy is up-to-date performed last on 6/28/2016 with recall in 10 years. 8.  Gynecologic screening. She no longer gets Pap smears. She denies any pelvic pain, abnormal bleeding. Orders Placed This Encounter   Procedures    Cancer Antigen 15-3    Hemoglobin A1C        Return in about 4 months (around 10/18/2020) for With Norm Lai.      Nadja Kan PA-C  12:05 PM  6/18/2020

## 2020-06-18 ENCOUNTER — HOSPITAL ENCOUNTER (OUTPATIENT)
Dept: INFUSION THERAPY | Age: 75
Discharge: HOME | End: 2020-06-18
Payer: MEDICARE

## 2020-06-18 ENCOUNTER — OFFICE VISIT (OUTPATIENT)
Dept: HEMATOLOGY | Age: 75
End: 2020-06-18
Payer: MEDICARE

## 2020-06-18 VITALS
OXYGEN SATURATION: 96 % | HEART RATE: 64 BPM | BODY MASS INDEX: 31.41 KG/M2 | SYSTOLIC BLOOD PRESSURE: 124 MMHG | HEIGHT: 64 IN | TEMPERATURE: 99 F | DIASTOLIC BLOOD PRESSURE: 62 MMHG | WEIGHT: 184 LBS

## 2020-06-18 DIAGNOSIS — C50.412 MALIGNANT NEOPLASM OF UPPER-OUTER QUADRANT OF LEFT BREAST IN FEMALE, ESTROGEN RECEPTOR POSITIVE (HCC): ICD-10-CM

## 2020-06-18 DIAGNOSIS — Z79.4 TYPE 2 DIABETES MELLITUS WITH STAGE 3 CHRONIC KIDNEY DISEASE, WITH LONG-TERM CURRENT USE OF INSULIN (HCC): ICD-10-CM

## 2020-06-18 DIAGNOSIS — E11.22 TYPE 2 DIABETES MELLITUS WITH STAGE 3 CHRONIC KIDNEY DISEASE, WITH LONG-TERM CURRENT USE OF INSULIN (HCC): ICD-10-CM

## 2020-06-18 DIAGNOSIS — Z17.0 MALIGNANT NEOPLASM OF UPPER-OUTER QUADRANT OF LEFT BREAST IN FEMALE, ESTROGEN RECEPTOR POSITIVE (HCC): ICD-10-CM

## 2020-06-18 DIAGNOSIS — N18.30 TYPE 2 DIABETES MELLITUS WITH STAGE 3 CHRONIC KIDNEY DISEASE, WITH LONG-TERM CURRENT USE OF INSULIN (HCC): ICD-10-CM

## 2020-06-18 LAB
BASOPHILS ABSOLUTE: 0.05 K/UL (ref 0.01–0.08)
BASOPHILS RELATIVE PERCENT: 0.4 % (ref 0.1–1.2)
CA 15-3: 25.9 U/ML (ref 0–35)
EOSINOPHILS ABSOLUTE: 0.15 K/UL (ref 0.04–0.54)
EOSINOPHILS RELATIVE PERCENT: 1.3 % (ref 0.7–7)
HCT VFR BLD CALC: 42.7 % (ref 34.1–44.9)
HEMOGLOBIN: 13.7 G/DL (ref 11.2–15.7)
LYMPHOCYTES ABSOLUTE: 1.89 K/UL (ref 1.18–3.74)
LYMPHOCYTES RELATIVE PERCENT: 16.3 % (ref 19.3–53.1)
MCH RBC QN AUTO: 29.5 PG (ref 25.6–32.2)
MCHC RBC AUTO-ENTMCNC: 32.1 G/DL (ref 32.3–35.5)
MCV RBC AUTO: 92 FL (ref 79.4–94.8)
MONOCYTES ABSOLUTE: 0.79 K/UL (ref 0.24–0.82)
MONOCYTES RELATIVE PERCENT: 6.8 % (ref 4.7–12.5)
NEUTROPHILS ABSOLUTE: 8.71 K/UL (ref 1.56–6.13)
NEUTROPHILS RELATIVE PERCENT: 75.2 % (ref 34–71.1)
PDW BLD-RTO: 13.9 % (ref 11.7–14.4)
PLATELET # BLD: 140 K/UL (ref 182–369)
PMV BLD AUTO: 11.2 FL (ref 7.4–10.4)
RBC # BLD: 4.64 M/UL (ref 3.93–5.22)
WBC # BLD: 11.59 K/UL (ref 3.98–10.04)

## 2020-06-18 PROCEDURE — 1123F ACP DISCUSS/DSCN MKR DOCD: CPT | Performed by: PHYSICIAN ASSISTANT

## 2020-06-18 PROCEDURE — G8399 PT W/DXA RESULTS DOCUMENT: HCPCS | Performed by: PHYSICIAN ASSISTANT

## 2020-06-18 PROCEDURE — 4040F PNEUMOC VAC/ADMIN/RCVD: CPT | Performed by: PHYSICIAN ASSISTANT

## 2020-06-18 PROCEDURE — 86300 IMMUNOASSAY TUMOR CA 15-3: CPT

## 2020-06-18 PROCEDURE — 85025 COMPLETE CBC W/AUTO DIFF WBC: CPT

## 2020-06-18 PROCEDURE — 3046F HEMOGLOBIN A1C LEVEL >9.0%: CPT | Performed by: PHYSICIAN ASSISTANT

## 2020-06-18 PROCEDURE — 3017F COLORECTAL CA SCREEN DOC REV: CPT | Performed by: PHYSICIAN ASSISTANT

## 2020-06-18 PROCEDURE — G8417 CALC BMI ABV UP PARAM F/U: HCPCS | Performed by: PHYSICIAN ASSISTANT

## 2020-06-18 PROCEDURE — G8427 DOCREV CUR MEDS BY ELIG CLIN: HCPCS | Performed by: PHYSICIAN ASSISTANT

## 2020-06-18 PROCEDURE — 2022F DILAT RTA XM EVC RTNOPTHY: CPT | Performed by: PHYSICIAN ASSISTANT

## 2020-06-18 PROCEDURE — 99211 OFF/OP EST MAY X REQ PHY/QHP: CPT

## 2020-06-18 PROCEDURE — 1090F PRES/ABSN URINE INCON ASSESS: CPT | Performed by: PHYSICIAN ASSISTANT

## 2020-06-18 PROCEDURE — 1036F TOBACCO NON-USER: CPT | Performed by: PHYSICIAN ASSISTANT

## 2020-06-18 PROCEDURE — 99213 OFFICE O/P EST LOW 20 MIN: CPT | Performed by: PHYSICIAN ASSISTANT

## 2020-06-18 ASSESSMENT — ENCOUNTER SYMPTOMS
NAUSEA: 0
PHOTOPHOBIA: 0
CONSTIPATION: 0
EYE ITCHING: 0
SHORTNESS OF BREATH: 0
BLOOD IN STOOL: 0
DIARRHEA: 0
VOICE CHANGE: 0
ABDOMINAL PAIN: 0
TROUBLE SWALLOWING: 0
BACK PAIN: 0
VOMITING: 0
WHEEZING: 0
COUGH: 0
SORE THROAT: 0
ABDOMINAL DISTENTION: 0
COLOR CHANGE: 0
EYE DISCHARGE: 0

## 2020-06-22 ENCOUNTER — TREATMENT (OUTPATIENT)
Dept: PHYSICAL THERAPY | Facility: CLINIC | Age: 75
End: 2020-06-22

## 2020-06-22 DIAGNOSIS — I97.2 POST-MASTECTOMY LYMPHEDEMA SYNDROME: Primary | ICD-10-CM

## 2020-06-22 DIAGNOSIS — C50.912 MALIGNANT NEOPLASM OF LEFT FEMALE BREAST, UNSPECIFIED ESTROGEN RECEPTOR STATUS, UNSPECIFIED SITE OF BREAST (HCC): ICD-10-CM

## 2020-06-22 DIAGNOSIS — Z90.13 STATUS POST BILATERAL MASTECTOMY: ICD-10-CM

## 2020-06-22 DIAGNOSIS — Z92.3 HISTORY OF RADIATION THERAPY: ICD-10-CM

## 2020-06-22 PROCEDURE — 97140 MANUAL THERAPY 1/> REGIONS: CPT | Performed by: PHYSICAL THERAPIST

## 2020-06-22 NOTE — PROGRESS NOTES
Outpatient Physical Therapy Lymphedema Treatment Note       Patient Name: Dianne Pritchard  : 1945  MRN: [id number]  Today's Date: 2020        Visit Date: 2020    Visit Dx:    ICD-10-CM ICD-9-CM   1. 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 Malignant neoplasm of left female breast, unspecified estrogen receptor status, unspecified site of breast (CMS/HCC) C50.912 174.9   3. Status post bilateral mastectomy Z90.13 V45.71   4. History of radiation therapy Z92.3 V15.3       Patient Active Problem List   Diagnosis   • Tremor   • Essential hypertension   • Type 2 diabetes mellitus with circulatory disorder, without long-term current use of insulin (CMS/[facility])   • Acquired hypothyroidism   • Dizziness   • Multiple falls   • Bruit of left carotid artery   • Acute pain of right knee   • Right knee pain   • Status post right knee replacement   • BMI 33.0-33.9,adult   • Bilateral carotid artery disease (CMS/HCC)   • Atrial fibrillation with RVR (CMS/HCC)   • Restrictive lung disease   • A-fib (CMS/HCC)   • Presence of Watchman left atrial appendage closure device   • Malignant neoplasm of female breast (CMS/HCC)   • Status post bilateral mastectomy   • Class 1 obesity due to excess calories with serious comorbidity and body mass index (BMI) of 31.0 to 31.9 in adult        Lymphedema     Row Name 20 1100             Subjective Pain    Able to rate subjective pain?  yes  -HR      Pre-Treatment Pain Level  4  -HR         Subjective Comments    Subjective Comments  She is feeling good.  -HR         Manual Lymphatic Drainage    Manual Lymphatic Drainage  initial sequence;opened regional lymph nodes;opened anastamoses;extremity treatment  -HR      Initial Sequence  short neck;abdomen  -HR      Abdomen  superficial  -HR      Opened Regional Lymph Nodes  inguinal  -HR      Inguinal  left;right  -HR      Opened Anastamoses  axillo-inguinal  -HR      Axillo-Inguinal  left;right  -HR      Extremity  Treatment  MLD to full limb;simple/brief MLD  -HR      Simple/Brief MLD  RUE  -HR      MLD to Full Limb  LUE  -HR      Manual Lymphatic Drainage Comments  daughter is coming to watch next treatment  -HR        User Key  (r) = Recorded By, (t) = Taken By, (c) = Cosigned By    Initials Name Provider Type    HR La Delgado, PT, DPT, CLT-LUANA Physical Therapist                        PT Assessment/Plan     Row Name 06/22/20 1100          PT Assessment    Assessment Comments  First treatment. She had no difficulty tolerating MLD. Daughter is coming with her next visit.   -HR        PT Plan    PT Plan Comments  Educate on self massage and wrapping if necessary  -HR       User Key  (r) = Recorded By, (t) = Taken By, (c) = Cosigned By    Initials Name Provider Type    HR La Delgado, PT, DPT, CLT-LUANA Physical Therapist             OP Exercises     Row Name 06/22/20 1100 06/22/20 1000          Subjective Comments    Subjective Comments  She is feeling good.  -HR  --        Subjective Pain    Able to rate subjective pain?  yes  -HR  --     Pre-Treatment Pain Level  4  -HR  --        Total Minutes    60686 - PT Manual Therapy Minutes  --  60  -HR       User Key  (r) = Recorded By, (t) = Taken By, (c) = Cosigned By    Initials Name Provider Type    HR La Delgado, PT, DPT, CLT-LUANA Physical Therapist                     Manual Rx (last 36 hours)      Manual Treatments     Row Name 06/22/20 1000             Total Minutes    82738 - PT Manual Therapy Minutes  60  -HR        User Key  (r) = Recorded By, (t) = Taken By, (c) = Cosigned By    Initials Name Provider Type    HR La Delgado, PT, DPT, CLT-LUANA Physical Therapist          PT OP Goals     Row Name 06/22/20 1000          PT Short Term Goals    STG Date to Achieve  07/01/20  -HR     STG 1  Patient will be independent with remedial HEP for lymphedema.  -HR     STG 1 Progress  Ongoing  -HR     STG 2  Patient will have a good basic  understanding of lymphedema including risk reduction practices and elevated infection risk.   -HR     STG 2 Progress  Ongoing  -HR     STG 3  Patient will be independent with self MLD with assist of family as needed.  -HR     STG 3 Progress  New  -HR     STG 4  Patient will be able to apply compression bandages with assist of family.   -HR     STG 4 Progress  New  -HR        Long Term Goals    LTG Date to Achieve  07/29/20  -HR     LTG 1  Patient will have compression garment for abdominal edema.  -HR     LTG 1 Progress  New  -HR     LTG 2  Patient will have well fitting compression garments for BUEs.  -HR     LTG 2 Progress  New  -HR     LTG 3  Patient will obtain a flexitouch lymphedema pump and be independent with its use for maintenance.   -HR     LTG 3 Progress  New  -HR     LTG 4  She will be independent and comfortable with maintenance program for lymphedema after DC.  -HR     LTG 4 Progress  New  -HR        Time Calculation    PT Goal Re-Cert Due Date  07/17/20  -HR       User Key  (r) = Recorded By, (t) = Taken By, (c) = Cosigned By    Initials Name Provider Type    HR La Delgado, PT, DPT, LUIS F Physical Therapist          Therapy Education  Education Details: will initiate more self HEP next visit.               Time Calculation:                     La Delgado PT, DPT, LUIS F  6/22/2020

## 2020-06-23 ENCOUNTER — OFFICE VISIT (OUTPATIENT)
Dept: FAMILY MEDICINE CLINIC | Facility: CLINIC | Age: 75
End: 2020-06-23

## 2020-06-23 VITALS
HEIGHT: 64 IN | OXYGEN SATURATION: 97 % | HEART RATE: 71 BPM | RESPIRATION RATE: 16 BRPM | DIASTOLIC BLOOD PRESSURE: 84 MMHG | WEIGHT: 186 LBS | SYSTOLIC BLOOD PRESSURE: 126 MMHG | BODY MASS INDEX: 31.76 KG/M2

## 2020-06-23 DIAGNOSIS — E11.59 TYPE 2 DIABETES MELLITUS WITH OTHER CIRCULATORY COMPLICATIONS (HCC): ICD-10-CM

## 2020-06-23 DIAGNOSIS — I48.11 LONGSTANDING PERSISTENT ATRIAL FIBRILLATION (HCC): ICD-10-CM

## 2020-06-23 DIAGNOSIS — I10 ESSENTIAL HYPERTENSION: Primary | ICD-10-CM

## 2020-06-23 DIAGNOSIS — C50.919 MALIGNANT NEOPLASM OF FEMALE BREAST, UNSPECIFIED ESTROGEN RECEPTOR STATUS, UNSPECIFIED LATERALITY, UNSPECIFIED SITE OF BREAST (HCC): Primary | ICD-10-CM

## 2020-06-23 DIAGNOSIS — I77.9 BILATERAL CAROTID ARTERY DISEASE, UNSPECIFIED TYPE (HCC): ICD-10-CM

## 2020-06-23 PROCEDURE — 99214 OFFICE O/P EST MOD 30 MIN: CPT | Performed by: FAMILY MEDICINE

## 2020-06-23 NOTE — PROGRESS NOTES
"Glenn Pritchard is a 74 y.o. female.     Chief Complaint   Patient presents with   • Follow-up     3 mo   htn       History of Present Illness     she thinks her bs and bp are stable --dr carreon just ordered [id number]---[id number] synthroid witout heat or cold intoances      Current Outpatient Medications:   •  aspirin 81 MG chewable tablet, Chew 1 tablet Daily., Disp: , Rfl:   •  dilTIAZem CD (CARDIZEM CD) 180 MG 24 hr capsule, Take 2 capsules by mouth Daily., Disp: 180 capsule, Rfl: 3  •  Insulin Pen Needle (PEN NEEDLES) 31G X 5 MM misc, 1 each 2 (Two) Times a Day., Disp: 100 each, Rfl: 5  •  letrozole (FEMARA) 2.5 MG tablet, Take 2.5 mg by mouth Daily., Disp: , Rfl:   •  levothyroxine (SYNTHROID, LEVOTHROID) 137 MCG tablet, TAKE 1 TABLET BY MOUTH DAILY, Disp: 90 tablet, Rfl: 0  •  losartan (COZAAR) 25 MG tablet, Take 25 mg by mouth Daily., Disp: , Rfl:   •  metoprolol tartrate (LOPRESSOR) 25 MG tablet, TAKE 1 TABLET BY MOUTH EVERY 12 HOURS, Disp: 180 tablet, Rfl: 1  •  Multiple Vitamins-Minerals (CENTRUM SILVER PO), Take 1 tablet by mouth Daily., Disp: , Rfl:   •  TOUJEO SOLOSTAR 300 UNIT/ML solution pen-injector injection, INJECT 80 UNITS UNDER THE SKIN UTD QD, Disp: , Rfl: 1  Allergies   Allergen Reactions   • Amoxil [Amoxicillin] Hives   • Penicillins Hives   • Biaxin [Clarithromycin] Other (See Comments)     NOT SURE OF REACTION, \"SORE MOUTH OF DIARRHEA\"   • Lortab [Hydrocodone-Acetaminophen] GI Intolerance       Past Medical History:   Diagnosis Date   • Arrhythmia    • Atrial fibrillation, currently in sinus rhythm    • Breast cancer (CMS/HCC)     right   • Cancer (CMS/HCC)     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)   • Carotid artery occlusion    • Diabetes mellitus, type II (CMS/HCC)    • Dizziness    • Drug therapy    • Essential hypertension    • GERD (gastroesophageal reflux disease)    • GI bleed requiring more than 4 units of blood in 24 hours, ICU, or surgery    • History of chemotherapy    • History of " "lymphoma    • Hx of radiation therapy    • Hypomagnesemia    • Hypothyroidism    • On amiodarone therapy      Past Surgical History:   Procedure Laterality Date   • ATRIAL APPENDAGE EXCLUSION LEFT WITH TRANSESOPHAGEAL ECHOCARDIOGRAM Left 11/27/2018    Procedure: Atrial Appendage Occlusion;  Surgeon: Mick Orantes MD;  Location:  [facility]ASIVE [facility];  Service: Cardiology   • ATRIAL APPENDAGE EXCLUSION LEFT WITH TRANSESOPHAGEAL ECHOCARDIOGRAM Left 1/22/2019    Procedure: Atrial Appendage Occlusion;  Surgeon: Mick Orantes MD;  Location:  [facility];  Service: Cardiology   • BREAST BIOPSY     • MASTECTOMY     • OTHER SURGICAL HISTORY      Mediport insertion X 2   • OTHER SURGICAL HISTORY      remote lymphoma treatment   • REPLACEMENT TOTAL KNEE Right 09/2017   • TUBAL ABDOMINAL LIGATION         Review of Systems   Constitutional: Negative.    HENT: Negative.    Eyes: Negative.    Respiratory: Negative.    Cardiovascular: Negative.    Gastrointestinal: Negative.    Endocrine: Negative.    Genitourinary: Negative.    Musculoskeletal: Negative.    Skin: Negative.    Allergic/Immunologic: Negative.    Neurological: Negative.    Hematological: Negative.    Psychiatric/Behavioral: Negative.        Objective  /84   Pulse 71   Resp 16   Ht 162.6 cm (64\")   Wt 84.4 kg (186 lb)   SpO2 97%   BMI 31.93 kg/m²   Physical Exam   Constitutional: She is oriented to person, place, and time. She appears well-developed and well-nourished.   HENT:   Head: Normocephalic and atraumatic.   Right Ear: External ear normal.   Eyes: Pupils are equal, round, and reactive to light. Conjunctivae and EOM are normal.   Neck: Normal range of motion. Neck supple.   Cardiovascular: Normal rate, regular rhythm, normal heart sounds and intact distal pulses.   Pulmonary/Chest: Effort normal and breath sounds normal.   Abdominal: Soft. Bowel sounds are normal.   Musculoskeletal: Normal range of motion.    Dianne had a " diabetic foot exam performed today.   During the foot exam she had a monofilament test performed.    Neurological Sensory Findings - Unaltered hot/cold right ankle/foot discrimination and unaltered hot/cold left ankle/foot discrimination.  Vascular Status -  Her right foot exhibits normal foot vasculature . Her left foot exhibits normal foot vasculature .  Skin Integrity  -  Her right foot skin is intact.Her left foot skin is intact..  Neurological: She is alert and oriented to person, place, and time.   Skin: Skin is warm. Capillary refill takes less than 2 seconds.   Psychiatric: She has a normal mood and affect. Her behavior is normal. Judgment and thought content normal.   Nursing note and vitals reviewed.      Assessment/Plan   Dianne was seen today for follow-up.    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

## 2020-06-24 ENCOUNTER — TREATMENT (OUTPATIENT)
Dept: PHYSICAL THERAPY | Facility: CLINIC | Age: 75
End: 2020-06-24

## 2020-06-24 DIAGNOSIS — I97.2 POST-MASTECTOMY LYMPHEDEMA SYNDROME: Primary | ICD-10-CM

## 2020-06-24 DIAGNOSIS — C50.912 MALIGNANT NEOPLASM OF LEFT FEMALE BREAST, UNSPECIFIED ESTROGEN RECEPTOR STATUS, UNSPECIFIED SITE OF BREAST (HCC): ICD-10-CM

## 2020-06-24 LAB — MICROALBUMIN UR-MCNC: 41.7 UG/ML

## 2020-06-24 PROCEDURE — 97140 MANUAL THERAPY 1/> REGIONS: CPT | Performed by: PHYSICAL THERAPIST

## 2020-06-24 PROCEDURE — 97535 SELF CARE MNGMENT TRAINING: CPT | Performed by: PHYSICAL THERAPIST

## 2020-06-24 NOTE — PROGRESS NOTES
Outpatient Physical Therapy Lymphedema Treatment Note       Patient Name: Dianne Pritchard  : 1945  MRN: [id number]  Today's Date: 2020        Visit Date: 2020    Visit Dx:    ICD-10-CM ICD-9-CM   1. 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)   • Acquired hypothyroidism   • Dizziness   • Multiple falls   • Bruit of left carotid artery   • Acute pain of right knee   • Right knee pain   • Status post right knee replacement   • BMI 33.0-33.9,adult   • Bilateral carotid artery disease (CMS/HCC)   • Atrial fibrillation with RVR (CMS/HCC)   • Restrictive lung disease   • A-fib (CMS/HCC)   • Presence of Watchman left atrial appendage closure device   • Malignant neoplasm of female breast (CMS/HCC)   • Status post bilateral mastectomy   • Class 1 obesity due to excess calories with serious comorbidity and body mass index (BMI) of 31.0 to 31.9 in adult        Lymphedema     Row Name 20 1005             Subjective Pain    Able to rate subjective pain?  yes  -AL      Pre-Treatment Pain Level  0  -AL         Subjective Comments    Subjective Comments  She did not have any problems after her last treatment.  She has a little burning in the L upper arm that is constant.  She is working on the HEP.  She has brought her daughter with her today to learn how to do the MLD.   -AL         Manual Lymphatic Drainage    Manual Lymphatic Drainage  initial sequence;opened regional lymph nodes;opened anastamoses;extremity treatment  -AL      Initial Sequence  short neck;abdomen;diaphragmatic breathing  -AL      Abdomen  superficial  -AL      Diaphragmatic Breathing  x 9 with superficial abdominals  -AL      Opened Regional  Lymph Nodes  inguinal  -AL      Inguinal  left;right  -AL      Opened Anastamoses  axillo-inguinal  -AL      Axillo-Inguinal  left;right  -AL      Extremity Treatment  MLD to full limb;simple/brief MLD  -AL      Simple/Brief MLD  R UE  -AL      MLD to Full Limb  L UE  -AL      Manual Lymphatic Drainage Comments  Instructed patient and daughter on the MLD  -AL        User Key  (r) = Recorded By, (t) = Taken By, (c) = Cosigned By    Initials Name Provider Type    Karla Stanford PTA, CLMATTHEW Physical Therapy Assistant                        PT Assessment/Plan     Row Name 06/24/20 1005          PT Assessment    Assessment Comments  Patient and her daughter were instructed on the MLD.  They will start working on this at home. Will go over the MLD if needed for patient next treatment, will take measurements of the L  UE.   -AL        PT Plan    PT Plan Comments  Will continue with MLD, assess if wrapping is needed.   -AL       User Key  (r) = Recorded By, (t) = Taken By, (c) = Cosigned By    Initials Name Provider Type    Karla Stanford PTA, LUIS F Physical Therapy Assistant             OP Exercises     Row Name 06/24/20 1005             Subjective Comments    Subjective Comments  She did not have any problems after her last treatment.  She has a little burning in the L upper arm that is constant.  She is working on the HEP.  She has brought her daughter with her today to learn how to do the MLD.   -AL         Subjective Pain    Able to rate subjective pain?  yes  -AL      Pre-Treatment Pain Level  0  -AL         Total Minutes    14654 - PT Manual Therapy Minutes  60  -AL        User Key  (r) = Recorded By, (t) = Taken By, (c) = Cosigned By    Initials Name Provider Type    Karla Stanford, PTA, CLLIBBY-LUANA Physical Therapy Assistant                     Manual Rx (last 36 hours)      Manual Treatments     Row Name 06/24/20 1005             Total Minutes    35498 - PT Manual Therapy Minutes  60  -AL        User  Key  (r) = Recorded By, (t) = Taken By, (c) = Cosigned By    Initials Name Provider Type    Karla Stanford PTA, DWAINE-LUANA Physical Therapy Assistant          PT OP Goals     Row Name 06/24/20 1005          PT Short Term Goals    STG Date to Achieve  07/01/20  -AL     STG 1  Patient will be independent with remedial HEP for lymphedema.  -AL     STG 1 Progress  Ongoing  -AL     STG 1 Progress Comments  She is working on the HEP  -AL     STG 2  Patient will have a good basic understanding of lymphedema including risk reduction practices and elevated infection risk.   -AL     STG 2 Progress  Ongoing  -AL     STG 2 Progress Comments  Educated during treatment today  -AL     STG 3  Patient will be independent with self MLD with assist of family as needed.  -AL     STG 3 Progress  Ongoing  -AL     STG 3 Progress Comments  Instructed patient and her daughter on the MLD  -AL     STG 4  Patient will be able to apply compression bandages with assist of family.   -AL     STG 4 Progress  New  -AL        Long Term Goals    LTG Date to Achieve  07/29/20  -AL     LTG 1  Patient will have compression garment for abdominal edema.  -AL     LTG 1 Progress  New  -AL     LTG 2  Patient will have well fitting compression garments for BUEs.  -AL     LTG 2 Progress  New  -AL     LTG 3  Patient will obtain a flexitouch lymphedema pump and be independent with its use for maintenance.   -AL     LTG 3 Progress  New  -AL     LTG 4  She will be independent and comfortable with maintenance program for lymphedema after DC.  -AL     LTG 4 Progress  New  -AL        Time Calculation    PT Goal Re-Cert Due Date  07/17/20  -AL       User Key  (r) = Recorded By, (t) = Taken By, (c) = Cosigned By    Initials Name Provider Type    Karla Stanford PTA, DWAINE-LUANA Physical Therapy Assistant          Therapy Education  Education Details: MLD  Given: Edema management  Program: New  How Provided: Verbal, Demonstration, Written  Provided to: Patient, Other  (comment)(daughter)  Level of Understanding: Teach back education performed, Verbalized, Demonstrated  29695 - PT Self Care/Mgmt Minutes: 15              Time Calculation:                     Karla Morales PTA, [facility]-LUANA  6/24/2020

## 2020-06-26 ENCOUNTER — TELEPHONE (OUTPATIENT)
Dept: VASCULAR SURGERY | Facility: CLINIC | Age: 75
End: 2020-06-26

## 2020-06-29 ENCOUNTER — HOSPITAL ENCOUNTER (OUTPATIENT)
Dept: ULTRASOUND IMAGING | Facility: HOSPITAL | Age: 75
Discharge: HOME | End: 2020-06-29
Admitting: NURSE PRACTITIONER

## 2020-06-29 ENCOUNTER — OFFICE VISIT (OUTPATIENT)
Dept: VASCULAR SURGERY | Facility: CLINIC | Age: 75
End: 2020-06-29

## 2020-06-29 VITALS
SYSTOLIC BLOOD PRESSURE: 134 MMHG | DIASTOLIC BLOOD PRESSURE: 78 MMHG | HEIGHT: 64 IN | HEART RATE: 74 BPM | WEIGHT: 185 LBS | BODY MASS INDEX: 31.58 KG/M2 | OXYGEN SATURATION: 97 %

## 2020-06-29 DIAGNOSIS — I65.23 BILATERAL CAROTID ARTERY STENOSIS: ICD-10-CM

## 2020-06-29 DIAGNOSIS — I65.23 BILATERAL CAROTID ARTERY STENOSIS: Primary | ICD-10-CM

## 2020-06-29 DIAGNOSIS — I10 ESSENTIAL HYPERTENSION: ICD-10-CM

## 2020-06-29 PROCEDURE — 99214 OFFICE O/P EST MOD 30 MIN: CPT | Performed by: NURSE PRACTITIONER

## 2020-06-29 PROCEDURE — 93880 EXTRACRANIAL BILAT STUDY: CPT

## 2020-06-29 PROCEDURE — 93880 EXTRACRANIAL BILAT STUDY: CPT | Performed by: SURGERY

## 2020-06-29 NOTE — PROGRESS NOTES
"6/29/2020       Darryl Bang MD   [address] [place] [id number]    Dianne Pritchard  1945    Chief Complaint   Patient presents with   • Follow-up     6 Month Follow For Bilateral Carotid Artery Stenosis. Test [id number] US pad carotid bilateral. Patient denies any stroke like symptoms.        Dear Darryl Bang MD       HPI  I had the pleasure of seeing your patient Dianne Pritchard in the office today.  As you recall, Dianne Pritchard is a 75 y.o.  female who we are following for asymptomatic carotid occlusive disease.  Y She is is also followed by cardiology for paroxysmal atrial fibrillation.  She has since had a Watchman procedure. She denies any strokelike symptoms.  She denies any claudication to her lower extremities.  She is maintained on aspirin.  She does have a history of previous radiation to her left neck.  She did have noninvasive testing performed today, which I did review in office.         Review of Systems   Constitutional: Negative.    HENT: Negative.    Eyes: Negative.    Respiratory: Negative.    Cardiovascular: Negative.    Gastrointestinal: Negative.    Endocrine: Negative.    Genitourinary: Negative.    Musculoskeletal: Negative.    Skin: Negative.    Allergic/Immunologic: Negative.    Neurological: Negative.    Hematological: Negative.    Psychiatric/Behavioral: Negative.        /78 (BP Location: Right arm, Patient Position: Sitting, Cuff Size: Adult)   Pulse 74   Ht 162.6 cm (64\")   Wt 83.9 kg (185 lb)   SpO2 97%   BMI 31.76 kg/m²   Physical Exam   Constitutional: She is oriented to person, place, and time. Vital signs are normal. She appears well-developed and well-nourished. She is cooperative. No distress.   HENT:   Head: Normocephalic and atraumatic.   Mouth/Throat: Oropharynx is clear and moist.   Eyes: Pupils are equal, round, and reactive to light. No scleral icterus.   Neck: Normal range of motion. Neck supple. No JVD present. Carotid bruit is not " present. No thyromegaly present.   Cardiovascular: Normal rate, regular rhythm, S2 normal, normal heart sounds, intact distal pulses and normal pulses. Exam reveals no gallop and no friction rub.   No murmur heard.  Pulses:       Carotid pulses are on the left side with bruit.  Pulmonary/Chest: Effort normal and breath sounds normal.   Abdominal: Soft. Normal aorta and bowel sounds are normal. There is no hepatosplenomegaly.   Musculoskeletal: Normal range of motion.   Neurological: She is alert and oriented to person, place, and time. No cranial nerve deficit.   Skin: Skin is warm and dry. She is not diaphoretic.   Psychiatric: She has a normal mood and affect. Her behavior is normal. Judgment and thought content normal.   Nursing note and vitals reviewed.      Diagnostic Data:  Noninvasive testing including a carotid duplex showed 50 to 69% carotid stenosis bilaterally with bilateral antegrade vertebral flow.    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)   • Acquired hypothyroidism   • Dizziness   • Multiple falls   • Bruit of left carotid artery   • Acute pain of right knee   • Right knee pain   • Status post right knee replacement   • BMI 33.0-33.9,adult   • Bilateral carotid artery disease (CMS/HCC)   • Atrial fibrillation with RVR (CMS/HCC)   • Restrictive lung disease   • A-fib (CMS/HCC)   • Presence of Watchman left atrial appendage closure device   • Malignant neoplasm of female breast (CMS/HCC)   • Status post bilateral mastectomy   • Class 1 obesity due to excess calories with serious comorbidity and body mass index (BMI) of 31.0 to 31.9 in adult         ICD-10-CM ICD-9-CM   1. Bilateral carotid artery stenosis I65.23 433.10     433.30   2. Essential hypertension I10 401.9       Plan: After thoroughly evaluating Dianne Pritchard, I believe the best course of action is to remain conservative from  vascular surgery standpoint.  I did review her testing which remains unchanged.  She does have heavy plaque in the left common and internal carotid arteries.  Previously, her left carotid is severely narrowed even into the cavernous portion on CTA neck, and for this reason we did not recommend surgical intervention.  This was likely caused by the radiation to the left side of her neck.   We will see her back in 6 months with repeat noninvasive testing including a carotid duplex.    I did discuss vascular risk factors as they pertain to the progression of vascular disease including controlling her hypertension and diabetes mellitus.  Her blood pressure is stable on her current medication regimen.  Her diabetes is uncontrolled with her most recent hemoglobin A1c being 9.  We did discuss the importance of keeping her blood sugar controlled.  She reports she just does not eat well.  The patient can continue taking her current medication regimen as previously planned.  This was all discussed in full with complete understanding.    Thank you for allowing me to participate in the care of your patient.  Please do not hesitate with any questions or concerns.  I will keep you aware of any further encounters with Dianne Pritchard.        Sincerely yours,         ДМИТРИЙ Jc Thomas Waldon, MD

## 2020-07-01 ENCOUNTER — TREATMENT (OUTPATIENT)
Dept: PHYSICAL THERAPY | Facility: CLINIC | Age: 75
End: 2020-07-01

## 2020-07-01 ENCOUNTER — TELEPHONE (OUTPATIENT)
Dept: VASCULAR SURGERY | Facility: CLINIC | Age: 75
End: 2020-07-01

## 2020-07-01 DIAGNOSIS — Z90.13 STATUS POST BILATERAL MASTECTOMY: ICD-10-CM

## 2020-07-01 DIAGNOSIS — C50.912 MALIGNANT NEOPLASM OF LEFT FEMALE BREAST, UNSPECIFIED ESTROGEN RECEPTOR STATUS, UNSPECIFIED SITE OF BREAST (HCC): ICD-10-CM

## 2020-07-01 DIAGNOSIS — I97.2 POST-MASTECTOMY LYMPHEDEMA SYNDROME: Primary | ICD-10-CM

## 2020-07-01 DIAGNOSIS — Z92.3 HISTORY OF RADIATION THERAPY: ICD-10-CM

## 2020-07-01 PROCEDURE — 97140 MANUAL THERAPY 1/> REGIONS: CPT | Performed by: PHYSICAL THERAPIST

## 2020-07-01 NOTE — PROGRESS NOTES
Outpatient Physical Therapy Lymphedema Treatment Note       Patient Name: Dianne Pritchard  : 1945  MRN: [id number]  Today's Date: 2020        Visit Date: 2020    Visit Dx:    ICD-10-CM ICD-9-CM   1. 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 Malignant neoplasm of left female breast, unspecified estrogen receptor status, unspecified site of breast (CMS/HCC) C50.912 174.9   3. Status post bilateral mastectomy Z90.13 V45.71   4. History of radiation therapy Z92.3 V15.3       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)   • Acquired hypothyroidism   • Dizziness   • Multiple falls   • Bruit of left carotid artery   • Acute pain of right knee   • Right knee pain   • Status post right knee replacement   • BMI 33.0-33.9,adult   • Bilateral carotid artery disease (CMS/HCC)   • Atrial fibrillation with RVR (CMS/HCC)   • Restrictive lung disease   • A-fib (CMS/HCC)   • Presence of Watchman left atrial appendage closure device   • Malignant neoplasm of female breast (CMS/HCC)   • Status post bilateral mastectomy   • Class 1 obesity due to excess calories with serious comorbidity and body mass index (BMI) of 31.0 to 31.9 in adult        Lymphedema     Row Name 20 1000             Subjective Pain    Able to rate subjective pain?  yes  -HR      Pre-Treatment Pain Level  0  -HR         Subjective Comments    Subjective Comments  She is feeling better.  -HR         Skin Changes/Observations    Skin Observations Comment  Hyperkeratosis at both forearm at wrist. Fibrosis around L mastectomy scar.   -HR         Lymphedema Measurements    Measurement Type(s)  Quick Girth;Circumferential  -HR      Quick Girth Areas  Upper extremities  -HR      Circumferential Areas  Trunk  -HR         LUE Quick Girth (cm)    Axilla  35.5 cm  -HR      Mid upper arm  34.5 cm  -HR      Elbow  26.7 cm  -HR      Mid  forearm  21.8 cm  -HR      Wrist crease  17 cm  -HR      Web space  19 cm  -HR      LUE Quick Girth Total  154.5  -HR         RUE Quick Girth (cm)    Axilla  36.8 cm  -HR      Mid upper arm  34 cm  -HR      Elbow  25.8 cm  -HR      Mid forearm  22.1 cm  -HR      Wrist crease  16.9 cm  -HR      Web space  20.3 cm  -HR      RUE Quick Girth Total  155.9  -HR         Trunk Circumferential (cm)    Measurement Location 1  at axillae-sitting EOB  -HR      Trunk 1  94 cm  -HR      Measurement Location 2  at umbilicus- standing  -HR      Trunk 2  108 cm  -HR      Measurement Location 3  .  -HR      Trunk Circumferential Total  202 cm  -HR         Manual Lymphatic Drainage    Manual Lymphatic Drainage  initial sequence;opened regional lymph nodes;opened anastamoses;extremity treatment  -HR      Initial Sequence  short neck;abdomen;diaphragmatic breathing  -HR      Abdomen  superficial  -HR      Diaphragmatic Breathing  x 9 with superficial abdominals  -HR      Opened Regional Lymph Nodes  inguinal  -HR      Inguinal  left;right  -HR      Opened Anastamoses  axillo-inguinal  -HR      Axillo-Inguinal  left;right  -HR      Extremity Treatment  MLD to full limb;simple/brief MLD  -HR      Simple/Brief MLD  R UE  -HR      MLD to Full Limb  L UE  -HR      Manual Lymphatic Drainage Comments  She is working on self MLD including work on the trunk and incisional scars on B chest.   -HR         Compression/Skin Care    Compression/Skin Care Comments  She is wearing a 20-30 mmHg compression sleeve to the RUE. We will start compression bandaging to the L arm next visit since she won't be back in until then due to the holiday. She and her daughter are looking for compression camisole/tank for her trunk as well.   -HR        User Key  (r) = Recorded By, (t) = Taken By, (c) = Cosigned By    Initials Name Provider Type    HR La Delgado, PT, DPT, CLT-LUANA Physical Therapist                        PT Assessment/Plan     Row Name  07/01/20 1000          PT Assessment    Assessment Comments  Her measurements were up today but she is feeling less discomfort in the arms. She is working on the self MLD for her UEs and her trunk. She is doing HEP. Due to scheduling around the holiday, this was her only appointment this week. Since she comes 2 days in a row next week, I will be able to apply compression bandaging to the LUE and then assess its effectiveness 24 hours later.   -HR        PT Plan    PT Plan Comments  Progress CDT to bandaging LUE. Assess new compression for trunk.   -HR       User Key  (r) = Recorded By, (t) = Taken By, (c) = Cosigned By    Initials Name Provider Type    HR La Delgado, PT, DPT, CLT-LUANA Physical Therapist             OP Exercises     Row Name 07/01/20 1000             Subjective Comments    Subjective Comments  She is feeling better.  -HR         Subjective Pain    Able to rate subjective pain?  yes  -HR      Pre-Treatment Pain Level  0  -HR         Total Minutes    56742 - PT Manual Therapy Minutes  70  -HR        User Key  (r) = Recorded By, (t) = Taken By, (c) = Cosigned By    Initials Name Provider Type    HR La Delgado, PT, DPT, CLT-LUANA Physical Therapist                     Manual Rx (last 36 hours)      Manual Treatments     Row Name 07/01/20 1000             Total Minutes    89581 - PT Manual Therapy Minutes  70  -HR        User Key  (r) = Recorded By, (t) = Taken By, (c) = Cosigned By    Initials Name Provider Type    HR La Delgado, PT, DPT, CLT-LUANA Physical Therapist          PT OP Goals     Row Name 07/01/20 1000          PT Short Term Goals    STG Date to Achieve  07/01/20  -HR     STG 1  Patient will be independent with remedial HEP for lymphedema.  -HR     STG 1 Progress  Ongoing  -HR     STG 1 Progress Comments  She is working on the HEP and low sugar/low salt diet.   -HR     STG 2  Patient will have a good basic understanding of lymphedema including risk reduction  practices and elevated infection risk.   -HR     STG 2 Progress  Ongoing  -HR     STG 3  Patient will be independent with self MLD with assist of family as needed.  -HR     STG 3 Progress  Ongoing  -HR     STG 4  Patient will be able to apply compression bandages with assist of family.   -HR     STG 4 Progress  New  -HR     STG 4 Progress Comments  Will wrap arm next week as she has appointments 2 days in a row.   -HR        Long Term Goals    LTG Date to Achieve  07/29/20  -HR     LTG 1  Patient will have compression garment for abdominal edema.  -HR     LTG 1 Progress  Ongoing  -HR     LTG 1 Progress Comments  She and daughter are looking for compression camisole. At this time, she is wearing a tank  -HR     LTG 2  Patient will have well fitting compression garments for BUEs.  -HR     LTG 2 Progress  Ongoing  -HR     LTG 2 Progress Comments  Will assess maintenance garments as treatment progresses.  -HR     LTG 3  Patient will obtain a flexitouch lymphedema pump and be independent with its use for maintenance.   -HR     LTG 3 Progress  New  -HR     LTG 4  She will be independent and comfortable with maintenance program for lymphedema after DC.  -HR     LTG 4 Progress  New  -HR        Time Calculation    PT Goal Re-Cert Due Date  07/17/20  -HR       User Key  (r) = Recorded By, (t) = Taken By, (c) = Cosigned By    Initials Name Provider Type    [place], PT, DPT, CLT-LUANA Physical Therapist          Therapy Education  Education Details: Discussed how the FT pump works and its benefit. Discussed compression camisole or tank.   Given: Edema management  Program: New, Progressed  How Provided: Verbal, Demonstration, Written  Provided to: Patient, Other (comment)  Level of Understanding: Teach back education performed, Verbalized, Demonstrated              Time Calculation:                     La Delgado, PT, DPT, CLLIBBY-LUANA  7/1/2020

## 2020-07-13 ENCOUNTER — TREATMENT (OUTPATIENT)
Dept: PHYSICAL THERAPY | Facility: CLINIC | Age: 75
End: 2020-07-13

## 2020-07-13 DIAGNOSIS — C50.912 MALIGNANT NEOPLASM OF LEFT FEMALE BREAST, UNSPECIFIED ESTROGEN RECEPTOR STATUS, UNSPECIFIED SITE OF BREAST (HCC): ICD-10-CM

## 2020-07-13 DIAGNOSIS — I97.2 POST-MASTECTOMY LYMPHEDEMA SYNDROME: Primary | ICD-10-CM

## 2020-07-13 PROCEDURE — 97140 MANUAL THERAPY 1/> REGIONS: CPT | Performed by: PHYSICAL THERAPIST

## 2020-07-13 NOTE — PROGRESS NOTES
Outpatient Physical Therapy Lymphedema Treatment Note       Patient Name: Dianne Pritchard  : 1945  MRN: [id number]  Today's Date: 2020        Visit Date: 2020    Visit Dx:    ICD-10-CM ICD-9-CM   1. 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)   • Acquired hypothyroidism   • Dizziness   • Multiple falls   • Bruit of left carotid artery   • Acute pain of right knee   • Right knee pain   • Status post right knee replacement   • BMI 33.0-33.9,adult   • Bilateral carotid artery disease (CMS/HCC)   • Atrial fibrillation with RVR (CMS/HCC)   • Restrictive lung disease   • A-fib (CMS/HCC)   • Presence of Watchman left atrial appendage closure device   • Malignant neoplasm of female breast (CMS/HCC)   • Status post bilateral mastectomy   • Class 1 obesity due to excess calories with serious comorbidity and body mass index (BMI) of 31.0 to 31.9 in adult        Lymphedema     Row Name 20 1050             Subjective Pain    Able to rate subjective pain?  yes  -AL      Pre-Treatment Pain Level  0  -AL         Subjective Comments    Subjective Comments  She is working on the MLD at home.  She wears compression on the R UE.  -AL         Lymphedema Measurements    Measurement Type(s)  Quick Girth;Circumferential  -AL      Quick Girth Areas  Upper extremities  -AL      Circumferential Areas  Trunk  -AL         LUE Quick Girth (cm)    Axilla  35.2 cm  -AL      Mid upper arm  34.5 cm  -AL      Elbow  26 cm  -AL      Mid forearm  22.6 cm  -AL      Wrist crease  17.1 cm  -AL      Web space  19.3 cm  -AL      LUE Quick Girth Total  154.7  -AL         RUE Quick Girth (cm)    Axilla  37.8 cm  -AL      Mid upper arm  33.6 cm   -AL      Elbow  25.6 cm  -AL      Mid forearm  23.1 cm  -AL      Wrist crease  17 cm  -AL      Web space  20.1 cm  -AL      RUE Quick Girth Total  157.2  -AL         Trunk Circumferential (cm)    Measurement Location 1  at axillae-sitting EOB  -AL      Trunk 1  94.5 cm  -AL      Measurement Location 2  at umbilicus- standing  -AL      Trunk 2  114 cm  -AL      Measurement Location 3  .  -AL      Trunk Circumferential Total  208.5 cm  -AL         Manual Lymphatic Drainage    Manual Lymphatic Drainage  initial sequence;opened regional lymph nodes;opened anastamoses;extremity treatment  -AL      Initial Sequence  short neck;abdomen;diaphragmatic breathing  -AL      Abdomen  superficial  -AL      Diaphragmatic Breathing  x 9 with superficial abdominals  -AL      Opened Regional Lymph Nodes  inguinal  -AL      Inguinal  left;right  -AL      Opened Anastamoses  axillo-inguinal  -AL      Axillo-Inguinal  left;right  -AL      Extremity Treatment  MLD to full limb;simple/brief MLD  -AL      Simple/Brief MLD  R UE  -AL      MLD to Full Limb  L UE  -AL      Manual Lymphatic Drainage Comments  Continue work on the MLD and scar tissue massage  -AL         Compression/Skin Care    Compression/Skin Care  skin care;wrapping location;bandaging;compression garment;remove bandages  -AL      Skin Care  lotion applied  -AL      Wrapping Location  upper extremity  -AL      Wrapping Location UE  right:;hand to axilla  -AL      Wrapping Comments  Had to re-wrap the L UE as she felt it was too tight.   -AL      Bandage Layers  cotton liner;padding/fluff layer;short-stretch bandages (comment size/quantity)  -AL      Bandaging Comments  4 in. stockinetter, one roll Artiflex, Comprilan:  one of each:  6 cm, 8 cm,10 cm.  -AL      Bandaging Technique  circumferential/spiral;figure-eight;light compression;moderate compression  -AL      Remove Bandages  Remove L UE wraps after 48 hours, or if they are painful or fingers turn dark..   -AL         User Key  (r) = Recorded By, (t) = Taken By, (c) = Cosigned By    Initials Name Provider Type    HITESH Morales Karla P, PTA, CLT-LUANA Physical Therapy Assistant                        PT Assessment/Plan     Row Name 07/13/20 1050          PT Assessment    Assessment Comments  Slight increase in the R UE arm mreasurements and an increase in the trunk measurements today.  I did wrap the L UE, will see how she tolerated the wraps and adjust them as needed.   -AL        PT Plan    PT Plan Comments  Continue with CDT  -AL       User Key  (r) = Recorded By, (t) = Taken By, (c) = Cosigned By    Initials Name Provider Type    HITESH Morales Karla P, PTA, CLT-LUANA Physical Therapy Assistant             OP Exercises     Row Name 07/13/20 1050             Subjective Comments    Subjective Comments  She is working on the MLD at home.  She wears compression on the R UE.  -AL         Subjective Pain    Able to rate subjective pain?  yes  -AL      Pre-Treatment Pain Level  0  -AL         Total Minutes    76318 - PT Manual Therapy Minutes  75  -AL        User Key  (r) = Recorded By, (t) = Taken By, (c) = Cosigned By    Initials Name Provider Type    HITESH Morales Karla P, PTA, CLT-LUANA Physical Therapy Assistant                     Manual Rx (last 36 hours)      Manual Treatments     Row Name 07/13/20 1050             Total Minutes    01661 - PT Manual Therapy Minutes  75  -AL        User Key  (r) = Recorded By, (t) = Taken By, (c) = Cosigned By    Initials Name Provider Type    HITESH Morales Karla P, PTA, CLT-LUANA Physical Therapy Assistant          PT OP Goals     Row Name 07/13/20 1050          PT Short Term Goals    STG Date to Achieve  07/01/20  -AL     STG 1  Patient will be independent with remedial HEP for lymphedema.  -AL     STG 1 Progress  Ongoing  -AL     STG 2  Patient will have a good basic understanding of lymphedema including risk reduction practices and elevated infection risk.   -AL     STG 2 Progress  Ongoing  -AL     STG 2  Progress Comments  Improving  -AL     STG 3  Patient will be independent with self MLD with assist of family as needed.  -AL     STG 3 Progress  Ongoing  -AL     STG 3 Progress Comments  She is working on the self MLD  -AL     STG 4  Patient will be able to apply compression bandages with assist of family.   -AL     STG 4 Progress  New  -AL        Long Term Goals    LTG Date to Achieve  07/29/20  -AL     LTG 1  Patient will have compression garment for abdominal edema.  -AL     LTG 1 Progress  Ongoing  -AL     LTG 2  Patient will have well fitting compression garments for BUEs.  -AL     LTG 2 Progress  Ongoing  -AL     LTG 3  Patient will obtain a flexitouch lymphedema pump and be independent with its use for maintenance.   -AL     LTG 3 Progress  New  -AL     LTG 4  She will be independent and comfortable with maintenance program for lymphedema after DC.  -AL     LTG 4 Progress  New  -AL        Time Calculation    PT Goal Re-Cert Due Date  07/17/20  -AL       User Key  (r) = Recorded By, (t) = Taken By, (c) = Cosigned By    Initials Name Provider Type    Karla Stanford PTA, CLT-LANA Physical Therapy Assistant          Therapy Education  Education Details: Remove L UE wraps after 48 hours, or if they are painful or fingers turn dark..   Given: Edema management  Program: New  How Provided: Verbal  Provided to: Patient  Level of Understanding: Verbalized              Time Calculation:                     Karla Morales PTA, CLT-LANA  7/13/2020

## 2020-07-17 ENCOUNTER — TREATMENT (OUTPATIENT)
Dept: PHYSICAL THERAPY | Facility: CLINIC | Age: 75
End: 2020-07-17

## 2020-07-17 DIAGNOSIS — I97.2 POST-MASTECTOMY LYMPHEDEMA SYNDROME: Primary | ICD-10-CM

## 2020-07-17 DIAGNOSIS — C50.912 MALIGNANT NEOPLASM OF LEFT FEMALE BREAST, UNSPECIFIED ESTROGEN RECEPTOR STATUS, UNSPECIFIED SITE OF BREAST (HCC): ICD-10-CM

## 2020-07-17 PROCEDURE — 97140 MANUAL THERAPY 1/> REGIONS: CPT | Performed by: PHYSICAL THERAPIST

## 2020-07-17 NOTE — PROGRESS NOTES
Outpatient Physical Therapy Lymphedema Progress Note       Patient Name: Dianne Pritchard  : 1945  MRN: [id number]  Today's Date: 2020        Visit Date: 2020    Visit Dx:    ICD-10-CM ICD-9-CM   1. 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 Malignant neoplasm of left female breast, unspecified estrogen receptor status, unspecified site of breast (CMS/HCC) C50.912 174.9       Patient Active Problem List   Diagnosis   • Tremor   • Essential hypertension   • Type 2 diabetes mellitus with other circulatory complications (CMS/[facility])   • Acquired hypothyroidism   • Dizziness   • Multiple falls   • Bruit of left carotid artery   • Acute pain of right knee   • Right knee pain   • Status post right knee replacement   • BMI 33.0-33.9,adult   • Bilateral carotid artery disease (CMS/[facility])   • Atrial fibrillation with RVR (CMS/[facility])   • Restrictive lung disease   • A-fib (CMS/[facility])   • Presence of Watchman left atrial appendage closure device   • Malignant neoplasm of female breast (CMS/[facility])   • Status post bilateral mastectomy   • Class 1 obesity due to excess calories with serious comorbidity and body mass index (BMI) of 31.0 to 31.9 in adult        Lymphedema     Row Name 20 1050 20 1000          Subjective Pain    Able to rate subjective pain?  yes  -AL  --  -AL     Pre-Treatment Pain Level  0  -AL  --  -AL        Subjective Comments    Subjective Comments  She was able to tolerate the wraps until 7:00, the wraps were getting painful.  She feels like her arm is better.   -AL  --  -AL        Lymphedema Measurements    Measurement Type(s)  Quick Girth;Circumferential  -AL  --     Quick Girth Areas  Upper extremities  -AL  --     Circumferential Areas  Trunk  -AL  --        LUE Quick Girth (cm)    Axilla  34.9 cm  -AL  --     Mid upper arm  34 cm  -AL  --     Elbow  26 cm  -AL  --     Mid forearm  22.1 cm  -AL  --     Wrist crease  17 cm  -AL  --     Web space  18.6 cm  -AL  --     LUE Quick  Girth Total  152.6  -AL  --        RUE Quick Girth (cm)    Axilla  37 cm  -AL  --     Mid upper arm  33.9 cm  -AL  --     Elbow  25.7 cm  -AL  --     Mid forearm  22.5 cm  -AL  --     Wrist crease  17.1 cm  -AL  --     Web space  19.7 cm  -AL  --     RUE Quick Girth Total  155.9  -AL  --        Manual Lymphatic Drainage    Manual Lymphatic Drainage  initial sequence;opened regional lymph nodes;opened anastamoses;extremity treatment  -AL  --     Initial Sequence  short neck;abdomen;diaphragmatic breathing  -AL  --     Abdomen  superficial  -AL  --     Diaphragmatic Breathing  x 9 with superficial abdominals  -AL  --     Opened Regional Lymph Nodes  inguinal  -AL  --     Inguinal  left;right  -AL  --     Opened Anastamoses  axillo-inguinal  -AL  --     Axillo-Inguinal  left;right  -AL  --     Extremity Treatment  MLD to full limb;simple/brief MLD  -AL  --     Simple/Brief MLD  R UE  -AL  --     MLD to Full Limb  L UE  -AL  --        Compression/Skin Care    Compression/Skin Care  skin care;wrapping location;bandaging;compression garment;remove bandages  -AL  --     Skin Care  lotion applied  -AL  --     Wrapping Location  upper extremity  -AL  --     Wrapping Location UE  right:;hand to axilla  -AL  --     Bandage Layers  cotton liner;padding/fluff layer;short-stretch bandages (comment size/quantity)  -AL  --     Bandaging Comments  4 in. stockinetter, one roll Artiflex, Comprilan:  one of each:  6 cm, 8 cm,10 cm.  -AL  --     Bandaging Technique  circumferential/spiral;light compression;moderate compression  -AL  --     Remove Bandages  Remove after 48 hours or if painful.  Wear the compression garment for the R UE.  -AL  --       User Key  (r) = Recorded By, (t) = Taken By, (c) = Cosigned By    Initials Name Provider Type    Karla Stanford, PTA, LIBERTYT-LUANA Physical Therapy Assistant                        PT Assessment/Plan     Row Name 07/17/20 1050          PT Assessment    Impairments  Edema;Impaired lymphatic  circulation;Impaired postural alignment;Integumentary integrity  -HR     Assessment Comments  She has had a reduction in both UE's since starting treatment with us.  Will do a pump basic pump trial next treatment.  Will continue to wrap the L UE to help reduce the arm and hand.  Patient will continue to wear the compression on the R UE.   -AL     Rehab Potential  Good  -HR     Patient/caregiver participated in establishment of treatment plan and goals  Yes  -HR     Patient would benefit from skilled therapy intervention  Yes  -HR        PT Plan    PT Frequency  2x/week  -HR     Predicted Duration of Therapy Intervention (Therapy Eval)  4-6 weeks  -HR     Planned CPT's?  PT RE-EVAL: 69908;PT THER PROC EA 15 MIN: 14798;PT MANUAL THERAPY EA 15 MIN: 75237;PT SELF CARE/HOME MGMT/TRAIN EA 15: 91699  -HR     PT Plan Comments  Continue with CDT  -AL       User Key  (r) = Recorded By, (t) = Taken By, (c) = Cosigned By    Initials Name Provider Type    Karla Stanford PTA, LUIS F Physical Therapy Assistant    HR La Delgado, PT, DPT, LUIS F Physical Therapist             OP Exercises     Row Name 07/17/20 1050 07/17/20 1000          Subjective Comments    Subjective Comments  She was able to tolerate the wraps until 7:00, the wraps were getting painful.  She feels like her arm is better.   -AL  --  -AL        Subjective Pain    Able to rate subjective pain?  yes  -AL  --  -AL     Pre-Treatment Pain Level  0  -AL  --  -AL        Total Minutes    45577 - PT Manual Therapy Minutes  75  -AL  --       User Key  (r) = Recorded By, (t) = Taken By, (c) = Cosigned By    Initials Name Provider Type    Karla Stanford PTA, LUIS F Physical Therapy Assistant                     Manual Rx (last 36 hours)      Manual Treatments     Row Name 07/17/20 1050             Total Minutes    35382 - PT Manual Therapy Minutes  75  -AL        User Key  (r) = Recorded By, (t) = Taken By, (c) = Cosigned By    Initials Name Provider  Type    Karla Stanford PTA, LUIS F Physical Therapy Assistant          PT OP Goals     Row Name 07/17/20 1050          PT Short Term Goals    STG Date to Achieve  07/01/20  -AL     STG 1  Patient will be independent with remedial HEP for lymphedema.  -AL     STG 1 Progress  Ongoing  -AL     STG 1 Progress Comments  She is working on the HEP  -AL     STG 2  Patient will have a good basic understanding of lymphedema including risk reduction practices and elevated infection risk.   -AL     STG 2 Progress  Ongoing  -AL     STG 2 Progress Comments  Improving  -AL     STG 3  Patient will be independent with self MLD with assist of family as needed.  -AL     STG 3 Progress  Ongoing  -AL     STG 3 Progress Comments  Working on the MLD  -AL     STG 4  Patient will be able to apply compression bandages with assist of family.   -AL     STG 4 Progress  Ongoing  -AL     STG 4 Progress Comments  Family will assist  -AL        Long Term Goals    LTG Date to Achieve  07/29/20  -AL     LTG 1  Patient will have compression garment for abdominal edema.  -AL     LTG 1 Progress  Ongoing  -AL     LTG 1 Progress Comments  Still looking for a compression lara  -AL     LTG 2  Patient will have well fitting compression garments for BUEs.  -AL     LTG 2 Progress  Ongoing  -AL     LTG 3  Patient will obtain a flexitouch lymphedema pump and be independent with its use for maintenance.   -AL     LTG 3 Progress  New  -AL     LTG 4  She will be independent and comfortable with maintenance program for lymphedema after DC.  -AL     LTG 4 Progress  New  -AL        Time Calculation    PT Goal Re-Cert Due Date  08/16/20  -AL       User Key  (r) = Recorded By, (t) = Taken By, (c) = Cosigned By    Initials Name Provider Type    Karla Stanford PTA, LUIS F Physical Therapy Assistant          Therapy Education  Education Details: Work on CDT for both UE's  Given: Edema management  Program: Reinforced  How Provided: Verbal  Provided to:  Patient  Level of Understanding: Verbalized              Time Calculation:                     La Delgado, PT, DPT, CLT-LUANA  7/17/2020

## 2020-07-31 ENCOUNTER — TREATMENT (OUTPATIENT)
Dept: PHYSICAL THERAPY | Facility: CLINIC | Age: 75
End: 2020-07-31

## 2020-07-31 DIAGNOSIS — Z90.13 STATUS POST BILATERAL MASTECTOMY: ICD-10-CM

## 2020-07-31 DIAGNOSIS — C50.912 MALIGNANT NEOPLASM OF LEFT FEMALE BREAST, UNSPECIFIED ESTROGEN RECEPTOR STATUS, UNSPECIFIED SITE OF BREAST (HCC): ICD-10-CM

## 2020-07-31 DIAGNOSIS — I97.2 POST-MASTECTOMY LYMPHEDEMA SYNDROME: Primary | ICD-10-CM

## 2020-07-31 PROCEDURE — 97140 MANUAL THERAPY 1/> REGIONS: CPT | Performed by: PHYSICAL THERAPIST

## 2020-07-31 NOTE — PROGRESS NOTES
Outpatient Physical Therapy Lymphedema Treatment Note       Patient Name: Dianne Pritchard  : 1945  MRN: [id number]  Today's Date: 2020        Visit Date: 2020    Visit Dx:    ICD-10-CM ICD-9-CM   1. 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 Malignant neoplasm of left female breast, unspecified estrogen receptor status, unspecified site of breast (CMS/HCC) C50.912 174.9   3. Status post bilateral mastectomy Z90.13 V45.71       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)   • Acquired hypothyroidism   • Dizziness   • Multiple falls   • Bruit of left carotid artery   • Acute pain of right knee   • Right knee pain   • Status post right knee replacement   • BMI 33.0-33.9,adult   • Bilateral carotid artery disease (CMS/HCC)   • Atrial fibrillation with RVR (CMS/HCC)   • Restrictive lung disease   • A-fib (CMS/HCC)   • Presence of Watchman left atrial appendage closure device   • Malignant neoplasm of female breast (CMS/HCC)   • Status post bilateral mastectomy   • Class 1 obesity due to excess calories with serious comorbidity and body mass index (BMI) of 31.0 to 31.9 in adult        Lymphedema     Row Name 20 1048             Subjective Pain    Able to rate subjective pain?  yes  -AL      Pre-Treatment Pain Level  0  -AL         Subjective Comments    Subjective Comments  She has been compliant with wearing compression on the R UE since .  She feels like she has increase fluid in the trunk, she has been compliant with working on the MLD and HEP.  She has been eating a low carb, high protien diet.  She has also been elevating the arms when swollen.    -AL         Lymphedema Measurements    Measurement Type(s)  Quick Girth;Circumferential  -AL      Quick Girth Areas  Upper extremities  -AL      Circumferential Areas  Trunk  -AL         LUE Quick Girth (cm)    Axilla  35.1 cm  -AL       Mid upper arm  34.4 cm  -AL      Elbow  26.6 cm  -AL      Mid forearm  22.3 cm  -AL      Wrist crease  17 cm  -AL      Web space  18.8 cm  -AL      LUE Quick Girth Total  154.2  -AL         RUE Quick Girth (cm)    Axilla  39.4 cm After pump:  40 cm  -AL      Mid upper arm  33.7 cm After pump:  34.1 cm  -AL      Elbow  25.9 cm After pump:  26 cm  -AL      Mid forearm  22.5 cm After pump:  23.1  -AL      Wrist crease  17.4 cm After pump: 17 cm  -AL      Web space  19.4 cm After pump: 19.5  -AL      RUE Quick Girth Total  158.3  -AL         Trunk Circumferential (cm)    Measurement Location 1  at axillae-sitting EOB, before pump  -AL      Trunk 1  95 cm After pump:  96.96 cm  -AL      Measurement Location 2  at umbilicus- standing before pump  -AL      Trunk 2  119.2 cm After pump:  123.1 cm  -AL      Trunk Circumferential Total  214.2 cm  -AL         Manual Lymphatic Drainage    Manual Lymphatic Drainage  initial sequence;opened regional lymph nodes;opened anastamoses;extremity treatment  -AL      Initial Sequence  short neck;abdomen;diaphragmatic breathing  -AL      Abdomen  superficial  -AL      Diaphragmatic Breathing  x 9 with superficial abdominals  -AL      Opened Regional Lymph Nodes  inguinal  -AL      Inguinal  left;right  -AL      Opened Anastamoses  axillo-inguinal  -AL      Axillo-Inguinal  left;right  -AL      Extremity Treatment  MLD to full limb;simple/brief MLD  -AL      Simple/Brief MLD  R UE  -AL      MLD to Full Limb  L UE  -AL      Manual Lymphatic Drainage Comments  Extra time working on MLD to both upper arms where hyperkeratosis is present.   -AL      Manual Therapy  Basic pump trial to the R UE x 25 min  -AL         Compression/Skin Care    Compression/Skin Care  skin care;wrapping location;bandaging;compression garment;remove bandages  -AL      Skin Care  lotion applied  -AL      Wrapping Location  upper extremity  -AL      Wrapping Location UE  right:;hand to axilla  -AL      Bandage  Layers  cotton liner;padding/fluff layer;short-stretch bandages (comment size/quantity)  -AL      Bandaging Comments  4 in. stockinetter, one roll Artiflex, Comprilan:  one of each:  6 cm, 8 cm,10 cm.  -AL      Bandaging Technique  circumferential/spiral;light compression;moderate compression  -AL      Remove Bandages  Remove after 48 hours or if painful.  Wear the compression garment for the R UE.  -AL        User Key  (r) = Recorded By, (t) = Taken By, (c) = Cosigned By    Initials Name Provider Type    Karla Stanford PTA, CLT-LANA Physical Therapy Assistant                        PT Assessment/Plan     Row Name 07/31/20 1041          PT Assessment    Assessment Comments  Patient had the basic pump trial for the R UE today.  She has had an increase in size in the abdomen and a further increase in size in the abdomen/trunk after the basic pump trial.  Patient has been compliant with wearing compression, working on the MLD, HEP and eating a healthy diet.  She has also been elevating both arms.   After the pump trial, patient also had an increase in size in the R upper arm.  Since patient had an increase in both the R upper arm and trunk, this is very concerning.  She has had severe abdominal swelling which was worse after the basic pump trial.  In order to prevent further worsening of her swelling or a possible cellulitis infection, patient would benefit from a Flexitouch advanced pump for proximal clearing.   -AL        PT Plan    PT Plan Comments  Will continue with CDT  -AL       User Key  (r) = Recorded By, (t) = Taken By, (c) = Cosigned By    Initials Name Provider Type    Karla Stanford PTA, CLT-LANA Physical Therapy Assistant             OP Exercises     Row Name 07/31/20 1045             Subjective Comments    Subjective Comments  She has been compliant with wearing compression on the R UE since 2012.  She feels like she has increase fluid in the trunk, she has been compliant with working on the MLD  and HEP.  She has been eating a low carb, high protien diet.  She has also been elevating the arms when swollen.    -AL         Subjective Pain    Able to rate subjective pain?  yes  -AL      Pre-Treatment Pain Level  0  -AL         Total Minutes    00050 - PT Manual Therapy Minutes  45  -AL        User Key  (r) = Recorded By, (t) = Taken By, (c) = Cosigned By    Initials Name Provider Type    Karla Stanford PTA, CLT-LUANA Physical Therapy Assistant                     Manual Rx (last 36 hours)      Manual Treatments     Row Name 07/31/20 1048             Total Minutes    00301 - PT Manual Therapy Minutes  45  -AL        User Key  (r) = Recorded By, (t) = Taken By, (c) = Cosigned By    Initials Name Provider Type    Karla Stanford, PTA, CLT-LUANA Physical Therapy Assistant          PT OP Goals     Row Name 07/31/20 1048          PT Short Term Goals    STG Date to Achieve  07/01/20  -AL     STG 1  Patient will be independent with remedial HEP for lymphedema.  -AL     STG 1 Progress  Ongoing  -AL     STG 1 Progress Comments  Patient is compliant with the HEP  -AL     STG 2  Patient will have a good basic understanding of lymphedema including risk reduction practices and elevated infection risk.   -AL     STG 2 Progress  Ongoing  -AL     STG 2 Progress Comments  Continue to educate  -AL     STG 3  Patient will be independent with self MLD with assist of family as needed.  -AL     STG 3 Progress  Ongoing  -AL     STG 3 Progress Comments  Compliant with MLD  -AL     STG 4  Patient will be able to apply compression bandages with assist of family.   -AL     STG 4 Progress  Ongoing  -AL     STG 4 Progress Comments  Family will help with wrapping the L UE  -AL        Long Term Goals    LTG Date to Achieve  07/29/20  -AL     LTG 1  Patient will have compression garment for abdominal edema.  -AL     LTG 1 Progress  Ongoing  -AL     LTG 1 Progress Comments  She is looking for compression garment for the abdomen  -AL      LTG 2  Patient will have well fitting compression garments for BUEs.  -AL     LTG 2 Progress  Ongoing  -AL     LTG 2 Progress Comments  Patient has good fitting compression for the R UE, we are wrapping the L UE with short stretch bandages.  -AL     LTG 3  Patient will obtain a flexitouch lymphedema pump and be independent with its use for maintenance.   -AL     LTG 3 Progress  Ongoing  -AL     LTG 3 Progress Comments  She had the basic pump trial today which caused increase swelling in the abdomen  -AL     LTG 4  She will be independent and comfortable with maintenance program for lymphedema after DC.  -AL     LTG 4 Progress  Ongoing  -AL     LTG 4 Progress Comments  Patient is working on her home maintenance program  -AL        Time Calculation    PT Goal Re-Cert Due Date  08/16/20  -AL       User Key  (r) = Recorded By, (t) = Taken By, (c) = Cosigned By    Initials Name Provider Type    Karla Stanford PTA, CLT-LANA Physical Therapy Assistant          Therapy Education  Education Details: Continue to work on CDT for both UE's.    Given: Edema management  Program: Reinforced  How Provided: Verbal  Provided to: Patient  Level of Understanding: Verbalized              Time Calculation:   PT Non-Billable Time (min): 25 min                 Karla Morales PTA, CLT-LANA  7/31/2020

## 2020-08-07 ENCOUNTER — TELEPHONE (OUTPATIENT)
Dept: PHYSICAL THERAPY | Facility: CLINIC | Age: 75
End: 2020-08-07

## 2020-08-07 NOTE — TELEPHONE ENCOUNTER
[id number]    Patient got time of appointment confused. Did not want to reschedule, has another appointment on Friday (8/14/20).

## 2020-08-14 ENCOUNTER — TREATMENT (OUTPATIENT)
Dept: PHYSICAL THERAPY | Facility: CLINIC | Age: 75
End: 2020-08-14

## 2020-08-14 DIAGNOSIS — C50.912 MALIGNANT NEOPLASM OF LEFT FEMALE BREAST, UNSPECIFIED ESTROGEN RECEPTOR STATUS, UNSPECIFIED SITE OF BREAST (HCC): ICD-10-CM

## 2020-08-14 DIAGNOSIS — Z92.3 HISTORY OF RADIATION THERAPY: ICD-10-CM

## 2020-08-14 DIAGNOSIS — I97.2 POST-MASTECTOMY LYMPHEDEMA SYNDROME: Primary | ICD-10-CM

## 2020-08-14 DIAGNOSIS — Z90.13 STATUS POST BILATERAL MASTECTOMY: ICD-10-CM

## 2020-08-14 PROCEDURE — 97140 MANUAL THERAPY 1/> REGIONS: CPT | Performed by: PHYSICAL THERAPIST

## 2020-08-14 NOTE — PROGRESS NOTES
Outpatient Physical Therapy Lymphedema Progress Note       Patient Name: Dianne Pritchard  : 1945  MRN: [id number]  Today's Date: 2020        Visit Date: 2020    Visit Dx:    ICD-10-CM ICD-9-CM   1. 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 Status post bilateral mastectomy Z90.13 V45.71   3. History of radiation therapy Z92.3 V15.3   4. 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)   • Acquired hypothyroidism   • Dizziness   • Multiple falls   • Bruit of left carotid artery   • Acute pain of right knee   • Right knee pain   • Status post right knee replacement   • BMI 33.0-33.9,adult   • Bilateral carotid artery disease (CMS/HCC)   • Atrial fibrillation with RVR (CMS/HCC)   • Restrictive lung disease   • A-fib (CMS/HCC)   • Presence of Watchman left atrial appendage closure device   • Malignant neoplasm of female breast (CMS/HCC)   • Status post bilateral mastectomy   • Class 1 obesity due to excess calories with serious comorbidity and body mass index (BMI) of 31.0 to 31.9 in adult        Lymphedema     Row Name 20 1000 20 0900          Subjective Pain    Able to rate subjective pain?  --  yes  -HR     Pre-Treatment Pain Level  --  0  -HR        Subjective Comments    Subjective Comments  --  She has still been keeping up with her home program.   -HR        LUE Quick Girth (cm)    Axilla  --  32 cm  -HR     Mid upper arm  --  34.3 cm  -HR     Elbow  --  24.3 cm  -HR     Mid forearm  --  22 cm  -HR     Wrist crease  --  16.5 cm  -HR     Web space  --  18.5 cm  -HR     LUE Quick Girth Total  --  147.6  -HR        RUE Quick Girth (cm)    Axilla  38 cm AFTER PUMP  -HR  36.8 cm BEFORE PUMP  -HR     Mid upper arm  32.9 cm AFTER PUMP  -HR  32.5  cm BEFORE PUMP  -HR     Elbow  25.7 cm AFTER PUMP  -HR  25 cm BEFORE PUMP  -HR     Mid forearm  21.4 cm AFTER PUMP  -HR  21.3 cm BEFORE PUMP  -HR     Wrist crease  16.5 cm AFTER PUMP  -HR  16.8 cm BEFORE PUMP  -HR     Web space  19.2 cm AFTER PUMP  -HR  19 cm BEFORE PUMP  -HR     RUE Quick Girth Total  153.7  -HR  151.4  -HR        Trunk Circumferential (cm)    Measurement Location 1  at axillae-sitting EOB  AFTER PUMP  -HR  at axillae-sitting EOB, before pump  -HR     Trunk 1  96.5 cm  -HR  94 cm AFTER PUMP  -HR     Measurement Location 2  Umbilicus in standing  -HR  Umbilicus in standing  -HR     Trunk 2  119 cm AFTER PUMP  -HR  115.5 cm BEFORE PUMP  -HR     Trunk Circumferential Total  215.5 cm  -HR  209.5 cm  -HR        Manual Lymphatic Drainage    Manual Lymphatic Drainage  --  initial sequence;opened regional lymph nodes;opened anastamoses;extremity treatment  -HR     Initial Sequence  --  short neck;abdomen;diaphragmatic breathing  -HR     Abdomen  --  superficial  -HR     Opened Regional Lymph Nodes  --  inguinal  -HR     Inguinal  --  left;right  -HR     Opened Anastamoses  --  axillo-inguinal  -HR     Axillo-Inguinal  --  left;right  -HR     Extremity Treatment  --  MLD to full limb;simple/brief MLD  -HR     Simple/Brief MLD  --  Entre pump trial on Medium setting X 30 minutes  -HR     MLD to Full Limb  --  LUE  -HR        Compression/Skin Care    Compression/Skin Care  --  skin care;wrapping location;bandaging;compression garment;remove bandages  -HR     Skin Care  --  lotion applied  -HR     Wrapping Location  --  upper extremity  -HR     Wrapping Location UE  --  hand to axilla;left:  -HR     Bandage Layers  --  cotton liner;padding/fluff layer;short-stretch bandages (comment size/quantity)  -HR     Bandaging Comments  --  4 in. stockinetter, one roll Artiflex, Comprilan:  one of each:  6 cm, 8 cm,10 cm.  -HR     Bandaging Technique  --  circumferential/spiral;light compression;moderate compression  -HR      Remove Bandages  --  Remove after 48 hours or if painful.  Wear the compression garment for the R UE.  -HR       User Key  (r) = Recorded By, (t) = Taken By, (c) = Cosigned By    Initials Name Provider Type    HR La Delgado, PT, DPT, LUIS F Physical Therapist                        PT Assessment/Plan     Row Name 08/14/20 1000          PT Assessment    Impairments  Edema;Impaired lymphatic circulation;Impaired postural alignment;Integumentary integrity  -HR     Assessment Comments  Ms. Pritchard has continued to work on her self massage and wears compression garment to RUE daily which is a 20-30mmHg garment., Compression bandaging applied to the LUE which is at least 20-30 mmHg. She has now had 2 trials of the basic pump to the RUE in clinic. Both trials have shown an increase in girth of trunk at axillae and umbilicus. Her symptoms are worse in the trunk as well. Continued use of a basic pump will be detrimental to her health as it will promote further abdominal congestion which could impact her breathing or organs. In her case, the only appropriate home pump would be the Flexitouch advanced pump due to its function to reduce proximal congestion in the trunk or abdomen to allow better drainage of the limb.  -HR     Rehab Potential  Good  -HR     Patient/caregiver participated in establishment of treatment plan and goals  Yes  -HR     Patient would benefit from skilled therapy intervention  Yes  -HR        PT Plan    PT Frequency  1x/week;2x/week  -HR     Predicted Duration of Therapy Intervention (Therapy Eval)  4 weeks  -HR     Planned CPT's?  PT RE-EVAL: 28716;PT THER PROC EA 15 MIN: 20144;PT MANUAL THERAPY EA 15 MIN: 02620;PT SELF CARE/HOME MGMT/TRAIN EA 15: 95035  -HR     PT Plan Comments  Cont CDT. Awaiting pump approval  -HR       User Key  (r) = Recorded By, (t) = Taken By, (c) = Cosigned By    Initials Name Provider Type    HR La Delgado, PT, DPT, DWAINE-LUANA Physical Therapist              OP Exercises     Row Name 08/14/20 1200 08/14/20 0900          Subjective Comments    Subjective Comments  --  She has still been keeping up with her home program.   -HR        Subjective Pain    Able to rate subjective pain?  --  yes  -HR     Pre-Treatment Pain Level  --  0  -HR        Total Minutes    92673 - PT Manual Therapy Minutes  45  -HR  --       User Key  (r) = Recorded By, (t) = Taken By, (c) = Cosigned By    Initials Name Provider Type    [place], NOHEMI, DPLIBBY, LUIS F Physical Therapist                     Manual Rx (last 36 hours)      Manual Treatments     Row Name 08/14/20 1200             Total Minutes    33177 - PT Manual Therapy Minutes  45  -HR        User Key  (r) = Recorded By, (t) = Taken By, (c) = Cosigned By    Initials Name Provider Type    [place], NOHEMI, DPT, LUIS F Physical Therapist              Therapy Education  Education Details: Continue to work on CDT for both UE's.    Given: Edema management  Program: Reinforced  How Provided: Verbal  Provided to: Patient  Level of Understanding: Verbalized              Time Calculation:                     La Delgado PT, DPT, LUIS F  8/14/2020

## 2020-08-14 NOTE — PROGRESS NOTES
HISTORY OF PRESENT ILLNESS:    Ms. Iman Kirby  is  A 76year old female here today for a breast exam. She underwent an ultrasound guided breast biopsy  on the left which revealed a 2.9 cm low grade invasive lobular carcinoma . ER is positive at 94%. MT is negative. HER-2 is negative by IHC. Ki-67 is low at 8%. MammaPrint demonstrates a low risk luminal-A phenotype    Of note is a previous right mastectomy and axillary node dissection in 2012 by Dr. Quintero and she is still on letrozole at this time    MRI-2/14/2020  FINDINGS:   Amount of Fibroglandular Tissue: Heterogeneous fibroglandular tissue. Background Parenchymal Enhancement:  Minimal.   Changes of prior right mastectomy and right axillary surgery. Left breast with a 2.9 x 1.6 cm (AP by transverse) area of nonmass   enhancement in the middle to posterior third, upper outer quadrant,   1:00-2:00 position. The anterior aspect is 5.5 cm from the nipple. Posterior aspect is 3.5 cm from the pectoralis musculature. This area   has a vague distorted appearance and an adjacent biopsy marker. Findings consistent with biopsy proven malignancy and correlates in in   positioning character with the ultrasound abnormality on 1/29/2020   exam, where it measured 1.1 x 0.8 x 1.4 cm. Note that the area of   concern measures larger on the MRI. No axillary or internal mammary adenopathy. Cardiomegaly. Otherwise, the partially visualized chest and abdomen   appear grossly within normal limits, but are limited in assessment due   to technique.       Impression   1. Left breast nonmass enhancement distortion measures 2.9 x 1.6 cm,   larger than appreciated on prior ultrasound. 2. No evidence of adenopathy. 3. Cardiomegaly.      She had a Left simple mastectomy with sentinel lymph node biopsy on 3/10/2020.         PATHOLOGY REVEALS:  FINAL DIAGNOSIS:  A.  Breast, left mastectomy:   1.  Invasive lobular carcinoma, grade 1.   2.  Invasive carcinoma measures 1.6 cm in greatest dimension.   3.  Invasive carcinoma is located greater than 1.0 cm from the nearest  deep surgical excision margin.   4.  Incidental complex sclerosing lesion, margins negative.   5.  Changes consistent with fibrocystic mastopathy involving  nonneoplastic breast parenchyma.   6.  Sections of skin, negative for evidence of malignancy.   7.  Sections of nipple, negative for evidence of malignancy. B.  Lymph node, left [place] lymph node biopsy: 10 lymph nodes, negative  for evidence of malignancy. PHYSICAL EXAM:  The  wounds look good with no evidence of infection, fluid accumulation, or skin necrosis. She does have appear to have a little swelling in her left arm so we will evaluate her for lymphedema. IMPRESSION:    Doing well s/p Left simple mastectomy with sentinel lymph node biopsy 3/10/2020. PLAN: Return in 6 months for a physical exam. She will see Boogie Cuello that same day for genetic testing if not sooner. She will call if anything changes. I have seen, examined and reviewed this patient medication list, appropriate labs and imaging studies. I reviewed relevant medical records and others physicians notes. I discussed the plans of care with the patient. I answered all the questions to the patients satisfaction. I, Dr Tamika Toledo, personally performed the services described in this documentation as scribed by Loren Zamora MA in my presence and is both accurate and complete. (Please note that portions of this note were completed with a voice recognition program. Efforts were made to edit the dictations but occasionally words are mis-transcribed.)  Over 50% of the total visit time of 20 minutes in face to face encounter with the patient, out of which more than 50% of the time was spent in counseling patient or family and coordination of care. Counseling included but was not limited to time spent reviewing labs, imaging studies/ treatment plan and answering questions.

## 2020-08-17 ENCOUNTER — OFFICE VISIT (OUTPATIENT)
Dept: SURGERY | Age: 75
End: 2020-08-17
Payer: MEDICARE

## 2020-08-17 VITALS — SYSTOLIC BLOOD PRESSURE: 124 MMHG | HEART RATE: 80 BPM | DIASTOLIC BLOOD PRESSURE: 70 MMHG

## 2020-08-17 PROCEDURE — 4040F PNEUMOC VAC/ADMIN/RCVD: CPT | Performed by: SURGERY

## 2020-08-17 PROCEDURE — 1036F TOBACCO NON-USER: CPT | Performed by: SURGERY

## 2020-08-17 PROCEDURE — G8417 CALC BMI ABV UP PARAM F/U: HCPCS | Performed by: SURGERY

## 2020-08-17 PROCEDURE — 1123F ACP DISCUSS/DSCN MKR DOCD: CPT | Performed by: SURGERY

## 2020-08-17 PROCEDURE — 1090F PRES/ABSN URINE INCON ASSESS: CPT | Performed by: SURGERY

## 2020-08-17 PROCEDURE — G8428 CUR MEDS NOT DOCUMENT: HCPCS | Performed by: SURGERY

## 2020-08-17 PROCEDURE — 99213 OFFICE O/P EST LOW 20 MIN: CPT | Performed by: SURGERY

## 2020-08-17 PROCEDURE — G8399 PT W/DXA RESULTS DOCUMENT: HCPCS | Performed by: SURGERY

## 2020-08-17 PROCEDURE — 3017F COLORECTAL CA SCREEN DOC REV: CPT | Performed by: SURGERY

## 2020-08-18 ENCOUNTER — LAB (OUTPATIENT)
Dept: FAMILY MEDICINE CLINIC | Facility: CLINIC | Age: 75
End: 2020-08-18

## 2020-08-19 ENCOUNTER — TREATMENT (OUTPATIENT)
Dept: PHYSICAL THERAPY | Facility: CLINIC | Age: 75
End: 2020-08-19

## 2020-08-19 DIAGNOSIS — Z90.13 STATUS POST BILATERAL MASTECTOMY: ICD-10-CM

## 2020-08-19 DIAGNOSIS — I97.2 POST-MASTECTOMY LYMPHEDEMA SYNDROME: Primary | ICD-10-CM

## 2020-08-19 DIAGNOSIS — Z92.3 HISTORY OF RADIATION THERAPY: ICD-10-CM

## 2020-08-19 DIAGNOSIS — C50.912 MALIGNANT NEOPLASM OF LEFT FEMALE BREAST, UNSPECIFIED ESTROGEN RECEPTOR STATUS, UNSPECIFIED SITE OF BREAST (HCC): ICD-10-CM

## 2020-08-19 PROCEDURE — 97140 MANUAL THERAPY 1/> REGIONS: CPT | Performed by: PHYSICAL THERAPIST

## 2020-08-19 NOTE — PROGRESS NOTES
Outpatient Physical Therapy Lymphedema Treatment Note       Patient Name: Dianne Pritchard  : 1945  MRN: [id number]  Today's Date: 2020        Visit Date: 2020    Visit Dx:    ICD-10-CM ICD-9-CM   1. 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 Status post bilateral mastectomy Z90.13 V45.71   3. History of radiation therapy Z92.3 V15.3   4. 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)   • Acquired hypothyroidism   • Dizziness   • Multiple falls   • Bruit of left carotid artery   • Acute pain of right knee   • Right knee pain   • Status post right knee replacement   • BMI 33.0-33.9,adult   • Bilateral carotid artery disease (CMS/HCC)   • Atrial fibrillation with RVR (CMS/HCC)   • Restrictive lung disease   • A-fib (CMS/HCC)   • Presence of Watchman left atrial appendage closure device   • Malignant neoplasm of female breast (CMS/HCC)   • Status post bilateral mastectomy   • Class 1 obesity due to excess calories with serious comorbidity and body mass index (BMI) of 31.0 to 31.9 in adult        Lymphedema     Row Name 20 1100             Subjective Pain    Able to rate subjective pain?  yes  -HR      Pre-Treatment Pain Level  0  -HR         Subjective Comments    Subjective Comments  She saw  yesterday. Nothing new to report.  -HR         LUE Quick Girth (cm)    Axilla  33.1 cm  -HR      Mid upper arm  33.8 cm  -HR      Elbow  25.1 cm  -HR      Mid forearm  21.5 cm  -HR      Wrist crease  16.6 cm  -HR      Web space  18.2 cm  -HR      LUE Quick Girth Total  148.3  -HR         RUE Quick Girth (cm)    Axilla  36.3 cm  -HR      Mid upper arm  33 cm  -HR      Elbow  26 cm  -HR      Mid forearm  22.2 cm  -HR      Wrist crease  16.5 cm  -HR       Web space  19 cm  -HR      RUE Quick Girth Total  153  -HR         Manual Lymphatic Drainage    Manual Lymphatic Drainage  initial sequence;opened regional lymph nodes;opened anastamoses;extremity treatment  -HR      Initial Sequence  short neck;abdomen;diaphragmatic breathing  -HR      Abdomen  superficial  -HR      Opened Regional Lymph Nodes  inguinal  -HR      Inguinal  left;right  -HR      Opened Anastamoses  axillo-inguinal  -HR      Axillo-Inguinal  left;right  -HR      Extremity Treatment  MLD to full limb;simple/brief MLD  -HR      Simple/Brief MLD  RUE  -HR      MLD to Full Limb  LUE  -HR         Compression/Skin Care    Compression/Skin Care  skin care;wrapping location;bandaging;compression garment;remove bandages  -HR      Skin Care  lotion applied  -HR      Wrapping Location  upper extremity  -HR      Wrapping Location UE  hand to axilla;left:  -HR      Bandage Layers  cotton liner;padding/fluff layer;short-stretch bandages (comment size/quantity)  -HR      Bandaging Technique  circumferential/spiral;light compression;moderate compression  -HR      Remove Bandages  Remove after 48 hours or if painful.  Wear the compression garment for the R UE.  -HR        User Key  (r) = Recorded By, (t) = Taken By, (c) = Cosigned By    Initials Name Provider Type    HR La Delgado, PT, DPT, CLT-LUANA Physical Therapist                        PT Assessment/Plan     Row Name 08/19/20 1100          PT Assessment    Assessment Comments  Her L arm has increased slightly since last visit. She continues to work on home program. Awaiting approval of Flexitouch pump.   -HR        PT Plan    PT Plan Comments  Cont CDT. Awaiting pump approval  -HR       User Key  (r) = Recorded By, (t) = Taken By, (c) = Cosigned By    Initials Name Provider Type    HR La Delgado, PT, DPT, CLTwOenLUANA Physical Therapist             OP Exercises     Row Name 08/19/20 1500 08/19/20 1100          Subjective Comments    Subjective  Comments  --  She saw  yesterday. Nothing new to report.  -HR        Subjective Pain    Able to rate subjective pain?  --  yes  -HR     Pre-Treatment Pain Level  --  0  -HR        Total Minutes    64949 - PT Manual Therapy Minutes  60  -HR  --       User Key  (r) = Recorded By, (t) = Taken By, (c) = Cosigned By    Initials Name Provider Type    HR La Delgado, PT, DPT, CLMATTHEW Physical Therapist                     Manual Rx (last 36 hours)      Manual Treatments     Row Name 08/19/20 1500             Total Minutes    12310 - PT Manual Therapy Minutes  60  -HR        User Key  (r) = Recorded By, (t) = Taken By, (c) = Cosigned By    Initials Name Provider Type    HR La Delgado, PT, DPT, CLTANGY Physical Therapist              Therapy Education  Education Details: She doesn't need blood drawn from the R arm either due to lymphedema              Time Calculation:                     La Delgado PT, DPT, CLMATTHEW  8/19/2020

## 2020-08-28 ENCOUNTER — TREATMENT (OUTPATIENT)
Dept: PHYSICAL THERAPY | Facility: CLINIC | Age: 75
End: 2020-08-28

## 2020-08-28 DIAGNOSIS — I97.2 POST-MASTECTOMY LYMPHEDEMA SYNDROME: Primary | ICD-10-CM

## 2020-08-28 DIAGNOSIS — Z90.13 STATUS POST BILATERAL MASTECTOMY: ICD-10-CM

## 2020-08-28 DIAGNOSIS — C50.912 MALIGNANT NEOPLASM OF LEFT FEMALE BREAST, UNSPECIFIED ESTROGEN RECEPTOR STATUS, UNSPECIFIED SITE OF BREAST (HCC): ICD-10-CM

## 2020-08-28 DIAGNOSIS — Z92.3 HISTORY OF RADIATION THERAPY: ICD-10-CM

## 2020-08-28 PROCEDURE — 97140 MANUAL THERAPY 1/> REGIONS: CPT | Performed by: PHYSICAL THERAPIST

## 2020-08-28 NOTE — PROGRESS NOTES
"Outpatient Physical Therapy Lymphedema Treatment Note       Patient Name: Dianne Pritchard  : 1945  MRN: [id number]  Today's Date: 2020        Visit Date: 2020    Visit Dx:    ICD-10-CM ICD-9-CM   1. 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 Status post bilateral mastectomy Z90.13 V45.71   3. History of radiation therapy Z92.3 V15.3   4. 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)   • Acquired hypothyroidism   • Dizziness   • Multiple falls   • Bruit of left carotid artery   • Acute pain of right knee   • Right knee pain   • Status post right knee replacement   • BMI 33.0-33.9,adult   • Bilateral carotid artery disease (CMS/HCC)   • Atrial fibrillation with RVR (CMS/HCC)   • Restrictive lung disease   • A-fib (CMS/HCC)   • Presence of Watchman left atrial appendage closure device   • Malignant neoplasm of female breast (CMS/HCC)   • Status post bilateral mastectomy   • Class 1 obesity due to excess calories with serious comorbidity and body mass index (BMI) of 31.0 to 31.9 in adult        Lymphedema     Row Name 20 1000             Subjective Pain    Able to rate subjective pain?  yes  -HR      Pre-Treatment Pain Level  0  -HR         Subjective Comments    Subjective Comments  Her stomach is feeling kind of \"iffy\" today. She isn't sure why.  -HR         LUE Quick Girth (cm)    Axilla  33.3 cm  -HR      Mid upper arm  35 cm  -HR      Elbow  25.9 cm  -HR      Mid forearm  21.4 cm  -HR      Wrist crease  17 cm  -HR      Web space  18.9 cm  -HR      LUE Quick Girth Total  151.5  -HR         Manual Lymphatic Drainage    Manual Lymphatic Drainage  initial sequence;opened regional lymph nodes;opened anastamoses;extremity treatment  -HR      Initial " Sequence  short neck;abdomen;diaphragmatic breathing  -HR      Abdomen  --  -HR      Abdomen Comment  had to skip abdominal tx due to her being queasy  -HR      Opened Regional Lymph Nodes  inguinal  -HR      Inguinal  left;right  -HR      Opened Anastamoses  axillo-inguinal  -HR      Axillo-Inguinal  left;right  -HR      Extremity Treatment  MLD to full limb;simple/brief MLD  -HR      Simple/Brief MLD  RUE  -HR      MLD to Full Limb  LUE  -HR      Manual Lymphatic Drainage Comments  Focused on bruise at R elbow on RUE. Spent extra time to the adhesions along the chest on both sides with L worse than R  -HR         Compression/Skin Care    Compression/Skin Care  skin care;wrapping location;bandaging;compression garment;remove bandages  -HR      Skin Care  lotion applied  -HR      Wrapping Location  upper extremity  -HR      Wrapping Location UE  hand to axilla;left:  -HR      Bandage Layers  cotton liner;padding/fluff layer;short-stretch bandages (comment size/quantity)  -HR      Bandaging Comments  4 in. stockinetter, one roll Artiflex, Comprilan:  one of each:  6 cm, 8 cm,10 cm.  -HR      Bandaging Technique  circumferential/spiral;light compression;moderate compression  -HR      Remove Bandages  Remove after 48 hours or if painful.  Wear the compression garment for the R UE.  -HR        User Key  (r) = Recorded By, (t) = Taken By, (c) = Cosigned By    Initials Name Provider Type    HR La Delgado, PT, DPT, CLT-LUANA Physical Therapist                        PT Assessment/Plan     Row Name 08/28/20 1300          PT Assessment    Assessment Comments  The LUE is up again today. She is feeling slightly sick to her stomach which may indicate inflammation and/or congestion in the gut. She needs the Flexitouch pump which has not yet been approved.   -HR        PT Plan    PT Plan Comments  Cont CDT. Awaiting pump approval  -HR       User Key  (r) = Recorded By, (t) = Taken By, (c) = Cosigned By    Initials Name  "Provider Type    HR La Delgado, PT, DPT, CLT-LUANA Physical Therapist             OP Exercises     Row Name 08/28/20 1300 08/28/20 1000          Subjective Comments    Subjective Comments  --  Her stomach is feeling kind of \"iffy\" today. She isn't sure why.  -HR        Subjective Pain    Able to rate subjective pain?  --  yes  -HR     Pre-Treatment Pain Level  --  0  -HR        Total Minutes    26105 - PT Manual Therapy Minutes  60  -HR  --       User Key  (r) = Recorded By, (t) = Taken By, (c) = Cosigned By    Initials Name Provider Type    HR La Delgado, PT, DPT, CLT-LUANA Physical Therapist                     Manual Rx (last 36 hours)      Manual Treatments     Row Name 08/28/20 1300             Total Minutes    30029 - PT Manual Therapy Minutes  60  -HR        User Key  (r) = Recorded By, (t) = Taken By, (c) = Cosigned By    Initials Name Provider Type    HR La Delgado, PT, DPT, CLT-LUANA Physical Therapist          PT OP Goals     Row Name 08/28/20 1300          PT Short Term Goals    STG 1  Patient will be independent with remedial HEP for lymphedema.  -HR     STG 1 Progress  Ongoing  -HR     STG 2  Patient will have a good basic understanding of lymphedema including risk reduction practices and elevated infection risk.   -HR     STG 2 Progress  Ongoing  -HR     STG 3  Patient will be independent with self MLD with assist of family as needed.  -HR     STG 3 Progress  Ongoing  -HR     STG 4  Patient will be able to apply compression bandages with assist of family.   -HR     STG 4 Progress  Ongoing  -HR        Long Term Goals    LTG Date to Achieve  09/11/20  -HR     LTG 1  Patient will have compression garment for abdominal edema.  -HR     LTG 1 Progress  Ongoing  -HR     LTG 2  Patient will have well fitting compression garments for BUEs.  -HR     LTG 2 Progress  Ongoing  -HR     LTG 2 Progress Comments  Patient has good fitting compression for the R UE, we are wrapping the L UE " with short stretch bandages.  -HR     LTG 3  Patient will obtain a flexitouch lymphedema pump and be independent with its use for maintenance.   -HR     LTG 3 Progress  Ongoing  -HR     LTG 4  She will be independent and comfortable with maintenance program for lymphedema after DC.  -HR     LTG 4 Progress  Ongoing  -HR     LTG 4 Progress Comments  Patient is working on her home maintenance program  -HR        Time Calculation    PT Goal Re-Cert Due Date  09/11/20  -HR       User Key  (r) = Recorded By, (t) = Taken By, (c) = Cosigned By    Initials Name Provider Type    HR La Delgado, PT, DPT, DWAINE-LUANA Physical Therapist          Therapy Education  Education Details: Cont HEP and CDT  Given: Edema management  Program: Reinforced  How Provided: Verbal  Provided to: Patient  Level of Understanding: Verbalized              Time Calculation:                     La Delgado, PT, DPT, LUIS F  8/28/2020

## 2020-09-04 ENCOUNTER — TREATMENT (OUTPATIENT)
Dept: PHYSICAL THERAPY | Facility: CLINIC | Age: 75
End: 2020-09-04

## 2020-09-04 DIAGNOSIS — I97.2 POST-MASTECTOMY LYMPHEDEMA SYNDROME: Primary | ICD-10-CM

## 2020-09-04 DIAGNOSIS — Z90.13 STATUS POST BILATERAL MASTECTOMY: ICD-10-CM

## 2020-09-04 PROCEDURE — 97140 MANUAL THERAPY 1/> REGIONS: CPT | Performed by: PHYSICAL THERAPIST

## 2020-09-04 NOTE — PROGRESS NOTES
Outpatient Physical Therapy Lymphedema Treatment Note       Patient Name: Dianne Pritchard  : 1945  MRN: [id number]  Today's Date: 2020        Visit Date: 2020    Visit Dx:    ICD-10-CM ICD-9-CM   1. 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 Status post bilateral mastectomy Z90.13 V45.71       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)   • Acquired hypothyroidism   • Dizziness   • Multiple falls   • Bruit of left carotid artery   • Acute pain of right knee   • Right knee pain   • Status post right knee replacement   • BMI 33.0-33.9,adult   • Bilateral carotid artery disease (CMS/HCC)   • Atrial fibrillation with RVR (CMS/HCC)   • Restrictive lung disease   • A-fib (CMS/HCC)   • Presence of Watchman left atrial appendage closure device   • Malignant neoplasm of female breast (CMS/HCC)   • Status post bilateral mastectomy   • Class 1 obesity due to excess calories with serious comorbidity and body mass index (BMI) of 31.0 to 31.9 in adult        Lymphedema     Row Name 20 0945             Subjective Pain    Able to rate subjective pain?  yes  -AL      Pre-Treatment Pain Level  0  -AL         Subjective Comments    Subjective Comments  She states she was approved for the pump.  She should have the pump next week.  She states her stomach is better now.   -AL         LUE Quick Girth (cm)    Axilla  33 cm  -AL      Mid upper arm  33.2 cm  -AL      Elbow  24.7 cm  -AL      Mid forearm  21 cm  -AL      Wrist crease  16.8 cm  -AL      Web space  18.4 cm  -AL      LUE Quick Girth Total  147.1  -AL         Manual Lymphatic Drainage    Manual Lymphatic Drainage  initial sequence;opened regional lymph nodes;opened anastamoses;extremity treatment  -AL      Initial Sequence  short neck;abdomen;diaphragmatic breathing  -AL      Abdomen  superficial  -AL      Opened Regional Lymph Nodes   inguinal  -AL      Inguinal  left;right  -AL      Opened Anastamoses  axillo-inguinal  -AL      Axillo-Inguinal  left;right  -AL      Extremity Treatment  MLD to full limb;simple/brief MLD  -AL      Simple/Brief MLD  RUE  -AL      MLD to Full Limb  LUE  -AL         Compression/Skin Care    Compression/Skin Care  skin care;compression garment  -AL      Skin Care  lotion applied  -AL      Wrapping Location  --  -AL      Wrapping Location UE  --  -AL      Bandage Layers  --  -AL      Bandaging Technique  --  -AL      Compression/Skin Care Comments  She forgot her compression wraps today.   She is going by [facility] today to be measured for a 20.30 mmHg compression sleeve and gauntlet for the R UE.   -AL        User Key  (r) = Recorded By, (t) = Taken By, (c) = Cosigned By    Initials Name Provider Type    Karla Stanford PTA, CLT-LANA Physical Therapy Assistant                        PT Assessment/Plan     Row Name 09/04/20 5111          PT Assessment    Assessment Comments  She has had a reduction in the L UE since her last visit.  She did forget the compression wraps, but I am sending her to [facility] to be measured for a compression sleeve and gauntlet.  Will assess the new garments if she has them next visit.  The Flexitouch pump has been approved and should arrive soon.   -AL        PT Plan    PT Plan Comments  Continue with CDT  -AL       User Key  (r) = Recorded By, (t) = Taken By, (c) = Cosigned By    Initials Name Provider Type    Karla Stanford PTA, CLT-LANA Physical Therapy Assistant             OP Exercises     Row Name 09/04/20 8579             Subjective Comments    Subjective Comments  She states she was approved for the pump.  She should have the pump next week.  She states her stomach is better now.   -AL         Subjective Pain    Able to rate subjective pain?  yes  -AL      Pre-Treatment Pain Level  0  -AL         Total Minutes    96889 - PT Manual Therapy  Minutes  75  -AL        User Key  (r) = Recorded By, (t) = Taken By, (c) = Cosigned By    Initials Name Provider Type    AL Karla Morales, PTA, DWAINE-LUANA Physical Therapy Assistant                     Manual Rx (last 36 hours)      Manual Treatments     Row Name 09/04/20 0945             Total Minutes    58258 - PT Manual Therapy Minutes  75  -AL        User Key  (r) = Recorded By, (t) = Taken By, (c) = Cosigned By    Initials Name Provider Type    AL Karla Morales, PTA, LIBERTYT-LUANA Physical Therapy Assistant          PT OP Goals     Row Name 09/04/20 0945          PT Short Term Goals    STG 1  Patient will be independent with remedial HEP for lymphedema.  -AL     STG 1 Progress  Ongoing  -AL     STG 2  Patient will have a good basic understanding of lymphedema including risk reduction practices and elevated infection risk.   -AL     STG 2 Progress  Ongoing  -AL     STG 3  Patient will be independent with self MLD with assist of family as needed.  -AL     STG 3 Progress  Ongoing  -AL     STG 4  Patient will be able to apply compression bandages with assist of family.   -AL     STG 4 Progress  Ongoing  -AL        Long Term Goals    LTG Date to Achieve  09/11/20  -AL     LTG 1  Patient will have compression garment for abdominal edema.  -AL     LTG 1 Progress  Ongoing  -AL     LTG 2  Patient will have well fitting compression garments for BUEs.  -AL     LTG 2 Progress  Ongoing  -AL     LTG 2 Progress Comments  She will be measured for compression garments for the L UE today  -AL     LTG 3  Patient will obtain a flexitouch lymphedema pump and be independent with its use for maintenance.   -AL     LTG 3 Progress  Ongoing  -AL     LTG 3 Progress Comments  Her Flextiouch pump has been approved and shoulde be shipped soon.   -AL     LTG 4  She will be independent and comfortable with maintenance program for lymphedema after DC.  -AL     LTG 4 Progress  Ongoing  -AL        Time Calculation    PT Goal Re-Cert Due Date   09/11/20  -AL       User Key  (r) = Recorded By, (t) = Taken By, (c) = Cosigned By    Initials Name Provider Type    Karla Stanford PTA, CLT-LANA Physical Therapy Assistant          Therapy Education  Education Details: Go to [facility] and purchase a compression sleeve and gauntlet for the L UE  Given: Edema management  Program: Reinforced  How Provided: Verbal  Provided to: Patient  Level of Understanding: Verbalized              Time Calculation:                     Karla Morales PTA, CLT-LANA  9/4/2020

## 2020-09-11 ENCOUNTER — TREATMENT (OUTPATIENT)
Dept: PHYSICAL THERAPY | Facility: CLINIC | Age: 75
End: 2020-09-11

## 2020-09-11 DIAGNOSIS — I97.2 POST-MASTECTOMY LYMPHEDEMA SYNDROME: Primary | ICD-10-CM

## 2020-09-11 DIAGNOSIS — Z90.13 STATUS POST BILATERAL MASTECTOMY: ICD-10-CM

## 2020-09-11 PROCEDURE — 97140 MANUAL THERAPY 1/> REGIONS: CPT | Performed by: PHYSICAL THERAPIST

## 2020-09-11 NOTE — PROGRESS NOTES
Physical Therapy Lymphedema Progress Note/Recert       Patient Name: Dianne Pritchard  : 1945  MRN: [id number]  Today's Date: 2020      Visit Date: 2020    Visit Dx:    ICD-10-CM ICD-9-CM   1. 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 Status post bilateral mastectomy Z90.13 V45.71       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)   • Acquired hypothyroidism   • Dizziness   • Multiple falls   • Bruit of left carotid artery   • Acute pain of right knee   • Right knee pain   • Status post right knee replacement   • BMI 33.0-33.9,adult   • Bilateral carotid artery disease (CMS/HCC)   • Atrial fibrillation with RVR (CMS/HCC)   • Restrictive lung disease   • A-fib (CMS/HCC)   • Presence of Watchman left atrial appendage closure device   • Malignant neoplasm of female breast (CMS/HCC)   • Status post bilateral mastectomy   • Class 1 obesity due to excess calories with serious comorbidity and body mass index (BMI) of 31.0 to 31.9 in adult        Past Medical History:   Diagnosis Date   • Arrhythmia    • Atrial fibrillation, currently in sinus rhythm    • Breast cancer (CMS/HCC)     right   • Cancer (CMS/HCC)     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)   • Carotid artery occlusion    • Diabetes mellitus, type II (CMS/HCC)    • Dizziness    • Drug therapy    • Essential hypertension    • GERD (gastroesophageal reflux disease)    • GI bleed requiring more than 4 units of blood in 24 hours, ICU, or surgery    • History of chemotherapy    • History of lymphoma    • Hx of radiation therapy    • Hypomagnesemia    • Hypothyroidism    • On amiodarone therapy         Past Surgical History:   Procedure Laterality Date   • ATRIAL APPENDAGE EXCLUSION LEFT WITH TRANSESOPHAGEAL ECHOCARDIOGRAM Left 2018    Procedure: Atrial Appendage Occlusion;  Surgeon: Mick Orantes MD;  Location: [facility] CATH INVASIVE LOCATION;   Service: Cardiology   • ATRIAL APPENDAGE EXCLUSION LEFT WITH TRANSESOPHAGEAL ECHOCARDIOGRAM Left 1/22/2019    Procedure: Atrial Appendage Occlusion;  Surgeon: Mick Orantes MD;  Location: [facility] CATH INVASIVE LOCATION;  Service: Cardiology   • BREAST BIOPSY     • MASTECTOMY     • MASTECTOMY  03/10/2020   • OTHER SURGICAL HISTORY      Mediport insertion X 2   • OTHER SURGICAL HISTORY      remote lymphoma treatment   • REPLACEMENT TOTAL KNEE Right 09/2017   • TUBAL ABDOMINAL LIGATION         Visit Dx:    ICD-10-CM ICD-9-CM   1. 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 Status post bilateral mastectomy Z90.13 V45.71           Lymphedema     Row Name 09/11/20 1048             Subjective Pain    Able to rate subjective pain?  yes  -AL      Pre-Treatment Pain Level  0  -AL         Subjective Comments    Subjective Comments  She is short of breath today, but ths is common for her.  She states she was notifited that she should get the pump sometime this week.   -AL         LUE Quick Girth (cm)    Axilla  31.9 cm  -AL      Mid upper arm  32.8 cm  -AL      Elbow  24 cm  -AL      Mid forearm  20.9 cm  -AL      Wrist crease  16.4 cm  -AL      Web space  17.6 cm  -AL      LUE Quick Girth Total  143.6  -AL         RUE Quick Girth (cm)    Axilla  36.6 cm  -AL      Mid upper arm  32.9 cm  -AL      Elbow  25 cm  -AL      Mid forearm  21.8 cm  -AL      Wrist crease  16.5 cm  -AL      Web space  19 cm  -AL      RUE Quick Girth Total  151.8  -AL         Manual Lymphatic Drainage    Manual Lymphatic Drainage  initial sequence;opened regional lymph nodes;opened anastamoses;extremity treatment  -AL      Initial Sequence  short neck;abdomen;diaphragmatic breathing  -AL      Abdomen  superficial  -AL      Opened Regional Lymph Nodes  inguinal  -AL      Inguinal  left;right  -AL      Opened Anastamoses  axillo-inguinal  -AL      Axillo-Inguinal  left;right  -AL      Extremity Treatment  MLD to full limb;simple/brief MLD  -AL       Simple/Brief MLD  RUE  -AL      MLD to Full Limb  LUE  -AL         Compression/Skin Care    Compression/Skin Care  compression garment  -AL      Compression/Skin Care Comments  Helped her don the Size 3 sleeve and size 3 gauntlet both 20-30 mmHg Medi [place] on the L UE.  They are both a good fit.   -AL        User Key  (r) = Recorded By, (t) = Taken By, (c) = Cosigned By    Initials Name Provider Type    Karla Stanford PTA, CLT-LUANA Physical Therapy Assistant                          Therapy Education  Education Details: Continue with CDT  Given: Edema management  Program: Reinforced  How Provided: Verbal  Provided to: Patient  Level of Understanding: Verbalized      OP Exercises     Row Name 09/11/20 1048             Subjective Comments    Subjective Comments  She is short of breath today, but ths is common for her.  She states she was notifited that she should get the pump sometime this week.   -AL         Subjective Pain    Able to rate subjective pain?  yes  -AL      Pre-Treatment Pain Level  0  -AL         Total Minutes    79344 - PT Manual Therapy Minutes  75  -AL        User Key  (r) = Recorded By, (t) = Taken By, (c) = Cosigned By    Initials Name Provider Type    Karla Stanford, PTA, CLT-LUANA Physical Therapy Assistant                     Manual Rx (last 36 hours)      Manual Treatments     Row Name 09/11/20 1048             Total Minutes    36490 - PT Manual Therapy Minutes  75  -AL        User Key  (r) = Recorded By, (t) = Taken By, (c) = Cosigned By    Initials Name Provider Type    Karla Stanford, PTA, CLT-LUANA Physical Therapy Assistant          PT OP Goals     Row Name 09/11/20 1048          PT Short Term Goals    STG 1  Patient will be independent with remedial HEP for lymphedema.  -AL     STG 1 Progress  Ongoing;Progressing;Partially Met  -AL     STG 1 Progress Comments  She is compliant with the HEP  -AL     STG 2  Patient will have a good basic understanding of lymphedema including  risk reduction practices and elevated infection risk.   -AL     STG 2 Progress  Ongoing;Progressing  -AL     STG 2 Progress Comments  Improving  -AL     STG 3  Patient will be independent with self MLD with assist of family as needed.  -AL     STG 3 Progress  Ongoing;Progressing;Partially Met  -AL     STG 3 Progress Comments  She is working on the MLD  -AL     STG 4  Patient will be able to apply compression bandages with assist of family.   -AL     STG 4 Progress  Ongoing;Met  -AL     STG 4 Progress Comments  Family will assist with wrapping as needed  -AL        Long Term Goals    LTG Date to Achieve  09/11/20  -AL     LTG 1  Patient will have compression garment for abdominal edema.  -AL     LTG 1 Progress  Ongoing  -AL     LTG 1 Progress Comments  She has been not been able to look for a compression garment for the trunk   -AL     LTG 2  Patient will have well fitting compression garments for BUEs.  -AL     LTG 2 Progress  Ongoing;Met  -AL     LTG 2 Progress Comments  She has a good fitting sleeve and gauntlet for the L UE  -AL     LTG 3  Patient will obtain a flexitouch lymphedema pump and be independent with its use for maintenance.   -AL     LTG 3 Progress  Ongoing  -AL     LTG 3 Progress Comments  Her Flexitouch pump should come in this week.   -AL     LTG 4  She will be independent and comfortable with maintenance program for lymphedema after DC.  -AL     LTG 4 Progress  Ongoing  -AL     LTG 4 Progress Comments  She is working towards her home maintenance program.   -AL        Time Calculation    PT Goal Re-Cert Due Date  10/11/20  -AL       User Key  (r) = Recorded By, (t) = Taken By, (c) = Cosigned By    Initials Name Provider Type    Karla Stanford, PTA, LIBERTYTANGY Physical Therapy Assistant          PT Assessment/Plan     Row Name 09/11/20 1048          PT Assessment    Impairments  Edema;Impaired lymphatic circulation;Impaired postural alignment;Integumentary integrity  -HR     Assessment Comments   Patient has good fitting compression for the L UE.  The compression is comfortable and she has had a reduction in the L UE.  She still has L latearal truncal edema with dense tissue.  She is expecting to have the Flexitouch pump delivered sometime this week.  She will be trained on the Flexitouch pump next week.    -AL     Rehab Potential  Good  -HR     Patient/caregiver participated in establishment of treatment plan and goals  Yes  -HR     Patient would benefit from skilled therapy intervention  Yes  -HR        PT Plan    PT Frequency  1x/week;2x/week  -HR     Predicted Duration of Therapy Intervention (Therapy Eval)  4 weeks  -HR     Planned CPT's?  PT RE-EVAL: 82846;PT THER PROC EA 15 MIN: 69923;PT MANUAL THERAPY EA 15 MIN: 28320;PT SELF CARE/HOME MGMT/TRAIN EA 15: 16905  -HR     PT Plan Comments  Will continue with CDT  -AL       User Key  (r) = Recorded By, (t) = Taken By, (c) = Cosigned By    Initials Name Provider Type    AL Karla Morales, PTA, LUIS F Physical Therapy Assistant    HR La Delgado, PT, DPT, LUIS F Physical Therapist                       Time Calculation:                     La Delgado, PT, DPT, LUIS F  9/11/2020

## 2020-09-14 ENCOUNTER — TREATMENT (OUTPATIENT)
Dept: PHYSICAL THERAPY | Facility: CLINIC | Age: 75
End: 2020-09-14

## 2020-09-14 DIAGNOSIS — I97.2 POST-MASTECTOMY LYMPHEDEMA SYNDROME: Primary | ICD-10-CM

## 2020-09-14 PROCEDURE — 97140 MANUAL THERAPY 1/> REGIONS: CPT | Performed by: PHYSICAL THERAPIST

## 2020-09-14 NOTE — PROGRESS NOTES
Outpatient Physical Therapy Lymphedema Treatment Note       Patient Name: Dianne Pritchard  : 1945  MRN: [id number]  Today's Date: 2020        Visit Date: 2020    Visit Dx:    ICD-10-CM ICD-9-CM   1. Post-mastectomy lymphedema syndrome  I97.2 457.0       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)   • Acquired hypothyroidism   • Dizziness   • Multiple falls   • Bruit of left carotid artery   • Acute pain of right knee   • Right knee pain   • Status post right knee replacement   • BMI 33.0-33.9,adult   • Bilateral carotid artery disease (CMS/HCC)   • Atrial fibrillation with RVR (CMS/HCC)   • Restrictive lung disease   • A-fib (CMS/HCC)   • Presence of Watchman left atrial appendage closure device   • Malignant neoplasm of female breast (CMS/HCC)   • Status post bilateral mastectomy   • Class 1 obesity due to excess calories with serious comorbidity and body mass index (BMI) of 31.0 to 31.9 in adult        Lymphedema     Row Name 20 1045 20 1000          Subjective Pain    Able to rate subjective pain?  yes  -AL  --  -AL     Pre-Treatment Pain Level  0  -AL  --  -AL        Subjective Comments    Subjective Comments  She states she feels like she is donig pretty well.  She is wearing the compression garments and they are comfortable.  She does not have the Flexitouch pump yet.   -AL  --        LUE Quick Girth (cm)    Axilla  31.2 cm  -AL  --     Mid upper arm  32.7 cm  -AL  --     Elbow  24.4 cm  -AL  --     Mid forearm  20.3 cm  -AL  --     Wrist crease  16.2 cm  -AL  --     Web space  17.4 cm  -AL  --     LUE Quick Girth Total  142.2  -AL  --        Manual Lymphatic Drainage    Manual Lymphatic Drainage  initial sequence;opened regional lymph nodes;opened anastamoses;extremity treatment  -AL  --     Initial Sequence  short neck;abdomen;diaphragmatic breathing  -AL  --     Abdomen   superficial  -AL  --     Opened Regional Lymph Nodes  inguinal  -AL  --     Inguinal  left;right  -AL  --     Opened Anastamoses  axillo-inguinal  -AL  --     Axillo-Inguinal  left;right  -AL  --     Extremity Treatment  MLD to full limb;simple/brief MLD  -AL  --     Simple/Brief MLD  R UE  -AL  --     MLD to Full Limb  L UE  -AL  --     Manual Lymphatic Drainage Comments  Extra time working on MLD to the L lateral trunk  -AL  --        Compression/Skin Care    Compression/Skin Care  compression garment;skin care  -AL  --     Skin Care  lotion applied To patient's back  -AL  --       User Key  (r) = Recorded By, (t) = Taken By, (c) = Cosigned By    Initials Name Provider Type    Karla Stanford PTA, CLT-LANA Physical Therapy Assistant                        PT Assessment/Plan     Row Name 09/14/20 1045          PT Assessment    Assessment Comments  Patient has had a further reduction in the L UE.  She continues to have dense tissue L lateral trunk.  The Flexitouch pump has not been approved by her insurance yet.  She will return in two weeks, will assess any needs.  Hopefully she will have her pump when she returns for treatment.   -AL        PT Plan    PT Plan Comments  Will see patient in two weeks and assess needs for continued treatment.   -AL       User Key  (r) = Recorded By, (t) = Taken By, (c) = Cosigned By    Initials Name Provider Type    Karla Stanford PTA, CLT-LANA Physical Therapy Assistant             OP Exercises     Row Name 09/14/20 1045 09/14/20 1000          Subjective Comments    Subjective Comments  She states she feels like she is donig pretty well.  She is wearing the compression garments and they are comfortable.  She does not have the Flexitouch pump yet.   -AL  --        Subjective Pain    Able to rate subjective pain?  yes  -AL  --  -AL     Pre-Treatment Pain Level  0  -AL  --  -AL        Total Minutes    12016 - PT Manual Therapy Minutes  75  -AL  --       User Key  (r) = Recorded  By, (t) = Taken By, (c) = Cosigned By    Initials Name Provider Type    Karla Stanford, PTA, CLT-LUANA Physical Therapy Assistant                     Manual Rx (last 36 hours)      Manual Treatments     Row Name 09/14/20 1045             Total Minutes    26790 - PT Manual Therapy Minutes  75  -AL        User Key  (r) = Recorded By, (t) = Taken By, (c) = Cosigned By    Initials Name Provider Type    Karla Stanford, PTA, CLT-LUANA Physical Therapy Assistant          PT OP Goals     Row Name 09/14/20 1045          PT Short Term Goals    STG 1  Patient will be independent with remedial HEP for lymphedema.  -AL     STG 1 Progress  Ongoing;Progressing;Partially Met  -AL     STG 2  Patient will have a good basic understanding of lymphedema including risk reduction practices and elevated infection risk.   -AL     STG 2 Progress  Ongoing;Progressing  -AL     STG 3  Patient will be independent with self MLD with assist of family as needed.  -AL     STG 3 Progress  Ongoing;Progressing;Partially Met  -AL     STG 4  Patient will be able to apply compression bandages with assist of family.   -AL     STG 4 Progress  Ongoing;Met  -AL        Long Term Goals    LTG Date to Achieve  09/11/20  -AL     LTG 1  Patient will have compression garment for abdominal edema.  -AL     LTG 1 Progress  Ongoing  -AL     LTG 2  Patient will have well fitting compression garments for BUEs.  -AL     LTG 2 Progress  Ongoing;Met  -AL     LTG 3  Patient will obtain a flexitouch lymphedema pump and be independent with its use for maintenance.   -AL     LTG 3 Progress  Ongoing  -AL     LTG 3 Progress Comments  She is waiting for insurance approval   -AL     LTG 4  She will be independent and comfortable with maintenance program for lymphedema after DC.  -AL     LTG 4 Progress  Ongoing  -AL        Time Calculation    PT Goal Re-Cert Due Date  10/11/20  -AL       User Key  (r) = Recorded By, (t) = Taken By, (c) = Cosigned By    Initials Name Provider  Type    AL Karla Morales PTA, CLT-LANA Physical Therapy Assistant          Therapy Education  Education Details: Work on CDT  Given: Edema management  Program: Reinforced  How Provided: Verbal  Provided to: Patient  Level of Understanding: Verbalized              Time Calculation:                     Karla Morales PTA, CLT-LANA  9/14/2020

## 2020-09-28 ENCOUNTER — TREATMENT (OUTPATIENT)
Dept: PHYSICAL THERAPY | Facility: CLINIC | Age: 75
End: 2020-09-28

## 2020-09-28 DIAGNOSIS — Z90.13 STATUS POST BILATERAL MASTECTOMY: ICD-10-CM

## 2020-09-28 DIAGNOSIS — I97.2 POST-MASTECTOMY LYMPHEDEMA SYNDROME: Primary | ICD-10-CM

## 2020-09-28 PROCEDURE — 97140 MANUAL THERAPY 1/> REGIONS: CPT | Performed by: PHYSICAL THERAPIST

## 2020-09-28 NOTE — PROGRESS NOTES
Outpatient Physical Therapy Lymphedema Treatment Note       Patient Name: Dianne Pritchard  : 1945  MRN: [id number]  Today's Date: 2020        Visit Date: 2020    Visit Dx:    ICD-10-CM ICD-9-CM   1. Post-mastectomy lymphedema syndrome  I97.2 457.0   2. Status post bilateral mastectomy  Z90.13 V45.71       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)   • Acquired hypothyroidism   • Dizziness   • Multiple falls   • Bruit of left carotid artery   • Acute pain of right knee   • Right knee pain   • Status post right knee replacement   • BMI 33.0-33.9,adult   • Bilateral carotid artery disease (CMS/HCC)   • Atrial fibrillation with RVR (CMS/HCC)   • Restrictive lung disease   • A-fib (CMS/HCC)   • Presence of Watchman left atrial appendage closure device   • Malignant neoplasm of female breast (CMS/HCC)   • Status post bilateral mastectomy   • Class 1 obesity due to excess calories with serious comorbidity and body mass index (BMI) of 31.0 to 31.9 in adult        Lymphedema     Row Name 20 1057             Subjective Pain    Able to rate subjective pain?  yes  -AL      Pre-Treatment Pain Level  0  -AL         Subjective Comments    Subjective Comments  She states she has been using the Flexitouch pump and likes it.  She states she has used the pump twice for 30 min.  She has urinated a lot more after using the pump, she has not noticed any increase shortness of breath.  She states she can tell the pump has been helping.   -AL         Lymphedema Measurements    Measurement Type(s)  Quick Girth  -AL      Quick Girth Areas  Upper extremities  -AL      Circumferential Areas  Trunk  -AL         LUE Quick Girth (cm)    Axilla  31 cm  -AL      Mid upper arm  32.9 cm  -AL      Elbow  23.7 cm  -AL      Mid forearm  20.4 cm  -AL      Wrist crease  16 cm  -AL      Web space  17.6 cm  -AL      LUE Quick  Girth Total  141.6  -AL         RUE Quick Girth (cm)    Axilla  36.8 cm  -AL      Mid upper arm  32.6 cm  -AL      Elbow  24.6 cm  -AL      Mid forearm  21.9 cm  -AL      Wrist crease  16.2 cm  -AL      Web space  18 cm  -AL      RUE Quick Girth Total  150.1  -AL         Trunk Circumferential (cm)    Measurement Location 1  at axillae-sitting EOB   -AL      Trunk 1  94.3 cm  -AL      Measurement Location 2  Umbilicus in standing  -AL      Trunk 2  112.1 cm  -AL      Trunk Circumferential Total  206.4 cm  -AL         Manual Lymphatic Drainage    Manual Lymphatic Drainage  initial sequence;opened regional lymph nodes;opened anastamoses;extremity treatment  -AL      Initial Sequence  short neck;abdomen;diaphragmatic breathing  -AL      Abdomen  superficial  -AL      Diaphragmatic Breathing  x 9 with superficial abdominals  -AL      Opened Regional Lymph Nodes  inguinal  -AL      Inguinal  left;right  -AL      Opened Anastamoses  axillo-inguinal  -AL      Axillo-Inguinal  left;right  -AL      Extremity Treatment  MLD to full limb;simple/brief MLD  -AL      Simple/Brief MLD  R UE  -AL      MLD to Full Limb  L UE  -AL      Manual Therapy  Continue to use the Flexitouch pump.  -AL         Compression/Skin Care    Compression/Skin Care  compression garment;skin care  -AL      Skin Care  --  -AL      Compression/Skin Care Comments  Helped her don the Size 3 sleeve and size 3 gauntlet both 20-30 mmHg Medi [place] on the L UE.    -AL        User Key  (r) = Recorded By, (t) = Taken By, (c) = Cosigned By    Initials Name Provider Type    Karla Stanford, PTA, LUIS F Physical Therapy Assistant                        PT Assessment/Plan     Row Name 09/28/20 5845          PT Assessment    Assessment Comments  She has had a reduction in the trunk and the R UE since her last visit.  She has started using the Flexitouch pump, she can tell a difference in both the arms and the trunk.  She is compliant with CDT.  She will continue  to work on her home maintenance program, will place patient on hold.   -AL        PT Plan    PT Plan Comments  Will place patient on hold.  -AL       User Key  (r) = Recorded By, (t) = Taken By, (c) = Cosigned By    Initials Name Provider Type    Karla Stanford PTA, CLT-LANA Physical Therapy Assistant             OP Exercises     Row Name 09/28/20 1057             Subjective Comments    Subjective Comments  She states she has been using the Flexitouch pump and likes it.  She states she has used the pump twice for 30 min.  She has urinated a lot more after using the pump, she has not noticed any increase shortness of breath.  She states she can tell the pump has been helping.   -AL         Subjective Pain    Able to rate subjective pain?  yes  -AL      Pre-Treatment Pain Level  0  -AL         Total Minutes    22191 - PT Manual Therapy Minutes  62  -AL        User Key  (r) = Recorded By, (t) = Taken By, (c) = Cosigned By    Initials Name Provider Type    Karla Stanford PTA, CLT-LANA Physical Therapy Assistant                     Manual Rx (last 36 hours)      Manual Treatments     Row Name 09/28/20 1057             Total Minutes    87542 - PT Manual Therapy Minutes  62  -AL        User Key  (r) = Recorded By, (t) = Taken By, (c) = Cosigned By    Initials Name Provider Type    Karla Stanford PTA, CLT-LANA Physical Therapy Assistant          PT OP Goals     Row Name 09/28/20 1057          PT Short Term Goals    STG 1  Patient will be independent with remedial HEP for lymphedema.  -AL     STG 1 Progress  Met;Ongoing  -AL     STG 1 Progress Comments  She is compliant with her HEP  -AL     STG 2  Patient will have a good basic understanding of lymphedema including risk reduction practices and elevated infection risk.   -AL     STG 2 Progress  Ongoing;Met  -AL     STG 2 Progress Comments  She has a good understanding   -AL     STG 3  Patient will be independent with self MLD with assist of family as needed.  -AL      STG 3 Progress  Ongoing;Met  -AL     STG 3 Progress Comments  She is compliant with the MLD  -AL     STG 4  Patient will be able to apply compression bandages with assist of family.   -AL     STG 4 Progress  Ongoing;Met  -AL        Long Term Goals    LTG Date to Achieve  09/11/20  -AL     LTG 1  Patient will have compression garment for abdominal edema.  -AL     LTG 1 Progress  Ongoing  -AL     LTG 1 Progress Comments  She has not found a compression garment for the abdomen yet  -AL     LTG 2  Patient will have well fitting compression garments for BUEs.  -AL     LTG 2 Progress  Ongoing;Met  -AL     LTG 2 Progress Comments  She has good fitting compression for the L UE  -AL     LTG 3  Patient will obtain a flexitouch lymphedema pump and be independent with its use for maintenance.   -AL     LTG 3 Progress  Met;Ongoing  -AL     LTG 3 Progress Comments  She now has her Flexitouch pump  -AL     LTG 4  She will be independent and comfortable with maintenance program for lymphedema after DC.  -AL     LTG 4 Progress  Ongoing;Met  -AL     LTG 4 Progress Comments  She is working on her home maintenance program  -AL        Time Calculation    PT Goal Re-Cert Due Date  10/11/20  -AL       User Key  (r) = Recorded By, (t) = Taken By, (c) = Cosigned By    Initials Name Provider Type    Karla Stanford PTA, CLT-LANA Physical Therapy Assistant          Therapy Education  Education Details: Work on CDT, use the Flexitouch pump  Given: Edema management  Program: Reinforced  How Provided: Verbal  Provided to: Patient  Level of Understanding: Verbalized              Time Calculation:                     Karla Morales PTA, CLT-LANA  9/28/2020

## 2020-09-29 RX ORDER — LETROZOLE 2.5 MG/1
TABLET, FILM COATED ORAL
Qty: 90 TABLET | Refills: 0 | Status: SHIPPED | OUTPATIENT
Start: 2020-09-29 | End: 2020-10-12

## 2020-10-12 RX ORDER — LETROZOLE 2.5 MG/1
TABLET, FILM COATED ORAL
Qty: 90 TABLET | Refills: 0 | Status: SHIPPED | OUTPATIENT
Start: 2020-10-12 | End: 2021-03-03 | Stop reason: SDUPTHER

## 2020-10-12 NOTE — TELEPHONE ENCOUNTER
Requested Prescriptions     Pending Prescriptions Disp Refills   • metoprolol tartrate (LOPRESSOR) 25 MG tablet [Pharmacy Med Name: METOPROLOL TARTRATE 25MG TABLETS] 180 tablet 1     Sig: TAKE 1 TABLET BY MOUTH EVERY 12 HOURS

## 2020-10-13 ENCOUNTER — FLU SHOT (OUTPATIENT)
Dept: FAMILY MEDICINE CLINIC | Facility: CLINIC | Age: 75
End: 2020-10-13

## 2020-10-13 DIAGNOSIS — Z23 NEED FOR INFLUENZA VACCINATION: ICD-10-CM

## 2020-10-13 PROCEDURE — 90694 VACC AIIV4 NO PRSRV 0.5ML IM: CPT | Performed by: FAMILY MEDICINE

## 2020-10-13 PROCEDURE — G0008 ADMIN INFLUENZA VIRUS VAC: HCPCS | Performed by: FAMILY MEDICINE

## 2020-10-27 NOTE — PROGRESS NOTES
Progress Note      Pt Name: Susan Blackmon  YOB: 1945  MRN: [id number]    Date of evaluation: 10/28/2020  History Obtained From:  patient, electronic medical record    CHIEF COMPLAINT:    Chief Complaint   Patient presents with    Follow-up     Malignant neoplasm of upper-outer quadrant of left breast in female, estrogen receptor positive      Current active problems  History of breast cancer      HISTORY OF PRESENT ILLNESS:    Susan Blackmon is a 76 y.o.  female with right stage II A breast carcinoma from 3/21/2012. She was also diagnosed with a left stage I breast carcinoma 1/29/2020 and did have a left simple mastectomy. She is on Femara 2.5 mg daily. She denies any significant hot flashes. She denies any bone pain. She reports that she has not felt any new nodules on her chest wall. She is breathing okay. She continues on vitamin D. She has declined to take any treatment for osteopenia even though she is on aromatase inhibitor. She is diabetic and reports that her blood sugars overall have been more stable. She has hypothyroidism and continues on Synthroid 137 mcg daily. Blood pressure is stable with her current medication. TARGET LYMPHOMA SITES:  1. Left supraclavicular area. 2. Left axilla. 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 Matilda Seay with a left supraclavicular lymph node. She was referred to Dr. Digna Andres who performed a biopsy on 10/13/93 that revealed a large cell malignant immunoblastic lymphoma, CD20 was 53% positive  A bone marrow biopsy and aspirate on 11/1/1993 was negative for any evidence of malignant lymphoma. She received 6 cycles of chemotherapy with CHOP/Bleomycin. The last 3 cycles were done without Vincristine due to peripheral neuropathy problems.  Following the chemotherapy she received XRT to the neck and upper chest due to the stage II-A presentation in the left supraclavicular area and left axilla. She received a total of 3,960cGy under the direction of Dr. Olena Souza that was completed on 6/22/94. TREATMENT SUMMARY:  1. CHOP/ Bleomycin x 6.   2. Mantle radiation therapy. 2ND PRIMARY TUMOR: Right stage II (pT2 N1 M0) infiltrating high grade breast cancer 03/21/12  Tram had an abnormal mammogram at [facility] on 02/22/12 revealing a 1.6 cm worrisome mass at the 7:00 position in the right lower quadrant of the right breast. On 03/21/12 Dr. Adam Ford took Tram to the OR and performed an intraoperative biopsy with frozen section that revealed an infiltrating breast cancer. The procedure then followed immediately with a right modified radical mastectomy with pathology revealing an invasive ductal carcinoma, high grade, grade III 3.3 cm tumor. Nineteen right axillary lymph nodes were submitted, one was positive for metastatic carcinoma. This places Tram in a stage II (pT2 N1 M0) breast cancer. The ER is positive, CT is also positive, Her2 Scottie by IHC is negative and Her2 Scottie by FISH is unamplified. 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 In addition to that she had mantle radiation therapy. Although the 2D echo shows an EF of 60%, I discussed with Tram the concerns of anthracycline based therapy and cardiac disease and she agrees and desires to receive adjuvant therapy without an anthracycline. Taxotere and Cytoxan x 6 cycles are recommended and initiated on 04/20/12. The last cycle of chemotherapy was delivered on 08/09/12. Femara is initiated on 09/06/12 with 10 years planned. TREATMENT SUMMARY:  1. Right modified radical mastectomy 03/21/12. 2. Taxotere and Cytoxan given 04/20/12 through 08/09/12 x 6 cycles. 3. Femara is initiated on 09/06/12.     3RD PRIMARY TUMOR: Left Stage I (pT1c, pN0, pMx),  grade 1, ER positive HER-2/scottie negative Invasive lobular carcinoma of the breast 1/29/2020  Tram was seen in routine follow-up on 12/20/2019 and physical exam revealed a 4 x 5 cm palpable mass anteriorly in the left axillary region.     Ultrasound left breast at [facility] on 1/10/2020 revealed an oblong 4.7 x 1.3 x 5.5 cm fat-containing mass in the upper outer quadrant of the left breast felt to be stable from a prior mammogram and likely representing a hamartoma.  A 1.3 x 1 cm irregular nodularity with associated architectural distortion in the upper outer quadrant.     Spot compression mammogram 1/10/2020 revealed a developing focus of irregular nodularity measuring 1.3 x 1 cm in the upper outer quadrant of the left breast with malignant features with ultrasound-guided biopsy recommended- BI-RADS 5.     The palpable abnormality on examination appears to be a benign hamartoma.  However there was an irregular focus that is suspicious in the upper outer quadrant of the left breast and she will be referred to surgery for evaluation. Judie Evangelista CA-15-3 above which is minimally elevated a 25.5 and lymphoma markers were negative.     Referral was made for her to see Dr. Rohith Anderson at her 1/17/2020 clinic visit.     CA-15-3 25.5 (0-25)     US-guided biopsy of the 1.4 x 1.1 x 0.80 cm left breast mass was performed on 1/29/2020 by Dr. Rohith Anderson. Pathology revealed the following:  · Invasive lobular carcinoma, low-grade  · Tumor measures at least 9 mm in greatest linear  · Tumor invades present in multiple core biopsies  · Adjacent intraductal papilloma with apocrine metaplasia     IHC quantative breast panel is as follows:  · ER: positive 94%  · HI: negative 0.2%  · HER2: negative, Score 1+  · KI-67: low, 8%     Mammaprint on 1/29/2020 resulted a LOW RISK luminal-type (A). Trell Hoffmann has a 97.8% risk of living without distant recurrence of breast cancer at 5-years if treated with adjuvant endocrine therapy alone.     On 3/10/2020 Dr. Rohith Anderson performed the following procedures:  1. Injection of radionuclide.   2. Lymphatic mapping. 3. Left-sided pectoral block. 4. Left simple mastectomy. 5. Left sentinel lymph node biopsy.     Pathology revealed the following:  Breast, left mastectomy:  · Invasive lobular carcinoma, grade 1  · Invasive carcinoma measures 1.6 cm in greatest dimension. · Invasive carcinoma is located greater than 1.0 cm from the nearest deep surgical excision margin. · Incidental complex sclerosing lesion, margins negative. · Changes consistent with fibrocystic mastopathy involving nonneoplastic breast parenchyma  · Sections of skin, negative for evidence of malignancy. · Sections of nipple, negative for evidence of malignancy  Lymph node, left sentinel lymph node biopsy:   · 10 lymph nodes, negative for evidence of malignancy      AJCC STAGE: pT1c, pN0, pMx     Adjuvant endocrine therapy with aromatase inhibitor letrozole (Femara) 2.5 mg daily is prescribed (continued)      1st HEMATOLOGY HISTORY: Iron deficiency/ Acute GI bleed secondary to gastric AVM, 06/27/16  Mrs. Aponte presented to [facility] Emergency Department on 06/24/16 with weakness, fatigue and exertional dyspnea. She was profoundly anemic with a hemoglobin of 6.0, MCV of 84.3. WBC and platelets were normal at 7.97 and 163,000, respectively. GI evaluation was sought, with an endoscopy on 06/27/16 by Dr. Ruiz Needle remarkable for angiodysplastic lesions of the gastric body. Colonoscopy on 06/28/16 was negative. Mrs. Aponte was transfused as warranted, given parenteral iron supplementation and anticoagulation with Xarelto for paroxysmal atrial fibrillation was discontinued due to UGI bleed with AVM. 2nd HEMATOLOGY HISTORY: Thrombocytopenia  Tram fam had a chronically low platelet count between 120 and 150.   DEONDRE - negative  Antiplatelet antibody - negative  SPEP - negative  Hepatitis A, B, C - negative      Past Medical History:   Diagnosis Date    Arteriovenous malformation, other site     Arthritis     Atrial fibrillation ([facility] [address] 04/28/2016    sees  25 MG tablet, Take 25 mg by mouth daily Indications: High Blood Pressure Disorder, Disp: , Rfl:     aspirin EC 81 MG EC tablet, Take 1 tablet by mouth 2 times daily, Disp: 60 tablet, Rfl: 0    losartan (COZAAR) 25 MG tablet, Take 25 mg by mouth daily Indications: High Blood Pressure Disorder , Disp: , Rfl:     Multiple Vitamins-Minerals (MULTI FOR HER PO), Take 1 tablet by mouth daily , Disp: , Rfl:     vitamin D (CHOLECALCIFEROL) 1000 UNIT TABS tablet, Take 1,000 Units by mouth daily, Disp: , Rfl:      Allergies   Allergen Reactions    Hydrocodone-Acetaminophen Nausea And Vomiting    Amoxicillin Hives    Clarithromycin     Penicillins Hives    Multihance [Gadobenate] Nausea Only     NAUSEOUS AFTER MRI CONTRAST (MULTIHANCE)       Social History     Tobacco Use    Smoking status: Former Smoker     Types: Cigarettes     Last attempt to quit: 1993     Years since quittin.0    Smokeless tobacco: Never Used   Substance Use Topics    Alcohol use: No    Drug use: No       Family History   Problem Relation Age of Onset    Heart Disease Mother     Stroke Mother     Coronary Art Dis Mother     Cancer Father         Lung-Age Unknown    Cancer Paternal Aunt         Breast-Age Unknown    Lung Cancer Sister         Non-small cell lung cancer-Age Unknown    Heart Attack Brother     Cancer Brother         Venal Cell-Age Unknown    Pancreatic Cancer Child 47       Subjective   REVIEW OF SYSTEMS:   Review of Systems   Constitutional: Positive for fatigue (Mild). Negative for chills, diaphoresis, fever and unexpected weight change. HENT: Negative for mouth sores, nosebleeds, sore throat, trouble swallowing and voice change. Eyes: Negative for photophobia, discharge and itching. Respiratory: Negative for cough, shortness of breath and wheezing. Cardiovascular: Negative for chest pain, palpitations and leg swelling.    Gastrointestinal: Negative for abdominal distention, abdominal pain, blood in Skin:     General: Skin is warm and dry. Findings: No rash. Neurological:      Mental Status: She is alert and oriented to person, place, and time. Coordination: Coordination normal.   Psychiatric:         Behavior: Behavior normal.         Thought Content: Thought content normal.             VISIT DIAGNOSES  1. Malignant neoplasm of upper-outer quadrant of left breast in female, estrogen receptor positive ([facility] [address]    2. Malignant neoplasm of lower-outer quadrant of right breast of female, estrogen receptor positive ([facility] [address]    3. Iron deficiency anemia due to chronic blood loss    4. Thrombocytopenia (HCC)    5. Bandemia        ASSESSMENT/PLAN:    1. Left Stage I (pT1c, pN0, pMx),  grade 1, ER positive HER-2/radha negative Invasive lobular carcinoma of the breast 1/29/2020  2. HX Stage II A right breast carcinoma. Examination is unrevealing for any evidence of recurrence. CA 15-3 will be rechecked. 3.  Leukocytosis. Her CBC today reveals a WBC of 12.67 with 81.2% neutrophils. She does not have any symptoms or signs of infection. BCR/ABL will be requested today. 4.  Thrombocytopenia. Platelet count is 974,401 which has decreased slightly. Further serology will be requested. 5.  Long-term use of aromatase inhibitor. I had a discussion with her previously about getting Prolia and she declined. She is not on any bisphosphonate. She was on calcium but her calcium level had apparently risen and Dr. Eveline Kenny has taken her off.    6.. Remote history of large B-cell immunoblastic lymphoma in 1993. She does not have any B symptoms. Serology 12/20/2019  CMP - crt 1.12 with GFR 49, alk phos 126 ()  LDH -171 -WNL  B2 M - 2.8(0.6-2.4)    She does not have any B symptoms. Physical examination is unrevealing for any performed adenopathy. Repeat serology requested    7. History of iron deficiency anemia. Hgb 13.4 with MCV 91.9 which is stable.     8. Her colonoscopy is up-to-date performed last on 6/28/2016 with recall in 10 years. 9.  Gynecologic screening. She no longer gets Pap smears. She denies any pelvic pain, abnormal bleeding. Over 50% of the total visit time of 25 minutes in face to face encounter with the patient, out of which more than 50% of the time was spent in counseling patient or family and coordination of care. Counseling included but was not limited to time spent reviewing labs, imaging studies/ treatment plan and answering questions. Orders Placed This Encounter   Procedures    Cancer Antigen 15-3    Comprehensive Metabolic Panel    Lactate Dehydrogenase    Electrophoresis Protein, Serum with Reflex to Immunofixation    Beta 2 Microglobulin, Serum    [place]/Lambda Free Lt Chains, Serum Quant    Vitamin B12    Folate    Miscellaneous Sendout 1        Return in about 4 months (around 2/28/2021) for With Bernardo Khan.      Jaymie Gastelum PA-C  1:19 PM  10/28/2020

## 2020-10-28 ENCOUNTER — OFFICE VISIT (OUTPATIENT)
Dept: HEMATOLOGY | Age: 75
End: 2020-10-28
Payer: MEDICARE

## 2020-10-28 ENCOUNTER — HOSPITAL ENCOUNTER (OUTPATIENT)
Dept: INFUSION THERAPY | Age: 75
Discharge: HOME | End: 2020-10-28
Payer: MEDICARE

## 2020-10-28 VITALS
SYSTOLIC BLOOD PRESSURE: 130 MMHG | BODY MASS INDEX: 31.84 KG/M2 | WEIGHT: 186.5 LBS | HEART RATE: 61 BPM | DIASTOLIC BLOOD PRESSURE: 68 MMHG | HEIGHT: 64 IN | TEMPERATURE: 97.3 F | OXYGEN SATURATION: 93 %

## 2020-10-28 DIAGNOSIS — C50.412 MALIGNANT NEOPLASM OF UPPER-OUTER QUADRANT OF LEFT BREAST IN FEMALE, ESTROGEN RECEPTOR POSITIVE (HCC): ICD-10-CM

## 2020-10-28 DIAGNOSIS — D69.6 THROMBOCYTOPENIA (HCC): ICD-10-CM

## 2020-10-28 DIAGNOSIS — Z17.0 MALIGNANT NEOPLASM OF UPPER-OUTER QUADRANT OF LEFT BREAST IN FEMALE, ESTROGEN RECEPTOR POSITIVE (HCC): ICD-10-CM

## 2020-10-28 LAB
ALBUMIN SERPL-MCNC: 4.6 G/DL (ref 3.5–5.2)
ALP BLD-CCNC: 102 U/L (ref 35–104)
ALT SERPL-CCNC: 17 U/L (ref 9–52)
ANION GAP SERPL CALCULATED.3IONS-SCNC: 11 MMOL/L (ref 7–19)
AST SERPL-CCNC: 28 U/L (ref 14–36)
BASOPHILS ABSOLUTE: 0.05 K/UL (ref 0.01–0.08)
BASOPHILS RELATIVE PERCENT: 0.4 % (ref 0.1–1.2)
BILIRUB SERPL-MCNC: 0.4 MG/DL (ref 0.2–1.3)
BUN BLDV-MCNC: 20 MG/DL (ref 7–17)
CA 15-3: 27.5 U/ML (ref 0–35)
CALCIUM SERPL-MCNC: 9.7 MG/DL (ref 8.4–10.2)
CHLORIDE BLD-SCNC: 98 MMOL/L (ref 98–111)
CO2: 31 MMOL/L (ref 22–29)
CREAT SERPL-MCNC: 1.2 MG/DL (ref 0.5–1)
EOSINOPHILS ABSOLUTE: 0.18 K/UL (ref 0.04–0.54)
EOSINOPHILS RELATIVE PERCENT: 1.4 % (ref 0.7–7)
FOLATE: 22 NG/ML (ref 2.7–20)
GFR NON-AFRICAN AMERICAN: 44
GLOBULIN: 3 G/DL
GLUCOSE BLD-MCNC: 216 MG/DL (ref 74–106)
HCT VFR BLD CALC: 41 % (ref 34.1–44.9)
HEMOGLOBIN: 13.4 G/DL (ref 11.2–15.7)
LACTATE DEHYDROGENASE: 345 U/L (ref 313–618)
LYMPHOCYTES ABSOLUTE: 1.45 K/UL (ref 1.18–3.74)
LYMPHOCYTES RELATIVE PERCENT: 11.4 % (ref 19.3–53.1)
MCH RBC QN AUTO: 30 PG (ref 25.6–32.2)
MCHC RBC AUTO-ENTMCNC: 32.7 G/DL (ref 32.3–35.5)
MCV RBC AUTO: 91.9 FL (ref 79.4–94.8)
MONOCYTES ABSOLUTE: 0.71 K/UL (ref 0.24–0.82)
MONOCYTES RELATIVE PERCENT: 5.6 % (ref 4.7–12.5)
NEUTROPHILS ABSOLUTE: 10.28 K/UL (ref 1.56–6.13)
NEUTROPHILS RELATIVE PERCENT: 81.2 % (ref 34–71.1)
PDW BLD-RTO: 13.9 % (ref 11.7–14.4)
PLATELET # BLD: 112 K/UL (ref 182–369)
PMV BLD AUTO: 11.4 FL (ref 7.4–10.4)
POTASSIUM SERPL-SCNC: 5.1 MMOL/L (ref 3.5–5.1)
RBC # BLD: 4.46 M/UL (ref 3.93–5.22)
SODIUM BLD-SCNC: 140 MMOL/L (ref 137–145)
TOTAL PROTEIN: 7.6 G/DL (ref 6.3–8.2)
VITAMIN B-12: 799 PG/ML (ref 239–931)
WBC # BLD: 12.67 K/UL (ref 3.98–10.04)

## 2020-10-28 PROCEDURE — 1036F TOBACCO NON-USER: CPT | Performed by: PHYSICIAN ASSISTANT

## 2020-10-28 PROCEDURE — 4040F PNEUMOC VAC/ADMIN/RCVD: CPT | Performed by: PHYSICIAN ASSISTANT

## 2020-10-28 PROCEDURE — 85025 COMPLETE CBC W/AUTO DIFF WBC: CPT

## 2020-10-28 PROCEDURE — 1123F ACP DISCUSS/DSCN MKR DOCD: CPT | Performed by: PHYSICIAN ASSISTANT

## 2020-10-28 PROCEDURE — 99211 OFF/OP EST MAY X REQ PHY/QHP: CPT

## 2020-10-28 PROCEDURE — 82607 VITAMIN B-12: CPT

## 2020-10-28 PROCEDURE — 82746 ASSAY OF FOLIC ACID SERUM: CPT

## 2020-10-28 PROCEDURE — 99214 OFFICE O/P EST MOD 30 MIN: CPT | Performed by: PHYSICIAN ASSISTANT

## 2020-10-28 PROCEDURE — 86300 IMMUNOASSAY TUMOR CA 15-3: CPT

## 2020-10-28 PROCEDURE — G8399 PT W/DXA RESULTS DOCUMENT: HCPCS | Performed by: PHYSICIAN ASSISTANT

## 2020-10-28 PROCEDURE — G8417 CALC BMI ABV UP PARAM F/U: HCPCS | Performed by: PHYSICIAN ASSISTANT

## 2020-10-28 PROCEDURE — G8484 FLU IMMUNIZE NO ADMIN: HCPCS | Performed by: PHYSICIAN ASSISTANT

## 2020-10-28 PROCEDURE — 83615 LACTATE (LD) (LDH) ENZYME: CPT

## 2020-10-28 PROCEDURE — G8427 DOCREV CUR MEDS BY ELIG CLIN: HCPCS | Performed by: PHYSICIAN ASSISTANT

## 2020-10-28 PROCEDURE — 1090F PRES/ABSN URINE INCON ASSESS: CPT | Performed by: PHYSICIAN ASSISTANT

## 2020-10-28 PROCEDURE — 80053 COMPREHEN METABOLIC PANEL: CPT

## 2020-10-28 PROCEDURE — 3017F COLORECTAL CA SCREEN DOC REV: CPT | Performed by: PHYSICIAN ASSISTANT

## 2020-10-28 ASSESSMENT — ENCOUNTER SYMPTOMS
TROUBLE SWALLOWING: 0
BLOOD IN STOOL: 0
SHORTNESS OF BREATH: 0
COLOR CHANGE: 0
EYE DISCHARGE: 0
CONSTIPATION: 0
WHEEZING: 0
ABDOMINAL DISTENTION: 0
COUGH: 0
BACK PAIN: 0
SORE THROAT: 0
VOMITING: 0
EYE ITCHING: 0
ABDOMINAL PAIN: 0
PHOTOPHOBIA: 0
VOICE CHANGE: 0
NAUSEA: 0
DIARRHEA: 0

## 2020-11-24 ENCOUNTER — OFFICE VISIT (OUTPATIENT)
Dept: FAMILY MEDICINE CLINIC | Facility: CLINIC | Age: 75
End: 2020-11-24

## 2020-11-24 VITALS
WEIGHT: 186 LBS | SYSTOLIC BLOOD PRESSURE: 126 MMHG | OXYGEN SATURATION: 97 % | DIASTOLIC BLOOD PRESSURE: 80 MMHG | BODY MASS INDEX: 31.76 KG/M2 | RESPIRATION RATE: 16 BRPM | HEART RATE: 74 BPM | HEIGHT: 64 IN

## 2020-11-24 DIAGNOSIS — E03.9 ACQUIRED HYPOTHYROIDISM: ICD-10-CM

## 2020-11-24 DIAGNOSIS — C50.912 MALIGNANT NEOPLASM OF LEFT FEMALE BREAST, UNSPECIFIED ESTROGEN RECEPTOR STATUS, UNSPECIFIED SITE OF BREAST (HCC): ICD-10-CM

## 2020-11-24 DIAGNOSIS — E11.59 TYPE 2 DIABETES MELLITUS WITH OTHER CIRCULATORY COMPLICATIONS (HCC): ICD-10-CM

## 2020-11-24 DIAGNOSIS — I10 ESSENTIAL HYPERTENSION: Primary | ICD-10-CM

## 2020-11-24 PROCEDURE — 99214 OFFICE O/P EST MOD 30 MIN: CPT | Performed by: FAMILY MEDICINE

## 2020-11-24 PROCEDURE — G0439 PPPS, SUBSEQ VISIT: HCPCS | Performed by: FAMILY MEDICINE

## 2020-11-24 NOTE — PROGRESS NOTES
"Glenn Pritchard is a 75 y.o. female.     Chief Complaint   Patient presents with   • Follow-up     5 mo htn   • Medicare Wellness-subsequent       History of Present Illness     she nots good bp and bs control--toleraign synthroid withot heat  or cold intoalnces---followed by dr mcmanus--she is seing oncologist ofor breast cancer hx      Current Outpatient Medications:   •  aspirin 81 MG chewable tablet, Chew 1 tablet Daily., Disp: , Rfl:   •  dilTIAZem CD (CARDIZEM CD) 180 MG 24 hr capsule, Take 2 capsules by mouth Daily., Disp: 180 capsule, Rfl: 3  •  Insulin Pen Needle (PEN NEEDLES) 31G X 5 MM misc, 1 each 2 (Two) Times a Day., Disp: 100 each, Rfl: 5  •  letrozole (FEMARA) 2.5 MG tablet, Take 2.5 mg by mouth Daily., Disp: , Rfl:   •  levothyroxine (SYNTHROID, LEVOTHROID) 137 MCG tablet, TAKE 1 TABLET BY MOUTH DAILY, Disp: 90 tablet, Rfl: 0  •  losartan (COZAAR) 25 MG tablet, Take 25 mg by mouth Daily., Disp: , Rfl:   •  metoprolol tartrate (LOPRESSOR) 25 MG tablet, TAKE 1 TABLET BY MOUTH EVERY 12 HOURS, Disp: 180 tablet, Rfl: 1  •  Misc. Devices (VIRAGE CUSTOM BREAST PROSTHES) misc, To be used with Mastectomy Bra-, Disp: 2 each, Rfl: 0  •  Multiple Vitamins-Minerals (CENTRUM SILVER PO), Take 1 tablet by mouth Daily., Disp: , Rfl:   •  TOUJEO SOLOSTAR 300 UNIT/ML solution pen-injector injection, INJECT 80 UNITS UNDER THE SKIN UTD QD, Disp: , Rfl: 1  Allergies   Allergen Reactions   • Amoxil [Amoxicillin] Hives   • Penicillins Hives   • Biaxin [Clarithromycin] Other (See Comments)     NOT SURE OF REACTION, \"SORE MOUTH OF DIARRHEA\"   • Lortab [Hydrocodone-Acetaminophen] GI Intolerance       Past Medical History:   Diagnosis Date   • Arrhythmia    • Atrial fibrillation, currently in sinus rhythm    • Breast cancer (CMS/HCC)     right   • Cancer (CMS/HCC)     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)   • Carotid artery occlusion    • Diabetes mellitus, type II (CMS/HCC)    • Dizziness    • Drug therapy    • Essential " "hypertension    • GERD (gastroesophageal reflux disease)    • GI bleed requiring more than 4 units of blood in 24 hours, ICU, or surgery    • History of chemotherapy    • History of lymphoma    • Hx of radiation therapy    • Hypomagnesemia    • Hypothyroidism    • On amiodarone therapy      Past Surgical History:   Procedure Laterality Date   • ATRIAL APPENDAGE EXCLUSION LEFT WITH TRANSESOPHAGEAL ECHOCARDIOGRAM Left 11/27/2018    Procedure: Atrial Appendage Occlusion;  Surgeon: Mick Orantes MD;  Location:  [facility] CATH INVASIVE LOCATION;  Service: Cardiology   • ATRIAL APPENDAGE EXCLUSION LEFT WITH TRANSESOPHAGEAL ECHOCARDIOGRAM Left 1/22/2019    Procedure: Atrial Appendage Occlusion;  Surgeon: Mick Orantes MD;  Location:  [facility] CATH INVASIVE LOCATION;  Service: Cardiology   • BREAST BIOPSY     • MASTECTOMY     • MASTECTOMY  03/10/2020   • OTHER SURGICAL HISTORY      Mediport insertion X 2   • OTHER SURGICAL HISTORY      remote lymphoma treatment   • REPLACEMENT TOTAL KNEE Right 09/2017   • TUBAL ABDOMINAL LIGATION         Review of Systems   Constitutional: Negative.    HENT: Negative.    Eyes: Negative.    Respiratory: Negative.    Cardiovascular: Negative.    Gastrointestinal: Negative.    Endocrine: Negative.    Genitourinary: Negative.    Musculoskeletal: Negative.    Skin: Negative.    Allergic/Immunologic: Negative.    Neurological: Negative.    Hematological: Negative.    Psychiatric/Behavioral: Negative.        Objective  /80   Pulse 74   Resp 16   Ht 162.6 cm (64\")   Wt 84.4 kg (186 lb)   SpO2 97%   BMI 31.93 kg/m²   Physical Exam  Vitals signs and nursing note reviewed.   Constitutional:       Appearance: Normal appearance.   HENT:      Head: Normocephalic and atraumatic.      Right Ear: Ear canal normal.      Left Ear: Ear canal normal.      Nose: Nose normal.      Mouth/Throat:      Mouth: Mucous membranes are moist.      Pharynx: Oropharynx is clear.   Eyes:      Extraocular Movements: " Extraocular movements intact.      Conjunctiva/sclera: Conjunctivae normal.      Pupils: Pupils are equal, round, and reactive to light.   Neck:      Musculoskeletal: Normal range of motion and neck supple.   Cardiovascular:      Rate and Rhythm: Normal rate and regular rhythm.      Pulses: Normal pulses.      Heart sounds: Normal heart sounds.   Pulmonary:      Effort: Pulmonary effort is normal.      Breath sounds: Normal breath sounds.   Abdominal:      General: Abdomen is flat. Bowel sounds are normal.      Palpations: Abdomen is soft.   Musculoskeletal: Normal range of motion.   Skin:     General: Skin is warm and dry.      Capillary Refill: Capillary refill takes less than 2 seconds.   Neurological:      General: No focal deficit present.      Mental Status: She is alert and oriented to person, place, and time. Mental status is at baseline.   Psychiatric:         Mood and Affect: Mood normal.         Behavior: Behavior normal.         Thought Content: Thought content normal.         Judgment: Judgment normal.         Assessment/Plan   Diagnoses and all orders for this visit:    1. Essential hypertension (Primary)    2. 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 Acquired hypothyroidism    4. Malignant neoplasm of left female breast, unspecified estrogen receptor status, unspecified site of breast (CMS/HCC)    sjhe will keep follow up with oncologist and endocrinologists  \continue meds and covid safety issues           No orders of the defined types were placed in this encounter.      Follow up: 4 month(s)

## 2020-11-24 NOTE — PROGRESS NOTES
The ABCs of the Annual Wellness Visit  Subsequent Medicare Wellness Visit    Chief Complaint   Patient presents with   • Follow-up     5 mo htn   • Medicare Wellness-subsequent       Subjective   History of Present Illness:  Dianne Pritchard is a 75 y.o. female who presents for a Subsequent Medicare Wellness Visit.    HEALTH RISK ASSESSMENT    Recent Hospitalizations:  No hospitalization(s) within the last year.    Current Medical Providers:  Patient Care Team:  Darryl Bang MD as PCP - General (Family Medicine)  Xiang Fernandes APRN as Nurse Practitioner (Pulmonary Disease)    Smoking Status:  Social History     Tobacco Use   Smoking Status Former Smoker   • Years: 20.00   • Types: Cigarettes   • Quit date:    • Years since quittin.9   Smokeless Tobacco Never Used       Alcohol Consumption:  Social History     Substance and Sexual Activity   Alcohol Use No       Depression Screen:   PHQ-2/PHQ-9 Depression Screening 2020   Little interest or pleasure in doing things 0   Feeling down, depressed, or hopeless 0   Trouble falling or staying asleep, or sleeping too much 1   Feeling tired or having little energy 1   Poor appetite or overeating 0   Feeling bad about yourself - or that you are a failure or have let yourself or your family down 0   Trouble concentrating on things, such as reading the newspaper or watching television 0   Moving or speaking so slowly that other people could have noticed. Or the opposite - being so fidgety or restless that you have been moving around a lot more than usual 0   Thoughts that you would be better off dead, or of hurting yourself in some way 0   Total Score 2   If you checked off any problems, how difficult have these problems made it for you to do your work, take care of things at home, or get along with other people? Not difficult at all       Fall Risk Screen:  STEADI Fall Risk Assessment was completed, and patient is at LOW risk for  falls.Assessment completed on:11/24/2020    Health Habits and Functional and Cognitive Screening:  Functional & Cognitive Status 11/24/2020   Do you have difficulty preparing food and eating? No   Do you have difficulty bathing yourself, getting dressed or grooming yourself? No   Do you have difficulty using the toilet? No   Do you have difficulty moving around from place to place? No   Do you have trouble with steps or getting out of a bed or a chair? No   Current Diet Well Balanced Diet   Dental Exam Not up to date   Eye Exam Not up to date   Exercise (times per week) 2 times per week   Current Exercises Include House Cleaning   Current Exercise Activities Include -   Do you need help using the phone?  No   Are you deaf or do you have serious difficulty hearing?  No   Do you need help with transportation? No   Do you need help shopping? No   Do you need help preparing meals?  No   Do you need help with housework?  No   Do you need help with laundry? No   Do you need help taking your medications? No   Do you need help managing money? No   Do you ever drive or ride in a car without wearing a seat belt? No   Have you felt unusual stress, anger or loneliness in the last month? No   Who do you live with? Child   If you need help, do you have trouble finding someone available to you? No   Have you been bothered in the last four weeks by sexual problems? No   Do you have difficulty concentrating, remembering or making decisions? No         Does the patient have evidence of cognitive impairment? No    Asprin use counseling:Taking ASA appropriately as indicated    Age-appropriate Screening Schedule:  Refer to the list below for future screening recommendations based on patient's age, sex and/or medical conditions. Orders for these recommended tests are listed in the plan section. The patient has been provided with a written plan.    Health Maintenance   Topic Date Due   • TDAP/TD VACCINES (1 - Tdap) 06/24/1964   • ZOSTER  VACCINE (1 of 2) 06/24/1995   • DIABETIC EYE EXAM  11/28/2020   • HEMOGLOBIN A1C  12/18/2020   • DIABETIC FOOT EXAM  06/23/2021   • URINE MICROALBUMIN  06/23/2021   • MAMMOGRAM  01/29/2022   • COLONOSCOPY  06/28/2026   • INFLUENZA VACCINE  Completed          The following portions of the patient's history were reviewed and updated as appropriate: allergies, current medications, past family history, past medical history, past social history, past surgical history and problem list.    Outpatient Medications Prior to Visit   Medication Sig Dispense Refill   • aspirin 81 MG chewable tablet Chew 1 tablet Daily.     • dilTIAZem CD (CARDIZEM CD) 180 MG 24 hr capsule Take 2 capsules by mouth Daily. 180 capsule 3   • Insulin Pen Needle (PEN NEEDLES) 31G X 5 MM misc 1 each 2 (Two) Times a Day. 100 each 5   • letrozole (FEMARA) 2.5 MG tablet Take 2.5 mg by mouth Daily.     • levothyroxine (SYNTHROID, LEVOTHROID) 137 MCG tablet TAKE 1 TABLET BY MOUTH DAILY 90 tablet 0   • losartan (COZAAR) 25 MG tablet Take 25 mg by mouth Daily.     • metoprolol tartrate (LOPRESSOR) 25 MG tablet TAKE 1 TABLET BY MOUTH EVERY 12 HOURS 180 tablet 1   • Misc. Devices (VIRAGE CUSTOM BREAST PROSTHES) misc To be used with Mastectomy Bra- 2 each 0   • Multiple Vitamins-Minerals (CENTRUM SILVER PO) Take 1 tablet by mouth Daily.     • TOUJEO SOLOSTAR 300 UNIT/ML solution pen-injector injection INJECT 80 UNITS UNDER THE SKIN UTD QD  1     No facility-administered medications prior to visit.        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)   • Acquired hypothyroidism   • Dizziness   • Multiple falls   • Bruit of left carotid artery   • Acute pain of right knee   • Right knee pain   • Status post right knee replacement   • BMI 33.0-33.9,adult   • Bilateral carotid artery disease (CMS/HCC)   • Atrial fibrillation with RVR (CMS/HCC)   • Restrictive  "lung disease   • A-fib (CMS/HCC)   • Presence of Watchman left atrial appendage closure device   • Malignant neoplasm of female breast (CMS/HCC)   • Status post bilateral mastectomy   • Class 1 obesity due to excess calories with serious comorbidity and body mass index (BMI) of 31.0 to 31.9 in adult       Advanced Care Planning:  ACP discussion was held with the patient during this visit. Patient does not have an advance directive, information provided.    Review of Systems    Compared to one year ago, the patient feels her physical health is the same.  Compared to one year ago, the patient feels her mental health is the same.    Reviewed chart for potential of high risk medication in the elderly: yes  Reviewed chart for potential of harmful drug interactions in the elderly:yes    Objective         Vitals:    11/24/20 1044   BP: 126/80   Pulse: 74   Resp: 16   SpO2: 97%   Weight: 84.4 kg (186 lb)   Height: 162.6 cm (64\")       Body mass index is 31.93 kg/m².  Discussed the patient's BMI with her. The BMI is above average; BMI management plan is completed.    Physical Exam          Assessment/Plan   Medicare Risks and Personalized Health Plan  CMS Preventative Services Quick Reference  Advance Directive Discussion    The above risks/problems have been discussed with the patient.  Pertinent information has been shared with the patient in the After Visit Summary.  Follow up plans and orders are seen below in the Assessment/Plan Section.    Diagnoses and all orders for this visit:    1. Essential hypertension (Primary)    2. 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 Acquired hypothyroidism    4. Malignant neoplasm of left female breast, unspecified estrogen receptor status, unspecified site of breast (CMS/HCC)      Follow Up:  No follow-ups on file.     An After Visit Summary and PPPS were given to the patient.             "

## 2020-12-14 ENCOUNTER — LAB (OUTPATIENT)
Dept: FAMILY MEDICINE CLINIC | Facility: CLINIC | Age: 75
End: 2020-12-14

## 2020-12-15 ENCOUNTER — HOSPITAL ENCOUNTER (OUTPATIENT)
Dept: ULTRASOUND IMAGING | Facility: HOSPITAL | Age: 75
Discharge: HOME | End: 2020-12-15
Admitting: NURSE PRACTITIONER

## 2020-12-15 ENCOUNTER — OFFICE VISIT (OUTPATIENT)
Dept: VASCULAR SURGERY | Facility: CLINIC | Age: 75
End: 2020-12-15

## 2020-12-15 VITALS
WEIGHT: 183 LBS | OXYGEN SATURATION: 95 % | SYSTOLIC BLOOD PRESSURE: 128 MMHG | HEIGHT: 64 IN | DIASTOLIC BLOOD PRESSURE: 58 MMHG | BODY MASS INDEX: 31.24 KG/M2 | HEART RATE: 68 BPM

## 2020-12-15 DIAGNOSIS — I10 ESSENTIAL HYPERTENSION: ICD-10-CM

## 2020-12-15 DIAGNOSIS — I65.23 BILATERAL CAROTID ARTERY STENOSIS: Primary | ICD-10-CM

## 2020-12-15 DIAGNOSIS — I65.23 BILATERAL CAROTID ARTERY STENOSIS: ICD-10-CM

## 2020-12-15 PROCEDURE — 93880 EXTRACRANIAL BILAT STUDY: CPT

## 2020-12-15 PROCEDURE — 99214 OFFICE O/P EST MOD 30 MIN: CPT | Performed by: SURGERY

## 2020-12-15 PROCEDURE — 93880 EXTRACRANIAL BILAT STUDY: CPT | Performed by: SURGERY

## 2020-12-15 NOTE — PROGRESS NOTES
"12/15/2020       Darryl Bang MD   [address] [place] [id number]    Dianne Pritchard  1945    Chief Complaint   Patient presents with   • Follow-up     6 month f/u with carotid testing.  Testing was done 12/15/20.  Pt denies any stroke like symptoms.       Dear Darryl Bang MD       HPI  I had the pleasure of seeing your patient Dianne Pritchard in the office today.  As you recall, Dianne Pritchard is a 75 y.o.  female who we are following for asymptomatic carotid occlusive disease.  She is is also followed by cardiology for paroxysmal atrial fibrillation.  She has since had a Watchman procedure.  She denies any strokelike symptoms or claudication to her lower extremities.  She continues to be maintained on aspirin.  She has a history of previous radiation to her left neck.  She did have noninvasive testing performed today, which I did review in office.         Review of Systems   Constitutional: Negative.    HENT: Negative.    Eyes: Negative.    Respiratory: Negative.    Cardiovascular: Negative.    Gastrointestinal: Negative.    Endocrine: Negative.    Genitourinary: Negative.    Musculoskeletal: Negative.    Skin: Negative.    Allergic/Immunologic: Negative.    Neurological: Negative.    Hematological: Negative.    Psychiatric/Behavioral: Negative.        /58   Pulse 68   Ht 162.6 cm (64\")   Wt 83 kg (183 lb)   SpO2 95%   BMI 31.41 kg/m²   Physical Exam  Vitals signs and nursing note reviewed.   Constitutional:       General: She is not in acute distress.     Appearance: She is well-developed. She is not diaphoretic.   HENT:      Head: Normocephalic and atraumatic.   Eyes:      General: No scleral icterus.     Pupils: Pupils are equal, round, and reactive to light.   Neck:      Musculoskeletal: Normal range of motion and neck supple.      Thyroid: No thyromegaly.      Vascular: No carotid bruit or JVD.   Cardiovascular:      Rate and Rhythm: Normal rate and regular rhythm.      " Pulses: Normal pulses.           Carotid pulses are on the left side with bruit.     Heart sounds: Normal heart sounds and S2 normal. No murmur. No friction rub. No gallop.    Pulmonary:      Effort: Pulmonary effort is normal.      Breath sounds: Normal breath sounds.   Abdominal:      General: Bowel sounds are normal.      Palpations: Abdomen is soft.   Musculoskeletal: Normal range of motion.   Skin:     General: Skin is warm and dry.   Neurological:      Mental Status: She is alert and oriented to person, place, and time.      Cranial Nerves: No cranial nerve deficit.   Psychiatric:         Behavior: Behavior normal. Behavior is cooperative.         Thought Content: Thought content normal.         Judgment: Judgment normal.          Diagnostic Data:  Us Carotid Bilateral    Result Date: 12/15/2020  Narrative: History: Carotid occlusive disease      Impression: Impression: 1. There is 50-69% stenosis of the right internal carotid artery. 2. There is less than 50% stenosis of the left internal carotid artery. 3. Antegrade flow is demonstrated in bilateral vertebral arteries.  Comments: Bilateral carotid vertebral arterial duplex scan was performed.  Grayscale imaging shows intimal thickening and calcified elements at the carotid bifurcation. The right internal carotid artery peak systolic velocity is 143.6 cm/sec. The end-diastolic velocity is 26.5 cm/sec. The right ICA/CCA ratio is approximately 0.9 . These findings correlate with 50-69% stenosis of the right internal carotid artery.  Grayscale imaging shows intimal thickening and calcified elements at the carotid bifurcation. The left internal carotid artery peak systolic velocity is 61.4 cm/sec. The end-diastolic velocity is 4.7 cm/sec. The left ICA/CCA ratio is approximately 0.3 . These findings correlate with less than 50% stenosis of the left internal carotid artery.  Antegrade flow is demonstrated in bilateral vertebral arteries. There is greater than 50%  stenosis in bilateral common carotid arteries and bilateral external carotid arteries. This report was finalized on 12/15/2020 14:39 by Dr. Babar Oviedo MD.       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)   • Acquired hypothyroidism   • Dizziness   • Multiple falls   • Bruit of left carotid artery   • Acute pain of right knee   • Right knee pain   • Status post right knee replacement   • BMI 33.0-33.9,adult   • Bilateral carotid artery disease (CMS/HCC)   • Atrial fibrillation with RVR (CMS/HCC)   • Restrictive lung disease   • A-fib (CMS/HCC)   • Presence of Watchman left atrial appendage closure device   • Malignant neoplasm of female breast (CMS/HCC)   • Status post bilateral mastectomy   • Class 1 obesity due to excess calories with serious comorbidity and body mass index (BMI) of 31.0 to 31.9 in adult         ICD-10-CM ICD-9-CM   1. Bilateral carotid artery stenosis  I65.23 433.10     433.30   2. Essential hypertension  I10 401.9       Plan: After thoroughly evaluating Dianne Pritchard, I believe the best course of action is to remain conservative from vascular surgery standpoint.  I did review her testing which shows 50 to 69% right carotid stenosis and less than 50% left carotid stenosis.  She is doing well and denies any strokelike symptoms.   Previously, her left carotid is severely narrowed even into the cavernous portion on CTA neck, and for this reason we did not recommend surgical intervention.  This was likely caused by the radiation to the left side of her neck.   We will see her back in 1 year with repeat noninvasive testing, including a carotid duplex.   I did discuss vascular risk factors as they pertain to the progression of vascular disease including controlling her hypertension and diabetes mellitus.  Her blood pressure is stable on her current medication regimen. Patient's Body mass  index is 31.41 kg/m². BMI is above normal parameters. Recommendations include: educational material.  The patient can continue taking her current medication regimen as previously planned.  This was all discussed in full with complete understanding.    Thank you for allowing me to participate in the care of your patient.  Please do not hesitate with any questions or concerns.  I will keep you aware of any further encounters with Dianne Pritchard.        Sincerely yours,         ДМИТРИЙ Jc Thomas Waldon, MD

## 2021-01-27 ENCOUNTER — LAB (OUTPATIENT)
Dept: FAMILY MEDICINE CLINIC | Facility: CLINIC | Age: 76
End: 2021-01-27

## 2021-03-02 NOTE — PROGRESS NOTES
Progress Note      Pt Name: Amber Kirby  YOB: 1945  MRN: [id number]    Date of evaluation: 3/3/2021  History Obtained From:  patient, electronic medical record    CHIEF COMPLAINT:    Chief Complaint   Patient presents with    Follow-up     Malignant neoplasm of upper-outer quadrant of left breast in female, estrogen receptor positive ([facility] [address]     Current active problems  History of breast cancer  Remote history immunoblastic lymphoma  Thrombocytopenia    HISTORY OF PRESENT ILLNESS:    Amber Kirby is a 76 y.o.  female with right stage II A breast carcinoma from 3/21/2012. She was also diagnosed with a left stage I breast carcinoma 1/29/2020 and did have a left simple mastectomy. She is on Femara 2.5 mg daily and seems to be tolerating it well. She had hot flashes initially but those are resolved. She does not have any specific bone or muscle pain related to the Femara. She does have significant osteoarthritis. She denies feeling any nodules on her chest wall. She has not contracted COVID-19. She is on the list both in [place] and at her local [place] [address] for [address] [place] [address] immunization. Her biggest issue continues to be weakness and fatigue related to her atrial fibrillation. She is on Cardizem CD and metoprolol. The rate is controlled but she feels a \"flip-flop\" frequently. She has not been hospitalized with any exacerbations of rapid ventricular response. She follows up with Dr. Alona Mays in the near future. She is diabetic and her A1c is doing fairly well at 7.2. She does have hypothyroidism and continues on Synthroid 137 mcg daily. TARGET LYMPHOMA SITES:  1. Left supraclavicular area. 2. Left axilla. 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 Debbie Phan with a left supraclavicular lymph node.  She was referred to Dr. Amie Gayle who performed a biopsy on 10/13/93 that revealed a large cell malignant immunoblastic delivered on 08/09/12. Femara is initiated on 09/06/12 with 10 years planned. TREATMENT SUMMARY:  1. Right modified radical mastectomy 03/21/12. 2. Taxotere and Cytoxan given 04/20/12 through 08/09/12 x 6 cycles. 3. Femara is initiated on 09/06/12. 3RD PRIMARY TUMOR: Left Stage I (pT1c, pN0, pMx),  grade 1, ER positive HER-2/radha negative Invasive lobular carcinoma of the breast 1/29/2020  Tram was seen in routine follow-up on 12/20/2019 and physical exam revealed a 4 x 5 cm palpable mass anteriorly in the left axillary region.     Ultrasound left breast at [facility] on 1/10/2020 revealed an oblong 4.7 x 1.3 x 5.5 cm fat-containing mass in the upper outer quadrant of the left breast felt to be stable from a prior mammogram and likely representing a hamartoma.  A 1.3 x 1 cm irregular nodularity with associated architectural distortion in the upper outer quadrant.     Spot compression mammogram 1/10/2020 revealed a developing focus of irregular nodularity measuring 1.3 x 1 cm in the upper outer quadrant of the left breast with malignant features with ultrasound-guided biopsy recommended BI-RADS 5.     The palpable abnormality on examination appears to be a benign hamartoma.  However there was an irregular focus that is suspicious in the upper outer quadrant of the left breast and she will be referred to surgery for evaluation. Germania George CA-15-3 above which is minimally elevated a 25.5 and lymphoma markers were negative.     Referral was made for her to see Dr. Nick Olguin at her 1/17/2020 clinic visit.     CA-15-3 25.5 (0-25)     US-guided biopsy of the 1.4 x 1.1 x 0.80 cm left breast mass was performed on 1/29/2020 by Dr. Nick Olguin.    Pathology revealed the following:  · Invasive lobular carcinoma, low-grade  · Tumor measures at least 9 mm in greatest linear  · Tumor invades present in multiple core biopsies  · Adjacent intraductal papilloma with apocrine metaplasia     IHC quantative breast panel is as follows:  · ER: positive 94%  · IA: negative 0.2%  · HER2: negative, Score 1+  · KI-67: low, 8%     Mammaprint on 1/29/2020 resulted a LOW RISK luminal-type (A). Pawan Mcallister has a 97.8% risk of living without distant recurrence of breast cancer at 5-years if treated with adjuvant endocrine therapy alone.     On 3/10/2020 Dr. Bam Velázquez performed the following procedures:  1. Injection of radionuclide. 2. Lymphatic mapping. 3. Left-sided pectoral block. 4. Left simple mastectomy. 5. Left sentinel lymph node biopsy.     Pathology revealed the following:  Breast, left mastectomy:  · Invasive lobular carcinoma, grade 1  · Invasive carcinoma measures 1.6 cm in greatest dimension. · Invasive carcinoma is located greater than 1.0 cm from the nearest deep surgical excision margin. · Incidental complex sclerosing lesion, margins negative. · Changes consistent with fibrocystic mastopathy involving nonneoplastic breast parenchyma  · Sections of skin, negative for evidence of malignancy. · Sections of nipple, negative for evidence of malignancy  Lymph node, left sentinel lymph node biopsy:   · 10 lymph nodes, negative for evidence of malignancy      AJCC STAGE: pT1c, pN0, pMx     Adjuvant endocrine therapy with aromatase inhibitor letrozole (Femara) 2.5 mg daily is prescribed (continued)      1st HEMATOLOGY HISTORY: Iron deficiency/ Acute GI bleed secondary to gastric AVM, 06/27/16  Mrs. Aponte presented to [facility] Emergency Department on 06/24/16 with weakness, fatigue and exertional dyspnea. She was profoundly anemic with a hemoglobin of 6.0, MCV of 84.3. WBC and platelets were normal at 7.97 and 163,000, respectively. GI evaluation was sought, with an endoscopy on 06/27/16 by Dr. Lauren Lacy remarkable for angiodysplastic lesions of the gastric body. Colonoscopy on 06/28/16 was negative. Mrs. Aponte was transfused as warranted, given parenteral iron supplementation and anticoagulation with Xarelto for paroxysmal atrial fibrillation was discontinued due UNIT/ML SOPN, Inject 80 Units into the skin daily Indications: Diabetes, Disp: , Rfl:     levothyroxine (SYNTHROID) 137 MCG tablet, Take 137 mcg by mouth Daily Indications: Underactive Thyroid, Disp: , Rfl:     dilTIAZem (CARDIZEM CD) 180 MG extended release capsule, Take 360 mg by mouth daily Indications: High Blood Pressure Disorder, Disp: , Rfl:     metoprolol tartrate (LOPRESSOR) 25 MG tablet, Take 25 mg by mouth daily Indications: High Blood Pressure Disorder, Disp: , Rfl:     aspirin EC 81 MG EC tablet, Take 1 tablet by mouth 2 times daily, Disp: 60 tablet, Rfl: 0    losartan (COZAAR) 25 MG tablet, Take 25 mg by mouth daily Indications: High Blood Pressure Disorder , Disp: , Rfl:     Multiple Vitamins-Minerals (MULTI FOR HER PO), Take 1 tablet by mouth daily , Disp: , Rfl:     vitamin D (CHOLECALCIFEROL) 1000 UNIT TABS tablet, Take 1,000 Units by mouth daily, Disp: , Rfl:      Allergies   Allergen Reactions    Hydrocodone-Acetaminophen Nausea And Vomiting    Amoxicillin Hives    Clarithromycin     Penicillins Hives    Multihance [Gadobenate] Nausea Only     NAUSEOUS AFTER MRI CONTRAST (MULTIHANCE)       Social History     Tobacco Use    Smoking status: Former Smoker     Types: Cigarettes     Quit date: 1993     Years since quittin.3    Smokeless tobacco: Never Used   Substance Use Topics    Alcohol use: No    Drug use: No       Family History   Problem Relation Age of Onset    Heart Disease Mother     Stroke Mother     Coronary Art Dis Mother     Cancer Father         Lung-Age Unknown    Cancer Paternal Aunt         Breast-Age Unknown    Lung Cancer Sister         Non-small cell lung cancer-Age Unknown    Heart Attack Brother     Cancer Brother         Venal Cell-Age Unknown    Pancreatic Cancer Child 47       Subjective   REVIEW OF SYSTEMS:   Review of Systems   Constitutional: Positive for fatigue (Mild). Negative for chills, diaphoresis, fever and unexpected weight change. HENT: Negative for mouth sores, nosebleeds, sore throat, trouble swallowing and voice change. Eyes: Negative for photophobia, discharge and itching. Respiratory: Negative for cough, shortness of breath and wheezing. Cardiovascular: Negative for chest pain, palpitations and leg swelling. Gastrointestinal: Negative for abdominal distention, abdominal pain, blood in stool, constipation, diarrhea, nausea and vomiting. Endocrine: Negative for cold intolerance, heat intolerance, polydipsia and polyuria. Genitourinary: Negative for difficulty urinating, dysuria, hematuria and urgency. Musculoskeletal: Positive for arthralgias. Negative for back pain, joint swelling and myalgias. Skin: Negative for color change and rash. Neurological: Negative for dizziness, tremors, seizures, syncope and light-headedness. Hematological: Negative for adenopathy. Does not bruise/bleed easily. Psychiatric/Behavioral: Negative for behavioral problems and suicidal ideas. The patient is not nervous/anxious. All other systems reviewed and are negative. Objective   /60   Pulse 56   Temp 96.9 °F (36.1 °C) (Temporal)   Ht 5' 4\" (1.626 m)   Wt 185 lb 4.8 oz (84.1 kg)   SpO2 97%   BMI 31.81 kg/m²     PHYSICAL EXAM:  Physical Exam  Exam conducted with a chaperone present. Constitutional:       Appearance: She is well-developed. HENT:      Head: Normocephalic and atraumatic. Eyes:      General: No scleral icterus. Conjunctiva/sclera: Conjunctivae normal.   Neck:      Musculoskeletal: Normal range of motion and neck supple. Trachea: No tracheal deviation. Cardiovascular:      Rate and Rhythm: Normal rate and regular rhythm. Heart sounds: Normal heart sounds. No murmur. Pulmonary:      Effort: Pulmonary effort is normal. No respiratory distress. Breath sounds: Normal breath sounds. Chest:      Chest wall: No mass or edema. Breasts:         Right: Absent.          Left: Absent. Abdominal:      General: Bowel sounds are normal. There is no distension. Palpations: Abdomen is soft. There is no splenomegaly. Tenderness: There is no abdominal tenderness. Musculoskeletal:         General: No tenderness. Comments: Full ROM in all 4 extremities   Lymphadenopathy:      Cervical: Cervical adenopathy present. Right cervical: Posterior cervical adenopathy (0.5 cm) present. No superficial or deep cervical adenopathy. Left cervical: No superficial, deep or posterior cervical adenopathy. Upper Body:      Right upper body: No supraclavicular or axillary adenopathy. Left upper body: No supraclavicular or axillary adenopathy. Lower Body: No right inguinal adenopathy. No left inguinal adenopathy. Skin:     General: Skin is warm and dry. Findings: No rash. Neurological:      Mental Status: She is alert and oriented to person, place, and time. Coordination: Coordination normal.   Psychiatric:         Behavior: Behavior normal.         Thought Content: Thought content normal.     CBC reviewed by me  Lab Results   Component Value Date    WBC 11.61 (H) 03/03/2021    HGB 13.4 03/03/2021    HCT 41.4 03/03/2021    MCV 92.8 03/03/2021     (L) 03/03/2021     Lab Results   Component Value Date    NEUTROABS 8.85 (H) 03/03/2021           VISIT DIAGNOSES  1. Malignant neoplasm of upper-outer quadrant of left breast in female, estrogen receptor positive ([address]    2. Malignant neoplasm of lower-outer quadrant of right breast of female, estrogen receptor positive ([address]    3. Thrombocytopenia ([address]    4. Leukocytosis, unspecified type    5. Iron deficiency anemia due to chronic blood loss    6. Long term current use of aromatase inhibitor        ASSESSMENT/PLAN:    1. Left Stage I (pT1c, pN0, pMx),  grade 1, ER positive HER-2/radha negative Invasive lobular carcinoma of the breast 1/29/2020  2. HX Stage II A right breast carcinoma.      She has had bilateral mastectomies    Serology 10/28/2020  CA 15-3 27.5 - WNL    Physical examination continues to be unrevealing. She does need a refill on her Femara and I sent a 90-day supply to her pharmacy electronically with 3 refills. Repeat CA 15-3 will be requested today. 3.  Leukocytosis. Routine follow-up CBC on 10/28/2020 revealed WBC of 12.67 with 81.2% neutrophils. She does not have any symptoms or signs of infection. BCR/ABL on peripheral blood to [facility] on 10/28/2020 was negative. CBC today reveals WBC of 11.61 with 76.3% PMN, 15% lymphocyte. We will continue to monitor her CBC. 4.  Thrombocytopenia. ,000 on 10/28/2020    Serology 10/28/2020  Folate 22  B12 799    PLT today is better at 161,000 and will continue to be monitored. 5.  Long-term use of aromatase inhibitor. Bone density is not up-to-date however she declines to have one performed. I had a discussion with her previously about getting Prolia and she declined. She is not on any bisphosphonate. She was on calcium but her calcium level had apparently risen and Dr. Delilah Vilchis has taken her off.    6.. Remote history of large B-cell immunoblastic lymphoma in 1993. She does not have any B symptoms. Serology 12/20/2019  CMP - crt 1.2 with GFR 44, alk phos normal at 102  LDH  - 345 - WNL  B2M - 3.3 (0.6-2.4)  SPEP no M spike with immunofixation negative  QI IgG 1128, IgA 401, IgM 64 all WNL  Free serum light chain kappa 49.3, K/L ratio 1.87 (0.26-1.65)    She does not have any B symptoms. She has a small 0.5 cm lymph node palpable in the right posterior upper cervical region. It is nontender. I will continue to monitor this. I will recheck labs as outlined below. 7. History of iron deficiency anemia. Hgb is 13.4 with MCV 92.8 which is stable. 8. Her colonoscopy is up-to-date performed last on 6/28/2016 with recall in 10 years. 9.  Gynecologic screening. She no longer gets Pap smears.   She denies any pelvic pain, abnormal bleeding. Orders Placed This Encounter   Procedures    Comprehensive Metabolic Panel    Lactate Dehydrogenase    Electrophoresis Protein, Serum with Reflex to Immunofixation    [place]/Lambda Free Lt Chains, Serum Quant    Beta 2 Microglobulin, Serum    Cancer Antigen 15-3        Return in about 4 months (around 7/3/2021) for With Trevor Salas.      Eric Knutson PA-C  12:02 PM  3/3/2021

## 2021-03-03 ENCOUNTER — OFFICE VISIT (OUTPATIENT)
Dept: HEMATOLOGY | Age: 76
End: 2021-03-03
Payer: MEDICARE

## 2021-03-03 ENCOUNTER — HOSPITAL ENCOUNTER (OUTPATIENT)
Dept: INFUSION THERAPY | Age: 76
Discharge: HOME | End: 2021-03-03
Payer: MEDICARE

## 2021-03-03 VITALS
SYSTOLIC BLOOD PRESSURE: 110 MMHG | HEIGHT: 64 IN | WEIGHT: 185.3 LBS | DIASTOLIC BLOOD PRESSURE: 60 MMHG | TEMPERATURE: 96.9 F | HEART RATE: 56 BPM | OXYGEN SATURATION: 97 % | BODY MASS INDEX: 31.63 KG/M2

## 2021-03-03 DIAGNOSIS — C50.412 MALIGNANT NEOPLASM OF UPPER-OUTER QUADRANT OF LEFT BREAST IN FEMALE, ESTROGEN RECEPTOR POSITIVE (HCC): Primary | ICD-10-CM

## 2021-03-03 DIAGNOSIS — Z17.0 MALIGNANT NEOPLASM OF LOWER-OUTER QUADRANT OF RIGHT BREAST OF FEMALE, ESTROGEN RECEPTOR POSITIVE (HCC): ICD-10-CM

## 2021-03-03 DIAGNOSIS — D69.6 THROMBOCYTOPENIA (HCC): ICD-10-CM

## 2021-03-03 DIAGNOSIS — D50.0 IRON DEFICIENCY ANEMIA DUE TO CHRONIC BLOOD LOSS: ICD-10-CM

## 2021-03-03 DIAGNOSIS — Z79.811 LONG TERM CURRENT USE OF AROMATASE INHIBITOR: ICD-10-CM

## 2021-03-03 DIAGNOSIS — C50.412 MALIGNANT NEOPLASM OF UPPER-OUTER QUADRANT OF LEFT BREAST IN FEMALE, ESTROGEN RECEPTOR POSITIVE (HCC): ICD-10-CM

## 2021-03-03 DIAGNOSIS — C50.511 MALIGNANT NEOPLASM OF LOWER-OUTER QUADRANT OF RIGHT BREAST OF FEMALE, ESTROGEN RECEPTOR POSITIVE (HCC): ICD-10-CM

## 2021-03-03 DIAGNOSIS — Z17.0 MALIGNANT NEOPLASM OF UPPER-OUTER QUADRANT OF LEFT BREAST IN FEMALE, ESTROGEN RECEPTOR POSITIVE (HCC): ICD-10-CM

## 2021-03-03 DIAGNOSIS — D72.829 LEUKOCYTOSIS, UNSPECIFIED TYPE: ICD-10-CM

## 2021-03-03 DIAGNOSIS — Z17.0 MALIGNANT NEOPLASM OF UPPER-OUTER QUADRANT OF LEFT BREAST IN FEMALE, ESTROGEN RECEPTOR POSITIVE (HCC): Primary | ICD-10-CM

## 2021-03-03 LAB
ALBUMIN SERPL-MCNC: 4.2 G/DL (ref 3.5–5.2)
ALP BLD-CCNC: 101 U/L (ref 35–104)
ALT SERPL-CCNC: 16 U/L (ref 9–52)
ANION GAP SERPL CALCULATED.3IONS-SCNC: 9 MMOL/L (ref 7–19)
AST SERPL-CCNC: 25 U/L (ref 14–36)
BASOPHILS ABSOLUTE: 0.06 K/UL (ref 0.01–0.08)
BASOPHILS RELATIVE PERCENT: 0.5 % (ref 0.1–1.2)
BILIRUB SERPL-MCNC: 0.5 MG/DL (ref 0.2–1.3)
BUN BLDV-MCNC: 21 MG/DL (ref 7–17)
CA 15-3: 25.1 U/ML (ref 0–35)
CALCIUM SERPL-MCNC: 10 MG/DL (ref 8.4–10.2)
CHLORIDE BLD-SCNC: 98 MMOL/L (ref 98–111)
CO2: 32 MMOL/L (ref 22–29)
CREAT SERPL-MCNC: 1.1 MG/DL (ref 0.5–1)
EOSINOPHILS ABSOLUTE: 0.19 K/UL (ref 0.04–0.54)
EOSINOPHILS RELATIVE PERCENT: 1.6 % (ref 0.7–7)
GFR NON-AFRICAN AMERICAN: 48
GLUCOSE BLD-MCNC: 168 MG/DL (ref 74–106)
HCT VFR BLD CALC: 41.4 % (ref 34.1–44.9)
HEMOGLOBIN: 13.4 G/DL (ref 11.2–15.7)
LACTATE DEHYDROGENASE: 370 U/L (ref 313–618)
LYMPHOCYTES ABSOLUTE: 1.74 K/UL (ref 1.18–3.74)
LYMPHOCYTES RELATIVE PERCENT: 15 % (ref 19.3–53.1)
MCH RBC QN AUTO: 30 PG (ref 25.6–32.2)
MCHC RBC AUTO-ENTMCNC: 32.4 G/DL (ref 32.3–35.5)
MCV RBC AUTO: 92.8 FL (ref 79.4–94.8)
MONOCYTES ABSOLUTE: 0.77 K/UL (ref 0.24–0.82)
MONOCYTES RELATIVE PERCENT: 6.6 % (ref 4.7–12.5)
NEUTROPHILS ABSOLUTE: 8.85 K/UL (ref 1.56–6.13)
NEUTROPHILS RELATIVE PERCENT: 76.3 % (ref 34–71.1)
PDW BLD-RTO: 13.6 % (ref 11.7–14.4)
PLATELET # BLD: 161 K/UL (ref 182–369)
PMV BLD AUTO: 11 FL (ref 7.4–10.4)
POTASSIUM SERPL-SCNC: 5.3 MMOL/L (ref 3.5–5.1)
RBC # BLD: 4.46 M/UL (ref 3.93–5.22)
SODIUM BLD-SCNC: 139 MMOL/L (ref 137–145)
TOTAL PROTEIN: 7.1 G/DL (ref 6.3–8.2)
WBC # BLD: 11.61 K/UL (ref 3.98–10.04)

## 2021-03-03 PROCEDURE — 80053 COMPREHEN METABOLIC PANEL: CPT

## 2021-03-03 PROCEDURE — G8427 DOCREV CUR MEDS BY ELIG CLIN: HCPCS | Performed by: PHYSICIAN ASSISTANT

## 2021-03-03 PROCEDURE — G8417 CALC BMI ABV UP PARAM F/U: HCPCS | Performed by: PHYSICIAN ASSISTANT

## 2021-03-03 PROCEDURE — 1123F ACP DISCUSS/DSCN MKR DOCD: CPT | Performed by: PHYSICIAN ASSISTANT

## 2021-03-03 PROCEDURE — 85025 COMPLETE CBC W/AUTO DIFF WBC: CPT

## 2021-03-03 PROCEDURE — 3017F COLORECTAL CA SCREEN DOC REV: CPT | Performed by: PHYSICIAN ASSISTANT

## 2021-03-03 PROCEDURE — 83615 LACTATE (LD) (LDH) ENZYME: CPT

## 2021-03-03 PROCEDURE — G8399 PT W/DXA RESULTS DOCUMENT: HCPCS | Performed by: PHYSICIAN ASSISTANT

## 2021-03-03 PROCEDURE — 1090F PRES/ABSN URINE INCON ASSESS: CPT | Performed by: PHYSICIAN ASSISTANT

## 2021-03-03 PROCEDURE — 99211 OFF/OP EST MAY X REQ PHY/QHP: CPT

## 2021-03-03 PROCEDURE — G8484 FLU IMMUNIZE NO ADMIN: HCPCS | Performed by: PHYSICIAN ASSISTANT

## 2021-03-03 PROCEDURE — 1036F TOBACCO NON-USER: CPT | Performed by: PHYSICIAN ASSISTANT

## 2021-03-03 PROCEDURE — 99214 OFFICE O/P EST MOD 30 MIN: CPT | Performed by: PHYSICIAN ASSISTANT

## 2021-03-03 PROCEDURE — 4040F PNEUMOC VAC/ADMIN/RCVD: CPT | Performed by: PHYSICIAN ASSISTANT

## 2021-03-03 PROCEDURE — 86300 IMMUNOASSAY TUMOR CA 15-3: CPT

## 2021-03-03 RX ORDER — LETROZOLE 2.5 MG/1
2.5 TABLET, FILM COATED ORAL DAILY
Qty: 90 TABLET | Refills: 3 | Status: SHIPPED | OUTPATIENT
Start: 2021-03-03 | End: 2022-04-04

## 2021-03-03 ASSESSMENT — ENCOUNTER SYMPTOMS
NAUSEA: 0
VOICE CHANGE: 0
ABDOMINAL DISTENTION: 0
EYE DISCHARGE: 0
COLOR CHANGE: 0
DIARRHEA: 0
CONSTIPATION: 0
TROUBLE SWALLOWING: 0
BLOOD IN STOOL: 0
PHOTOPHOBIA: 0
EYE ITCHING: 0
SORE THROAT: 0
WHEEZING: 0
SHORTNESS OF BREATH: 0
VOMITING: 0
BACK PAIN: 0
ABDOMINAL PAIN: 0
COUGH: 0

## 2021-03-17 ENCOUNTER — OFFICE VISIT (OUTPATIENT)
Dept: SURGERY | Age: 76
End: 2021-03-17
Payer: MEDICARE

## 2021-03-17 VITALS
SYSTOLIC BLOOD PRESSURE: 128 MMHG | WEIGHT: 185 LBS | BODY MASS INDEX: 31.58 KG/M2 | DIASTOLIC BLOOD PRESSURE: 70 MMHG | HEART RATE: 68 BPM | HEIGHT: 64 IN | TEMPERATURE: 97.3 F | OXYGEN SATURATION: 98 %

## 2021-03-17 DIAGNOSIS — Z90.12 STATUS POST LEFT MASTECTOMY: Primary | ICD-10-CM

## 2021-03-17 DIAGNOSIS — C50.412 MALIGNANT NEOPLASM OF UPPER-OUTER QUADRANT OF LEFT BREAST IN FEMALE, ESTROGEN RECEPTOR POSITIVE (HCC): ICD-10-CM

## 2021-03-17 DIAGNOSIS — Z17.0 MALIGNANT NEOPLASM OF UPPER-OUTER QUADRANT OF LEFT BREAST IN FEMALE, ESTROGEN RECEPTOR POSITIVE (HCC): ICD-10-CM

## 2021-03-17 PROCEDURE — 4040F PNEUMOC VAC/ADMIN/RCVD: CPT | Performed by: SURGERY

## 2021-03-17 PROCEDURE — 1123F ACP DISCUSS/DSCN MKR DOCD: CPT | Performed by: SURGERY

## 2021-03-17 PROCEDURE — 1036F TOBACCO NON-USER: CPT | Performed by: SURGERY

## 2021-03-17 PROCEDURE — 3017F COLORECTAL CA SCREEN DOC REV: CPT | Performed by: SURGERY

## 2021-03-17 PROCEDURE — G8484 FLU IMMUNIZE NO ADMIN: HCPCS | Performed by: SURGERY

## 2021-03-17 PROCEDURE — G8427 DOCREV CUR MEDS BY ELIG CLIN: HCPCS | Performed by: SURGERY

## 2021-03-17 PROCEDURE — 1090F PRES/ABSN URINE INCON ASSESS: CPT | Performed by: SURGERY

## 2021-03-17 PROCEDURE — G8417 CALC BMI ABV UP PARAM F/U: HCPCS | Performed by: SURGERY

## 2021-03-17 PROCEDURE — 99213 OFFICE O/P EST LOW 20 MIN: CPT | Performed by: SURGERY

## 2021-03-17 PROCEDURE — G8399 PT W/DXA RESULTS DOCUMENT: HCPCS | Performed by: SURGERY

## 2021-03-23 ENCOUNTER — OFFICE VISIT (OUTPATIENT)
Dept: FAMILY MEDICINE CLINIC | Facility: CLINIC | Age: 76
End: 2021-03-23

## 2021-03-23 VITALS
HEIGHT: 64 IN | OXYGEN SATURATION: 96 % | SYSTOLIC BLOOD PRESSURE: 130 MMHG | DIASTOLIC BLOOD PRESSURE: 76 MMHG | WEIGHT: 185 LBS | HEART RATE: 59 BPM | RESPIRATION RATE: 16 BRPM | BODY MASS INDEX: 31.58 KG/M2

## 2021-03-23 DIAGNOSIS — I10 ESSENTIAL HYPERTENSION: Primary | ICD-10-CM

## 2021-03-23 DIAGNOSIS — E11.59 TYPE 2 DIABETES MELLITUS WITH OTHER CIRCULATORY COMPLICATIONS (HCC): ICD-10-CM

## 2021-03-23 DIAGNOSIS — E03.9 ACQUIRED HYPOTHYROIDISM: ICD-10-CM

## 2021-03-23 PROCEDURE — 99213 OFFICE O/P EST LOW 20 MIN: CPT | Performed by: FAMILY MEDICINE

## 2021-03-23 NOTE — PROGRESS NOTES
"Glenn Pritchard is a 75 y.o. female.     Chief Complaint   Patient presents with   • Follow-up     4 mo   htn       History of Present Illness     she notes good bp and bs control --lucy the care of dr mcmanus      Current Outpatient Medications:   •  aspirin 81 MG chewable tablet, Chew 1 tablet Daily., Disp: , Rfl:   •  dilTIAZem CD (CARDIZEM CD) 180 MG 24 hr capsule, Take 2 capsules by mouth Daily., Disp: 180 capsule, Rfl: 3  •  Insulin Pen Needle (PEN NEEDLES) 31G X 5 MM misc, 1 each 2 (Two) Times a Day., Disp: 100 each, Rfl: 5  •  letrozole (FEMARA) 2.5 MG tablet, Take 2.5 mg by mouth Daily., Disp: , Rfl:   •  levothyroxine (SYNTHROID, LEVOTHROID) 137 MCG tablet, TAKE 1 TABLET BY MOUTH DAILY, Disp: 90 tablet, Rfl: 0  •  losartan (COZAAR) 25 MG tablet, Take 25 mg by mouth Daily., Disp: , Rfl:   •  metoprolol tartrate (LOPRESSOR) 25 MG tablet, TAKE 1 TABLET BY MOUTH EVERY 12 HOURS, Disp: 180 tablet, Rfl: 1  •  Misc. Devices (VIRAGE CUSTOM BREAST PROSTHES) misc, To be used with Mastectomy Bra-, Disp: 2 each, Rfl: 0  •  Multiple Vitamins-Minerals (CENTRUM SILVER PO), Take 1 tablet by mouth Daily., Disp: , Rfl:   •  TOUJEO SOLOSTAR 300 UNIT/ML solution pen-injector injection, INJECT 80 UNITS UNDER THE SKIN UTD QD, Disp: , Rfl: 1  Allergies   Allergen Reactions   • Amoxil [Amoxicillin] Hives   • Penicillins Hives   • Biaxin [Clarithromycin] Other (See Comments)     NOT SURE OF REACTION, \"SORE MOUTH OF DIARRHEA\"   • Lortab [Hydrocodone-Acetaminophen] GI Intolerance       Past Medical History:   Diagnosis Date   • Arrhythmia    • Atrial fibrillation, currently in sinus rhythm    • Breast cancer (CMS/HCC)     right   • Cancer (CMS/HCC)     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)   • Carotid artery occlusion    • Diabetes mellitus, type II (CMS/HCC)    • Dizziness    • Drug therapy    • Essential hypertension    • GERD (gastroesophageal reflux disease)    • GI bleed requiring more than 4 units of blood in 24 hours, ICU, or " "surgery    • History of chemotherapy    • History of lymphoma    • Hx of radiation therapy    • Hypomagnesemia    • Hypothyroidism    • On amiodarone therapy      Past Surgical History:   Procedure Laterality Date   • ATRIAL APPENDAGE EXCLUSION LEFT WITH TRANSESOPHAGEAL ECHOCARDIOGRAM Left 11/27/2018    Procedure: Atrial Appendage Occlusion;  Surgeon: Mick Orantes MD;  Location:  [facility] CATH INVASIVE LOCATION;  Service: Cardiology   • ATRIAL APPENDAGE EXCLUSION LEFT WITH TRANSESOPHAGEAL ECHOCARDIOGRAM Left 1/22/2019    Procedure: Atrial Appendage Occlusion;  Surgeon: Mick Orantes MD;  Location:  [facility] INVASIVE LOCATION;  Service: Cardiology   • BREAST BIOPSY     • MASTECTOMY     • MASTECTOMY  03/10/2020   • OTHER SURGICAL HISTORY      Mediport insertion X 2   • OTHER SURGICAL HISTORY      remote lymphoma treatment   • REPLACEMENT TOTAL KNEE Right 09/2017   • TUBAL ABDOMINAL LIGATION         Review of Systems   Constitutional: Negative.    HENT: Negative.    Eyes: Negative.    Respiratory: Negative.    Cardiovascular: Negative.    Endocrine: Negative.    Genitourinary: Negative.    Musculoskeletal: Negative.    Skin: Negative.    Allergic/Immunologic: Negative.    Neurological: Negative.    Hematological: Negative.    Psychiatric/Behavioral: Negative.        Objective  /76   Pulse 59   Resp 16   Ht 162.6 cm (64\")   Wt 83.9 kg (185 lb)   SpO2 96%   BMI 31.76 kg/m²   Physical Exam  Vitals reviewed.   Constitutional:       Appearance: Normal appearance.   HENT:      Head: Normocephalic and atraumatic.      Nose: Nose normal.      Mouth/Throat:      Mouth: Mucous membranes are moist.   Eyes:      Extraocular Movements: Extraocular movements intact.      Pupils: Pupils are equal, round, and reactive to light.   Cardiovascular:      Rate and Rhythm: Normal rate and regular rhythm.      Pulses: Normal pulses.      Heart sounds: Normal heart sounds.   Pulmonary:      Effort: Pulmonary effort is normal.    "   Breath sounds: Normal breath sounds.   Abdominal:      General: Abdomen is flat.      Palpations: Abdomen is soft.   Musculoskeletal:         General: Normal range of motion.      Cervical back: Normal range of motion and neck supple.   Skin:     General: Skin is warm and dry.      Capillary Refill: Capillary refill takes less than 2 seconds.   Neurological:      General: No focal deficit present.      Mental Status: She is alert and oriented to person, place, and time.   Psychiatric:         Mood and Affect: Mood normal.         Behavior: Behavior normal.         Thought Content: Thought content normal.         Assessment/Plan   Diagnoses and all orders for this visit:    1. Essential hypertension (Primary)    2. 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 Acquired hypothyroidism    she will monitor bp and bs at home and keep appt with dr mcmanus    She will get labs in july       No orders of the defined types were placed in this encounter.      Follow up: 6 month(s)

## 2021-05-10 RX ORDER — DILTIAZEM HYDROCHLORIDE 180 MG/1
360 CAPSULE, COATED, EXTENDED RELEASE ORAL DAILY
Qty: 180 CAPSULE | Refills: 3 | Status: SHIPPED | OUTPATIENT
Start: 2021-05-10 | End: 2021-08-11

## 2021-05-18 ENCOUNTER — OFFICE VISIT (OUTPATIENT)
Dept: CARDIOLOGY | Facility: CLINIC | Age: 76
End: 2021-05-18

## 2021-05-18 VITALS
SYSTOLIC BLOOD PRESSURE: 140 MMHG | WEIGHT: 185 LBS | OXYGEN SATURATION: 98 % | HEIGHT: 64 IN | HEART RATE: 55 BPM | BODY MASS INDEX: 31.58 KG/M2 | DIASTOLIC BLOOD PRESSURE: 56 MMHG

## 2021-05-18 DIAGNOSIS — E03.9 ACQUIRED HYPOTHYROIDISM: ICD-10-CM

## 2021-05-18 DIAGNOSIS — I65.23 BILATERAL CAROTID ARTERY STENOSIS: ICD-10-CM

## 2021-05-18 DIAGNOSIS — E11.59 TYPE 2 DIABETES MELLITUS WITH OTHER CIRCULATORY COMPLICATIONS (HCC): ICD-10-CM

## 2021-05-18 DIAGNOSIS — I10 ESSENTIAL HYPERTENSION: ICD-10-CM

## 2021-05-18 DIAGNOSIS — Z95.818 PRESENCE OF WATCHMAN LEFT ATRIAL APPENDAGE CLOSURE DEVICE: ICD-10-CM

## 2021-05-18 DIAGNOSIS — I48.21 PERMANENT ATRIAL FIBRILLATION (HCC): Primary | ICD-10-CM

## 2021-05-18 PROBLEM — I48.91 ATRIAL FIBRILLATION WITH RVR (HCC): Status: RESOLVED | Noted: 2018-09-14 | Resolved: 2021-05-18

## 2021-05-18 PROCEDURE — 99214 OFFICE O/P EST MOD 30 MIN: CPT | Performed by: NURSE PRACTITIONER

## 2021-05-18 PROCEDURE — 93000 ELECTROCARDIOGRAM COMPLETE: CPT | Performed by: NURSE PRACTITIONER

## 2021-05-18 RX ORDER — METOPROLOL SUCCINATE 25 MG/1
25 TABLET, EXTENDED RELEASE ORAL DAILY
Qty: 30 TABLET | Refills: 11 | Status: SHIPPED | OUTPATIENT
Start: 2021-05-18 | End: 2022-05-12

## 2021-05-18 NOTE — PROGRESS NOTES
"    Subjective:     Encounter Date:05/18/2021      Patient ID: Dianne Pritchard is a 75 y.o. female with a history of permanent afib s/p watchman implant, carotid stenosis, HTN, DM2, bilateral breast cancer s/p mastectomy, lymphoma and obesity.    Chief Complaint: follow up  Atrial Fibrillation  Presents for follow-up visit. Symptoms include shortness of breath. Symptoms are negative for chest pain, dizziness, palpitations, syncope and weakness. The symptoms have been stable. Past medical history includes atrial fibrillation.   Hypertension  This is a chronic problem. The current episode started more than 1 year ago. The problem has been rapidly improving since onset. The problem is controlled. Associated symptoms include shortness of breath. Pertinent negatives include no chest pain, malaise/fatigue, orthopnea, palpitations or PND.     Patient presents today for a routine follow up. Patient has a history of permanent afib s/p watchman implant in 1/22/2019. She reports she has been well. Her last appointment was conducted via telephone, last year due to the current COVID 19 pandemic. She was noted to be off her ASA at that time and advised to restart it due to presence of watchman. Today she reports she has been off of it for some time now due to her history of GI bleeding. She denies having bleeding while on ASA. She has chronic dyspnea with exertion that is unchanged from previous. She notes occasional left ankle swelling that is unchanged. She denies chest pain, palpitations, orthopnea and PND.      The following portions of the patient's history were reviewed and updated as appropriate: allergies, current medications, past family history, past medical history, past social history, past surgical history and problem list.    Allergies   Allergen Reactions   • Amoxil [Amoxicillin] Hives   • Penicillins Hives   • Biaxin [Clarithromycin] Other (See Comments)     NOT SURE OF REACTION, \"SORE MOUTH OF DIARRHEA\"   • Lortab " [Hydrocodone-Acetaminophen] GI Intolerance       Current Outpatient Medications:   •  dilTIAZem CD (CARDIZEM CD) 180 MG 24 hr capsule, TAKE 2 CAPSULES BY MOUTH DAILY, Disp: 180 capsule, Rfl: 3  •  Insulin Pen Needle (PEN NEEDLES) 31G X 5 MM misc, 1 each 2 (Two) Times a Day., Disp: 100 each, Rfl: 5  •  letrozole (FEMARA) 2.5 MG tablet, Take 2.5 mg by mouth Daily., Disp: , Rfl:   •  levothyroxine (SYNTHROID, LEVOTHROID) 137 MCG tablet, TAKE 1 TABLET BY MOUTH DAILY, Disp: 90 tablet, Rfl: 0  •  losartan (COZAAR) 25 MG tablet, Take 25 mg by mouth Daily., Disp: , Rfl:   •  Misc. Devices (VIRAGE CUSTOM BREAST PROSTHES) misc, To be used with Mastectomy Bra-, Disp: 2 each, Rfl: 0  •  Multiple Vitamins-Minerals (CENTRUM SILVER PO), Take 1 tablet by mouth Daily., Disp: , Rfl:   •  TOUJEO SOLOSTAR 300 UNIT/ML solution pen-injector injection, INJECT 80 UNITS UNDER THE SKIN UTD QD, Disp: , Rfl: 1  •  aspirin 81 MG chewable tablet, Chew 1 tablet Daily., Disp: , Rfl:   •  metoprolol succinate XL (TOPROL-XL) 25 MG 24 hr tablet, Take 1 tablet by mouth Daily., Disp: 30 tablet, Rfl: 11  Past Medical History:   Diagnosis Date   • Arrhythmia    • Atrial fibrillation, currently in sinus rhythm    • Breast cancer (CMS/HCC)     right   • Cancer (CMS/HCC)     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)   • Carotid artery occlusion    • Diabetes mellitus, type II (CMS/HCC)    • Dizziness    • Drug therapy    • Essential hypertension    • GERD (gastroesophageal reflux disease)    • GI bleed requiring more than 4 units of blood in 24 hours, ICU, or surgery    • History of chemotherapy    • History of lymphoma    • Hx of radiation therapy    • Hypomagnesemia    • Hypothyroidism    • On amiodarone therapy        Social History     Socioeconomic History   • Marital status:      Spouse name: Not on file   • Number of children: Not on file   • Years of education: Not on file   • Highest education level: Not on file   Tobacco Use   • Smoking status: Former  Smoker     Years: 20.00     Types: Cigarettes     Quit date:      Years since quittin.3   • Smokeless tobacco: Never Used   Substance and Sexual Activity   • Alcohol use: No   • Drug use: No   • Sexual activity: Defer       Review of Systems   Constitutional: Negative for malaise/fatigue, weight gain and weight loss.   Cardiovascular: Negative for chest pain, dyspnea on exertion, irregular heartbeat, leg swelling, near-syncope, orthopnea, palpitations, paroxysmal nocturnal dyspnea and syncope.   Respiratory: Positive for shortness of breath. Negative for cough, sleep disturbances due to breathing, sputum production and wheezing.    Skin: Negative for dry skin, flushing, itching and rash.   Gastrointestinal: Negative for hematemesis and hematochezia.   Neurological: Negative for dizziness, light-headedness, loss of balance and weakness.   All other systems reviewed and are negative.         Objective:     Vitals reviewed.   Constitutional:       General: Not in acute distress.     Appearance: Well-developed. Not diaphoretic.   Eyes:      General: No scleral icterus.     Conjunctiva/sclera: Conjunctivae normal.      Pupils: Pupils are equal, round, and reactive to light.   HENT:      Head: Normocephalic.    Mouth/Throat:      Pharynx: No oropharyngeal exudate.   Pulmonary:      Effort: Pulmonary effort is normal. No respiratory distress.      Breath sounds: Normal breath sounds. No wheezing. No rales.   Chest:      Chest wall: Not tender to palpatation.   Cardiovascular:      Normal rate. Irregularly irregular rhythm.   Pulses:     Intact distal pulses.   Edema:     Peripheral edema absent.   Abdominal:      General: Bowel sounds are normal. There is no distension.      Palpations: Abdomen is soft.      Tenderness: There is no abdominal tenderness.   Musculoskeletal: Normal range of motion.      Cervical back: Normal range of motion and neck supple. Skin:     General: Skin is warm and dry.      Coloration:  "Skin is not pale.      Findings: No erythema or rash.   Neurological:      Mental Status: Alert and oriented to person, place, and time.      Deep Tendon Reflexes: Reflexes are normal and symmetric.   Psychiatric:         Behavior: Behavior normal.             ECG 12 Lead    Date/Time: 5/18/2021 11:51 AM  Performed by: Esperanza Valles APRN  Authorized by: Esperanza Valles APRN   Comparison: compared with previous ECG from 5/29/2019  Similar to previous ECG  Rhythm: atrial fibrillation  Rate: bradycardic  BPM: 55  Conduction: conduction normal  QRS axis: normal    Clinical impression: abnormal EKG          /56   Pulse 55   Ht 162.6 cm (64\")   Wt 83.9 kg (185 lb)   SpO2 98%   BMI 31.76 kg/m²     Lab Review:   I have reviewed previous office notes, recent labs and recent cardiac testing.     Lab Results   Component Value Date    CHLPL 168 10/13/2017    TRIG 199 (H) 10/13/2017    HDL 65 10/13/2017    LDL 63 10/13/2017     Results for orders placed during the hospital encounter of 06/20/19    Adult Transthoracic Echo Complete W/ Cont if Necessary Per Protocol    Interpretation Summary  · Left ventricular wall thickness is consistent with borderline concentric hypertrophy.  · Estimated EF = 70%.  · Left ventricular systolic function is normal.  · Left ventricular diastolic dysfunction.  · Left atrial cavity size is mildly dilated.  · Mild mitral valve stenosis is present          Assessment:          Diagnosis Plan   1. Permanent atrial fibrillation (CMS/HCC)     2. Presence of Watchman left atrial appendage closure device     3. Essential hypertension     4. Bilateral carotid artery stenosis     5. Acquired hypothyroidism     6. Type 2 diabetes mellitus with other circulatory complications (CMS/HCC)            Plan:       1. Permanent afib- rates controlled. Currently taking lopressor 25mg daily. She is unsure why and for how long she has been doing this. Will change to Toprol 25mg daily. Also educated her " that she must remain on ASA for life due to presence of watchman device.   2. Presence of watchman- 21mm SETH watchman occluder placed in 1/22/2019. Will discuss with Dr. Orantes about possible imagining needed to evaluate the watchman device due to being off ASA for well over 1 year.   3. HTN- controlled. Followed by PCP   4. Carotid stenosis- followed by vascular  5. Hypothyroidism- followed by PCP  6. DM2- followed by PCP.     Follow up in 1 year or sooner if symptoms worsen.     I spent 30 minutes caring for Dianne on this date of service. This time includes time spent by me in the following activities:preparing for the visit, reviewing tests, obtaining and/or reviewing a separately obtained history, performing a medically appropriate examination and/or evaluation , documenting information in the medical record, independently interpreting results and communicating that information with the patient/family/caregiver and care coordination

## 2021-06-30 ENCOUNTER — DOCUMENTATION (OUTPATIENT)
Dept: PHYSICAL THERAPY | Facility: CLINIC | Age: 76
End: 2021-06-30

## 2021-06-30 DIAGNOSIS — C50.912 MALIGNANT NEOPLASM OF LEFT FEMALE BREAST, UNSPECIFIED ESTROGEN RECEPTOR STATUS, UNSPECIFIED SITE OF BREAST (HCC): ICD-10-CM

## 2021-06-30 DIAGNOSIS — Z90.13 STATUS POST BILATERAL MASTECTOMY: ICD-10-CM

## 2021-06-30 DIAGNOSIS — I97.2 POST-MASTECTOMY LYMPHEDEMA SYNDROME: Primary | ICD-10-CM

## 2021-06-30 DIAGNOSIS — Z92.3 HISTORY OF RADIATION THERAPY: ICD-10-CM

## 2021-06-30 NOTE — PROGRESS NOTES
Physical Therapy Therapy Discharge Summary    Patient: Dianne Pritchard                                                                                     Today's Date: 2021  :     1945    Date of Initial Visit:          No linked episodes    Patient seen for Visit count could not be calculated. Make sure you are using a visit which is associated with an episode. sessions    Visit Diagnoses:    ICD-10-CM ICD-9-CM   1. Post-mastectomy lymphedema syndrome  I97.2 457.0   2. Status post bilateral mastectomy  Z90.13 V45.71   3. History of radiation therapy  Z92.3 V15.3   4. Malignant neoplasm of left female breast, unspecified estrogen receptor status, unspecified site of breast (CMS/[facility])  C50.912 174.9       GOALS:  Goals                                                                             STG by:  Comments Date Status   Patient will be independent with remedial HEP for lymphedema.  21 Met   Patient will have a good basic understanding of lymphedema including risk reduction practices and elevated infection risk  21 Met   Patient will be independent with self MLD with assist of family as needed.  21 Met   Patient will be able to apply compression bandages with assist of family.   21 Met   LTG by:       Patient will have compression garment for abdominal edema.    21 Not Met   Patient will have well fitting compression garments for BUEs.    21 Met   Patient will obtain a Flexitouch lymphedema pump and be independent with its use for maintenance.     21 Met   She will be independent and comfortable with maintenance program for lymphedema after DC.   21 Met                     DISCHARGE SUMMARY   Discharge date    Dates of this episode 20  through 21   Number of visits on this episode 14   Reason for discharge maximum functional potential achieved   Outcomes achieved Refer to the goals table for specifics on goals   Discharge instructions Patient to  work on home maintenance program   Discharge plan Continue with current home exercise program as instructed   Summary of care Patient will work on home maintenance program as well as using the Flexitouch pump, the only goal she did not meet was having a compression garment for the trunk, all other goals met.

## 2021-07-12 DIAGNOSIS — C50.412 MALIGNANT NEOPLASM OF UPPER-OUTER QUADRANT OF LEFT BREAST IN FEMALE, ESTROGEN RECEPTOR POSITIVE (HCC): Primary | ICD-10-CM

## 2021-07-12 DIAGNOSIS — Z17.0 MALIGNANT NEOPLASM OF UPPER-OUTER QUADRANT OF LEFT BREAST IN FEMALE, ESTROGEN RECEPTOR POSITIVE (HCC): Primary | ICD-10-CM

## 2021-07-13 NOTE — PROGRESS NOTES
Progress Note      Pt Name: Tong Smith  YOB: 1945  MRN: [id number]    Date of evaluation: 7/14/2021  History Obtained From:  patient, electronic medical record    CHIEF COMPLAINT:    Chief Complaint   Patient presents with    Follow-up     Malignant neoplasm of upper-outer quadrant of left breast in female, estrogen receptor positive ([address]     Current active problems  History of breast cancer  Remote history immunoblastic lymphoma  Thrombocytopenia    HISTORY OF PRESENT ILLNESS:    Tong Smith is a 68 y.o.  female with right stage II A breast carcinoma from 3/21/2012. She was also diagnosed with a left stage I breast carcinoma 1/29/2020 and did have a left simple mastectomy. She is currently continuing her aromatase inhibitorFemara 2.5 daily. She denies any muscle, bone pain with the Femara. She has not had any significant hot flashes. She denies feeling any new nodules on her chest wall or in her axillary regions. She does have significant osteoarthritis which basically is stable. She does have fatigue issues related to her atrial fibrillation. She continues on Cardizem CD and metoprolol. She denies being hospitalized for exacerbation of A. fib recently. She is followed by Dr. Salvador Lawrence. She is diabetic and her A1c was 7.2this is to be rechecked very soon. She does have hypothyroidism and is on Synthroid and 37 mcg daily. TARGET LYMPHOMA SITES:  1. Left supraclavicular area. 2. Left axilla. 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 Giovana Wong with a left supraclavicular lymph node. She was referred to Dr. Dora Marshall who performed a biopsy on 10/13/93 that revealed a large cell malignant immunoblastic lymphoma, CD20 was 53% positive  A bone marrow biopsy and aspirate on 11/1/1993 was negative for any evidence of malignant lymphoma. She received 6 cycles of chemotherapy with CHOP/Bleomycin.  The last 3 cycles were done without Vincristine due to peripheral neuropathy problems. Following the chemotherapy she received XRT to the neck and upper chest due to the stage II-A presentation in the left supraclavicular area and left axilla. She received a total of 3,960cGy under the direction of Dr. Shaun Russell that was completed on 6/22/94. TREATMENT SUMMARY:  1. CHOP/ Bleomycin x 6.   2. Mantle radiation therapy. 2ND PRIMARY TUMOR: Right stage II (pT2 N1 M0) infiltrating high grade breast cancer 03/21/12  Tram had an abnormal mammogram at [facility] on 02/22/12 revealing a 1.6 cm worrisome mass at the 7:00 position in the right lower quadrant of the right breast. On 03/21/12 Dr. Mark Martinez took Tram to the OR and performed an intraoperative biopsy with frozen section that revealed an infiltrating breast cancer. The procedure then followed immediately with a right modified radical mastectomy with pathology revealing an invasive ductal carcinoma, high grade, grade III 3.3 cm tumor. Nineteen right axillary lymph nodes were submitted, one was positive for metastatic carcinoma. This places Tram in a stage II (pT2 N1 M0) breast cancer. The ER is positive, WI is also positive, Her2 Scottie by IHC is negative and Her2 Scottie by FISH is unamplified. 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 In addition to that she had mantle radiation therapy. Although the 2D echo shows an EF of 60%, I discussed with Tram the concerns of anthracycline based therapy and cardiac disease and she agrees and desires to receive adjuvant therapy without an anthracycline. Taxotere and Cytoxan x 6 cycles are recommended and initiated on 04/20/12. The last cycle of chemotherapy was delivered on 08/09/12. Femara is initiated on 09/06/12 with 10 years planned. TREATMENT SUMMARY:  1. Right modified radical mastectomy 03/21/12.    2. Taxotere and Cytoxan given 04/20/12 through 08/09/12 x 6 cycles. 3. Femara is initiated on 09/06/12. 3RD PRIMARY TUMOR: Left Stage I (pT1c, pN0, pMx),  grade 1, ER positive HER-2/radha negative Invasive lobular carcinoma of the breast 1/29/2020  Tram was seen in routine follow-up on 12/20/2019 and physical exam revealed a 4 x 5 cm palpable mass anteriorly in the left axillary region.     Ultrasound left breast at [facility] on 1/10/2020 revealed an oblong 4.7 x 1.3 x 5.5 cm fat-containing mass in the upper outer quadrant of the left breast felt to be stable from a prior mammogram and likely representing a hamartoma.  A 1.3 x 1 cm irregular nodularity with associated architectural distortion in the upper outer quadrant.     Spot compression mammogram 1/10/2020 revealed a developing focus of irregular nodularity measuring 1.3 x 1 cm in the upper outer quadrant of the left breast with malignant features with ultrasound-guided biopsy recommended BI-RADS 5.     The palpable abnormality on examination appears to be a benign hamartoma.  However there was an irregular focus that is suspicious in the upper outer quadrant of the left breast and she will be referred to surgery for evaluation. Francia Alexandre CA-15-3 above which is minimally elevated a 25.5 and lymphoma markers were negative.     Referral was made for her to see Dr. Jael Luz at her 1/17/2020 clinic visit.     CA-15-3 25.5 (0-25)     US-guided biopsy of the 1.4 x 1.1 x 0.80 cm left breast mass was performed on 1/29/2020 by Dr. Jael Luz. Pathology revealed the following:  · Invasive lobular carcinoma, low-grade  · Tumor measures at least 9 mm in greatest linear  · Tumor invades present in multiple core biopsies  · Adjacent intraductal papilloma with apocrine metaplasia     IHC quantative breast panel is as follows:  · ER: positive 94%  · TX: negative 0.2%  · HER2: negative, Score 1+  · KI-67: low, 8%     Mammaprint on 1/29/2020 resulted a LOW RISK luminal-type (A).  Veronica Mayfield has a 97.8% risk of living without distant recurrence of breast cancer at 5-years if treated with adjuvant endocrine therapy alone.     On 3/10/2020 Dr. Melba Angulo performed the following procedures:  1. Injection of radionuclide. 2. Lymphatic mapping. 3. Left-sided pectoral block. 4. Left simple mastectomy. 5. Left sentinel lymph node biopsy.     Pathology revealed the following:  Breast, left mastectomy:  · Invasive lobular carcinoma, grade 1  · Invasive carcinoma measures 1.6 cm in greatest dimension. · Invasive carcinoma is located greater than 1.0 cm from the nearest deep surgical excision margin. · Incidental complex sclerosing lesion, margins negative. · Changes consistent with fibrocystic mastopathy involving nonneoplastic breast parenchyma  · Sections of skin, negative for evidence of malignancy. · Sections of nipple, negative for evidence of malignancy  Lymph node, left sentinel lymph node biopsy:   · 10 lymph nodes, negative for evidence of malignancy      AJCC STAGE: pT1c, pN0, pMx     Adjuvant endocrine therapy with aromatase inhibitor letrozole (Femara) 2.5 mg daily is prescribed (continued)      1st HEMATOLOGY HISTORY: Iron deficiency/ Acute GI bleed secondary to gastric AVM, 06/27/16  Mrs. Aponte presented to [facility] on 06/24/16 with weakness, fatigue and exertional dyspnea. She was profoundly anemic with a hemoglobin of 6.0, MCV of 84.3. WBC and platelets were normal at 7.97 and 163,000, respectively. GI evaluation was sought, with an endoscopy on 06/27/16 by Dr. Mike Wilkerson remarkable for angiodysplastic lesions of the gastric body. Colonoscopy on 06/28/16 was negative. Mrs. Aponte was transfused as warranted, given parenteral iron supplementation and anticoagulation with Xarelto for paroxysmal atrial fibrillation was discontinued due to UGI bleed with AVM. 2nd HEMATOLOGY HISTORY: Thrombocytopenia  Tram fam had a chronically low platelet count between 120 and 150.   DEONDRE - negative  Antiplatelet antibody - negative  SPEP - negative  Hepatitis A, B, C - negative    Serology 10/28/2020  Folate = 22  B12 = 799        Past Medical History:   Diagnosis Date    Arteriovenous malformation, other site     Atrial fibrillation ([facility] 04/28/2016    sees dr. Sulma Regalado [facility])     breast; surgery and chemo x 2 and radiation    COPD (chronic obstructive pulmonary disease) [facility]     Diabetes mellitus ([facility]     H/O screening mammography 07/08/2019    Caitlin Saavedra H/O: GI bleed 06/24/2016 2016 while on xarelto for a.fib; no longer on any anti-coags.  Xarelto discontinued     Hypertension     Hypothyroidism     Iron deficiency anemia     Kidney disease     sees dr. Jael Kapadia [facility]) Sasha Paiz    sees dr. Anu Pro Osteopenia of the elderly     Osteoporosis     Post-menopausal     [facility]     Thyroid disease         Past Surgical History:   Procedure Laterality Date    BREAST SURGERY      right mastectomy; no sticks/bp right arm    CARDIAC SURGERY      \"watchman's device\" per dr. Rishi Pugh COLONOSCOPY  06/28/2016    per  recall 10 years     ENDOSCOPY, COLON, DIAGNOSTIC      IVC FILTER INSERTION      JOINT REPLACEMENT      LYMPH NODE BIOPSY Left     Supraclavicular     MASTECTOMY Left 3/10/2020    FLAPS WITH LEFT MASTECTOMY, SENTINEL NODE BIOPSY, PEC BLOCK performed by Abhijit Farias MD at [address] [place] [address] Right 9/7/2017    KNEE TOTAL ARTHROPLASTY performed by Krissy Smith MD at [place] [address] TUNNELED VENOUS PORT PLACEMENT      x2 and removed           Current Outpatient Medications:     letrozole (FEMARA) 2.5 MG tablet, Take 1 tablet by mouth daily, Disp: 90 tablet, Rfl: 3    Insulin Glargine, 2 Unit Dial, (TOUJEO MAX SOLOSTAR) 300 UNIT/ML SOPN, Inject 80 Units into the skin daily Indications: Diabetes, Disp: , Rfl:     levothyroxine (SYNTHROID) 137 MCG tablet, Take 137 mcg by mouth Daily Indications: Underactive Thyroid, Disp: , Rfl:   dilTIAZem (CARDIZEM CD) 180 MG extended release capsule, Take 360 mg by mouth daily Indications: High Blood Pressure Disorder, Disp: , Rfl:     metoprolol tartrate (LOPRESSOR) 25 MG tablet, Take 25 mg by mouth daily Indications: High Blood Pressure Disorder, Disp: , Rfl:     aspirin EC 81 MG EC tablet, Take 1 tablet by mouth 2 times daily, Disp: 60 tablet, Rfl: 0    losartan (COZAAR) 25 MG tablet, Take 25 mg by mouth daily Indications: High Blood Pressure Disorder , Disp: , Rfl:     Multiple Vitamins-Minerals (MULTI FOR HER PO), Take 1 tablet by mouth daily , Disp: , Rfl:     vitamin D (CHOLECALCIFEROL) 1000 UNIT TABS tablet, Take 1,000 Units by mouth daily, Disp: , Rfl:      Allergies   Allergen Reactions    Hydrocodone-Acetaminophen Nausea And Vomiting    Amoxicillin Hives    Clarithromycin     Penicillins Hives    Multihance [Gadobenate] Nausea Only     NAUSEOUS AFTER MRI CONTRAST (MULTIHANCE)       Social History     Tobacco Use    Smoking status: Former Smoker     Types: Cigarettes     Quit date: 1993     Years since quittin.7    Smokeless tobacco: Never Used   Vaping Use    Vaping Use: Never used   Substance Use Topics    Alcohol use: No    Drug use: No       Family History   Problem Relation Age of Onset    Heart Disease Mother     Stroke Mother     Coronary Art Dis Mother     Cancer Father         Lung-Age Unknown    Cancer Paternal Aunt         Breast-Age Unknown    Lung Cancer Sister         Non-small cell lung cancer-Age Unknown    Heart Attack Brother     Cancer Brother         Venal Cell-Age Unknown    Pancreatic Cancer Child 47       Subjective   REVIEW OF SYSTEMS:   Review of Systems   Constitutional: Positive for fatigue (Mild). Negative for chills, diaphoresis, fever and unexpected weight change. HENT: Negative for mouth sores, nosebleeds, sore throat, trouble swallowing and voice change. Eyes: Negative for photophobia, discharge and itching. Respiratory: Negative for cough, shortness of breath and wheezing. Cardiovascular: Negative for chest pain, palpitations and leg swelling. Gastrointestinal: Negative for abdominal distention, abdominal pain, blood in stool, constipation, diarrhea, nausea and vomiting. Endocrine: Negative for cold intolerance, heat intolerance, polydipsia and polyuria. Genitourinary: Negative for difficulty urinating, dysuria, hematuria and urgency. Musculoskeletal: Positive for arthralgias. Negative for back pain, joint swelling and myalgias. Skin: Negative for color change and rash. Neurological: Negative for dizziness, tremors, seizures, syncope and light-headedness. Hematological: Negative for adenopathy. Does not bruise/bleed easily. Psychiatric/Behavioral: Negative for behavioral problems and suicidal ideas. The patient is not nervous/anxious. All other systems reviewed and are negative. Objective   /62   Pulse 81   Ht 5' 4\" (1.626 m)   Wt 186 lb 3.2 oz (84.5 kg)   SpO2 95%   BMI 31.96 kg/m²     PHYSICAL EXAM:  Physical Exam  Exam conducted with a chaperone present. Constitutional:       Appearance: She is well-developed. HENT:      Head: Normocephalic and atraumatic. Eyes:      General: No scleral icterus. Conjunctiva/sclera: Conjunctivae normal.   Neck:      Trachea: No tracheal deviation. Cardiovascular:      Rate and Rhythm: Normal rate and regular rhythm. Heart sounds: Normal heart sounds. No murmur heard. Pulmonary:      Effort: Pulmonary effort is normal. No respiratory distress. Breath sounds: Normal breath sounds. Chest:      Chest wall: No mass or edema. Breasts:         Right: Absent. Left: Absent. Abdominal:      General: Bowel sounds are normal. There is no distension. Palpations: Abdomen is soft. There is no splenomegaly. Tenderness: There is no abdominal tenderness. Musculoskeletal:         General: No tenderness. Cervical back: Normal range of motion and neck supple. Comments: Full ROM in all 4 extremities   Lymphadenopathy:      Cervical: No cervical adenopathy. Right cervical: No posterior cervical adenopathy. Upper Body:      Right upper body: No supraclavicular or axillary adenopathy. Left upper body: No supraclavicular or axillary adenopathy. Lower Body: No right inguinal adenopathy. No left inguinal adenopathy. Skin:     General: Skin is warm and dry. Findings: No rash. Neurological:      Mental Status: She is alert and oriented to person, place, and time. Coordination: Coordination normal.   Psychiatric:         Behavior: Behavior normal.         Thought Content: Thought content normal.          CBC reviewed by me  Lab Results   Component Value Date    WBC 9.07 07/14/2021    HGB 13.8 07/14/2021    HCT 41.9 07/14/2021    MCV 91.7 07/14/2021     (L) 07/14/2021     Lab Results   Component Value Date    NEUTROABS 7.26 (H) 07/14/2021         VISIT DIAGNOSES  1. Malignant neoplasm of upper-outer quadrant of left breast in female, estrogen receptor positive ([address]    2. Malignant neoplasm of lower-outer quadrant of right breast of female, estrogen receptor positive ([facility])    3. Leukocytosis, unspecified type    4. Thrombocytopenia ([address]    5. Long term current use of aromatase inhibitor    6. Iron deficiency anemia due to chronic blood loss        ASSESSMENT/PLAN:    1. Left Stage I (pT1c, pN0, pMx),  grade 1, ER positive HER-2/radha negative Invasive lobular carcinoma of the breast 1/29/2020  2. HX Stage II A right breast carcinoma. She has had bilateral mastectomies        Physical examination continues to be unrevealing. She continues on Femara. Repeat CA 15-3 will be requested today. 3.  Leukocytosis. BCR/ABL on peripheral blood to [facility] on 10/28/2020 was negative. WBC today is actually normal at 9.07.   She continues to have a mild elevation of neutrophils. 4.  Thrombocytopenia. Serology 10/28/2020  Folate = 22  B12 = 799        Persistent mild thrombocytopenia with ,000 today, which is stable. 5.  Long-term use of aromatase inhibitor. Bone density is not up-to-date however she declines to have one performed. I had a discussion with her previously about getting Prolia and she declined. She is not on any bisphosphonate. I will defer to her PCP Dr. Alex Navarro. 6.  Remote history of large B-cell immunoblastic lymphoma in 1993. She does not have any B symptoms. Serology 3/3/2021  CMP - crt 1.1 with GFR 48, Ca 10,  alk phos normal at 101  LDH  - 370 - WNL  B2M - 3.3 (0.6-2.4)  SPEP no M spike with immunofixation negative  QI IgG 1077, IgA 401, IgM 56 all WNL  Free serum light chains - kappa 37.5, lambda 26.7 with K/L ratio 1.40 (WNL)    She does not have any B symptoms. No specific cervical adenopathy palpable today. No peripheral lymphadenopathy whatsoever, no splenomegaly. 7. History of iron deficiency anemia. Hgb 13.8 with MCV 91.7. · Colon cancer screening. Her colonoscopy is up-to-date performed last on 6/28/2016 with recall in 10 years. · Gynecologic screening. She no longer gets Pap smears. She denies any pelvic pain, abnormal bleeding. Immunization History   Administered Date(s) Administered    COVID-19, Moderna, PF, 100mcg/0.5mL 03/10/2021, 04/08/2021     She has not required COVID-19. Orders Placed This Encounter   Procedures    Cancer Antigen 15-3        Return in about 4 months (around 11/14/2021) for With Kenzie Bates.      Albaro Leach PA-C  11:20 AM  7/14/2021

## 2021-07-14 ENCOUNTER — HOSPITAL ENCOUNTER (OUTPATIENT)
Dept: INFUSION THERAPY | Age: 76
Discharge: HOME | End: 2021-07-14
Payer: MEDICARE

## 2021-07-14 ENCOUNTER — OFFICE VISIT (OUTPATIENT)
Dept: HEMATOLOGY | Age: 76
End: 2021-07-14
Payer: MEDICARE

## 2021-07-14 VITALS
DIASTOLIC BLOOD PRESSURE: 62 MMHG | HEART RATE: 81 BPM | SYSTOLIC BLOOD PRESSURE: 136 MMHG | BODY MASS INDEX: 31.79 KG/M2 | WEIGHT: 186.2 LBS | OXYGEN SATURATION: 95 % | HEIGHT: 64 IN

## 2021-07-14 DIAGNOSIS — Z17.0 MALIGNANT NEOPLASM OF LOWER-OUTER QUADRANT OF RIGHT BREAST OF FEMALE, ESTROGEN RECEPTOR POSITIVE (HCC): ICD-10-CM

## 2021-07-14 DIAGNOSIS — D50.0 IRON DEFICIENCY ANEMIA DUE TO CHRONIC BLOOD LOSS: ICD-10-CM

## 2021-07-14 DIAGNOSIS — D69.6 THROMBOCYTOPENIA (HCC): ICD-10-CM

## 2021-07-14 DIAGNOSIS — Z17.0 MALIGNANT NEOPLASM OF UPPER-OUTER QUADRANT OF LEFT BREAST IN FEMALE, ESTROGEN RECEPTOR POSITIVE (HCC): Primary | ICD-10-CM

## 2021-07-14 DIAGNOSIS — Z17.0 MALIGNANT NEOPLASM OF UPPER-OUTER QUADRANT OF LEFT BREAST IN FEMALE, ESTROGEN RECEPTOR POSITIVE (HCC): ICD-10-CM

## 2021-07-14 DIAGNOSIS — D72.829 LEUKOCYTOSIS, UNSPECIFIED TYPE: ICD-10-CM

## 2021-07-14 DIAGNOSIS — C50.412 MALIGNANT NEOPLASM OF UPPER-OUTER QUADRANT OF LEFT BREAST IN FEMALE, ESTROGEN RECEPTOR POSITIVE (HCC): ICD-10-CM

## 2021-07-14 DIAGNOSIS — Z79.811 LONG TERM CURRENT USE OF AROMATASE INHIBITOR: ICD-10-CM

## 2021-07-14 DIAGNOSIS — C50.511 MALIGNANT NEOPLASM OF LOWER-OUTER QUADRANT OF RIGHT BREAST OF FEMALE, ESTROGEN RECEPTOR POSITIVE (HCC): ICD-10-CM

## 2021-07-14 DIAGNOSIS — C50.412 MALIGNANT NEOPLASM OF UPPER-OUTER QUADRANT OF LEFT BREAST IN FEMALE, ESTROGEN RECEPTOR POSITIVE (HCC): Primary | ICD-10-CM

## 2021-07-14 LAB
BASOPHILS ABSOLUTE: 0.03 K/UL (ref 0.01–0.08)
BASOPHILS RELATIVE PERCENT: 0.3 % (ref 0.1–1.2)
CA 15-3: 23.5 U/ML (ref 0–35)
EOSINOPHILS ABSOLUTE: 0.15 K/UL (ref 0.04–0.54)
EOSINOPHILS RELATIVE PERCENT: 1.7 % (ref 0.7–7)
HCT VFR BLD CALC: 41.9 % (ref 34.1–44.9)
HEMOGLOBIN: 13.8 G/DL (ref 11.2–15.7)
LYMPHOCYTES ABSOLUTE: 1.11 K/UL (ref 1.18–3.74)
LYMPHOCYTES RELATIVE PERCENT: 12.2 % (ref 19.3–53.1)
MCH RBC QN AUTO: 30.2 PG (ref 25.6–32.2)
MCHC RBC AUTO-ENTMCNC: 32.9 G/DL (ref 32.3–35.5)
MCV RBC AUTO: 91.7 FL (ref 79.4–94.8)
MONOCYTES ABSOLUTE: 0.52 K/UL (ref 0.24–0.82)
MONOCYTES RELATIVE PERCENT: 5.7 % (ref 4.7–12.5)
NEUTROPHILS ABSOLUTE: 7.26 K/UL (ref 1.56–6.13)
NEUTROPHILS RELATIVE PERCENT: 80.1 % (ref 34–71.1)
PDW BLD-RTO: 13.4 % (ref 11.7–14.4)
PLATELET # BLD: 133 K/UL (ref 182–369)
PMV BLD AUTO: 11.4 FL (ref 7.4–10.4)
RBC # BLD: 4.57 M/UL (ref 3.93–5.22)
WBC # BLD: 9.07 K/UL (ref 3.98–10.04)

## 2021-07-14 PROCEDURE — 4040F PNEUMOC VAC/ADMIN/RCVD: CPT | Performed by: PHYSICIAN ASSISTANT

## 2021-07-14 PROCEDURE — 1090F PRES/ABSN URINE INCON ASSESS: CPT | Performed by: PHYSICIAN ASSISTANT

## 2021-07-14 PROCEDURE — 85025 COMPLETE CBC W/AUTO DIFF WBC: CPT

## 2021-07-14 PROCEDURE — 1123F ACP DISCUSS/DSCN MKR DOCD: CPT | Performed by: PHYSICIAN ASSISTANT

## 2021-07-14 PROCEDURE — 1036F TOBACCO NON-USER: CPT | Performed by: PHYSICIAN ASSISTANT

## 2021-07-14 PROCEDURE — 86300 IMMUNOASSAY TUMOR CA 15-3: CPT

## 2021-07-14 PROCEDURE — 99212 OFFICE O/P EST SF 10 MIN: CPT

## 2021-07-14 PROCEDURE — G8417 CALC BMI ABV UP PARAM F/U: HCPCS | Performed by: PHYSICIAN ASSISTANT

## 2021-07-14 PROCEDURE — 36415 COLL VENOUS BLD VENIPUNCTURE: CPT

## 2021-07-14 PROCEDURE — G8399 PT W/DXA RESULTS DOCUMENT: HCPCS | Performed by: PHYSICIAN ASSISTANT

## 2021-07-14 PROCEDURE — 99213 OFFICE O/P EST LOW 20 MIN: CPT | Performed by: PHYSICIAN ASSISTANT

## 2021-07-14 PROCEDURE — G8428 CUR MEDS NOT DOCUMENT: HCPCS | Performed by: PHYSICIAN ASSISTANT

## 2021-07-14 ASSESSMENT — ENCOUNTER SYMPTOMS
EYE ITCHING: 0
VOMITING: 0
ABDOMINAL DISTENTION: 0
COLOR CHANGE: 0
EYE DISCHARGE: 0
CONSTIPATION: 0
SHORTNESS OF BREATH: 0
TROUBLE SWALLOWING: 0
BLOOD IN STOOL: 0
BACK PAIN: 0
WHEEZING: 0
PHOTOPHOBIA: 0
ABDOMINAL PAIN: 0
NAUSEA: 0
DIARRHEA: 0
VOICE CHANGE: 0
SORE THROAT: 0
COUGH: 0

## 2021-07-31 ENCOUNTER — TRANSCRIBE ORDERS (OUTPATIENT)
Dept: ADMINISTRATIVE | Facility: HOSPITAL | Age: 76
End: 2021-07-31

## 2021-08-04 ENCOUNTER — TRANSCRIBE ORDERS (OUTPATIENT)
Dept: ADMINISTRATIVE | Facility: HOSPITAL | Age: 76
End: 2021-08-04

## 2021-08-04 DIAGNOSIS — I10 ESSENTIAL HYPERTENSION: ICD-10-CM

## 2021-08-04 DIAGNOSIS — E04.1 THYROID NODULE: Primary | ICD-10-CM

## 2021-08-04 DIAGNOSIS — E11.65 TYPE II DIABETES MELLITUS WITH HYPEROSMOLARITY, UNCONTROLLED (HCC): ICD-10-CM

## 2021-08-04 DIAGNOSIS — E03.9 HYPOTHYROIDISM, UNSPECIFIED TYPE: ICD-10-CM

## 2021-08-04 DIAGNOSIS — E55.9 VITAMIN D DEFICIENCY: ICD-10-CM

## 2021-08-04 DIAGNOSIS — E11.00 TYPE II DIABETES MELLITUS WITH HYPEROSMOLARITY, UNCONTROLLED (HCC): ICD-10-CM

## 2021-08-11 RX ORDER — DILTIAZEM HYDROCHLORIDE 180 MG/1
CAPSULE, COATED, EXTENDED RELEASE ORAL
Qty: 180 CAPSULE | Refills: 3 | Status: SHIPPED | OUTPATIENT
Start: 2021-08-11 | End: 2022-05-12

## 2021-09-23 ENCOUNTER — OFFICE VISIT (OUTPATIENT)
Dept: FAMILY MEDICINE CLINIC | Facility: CLINIC | Age: 76
End: 2021-09-23

## 2021-09-23 VITALS
HEIGHT: 64 IN | WEIGHT: 186 LBS | OXYGEN SATURATION: 97 % | BODY MASS INDEX: 31.76 KG/M2 | DIASTOLIC BLOOD PRESSURE: 67 MMHG | RESPIRATION RATE: 16 BRPM | HEART RATE: 52 BPM | SYSTOLIC BLOOD PRESSURE: 136 MMHG

## 2021-09-23 DIAGNOSIS — L98.9 SKIN LESION: Primary | ICD-10-CM

## 2021-09-23 DIAGNOSIS — I10 ESSENTIAL HYPERTENSION: ICD-10-CM

## 2021-09-23 DIAGNOSIS — E03.9 ACQUIRED HYPOTHYROIDISM: ICD-10-CM

## 2021-09-23 DIAGNOSIS — E11.59 TYPE 2 DIABETES MELLITUS WITH OTHER CIRCULATORY COMPLICATIONS (HCC): ICD-10-CM

## 2021-09-23 PROCEDURE — 99214 OFFICE O/P EST MOD 30 MIN: CPT | Performed by: FAMILY MEDICINE

## 2021-09-23 NOTE — PROGRESS NOTES
"Glenn Pritchard is a 76 y.o. female.     Chief Complaint   Patient presents with   • Hypertension     6 mo f/u      History of Present Illness     she notes good bp control without cp ro ha--he thinks bs witout hypoglyam---he is tolerna stain witout myalgia s..--he denies any heat or cold intoaneras  She notes a skin lesion onher scalp for several mos    Current Outpatient Medications:   •  aspirin 81 MG chewable tablet, Chew 1 tablet Daily., Disp: , Rfl:   •  dilTIAZem CD (CARDIZEM CD) 180 MG 24 hr capsule, TAKE 2 CAPSULES BY MOUTH EVERY DAY, Disp: 180 capsule, Rfl: 3  •  Insulin Pen Needle (PEN NEEDLES) 31G X 5 MM misc, 1 each 2 (Two) Times a Day., Disp: 100 each, Rfl: 5  •  letrozole (FEMARA) 2.5 MG tablet, Take 2.5 mg by mouth Daily., Disp: , Rfl:   •  levothyroxine (SYNTHROID, LEVOTHROID) 137 MCG tablet, TAKE 1 TABLET BY MOUTH DAILY, Disp: 90 tablet, Rfl: 0  •  losartan (COZAAR) 25 MG tablet, Take 25 mg by mouth Daily., Disp: , Rfl:   •  metoprolol succinate XL (TOPROL-XL) 25 MG 24 hr tablet, Take 1 tablet by mouth Daily., Disp: 30 tablet, Rfl: 11  •  Misc. Devices (VIRAGE CUSTOM BREAST PROSTHES) misc, To be used with Mastectomy Bra-, Disp: 2 each, Rfl: 0  •  Multiple Vitamins-Minerals (CENTRUM SILVER PO), Take 1 tablet by mouth Daily., Disp: , Rfl:   •  TOUJEO SOLOSTAR 300 UNIT/ML solution pen-injector injection, INJECT 80 UNITS UNDER THE SKIN UTD QD, Disp: , Rfl: 1  Allergies   Allergen Reactions   • Amoxil [Amoxicillin] Hives   • Penicillins Hives   • Biaxin [Clarithromycin] Other (See Comments)     NOT SURE OF REACTION, \"SORE MOUTH OF DIARRHEA\"   • Lortab [Hydrocodone-Acetaminophen] GI Intolerance       Past Medical History:   Diagnosis Date   • Arrhythmia    • Atrial fibrillation, currently in sinus rhythm    • Breast cancer (CMS/HCC)     right   • Cancer (CMS/HCC)     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)   • Carotid artery occlusion    • Diabetes mellitus, type II (CMS/HCC)    • Dizziness    • Drug therapy " "   • Essential hypertension    • GERD (gastroesophageal reflux disease)    • GI bleed requiring more than 4 units of blood in 24 hours, ICU, or surgery    • History of chemotherapy    • History of lymphoma    • Hx of radiation therapy    • Hypomagnesemia    • Hypothyroidism    • On amiodarone therapy      Past Surgical History:   Procedure Laterality Date   • ATRIAL APPENDAGE EXCLUSION LEFT WITH TRANSESOPHAGEAL ECHOCARDIOGRAM Left 11/27/2018    Procedure: Atrial Appendage Occlusion;  Surgeon: Mick Orantes MD;  Location:  [facility];  Service: Cardiology   • ATRIAL APPENDAGE EXCLUSION LEFT WITH TRANSESOPHAGEAL ECHOCARDIOGRAM Left 1/22/2019    Procedure: Atrial Appendage Occlusion;  Surgeon: Mick Orantes MD;  Location:  [facility];  Service: Cardiology   • BREAST BIOPSY     • MASTECTOMY     • MASTECTOMY  03/10/2020   • OTHER SURGICAL HISTORY      Mediport insertion X 2   • OTHER SURGICAL HISTORY      remote lymphoma treatment   • REPLACEMENT TOTAL KNEE Right 09/2017   • TUBAL ABDOMINAL LIGATION         Review of Systems   Constitutional: Negative.    HENT: Negative.    Eyes: Negative.    Respiratory: Negative.    Cardiovascular: Negative.    Gastrointestinal: Negative.    Endocrine: Negative.    Genitourinary: Negative.    Musculoskeletal: Negative.    Skin: Negative.    Allergic/Immunologic: Negative.    Neurological: Negative.    Hematological: Negative.    Psychiatric/Behavioral: Negative.        Objective  /67   Pulse 52   Resp 16   Ht 162.6 cm (64\")   Wt 84.4 kg (186 lb)   SpO2 97%   BMI 31.93 kg/m²   Physical Exam  Vitals and nursing note reviewed.   Constitutional:       Appearance: Normal appearance. She is normal weight.   HENT:      Head: Normocephalic and atraumatic.      Nose: Nose normal.      Mouth/Throat:      Mouth: Mucous membranes are moist.   Eyes:      Extraocular Movements: Extraocular movements intact.      Pupils: Pupils are equal, round, and " reactive to light.   Cardiovascular:      Rate and Rhythm: Normal rate and regular rhythm.      Pulses: Normal pulses.      Heart sounds: Normal heart sounds.   Pulmonary:      Effort: Pulmonary effort is normal.      Breath sounds: Normal breath sounds.   Abdominal:      General: Abdomen is flat. Bowel sounds are normal.      Palpations: Abdomen is soft.   Musculoskeletal:         General: Normal range of motion.      Cervical back: Normal range of motion and neck supple.   Skin:     General: Skin is warm and dry.      Capillary Refill: Capillary refill takes less than 2 seconds.   Neurological:      General: No focal deficit present.      Mental Status: She is alert and oriented to person, place, and time. Mental status is at baseline.   Psychiatric:         Mood and Affect: Mood normal.         Behavior: Behavior normal.         Thought Content: Thought content normal.         Judgment: Judgment normal.         Assessment/Plan   Diagnoses and all orders for this visit:    1. Skin lesion (Primary)  -     Ambulatory Referral to Dermatology    2. Essential hypertension  -     CBC & Differential  -     Comprehensive metabolic panel  -     Lipid Panel With / Chol / HDL Ratio  -     Hemoglobin A1c  -     Hepatitis C Antibody  -     MicroAlbumin, Urine, Random - Urine, Clean Catch  -     TSH    3. Type 2 diabetes mellitus with other circulatory complications (CMS/HCC)  -     CBC & Differential  -     Comprehensive metabolic panel  -     Lipid Panel With / Chol / HDL Ratio  -     Hemoglobin A1c  -     Hepatitis C Antibody  -     MicroAlbumin, Urine, Random - Urine, Clean Catch  -     TSH    4. Acquired hypothyroidism  -     CBC & Differential  -     Comprehensive metabolic panel  -     Lipid Panel With / Chol / HDL Ratio  -     Hemoglobin A1c  -     Hepatitis C Antibody  -     MicroAlbumin, Urine, Random - Urine, Clean Catch  -     TSH      She will montor bp and bs and keep me informd  She will montito for heat cold  intolaneer  She will keep appt with dr mcmanus         Orders Placed This Encounter   Procedures   • Comprehensive metabolic panel     Order Specific Question:   Release to patient     Answer:   Immediate   • Lipid Panel With / Chol / HDL Ratio     Order Specific Question:   Release to patient     Answer:   Immediate   • Hemoglobin A1c     Order Specific Question:   Release to patient     Answer:   Immediate   • Hepatitis C Antibody     Order Specific Question:   Release to patient     Answer:   Immediate   • MicroAlbumin, Urine, Random - Urine, Clean Catch     Order Specific Question:   Release to patient     Answer:   Immediate   • TSH     Order Specific Question:   Release to patient     Answer:   Immediate   • Ambulatory Referral to Dermatology     Referral Priority:   Routine     Referral Type:   Consultation     Referral Reason:   Specialty Services Required     Referred to Provider:   Rohit Cabrales MD     Requested Specialty:   Dermatology     Number of Visits Requested:   1   • CBC & Differential       Follow up: 6 month(s)

## 2021-09-24 LAB
ALBUMIN SERPL-MCNC: 4.7 G/DL (ref 3.5–5.2)
ALBUMIN/GLOB SERPL: 1.7 G/DL
ALP SERPL-CCNC: 122 U/L (ref 39–117)
ALT SERPL-CCNC: 16 U/L (ref 1–33)
AST SERPL-CCNC: 25 U/L (ref 1–32)
BASOPHILS # BLD AUTO: 0.06 10*3/MM3 (ref 0–0.2)
BASOPHILS NFR BLD AUTO: 0.5 % (ref 0–1.5)
BILIRUB SERPL-MCNC: 0.5 MG/DL (ref 0–1.2)
BUN SERPL-MCNC: 15 MG/DL (ref 8–23)
BUN/CREAT SERPL: 14.4 (ref 7–25)
CALCIUM SERPL-MCNC: 10.5 MG/DL (ref 8.6–10.5)
CHLORIDE SERPL-SCNC: 96 MMOL/L (ref 98–107)
CHOLEST SERPL-MCNC: 197 MG/DL (ref 0–200)
CHOLEST/HDLC SERPL: 4.19 {RATIO}
CO2 SERPL-SCNC: 31.2 MMOL/L (ref 22–29)
CREAT SERPL-MCNC: 1.04 MG/DL (ref 0.57–1)
EOSINOPHIL # BLD AUTO: 0.15 10*3/MM3 (ref 0–0.4)
EOSINOPHIL NFR BLD AUTO: 1.3 % (ref 0.3–6.2)
ERYTHROCYTE [DISTWIDTH] IN BLOOD BY AUTOMATED COUNT: 13.2 % (ref 12.3–15.4)
GLOBULIN SER CALC-MCNC: 2.8 GM/DL
GLUCOSE SERPL-MCNC: 131 MG/DL (ref 65–99)
HBA1C MFR BLD: 9.13 % (ref 4.8–5.6)
HCT VFR BLD AUTO: 42.1 % (ref 34–46.6)
HCV AB S/CO SERPL IA: 0.2 S/CO RATIO (ref 0–0.9)
HDLC SERPL-MCNC: 47 MG/DL (ref 40–60)
HGB BLD-MCNC: 13.8 G/DL (ref 12–15.9)
IMM GRANULOCYTES # BLD AUTO: 0.03 10*3/MM3 (ref 0–0.05)
IMM GRANULOCYTES NFR BLD AUTO: 0.3 % (ref 0–0.5)
LDLC SERPL CALC-MCNC: 115 MG/DL (ref 0–100)
LYMPHOCYTES # BLD AUTO: 1.44 10*3/MM3 (ref 0.7–3.1)
LYMPHOCYTES NFR BLD AUTO: 12.9 % (ref 19.6–45.3)
MCH RBC QN AUTO: 29.6 PG (ref 26.6–33)
MCHC RBC AUTO-ENTMCNC: 32.8 G/DL (ref 31.5–35.7)
MCV RBC AUTO: 90.1 FL (ref 79–97)
MICROALBUMIN UR-MCNC: 370.2 UG/ML
MONOCYTES # BLD AUTO: 0.61 10*3/MM3 (ref 0.1–0.9)
MONOCYTES NFR BLD AUTO: 5.5 % (ref 5–12)
NEUTROPHILS # BLD AUTO: 8.88 10*3/MM3 (ref 1.7–7)
NEUTROPHILS NFR BLD AUTO: 79.5 % (ref 42.7–76)
NRBC BLD AUTO-RTO: 0 /100 WBC (ref 0–0.2)
PLATELET # BLD AUTO: 173 10*3/MM3 (ref 140–450)
POTASSIUM SERPL-SCNC: 4.7 MMOL/L (ref 3.5–5.2)
PROT SERPL-MCNC: 7.5 G/DL (ref 6–8.5)
RBC # BLD AUTO: 4.67 10*6/MM3 (ref 3.77–5.28)
SODIUM SERPL-SCNC: 137 MMOL/L (ref 136–145)
TRIGL SERPL-MCNC: 201 MG/DL (ref 0–150)
TSH SERPL DL<=0.005 MIU/L-ACNC: 0.57 UIU/ML (ref 0.27–4.2)
VLDLC SERPL CALC-MCNC: 35 MG/DL (ref 5–40)
WBC # BLD AUTO: 11.17 10*3/MM3 (ref 3.4–10.8)

## 2021-09-27 ENCOUNTER — LAB (OUTPATIENT)
Dept: FAMILY MEDICINE CLINIC | Facility: CLINIC | Age: 76
End: 2021-09-27

## 2021-09-27 DIAGNOSIS — R30.0 DYSURIA: Primary | ICD-10-CM

## 2021-09-27 RX ORDER — NITROFURANTOIN 25; 75 MG/1; MG/1
100 CAPSULE ORAL 2 TIMES DAILY
Qty: 14 CAPSULE | Refills: 0 | Status: SHIPPED | OUTPATIENT
Start: 2021-09-27 | End: 2021-10-04

## 2021-09-30 LAB
APPEARANCE UR: ABNORMAL
BACTERIA #/AREA URNS HPF: ABNORMAL /HPF
BACTERIA UR CULT: ABNORMAL
BACTERIA UR CULT: ABNORMAL
BILIRUB UR QL STRIP: NEGATIVE
CASTS URNS MICRO: ABNORMAL
COLOR UR: ABNORMAL
EPI CELLS #/AREA URNS HPF: ABNORMAL /HPF
GLUCOSE UR QL: NEGATIVE
HGB UR QL STRIP: ABNORMAL
KETONES UR QL STRIP: NEGATIVE
LEUKOCYTE ESTERASE UR QL STRIP: ABNORMAL
NITRITE UR QL STRIP: NEGATIVE
OTHER ANTIBIOTIC SUSC ISLT: ABNORMAL
PH UR STRIP: 6.5 [PH] (ref 5–8)
PROT UR QL STRIP: ABNORMAL
RBC #/AREA URNS HPF: ABNORMAL /HPF
SP GR UR: 1.02 (ref 1–1.03)
UROBILINOGEN UR STRIP-MCNC: ABNORMAL MG/DL
WBC #/AREA URNS HPF: ABNORMAL /HPF

## 2021-10-04 ENCOUNTER — HOSPITAL ENCOUNTER (OUTPATIENT)
Dept: ULTRASOUND IMAGING | Facility: HOSPITAL | Age: 76
Discharge: HOME | End: 2021-10-04
Admitting: INTERNAL MEDICINE

## 2021-10-04 DIAGNOSIS — E04.1 THYROID NODULE: ICD-10-CM

## 2021-10-04 DIAGNOSIS — E11.65 TYPE II DIABETES MELLITUS WITH HYPEROSMOLARITY, UNCONTROLLED (HCC): ICD-10-CM

## 2021-10-04 DIAGNOSIS — I10 ESSENTIAL HYPERTENSION: ICD-10-CM

## 2021-10-04 DIAGNOSIS — E11.00 TYPE II DIABETES MELLITUS WITH HYPEROSMOLARITY, UNCONTROLLED (HCC): ICD-10-CM

## 2021-10-04 DIAGNOSIS — E03.9 HYPOTHYROIDISM, UNSPECIFIED TYPE: ICD-10-CM

## 2021-10-04 DIAGNOSIS — E55.9 VITAMIN D DEFICIENCY: ICD-10-CM

## 2021-10-04 PROCEDURE — 76536 US EXAM OF HEAD AND NECK: CPT

## 2021-10-07 ENCOUNTER — TELEPHONE (OUTPATIENT)
Dept: FAMILY MEDICINE CLINIC | Facility: CLINIC | Age: 76
End: 2021-10-07

## 2021-10-07 DIAGNOSIS — R50.9 FEVER, UNSPECIFIED FEVER CAUSE: Primary | ICD-10-CM

## 2021-10-08 ENCOUNTER — TELEPHONE (OUTPATIENT)
Dept: SURGERY | Age: 76
End: 2021-10-08

## 2021-11-29 ENCOUNTER — TELEPHONE (OUTPATIENT)
Dept: VASCULAR SURGERY | Facility: CLINIC | Age: 76
End: 2021-11-29

## 2021-11-29 NOTE — TELEPHONE ENCOUNTER
Called pt and left a VM with a reminder of appts tomorrow [facility] for 1:00 testing and follow-up with Dr Oviedo at 2:00.

## 2021-11-30 ENCOUNTER — OFFICE VISIT (OUTPATIENT)
Dept: VASCULAR SURGERY | Facility: CLINIC | Age: 76
End: 2021-11-30

## 2021-11-30 ENCOUNTER — HOSPITAL ENCOUNTER (OUTPATIENT)
Dept: ULTRASOUND IMAGING | Facility: HOSPITAL | Age: 76
Discharge: HOME | End: 2021-11-30
Admitting: NURSE PRACTITIONER

## 2021-11-30 VITALS
HEIGHT: 64 IN | OXYGEN SATURATION: 96 % | HEART RATE: 87 BPM | BODY MASS INDEX: 32.1 KG/M2 | DIASTOLIC BLOOD PRESSURE: 82 MMHG | RESPIRATION RATE: 18 BRPM | SYSTOLIC BLOOD PRESSURE: 130 MMHG | WEIGHT: 188 LBS

## 2021-11-30 DIAGNOSIS — I65.23 BILATERAL CAROTID ARTERY STENOSIS: ICD-10-CM

## 2021-11-30 DIAGNOSIS — I65.23 BILATERAL CAROTID ARTERY STENOSIS: Primary | ICD-10-CM

## 2021-11-30 DIAGNOSIS — I10 ESSENTIAL HYPERTENSION: ICD-10-CM

## 2021-11-30 PROBLEM — H35.372 PUCKERING OF MACULA, LEFT EYE: Status: ACTIVE | Noted: 2019-05-07

## 2021-11-30 PROBLEM — H26.499 AFTER CATARACT NOT OBSCURING VISION: Status: ACTIVE | Noted: 2019-05-07

## 2021-11-30 PROBLEM — Z96.1 PSEUDOPHAKIA: Status: ACTIVE | Noted: 2019-05-07

## 2021-11-30 PROBLEM — H43.819 VITREOUS DEGENERATION: Status: ACTIVE | Noted: 2019-05-07

## 2021-11-30 PROBLEM — E11.3299 NONPROLIFERATIVE DIABETIC RETINOPATHY (HCC): Status: ACTIVE | Noted: 2019-05-07

## 2021-11-30 PROCEDURE — 99214 OFFICE O/P EST MOD 30 MIN: CPT | Performed by: SURGERY

## 2021-11-30 PROCEDURE — 93880 EXTRACRANIAL BILAT STUDY: CPT

## 2021-11-30 PROCEDURE — 93880 EXTRACRANIAL BILAT STUDY: CPT | Performed by: SURGERY

## 2021-11-30 NOTE — PROGRESS NOTES
"11/30/2021       Darryl Bang MD   [address] [place] [id number]    Dianne Pritchard  1945    Chief Complaint   Patient presents with   • Follow-up     1 year follow-up with US for bilateral carotid artery stenosis.  Patient denies any stroke like symptoms.   • Former smoker     Pt verified former smoker.       Dear Darryl Bang MD       HPI  I had the pleasure of seeing your patient Dianne Pritchard in the office today.  As you recall, Dianne Pritchard is a 76 y.o.  female who we are following for asymptomatic carotid occlusive disease.  She is is also followed by cardiology for paroxysmal atrial fibrillation.  She has since had a Watchman procedure.  She denies any strokelike symptoms or claudication to her lower extremities.  She continues to be maintained on aspirin.  She has a history of previous radiation to her left neck.  She did have noninvasive testing performed today, which I did review in office.         Review of Systems   Constitutional: Negative.    HENT: Negative.    Eyes: Negative.    Respiratory: Negative.    Cardiovascular: Negative.    Gastrointestinal: Negative.    Endocrine: Negative.    Genitourinary: Negative.    Musculoskeletal: Negative.    Skin: Negative.    Allergic/Immunologic: Negative.    Neurological: Negative.    Hematological: Negative.    Psychiatric/Behavioral: Negative.        /82 (BP Location: Left arm, Patient Position: Sitting, Cuff Size: Adult)   Pulse 87   Resp 18   Ht 162.6 cm (64\")   Wt 85.3 kg (188 lb)   SpO2 96%   BMI 32.27 kg/m²   Physical Exam  Vitals and nursing note reviewed.   Constitutional:       General: She is not in acute distress.     Appearance: Normal appearance. She is well-developed. She is obese. She is not diaphoretic.   HENT:      Head: Normocephalic and atraumatic.   Eyes:      General: No scleral icterus.     Pupils: Pupils are equal, round, and reactive to light.   Neck:      Thyroid: No thyromegaly.      Vascular: No " carotid bruit or JVD.   Cardiovascular:      Rate and Rhythm: Normal rate and regular rhythm.      Pulses: Normal pulses.           Carotid pulses are on the left side with bruit.     Heart sounds: Normal heart sounds and S2 normal. No murmur heard.  No friction rub. No gallop.    Pulmonary:      Effort: Pulmonary effort is normal.      Breath sounds: Normal breath sounds.   Abdominal:      General: Bowel sounds are normal.      Palpations: Abdomen is soft.   Musculoskeletal:         General: Normal range of motion.      Cervical back: Normal range of motion and neck supple.   Skin:     General: Skin is warm and dry.   Neurological:      General: No focal deficit present.      Mental Status: She is alert and oriented to person, place, and time.      Cranial Nerves: No cranial nerve deficit.   Psychiatric:         Mood and Affect: Mood normal.         Behavior: Behavior normal. Behavior is cooperative.         Thought Content: Thought content normal.         Judgment: Judgment normal.          Diagnostic Data:  US Carotid Bilateral    Result Date: 11/30/2021  Narrative: History: Carotid occlusive disease      Impression: Impression: 1. There is 50-69% stenosis of the right internal carotid artery. 2. There is less than 50% stenosis of the left internal carotid artery. This is unchanged from previous exam. The left internal carotid artery is diminutive all the way to the skull base. 3. Antegrade flow is demonstrated in bilateral vertebral arteries.  Comments: Bilateral carotid vertebral arterial duplex scan was performed.  Grayscale imaging shows intimal thickening and calcified elements at the carotid bifurcation. The right internal carotid artery peak systolic velocity is 192.2 cm/sec. The end-diastolic velocity is 37.9 cm/sec. The right ICA/CCA ratio is approximately 1.3 . These findings correlate with 50-69% stenosis of the right internal carotid artery.  Grayscale imaging shows intimal thickening and calcified  elements at the carotid bifurcation. The left internal carotid artery peak systolic velocity is 40.1 cm/sec. The end-diastolic velocity is 6.4 cm/sec. The left ICA/CCA ratio is approximately 0.2 . These findings correlate with less than 50% stenosis of the left internal carotid artery.  Antegrade flow is demonstrated in bilateral vertebral arteries. There is greater than 50% stenosis in bilateral common carotid arteries and bilateral external carotid arteries. This report was finalized on 11/30/2021 15:06 by Dr. Babar Oviedo MD.       Patient Active Problem List   Diagnosis   • Tremor   • Essential hypertension   • Type 2 diabetes mellitus with other circulatory complications (HCC)   • Acquired hypothyroidism   • Dizziness   • Multiple falls   • Bruit of left carotid artery   • Acute pain of right knee   • Right knee pain   • Status post right knee replacement   • BMI 33.0-33.9,adult   • Bilateral carotid artery disease (HCC)   • Restrictive lung disease   • A-fib (HCC)   • Presence of Watchman left atrial appendage closure device   • Malignant neoplasm of female breast (HCC)   • Status post bilateral mastectomy   • Class 1 obesity due to excess calories with serious comorbidity and body mass index (BMI) of 31.0 to 31.9 in adult   • After cataract not obscuring vision   • Nonproliferative diabetic retinopathy (HCC)   • Pseudophakia   • Puckering of macula, left eye   • Vitreous degeneration         ICD-10-CM ICD-9-CM   1. Bilateral carotid artery stenosis  I65.23 433.10     433.30   2. Essential hypertension  I10 401.9       Plan: After thoroughly evaluating Dianne Pritchard, I believe the best course of action is to remain conservative from vascular surgery standpoint.  I did review her testing which shows 50 to 69% right carotid stenosis and less than since.  Her testing is unchanged from previous exam and on previous CTA of the neck her left carotid is diminutive all the way into the skull base and therefore not  a surgical candidate.  This was likely caused by the radiation to the left side of her neck.  I will see her back in 1 year noninvasive testing for continued surveillance, including a carotid duplex.  I did discuss vascular risk factors as they pertain to the progression of vascular disease including controlling her hypertension and diabetes mellitus.  This risk factors currently stable.  The patient can continue taking her current medication regimen as previously planned.  This was all discussed in full with complete understanding.    Thank you for allowing me to participate in the care of your patient.  Please do not hesitate with any questions or concerns.  I will keep you aware of any further encounters with Dianne Pritchard.        Sincerely yours,         Babar Oviedo, Darryl Saavedra MD       Scribed for Dr. Babar Oviedo by Kalina CHOE

## 2022-01-13 NOTE — PROGRESS NOTES
"Glenn Pritchard is a 73 y.o. female.     Chief Complaint   Patient presents with   • Follow-up     3 mo   htn       History of Present Illness     she had watchman for afib---denies any cp or garcia--she is seeing dr mcmanus for htn and dm...toleearting synthroid wituout heat or cold intolerances      Current Outpatient Medications:   •  aspirin 81 MG chewable tablet, Chew 1 tablet Daily., Disp: , Rfl:   •  Calcium Carbonate-Vitamin D (CALCIUM 500 + D) 500-125 MG-UNIT tablet, Take 1 tablet by mouth Daily., Disp: , Rfl:   •  CARTIA  MG 24 hr capsule, Take 2 capsules by mouth Daily., Disp: 90 capsule, Rfl: 3  •  clopidogrel (PLAVIX) 75 MG tablet, Take 1 tablet by mouth Daily., Disp: 30 tablet, Rfl: 5  •  insulin detemir (LEVEMIR FLEXTOUCH) 100 UNIT/ML injection, Inject 50 Units under the skin into the appropriate area as directed 2 (Two) Times a Day., Disp: 3 pen, Rfl: 2  •  Insulin Pen Needle (PEN NEEDLES) 31G X 5 MM misc, 1 each 2 (Two) Times a Day., Disp: 100 each, Rfl: 5  •  letrozole (FEMARA) 2.5 MG tablet, Take 2.5 mg by mouth Daily., Disp: , Rfl:   •  levothyroxine (SYNTHROID, LEVOTHROID) 137 MCG tablet, TAKE 1 TABLET BY MOUTH DAILY, Disp: 90 tablet, Rfl: 0  •  losartan (COZAAR) 25 MG tablet, Take 25 mg by mouth Daily., Disp: , Rfl:   •  metoprolol tartrate (LOPRESSOR) 25 MG tablet, TAKE 1 TABLET BY MOUTH EVERY 12 HOURS, Disp: 180 tablet, Rfl: 1  •  Multiple Vitamins-Minerals (CENTRUM SILVER PO), Take 1 tablet by mouth Daily., Disp: , Rfl:   Allergies   Allergen Reactions   • Amoxil [Amoxicillin] Hives   • Penicillins Hives   • Biaxin [Clarithromycin] Other (See Comments)     NOT SURE OF REACTION, \"SORE MOUTH OF DIARRHEA\"   • Lortab [Hydrocodone-Acetaminophen] GI Intolerance       Past Medical History:   Diagnosis Date   • Arrhythmia    • Atrial fibrillation, currently in sinus rhythm    • Breast cancer (CMS/HCC)     right   • Cancer (CMS/HCC)     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)   • Carotid artery " "occlusion    • Diabetes mellitus, type II (CMS/HCC)    • Dizziness    • Drug therapy    • Essential hypertension    • GERD (gastroesophageal reflux disease)    • GI bleed requiring more than 4 units of blood in 24 hours, ICU, or surgery    • History of chemotherapy    • History of lymphoma    • Hx of radiation therapy    • Hypomagnesemia    • Hypothyroidism    • On amiodarone therapy      Past Surgical History:   Procedure Laterality Date   • ATRIAL APPENDAGE EXCLUSION LEFT WITH TRANSESOPHAGEAL ECHOCARDIOGRAM Left 11/27/2018    Procedure: Atrial Appendage Occlusion;  Surgeon: Mick Orantes MD;  Location:  [facility];  Service: Cardiology   • ATRIAL APPENDAGE EXCLUSION LEFT WITH TRANSESOPHAGEAL ECHOCARDIOGRAM Left 1/22/2019    Procedure: Atrial Appendage Occlusion;  Surgeon: Mick Orantes MD;  Location:  [facility];  Service: Cardiology   • BREAST BIOPSY     • MASTECTOMY     • OTHER SURGICAL HISTORY      Mediport insertion X 2   • OTHER SURGICAL HISTORY      remote lymphoma treatment   • REPLACEMENT TOTAL KNEE Right 09/2017   • TUBAL ABDOMINAL LIGATION         Review of Systems   Constitutional: Negative.    HENT: Negative.    Eyes: Negative.    Respiratory: Negative.    Cardiovascular: Negative.    Gastrointestinal: Negative.    Endocrine: Negative.    Genitourinary: Negative.    Musculoskeletal: Negative.    Skin: Negative.    Allergic/Immunologic: Negative.    Neurological: Negative.    Hematological: Negative.    Psychiatric/Behavioral: Negative.        Objective  /88   Pulse 71   Resp 16   Ht 162.6 cm (64\")   Wt 89.8 kg (198 lb)   SpO2 98%   BMI 33.99 kg/m²   Physical Exam   Constitutional: She is oriented to person, place, and time. She appears well-developed and well-nourished.   HENT:   Head: Normocephalic and atraumatic.   Right Ear: External ear normal.   Left Ear: External ear normal.   Nose: Nose normal.   Mouth/Throat: Oropharynx is clear and moist. "   Eyes: Conjunctivae and EOM are normal. Pupils are equal, round, and reactive to light.   Neck: Normal range of motion. Neck supple.   Cardiovascular: Normal rate, regular rhythm, normal heart sounds and intact distal pulses.   Pulmonary/Chest: Effort normal and breath sounds normal.   Abdominal: Soft. Bowel sounds are normal.   Musculoskeletal: Normal range of motion.   Neurological: She is alert and oriented to person, place, and time.   Skin: Skin is warm. Capillary refill takes less than 2 seconds.   Psychiatric: She has a normal mood and affect. Her behavior is normal. Judgment and thought content normal.   Nursing note and vitals reviewed.  left carotid bruit    Assessment/Plan   Dianne was seen today for follow-up.    Diagnoses and all orders for this visit:    Presence of Watchman left atrial appendage closure device    Type 2 diabetes mellitus without complication, with long-term current use of insulin (CMS/[facility])    Essential hypertension    Patient's Body mass index is 33.99 kg/m². BMI is above normal parameters. Recommendations include: nutrition counseling.             No orders of the defined types were placed in this encounter.  Current outpatient and discharge medications have been reconciled for the patient.  Reviewed by: Darryl Bang MD  Follow up: 6 month(s)   You can access the FollowMyHealth Patient Portal offered by [facility] by registering at the following website: [email or web address] By joining [employer]’s FollowMyHealth portal, you will also be able to view your health information using other applications (apps) compatible with our system.

## 2022-03-24 ENCOUNTER — OFFICE VISIT (OUTPATIENT)
Dept: FAMILY MEDICINE CLINIC | Facility: CLINIC | Age: 77
End: 2022-03-24

## 2022-03-24 VITALS
HEIGHT: 64 IN | SYSTOLIC BLOOD PRESSURE: 132 MMHG | WEIGHT: 186 LBS | OXYGEN SATURATION: 97 % | HEART RATE: 86 BPM | RESPIRATION RATE: 16 BRPM | BODY MASS INDEX: 31.76 KG/M2 | DIASTOLIC BLOOD PRESSURE: 84 MMHG

## 2022-03-24 DIAGNOSIS — E03.9 ACQUIRED HYPOTHYROIDISM: ICD-10-CM

## 2022-03-24 DIAGNOSIS — I10 ESSENTIAL HYPERTENSION: Primary | ICD-10-CM

## 2022-03-24 DIAGNOSIS — E11.59 TYPE 2 DIABETES MELLITUS WITH OTHER CIRCULATORY COMPLICATIONS: ICD-10-CM

## 2022-03-24 DIAGNOSIS — Z12.11 COLON CANCER SCREENING: ICD-10-CM

## 2022-03-24 PROCEDURE — 1159F MED LIST DOCD IN RCRD: CPT | Performed by: FAMILY MEDICINE

## 2022-03-24 PROCEDURE — 1126F AMNT PAIN NOTED NONE PRSNT: CPT | Performed by: FAMILY MEDICINE

## 2022-03-24 PROCEDURE — G0439 PPPS, SUBSEQ VISIT: HCPCS | Performed by: FAMILY MEDICINE

## 2022-03-24 PROCEDURE — 1170F FXNL STATUS ASSESSED: CPT | Performed by: FAMILY MEDICINE

## 2022-03-24 PROCEDURE — 99213 OFFICE O/P EST LOW 20 MIN: CPT | Performed by: FAMILY MEDICINE

## 2022-03-24 NOTE — PROGRESS NOTES
"Glenn Pritchard is a 76 y.o. female.     Chief Complaint   Patient presents with   • Hypertension     6 mo f/u   • Medicare Wellness-subsequent       History of Present Illness     she notse good bs control withjout hhypoglcyemia--he bp has been stable wih tout ccp or ha..toleraing synthroid witout heat or cold intnances..--she will see endo in april       Current Outpatient Medications:   •  aspirin 81 MG chewable tablet, Chew 1 tablet Daily., Disp: , Rfl:   •  dilTIAZem CD (CARDIZEM CD) 180 MG 24 hr capsule, TAKE 2 CAPSULES BY MOUTH EVERY DAY, Disp: 180 capsule, Rfl: 3  •  Insulin Pen Needle (PEN NEEDLES) 31G X 5 MM misc, 1 each 2 (Two) Times a Day., Disp: 100 each, Rfl: 5  •  letrozole (FEMARA) 2.5 MG tablet, Take 2.5 mg by mouth Daily., Disp: , Rfl:   •  levothyroxine (SYNTHROID, LEVOTHROID) 137 MCG tablet, TAKE 1 TABLET BY MOUTH DAILY, Disp: 90 tablet, Rfl: 0  •  losartan (COZAAR) 25 MG tablet, Take 25 mg by mouth Daily., Disp: , Rfl:   •  metoprolol succinate XL (TOPROL-XL) 25 MG 24 hr tablet, Take 1 tablet by mouth Daily., Disp: 30 tablet, Rfl: 11  •  Misc. Devices (VIRAGE CUSTOM BREAST PROSTHES) misc, To be used with Mastectomy Bra-, Disp: 2 each, Rfl: 0  •  Multiple Vitamins-Minerals (CENTRUM SILVER PO), Take 1 tablet by mouth Daily., Disp: , Rfl:   •  TOUJEO SOLOSTAR 300 UNIT/ML solution pen-injector injection, INJECT 80 UNITS UNDER THE SKIN UTD QD, Disp: , Rfl: 1  Allergies   Allergen Reactions   • Amoxil [Amoxicillin] Hives   • Penicillins Hives   • Biaxin [Clarithromycin] Other (See Comments)     NOT SURE OF REACTION, \"SORE MOUTH OF DIARRHEA\"   • Lortab [Hydrocodone-Acetaminophen] GI Intolerance       Patient's Body mass index is 31.93 kg/m². indicating that she is obese (BMI >30). Obesity-related health conditions include the following: hypertension, diabetes mellitus and dyslipidemias. Obesity is unchanged. BMI is is above average; BMI management plan is completed. We discussed portion " "control and increasing exercise..      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)   • Carotid artery occlusion    • Diabetes mellitus, type II (HCC)    • Dizziness    • Drug therapy    • Essential hypertension    • GERD (gastroesophageal reflux disease)    • GI bleed requiring more than 4 units of blood in 24 hours, ICU, or surgery    • History of chemotherapy    • History of lymphoma    • Hx of radiation therapy    • Hypomagnesemia    • Hypothyroidism    • On amiodarone therapy      Past Surgical History:   Procedure Laterality Date   • ATRIAL APPENDAGE EXCLUSION LEFT WITH TRANSESOPHAGEAL ECHOCARDIOGRAM Left 11/27/2018    Procedure: Atrial Appendage Occlusion;  Surgeon: Mick Orantes MD;  Location:  [facility];  Service: Cardiology   • ATRIAL APPENDAGE EXCLUSION LEFT WITH TRANSESOPHAGEAL ECHOCARDIOGRAM Left 1/22/2019    Procedure: Atrial Appendage Occlusion;  Surgeon: Mick Orantes MD;  Location:  [facility];  Service: Cardiology   • BREAST BIOPSY     • MASTECTOMY     • MASTECTOMY  03/10/2020   • OTHER SURGICAL HISTORY      Mediport insertion X 2   • OTHER SURGICAL HISTORY      remote lymphoma treatment   • REPLACEMENT TOTAL KNEE Right 09/2017   • TUBAL ABDOMINAL LIGATION         Review of Systems   Constitutional: Negative.    HENT: Negative.    Eyes: Negative.    Respiratory: Negative.    Cardiovascular: Negative.    Gastrointestinal: Negative.    Endocrine: Negative.    Genitourinary: Negative.    Musculoskeletal: Negative.    Skin: Negative.    Allergic/Immunologic: Negative.    Neurological: Negative.    Hematological: Negative.    Psychiatric/Behavioral: Negative.        Objective  /84   Pulse 86   Resp 16   Ht 162.6 cm (64\")   Wt 84.4 kg (186 lb)   SpO2 97%   BMI 31.93 kg/m²   Physical Exam  Vitals and nursing note " reviewed.   Constitutional:       Appearance: Normal appearance. She is normal weight.   HENT:      Head: Normocephalic and atraumatic.      Nose: Nose normal.      Mouth/Throat:      Mouth: Mucous membranes are moist.   Eyes:      Extraocular Movements: Extraocular movements intact.      Pupils: Pupils are equal, round, and reactive to light.   Cardiovascular:      Rate and Rhythm: Normal rate and regular rhythm.      Pulses: Normal pulses.      Heart sounds: Normal heart sounds.   Pulmonary:      Effort: Pulmonary effort is normal.      Breath sounds: Normal breath sounds.   Abdominal:      General: Abdomen is flat. Bowel sounds are normal. There is no distension.      Palpations: Abdomen is soft.   Musculoskeletal:         General: Normal range of motion.      Cervical back: Normal range of motion and neck supple.   Skin:     General: Skin is warm and dry.      Capillary Refill: Capillary refill takes less than 2 seconds.   Neurological:      General: No focal deficit present.      Mental Status: She is alert and oriented to person, place, and time. Mental status is at baseline.   Psychiatric:         Mood and Affect: Mood normal.         Behavior: Behavior normal.         Thought Content: Thought content normal.         Judgment: Judgment normal.         Assessment/Plan   Diagnoses and all orders for this visit:    1. Essential hypertension (Primary)  -     CBC & Differential  -     Comprehensive metabolic panel  -     Lipid Panel With / Chol / HDL Ratio  -     TSH  -     T4, Free  -     Hemoglobin A1c    2. Acquired hypothyroidism  -     CBC & Differential  -     Comprehensive metabolic panel  -     Lipid Panel With / Chol / HDL Ratio  -     TSH  -     T4, Free  -     Hemoglobin A1c    3. Type 2 diabetes mellitus with other circulatory complications (HCC)  -     CBC & Differential  -     Comprehensive metabolic panel  -     Lipid Panel With / Chol / HDL Ratio  -     TSH  -     T4, Free  -     Hemoglobin  A1c      She will moniitor bp and bs and keep me informe and keep appt with endocrinloogy.  She will monitor for heat or cold tintoenacles.           Orders Placed This Encounter   Procedures   • Comprehensive metabolic panel     Order Specific Question:   Release to patient     Answer:   Immediate   • Lipid Panel With / Chol / HDL Ratio     Order Specific Question:   Release to patient     Answer:   Immediate   • TSH     Order Specific Question:   Release to patient     Answer:   Immediate   • T4, Free     Order Specific Question:   Release to patient     Answer:   Immediate   • Hemoglobin A1c     Order Specific Question:   Release to patient     Answer:   Immediate   • CBC & Differential       Follow up: 6 month(s)

## 2022-03-24 NOTE — PROGRESS NOTES
The ABCs of the Annual Wellness Visit  Subsequent Medicare Wellness Visit    Chief Complaint   Patient presents with   • Hypertension     6 mo f/u   • Medicare Wellness-subsequent      Subjective    History of Present Illness:  Dianne Pritchard is a 76 y.o. female who presents for a Subsequent Medicare Wellness Visit.    The following portions of the patient's history were reviewed and   updated as appropriate: allergies, current medications, past family history, past medical history, past social history, past surgical history and problem list.    Compared to one year ago, the patient feels her physical   health is the same.    Compared to one year ago, the patient feels her mental   health is the same.    Recent Hospitalizations:  She was not admitted to the hospital during the last year.       Current Medical Providers:  Patient Care Team:  Darryl Bang MD as PCP - General (Family Medicine)  Xiang Fernandes APRN as Nurse Practitioner (Pulmonary Disease)    Outpatient Medications Prior to Visit   Medication Sig Dispense Refill   • aspirin 81 MG chewable tablet Chew 1 tablet Daily.     • dilTIAZem CD (CARDIZEM CD) 180 MG 24 hr capsule TAKE 2 CAPSULES BY MOUTH EVERY  capsule 3   • Insulin Pen Needle (PEN NEEDLES) 31G X 5 MM misc 1 each 2 (Two) Times a Day. 100 each 5   • letrozole (FEMARA) 2.5 MG tablet Take 2.5 mg by mouth Daily.     • levothyroxine (SYNTHROID, LEVOTHROID) 137 MCG tablet TAKE 1 TABLET BY MOUTH DAILY 90 tablet 0   • losartan (COZAAR) 25 MG tablet Take 25 mg by mouth Daily.     • metoprolol succinate XL (TOPROL-XL) 25 MG 24 hr tablet Take 1 tablet by mouth Daily. 30 tablet 11   • Misc. Devices (VIRAGE CUSTOM BREAST PROSTHES) misc To be used with Mastectomy Bra- 2 each 0   • Multiple Vitamins-Minerals (CENTRUM SILVER PO) Take 1 tablet by mouth Daily.     • TOUJEO SOLOSTAR 300 UNIT/ML solution pen-injector injection INJECT 80 UNITS UNDER THE SKIN UTD QD  1     No  "facility-administered medications prior to visit.       No opioid medication identified on active medication list. I have reviewed chart for other potential  high risk medication/s and harmful drug interactions in the elderly.          Aspirin is on active medication list. Aspirin use is indicated based on review of current medical condition/s. Pros and cons of this therapy have been discussed today. Benefits of this medication outweigh potential harm.  Patient has been encouraged to continue taking this medication.  .      Patient Active Problem List   Diagnosis   • Tremor   • Essential hypertension   • Type 2 diabetes mellitus with other circulatory complications (HCC)   • Acquired hypothyroidism   • Dizziness   • Multiple falls   • Bruit of left carotid artery   • Acute pain of right knee   • Right knee pain   • Status post right knee replacement   • BMI 33.0-33.9,adult   • Bilateral carotid artery disease (HCC)   • Restrictive lung disease   • A-fib (HCC)   • Presence of Watchman left atrial appendage closure device   • Malignant neoplasm of female breast (HCC)   • Status post bilateral mastectomy   • Class 1 obesity due to excess calories with serious comorbidity and body mass index (BMI) of 31.0 to 31.9 in adult   • After cataract not obscuring vision   • Nonproliferative diabetic retinopathy (HCC)   • Pseudophakia   • Puckering of macula, left eye   • Vitreous degeneration     Advance Care Planning  Advance Directive is not on file.  ACP discussion was held with the patient during this visit. Patient does not have an advance directive, information provided.          Objective    Vitals:    03/24/22 1102   BP: 132/84   Pulse: 86   Resp: 16   SpO2: 97%   Weight: 84.4 kg (186 lb)   Height: 162.6 cm (64\")     BMI Readings from Last 1 Encounters:   03/24/22 31.93 kg/m²   BMI is above normal parameters. Recommendations include: nutrition counseling    Does the patient have evidence of cognitive impairment? " No    Physical Exam            HEALTH RISK ASSESSMENT    Smoking Status:  Social History     Tobacco Use   Smoking Status Former Smoker   • Years: 20.00   • Types: Cigarettes   • Quit date:    • Years since quittin.2   Smokeless Tobacco Never Used     Alcohol Consumption:  Social History     Substance and Sexual Activity   Alcohol Use No     Fall Risk Screen:    CATHERINE Fall Risk Assessment was completed, and patient is at LOW risk for falls.Assessment completed on:3/24/2022    Depression Screening:  PHQ-2/PHQ-9 Depression Screening 3/24/2022   Retired Total Score -   Little Interest or Pleasure in Doing Things 0-->not at all   Feeling Down, Depressed or Hopeless 0-->not at all   PHQ-9: Brief Depression Severity Measure Score 0       Health Habits and Functional and Cognitive Screening:  Functional & Cognitive Status 3/24/2022   Do you have difficulty preparing food and eating? No   Do you have difficulty bathing yourself, getting dressed or grooming yourself? No   Do you have difficulty using the toilet? No   Do you have difficulty moving around from place to place? No   Do you have trouble with steps or getting out of a bed or a chair? No   Current Diet Well Balanced Diet   Dental Exam Up to date   Eye Exam Up to date   Exercise (times per week) 3 times per week   Current Exercises Include Walking   Current Exercise Activities Include -   Do you need help using the phone?  No   Are you deaf or do you have serious difficulty hearing?  No   Do you need help with transportation? No   Do you need help shopping? No   Do you need help preparing meals?  No   Do you need help with housework?  No   Do you need help with laundry? No   Do you need help taking your medications? No   Do you need help managing money? No   Do you ever drive or ride in a car without wearing a seat belt? No   Have you felt unusual stress, anger or loneliness in the last month? No   Who do you live with? Child   If you need help, do you  have trouble finding someone available to you? No   Have you been bothered in the last four weeks by sexual problems? No   Do you have difficulty concentrating, remembering or making decisions? No       Age-appropriate Screening Schedule:  Refer to the list below for future screening recommendations based on patient's age, sex and/or medical conditions. Orders for these recommended tests are listed in the plan section. The patient has been provided with a written plan.    Health Maintenance   Topic Date Due   • TDAP/TD VACCINES (1 - Tdap) Never done   • ZOSTER VACCINE (1 of 2) Never done   • DXA SCAN  08/14/2020   • HEMOGLOBIN A1C  03/23/2022   • URINE MICROALBUMIN  09/23/2022   • DIABETIC EYE EXAM  10/25/2022   • INFLUENZA VACCINE  Completed   • MAMMOGRAM  Discontinued              Assessment/Plan   CMS Preventative Services Quick Reference  Risk Factors Identified During Encounter  Cardiovascular Disease  The above risks/problems have been discussed with the patient.  Follow up actions/plans if indicated are seen below in the Assessment/Plan Section.  Pertinent information has been shared with the patient in the After Visit Summary.    Diagnoses and all orders for this visit:    1. Essential hypertension (Primary)  -     CBC & Differential  -     Comprehensive metabolic panel  -     Lipid Panel With / Chol / HDL Ratio  -     TSH  -     T4, Free  -     Hemoglobin A1c    2. Acquired hypothyroidism  -     CBC & Differential  -     Comprehensive metabolic panel  -     Lipid Panel With / Chol / HDL Ratio  -     TSH  -     T4, Free  -     Hemoglobin A1c    3. Type 2 diabetes mellitus with other circulatory complications (HCC)  -     CBC & Differential  -     Comprehensive metabolic panel  -     Lipid Panel With / Chol / HDL Ratio  -     TSH  -     T4, Free  -     Hemoglobin A1c    4. Colon cancer screening  -     Cologuard - Stool, Per Rectum        Follow Up:   No follow-ups on file.     An After Visit Summary and PPPS  were made available to the patient.        I spent 6  minutes caring for Dianne on this date of service. This time includes time spent by me in the following activities:preparing for the visit, counseling and educating the patient/family/caregiver, referring and communicating with other health care professionals  and documenting information in the medical record

## 2022-03-25 LAB
ALBUMIN SERPL-MCNC: 4.4 G/DL (ref 3.5–5.2)
ALBUMIN/GLOB SERPL: 1.4 G/DL
ALP SERPL-CCNC: 121 U/L (ref 39–117)
ALT SERPL-CCNC: 14 U/L (ref 1–33)
AST SERPL-CCNC: 28 U/L (ref 1–32)
BASOPHILS # BLD AUTO: 0.07 10*3/MM3 (ref 0–0.2)
BASOPHILS NFR BLD AUTO: 0.6 % (ref 0–1.5)
BILIRUB SERPL-MCNC: 0.6 MG/DL (ref 0–1.2)
BUN SERPL-MCNC: 27 MG/DL (ref 8–23)
BUN/CREAT SERPL: 19.1 (ref 7–25)
CALCIUM SERPL-MCNC: 10.3 MG/DL (ref 8.6–10.5)
CHLORIDE SERPL-SCNC: 93 MMOL/L (ref 98–107)
CHOLEST SERPL-MCNC: 222 MG/DL (ref 0–200)
CHOLEST/HDLC SERPL: 4.93 {RATIO}
CO2 SERPL-SCNC: 27.4 MMOL/L (ref 22–29)
CREAT SERPL-MCNC: 1.41 MG/DL (ref 0.57–1)
EGFRCR SERPLBLD CKD-EPI 2021: 38.7 ML/MIN/1.73
EOSINOPHIL # BLD AUTO: 0.17 10*3/MM3 (ref 0–0.4)
EOSINOPHIL NFR BLD AUTO: 1.5 % (ref 0.3–6.2)
ERYTHROCYTE [DISTWIDTH] IN BLOOD BY AUTOMATED COUNT: 13.4 % (ref 12.3–15.4)
GLOBULIN SER CALC-MCNC: 3.1 GM/DL
GLUCOSE SERPL-MCNC: 168 MG/DL (ref 65–99)
HBA1C MFR BLD: 9 % (ref 4.8–5.6)
HCT VFR BLD AUTO: 43 % (ref 34–46.6)
HDLC SERPL-MCNC: 45 MG/DL (ref 40–60)
HGB BLD-MCNC: 14.2 G/DL (ref 12–15.9)
IMM GRANULOCYTES # BLD AUTO: 0.04 10*3/MM3 (ref 0–0.05)
IMM GRANULOCYTES NFR BLD AUTO: 0.3 % (ref 0–0.5)
LDLC SERPL CALC-MCNC: 139 MG/DL (ref 0–100)
LYMPHOCYTES # BLD AUTO: 1.42 10*3/MM3 (ref 0.7–3.1)
LYMPHOCYTES NFR BLD AUTO: 12.2 % (ref 19.6–45.3)
MCH RBC QN AUTO: 29.6 PG (ref 26.6–33)
MCHC RBC AUTO-ENTMCNC: 33 G/DL (ref 31.5–35.7)
MCV RBC AUTO: 89.6 FL (ref 79–97)
MONOCYTES # BLD AUTO: 0.68 10*3/MM3 (ref 0.1–0.9)
MONOCYTES NFR BLD AUTO: 5.8 % (ref 5–12)
NEUTROPHILS # BLD AUTO: 9.29 10*3/MM3 (ref 1.7–7)
NEUTROPHILS NFR BLD AUTO: 79.6 % (ref 42.7–76)
NRBC BLD AUTO-RTO: 0 /100 WBC (ref 0–0.2)
PLATELET # BLD AUTO: 199 10*3/MM3 (ref 140–450)
POTASSIUM SERPL-SCNC: 5.2 MMOL/L (ref 3.5–5.2)
PROT SERPL-MCNC: 7.5 G/DL (ref 6–8.5)
RBC # BLD AUTO: 4.8 10*6/MM3 (ref 3.77–5.28)
SODIUM SERPL-SCNC: 140 MMOL/L (ref 136–145)
T4 FREE SERPL-MCNC: 1.9 NG/DL (ref 0.93–1.7)
TRIGL SERPL-MCNC: 214 MG/DL (ref 0–150)
TSH SERPL DL<=0.005 MIU/L-ACNC: 0.69 UIU/ML (ref 0.27–4.2)
VLDLC SERPL CALC-MCNC: 38 MG/DL (ref 5–40)
WBC # BLD AUTO: 11.67 10*3/MM3 (ref 3.4–10.8)

## 2022-04-04 RX ORDER — LETROZOLE 2.5 MG/1
2.5 TABLET, FILM COATED ORAL DAILY
Qty: 90 TABLET | Refills: 3 | Status: SHIPPED | OUTPATIENT
Start: 2022-04-04

## 2022-04-11 DIAGNOSIS — C50.412 MALIGNANT NEOPLASM OF UPPER-OUTER QUADRANT OF LEFT BREAST IN FEMALE, ESTROGEN RECEPTOR POSITIVE (HCC): Primary | ICD-10-CM

## 2022-04-11 DIAGNOSIS — Z17.0 MALIGNANT NEOPLASM OF UPPER-OUTER QUADRANT OF LEFT BREAST IN FEMALE, ESTROGEN RECEPTOR POSITIVE (HCC): Primary | ICD-10-CM

## 2022-04-11 NOTE — PROGRESS NOTES
Progress Note      Pt Name: Wilbert Robertson  YOB: 1945  MRN: [id number]    Date of evaluation: 4/12/2022  History Obtained From:  patient, daughter Myesha Cotto, electronic medical record    CHIEF COMPLAINT:    Chief Complaint   Patient presents with    Follow-up     Malignant neoplasm of upper-outer quadrant of left breast in female, estrogen receptor positive ([address]     Current active problems  History of breast cancer  Remote history immunoblastic lymphoma  Thrombocytopenia    HISTORY OF PRESENT ILLNESS:    Wilbert Robertson is a 68 y.o.  female with right stage II A breast carcinoma from 3/21/2012. She was also diagnosed with a left stage I breast carcinoma 1/29/2020 and did have a left simple mastectomy. She is currently continuing her aromatase inhibitorFemara 2.5 daily. She denies any muscle, bone pain with the Femara. He does report moderate issues with hot flashes at times. She denies feeling any new nodules on her chest wall or in her axillary regions. She does have significant osteoarthritis which basically is stable. She does have fatigue issues related to her atrial fibrillation. She continues on Cardizem CD and metoprolol. She is diabetic and her A1c was up to 9 at her last check. She does have hypothyroidism and is on Synthroid 137 mcg daily. TARGET LYMPHOMA SITES:  1. Left supraclavicular area. 2. Left axilla. 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 Cadence Wick with a left supraclavicular lymph node. She was referred to Dr. Nati Arevalo who performed a biopsy on 10/13/93 that revealed a large cell malignant immunoblastic lymphoma, CD20 was 53% positive  A bone marrow biopsy and aspirate on 11/1/1993 was negative for any evidence of malignant lymphoma. She received 6 cycles of chemotherapy with CHOP/Bleomycin. The last 3 cycles were done without Vincristine due to peripheral neuropathy problems. Following the chemotherapy she received XRT to the neck and upper chest due to the stage II-A presentation in the left supraclavicular area and left axilla. She received a total of 3,960cGy under the direction of Dr. Suzanne Fox that was completed on 6/22/94. TREATMENT SUMMARY:  1. CHOP/ Bleomycin x 6.   2. Mantle radiation therapy. 2ND PRIMARY TUMOR: Right stage II (pT2 N1 M0) infiltrating high grade breast cancer 03/21/12  Tram had an abnormal mammogram at [facility] on 02/22/12 revealing a 1.6 cm worrisome mass at the 7:00 position in the right lower quadrant of the right breast. On 03/21/12 Dr. Rajat Tolliver took Tram to the OR and performed an intraoperative biopsy with frozen section that revealed an infiltrating breast cancer. The procedure then followed immediately with a right modified radical mastectomy with pathology revealing an invasive ductal carcinoma, high grade, grade III 3.3 cm tumor. Nineteen right axillary lymph nodes were submitted, one was positive for metastatic carcinoma. This places Tram in a stage II (pT2 N1 M0) breast cancer. The ER is positive, FL is also positive, Her2 Scottie by IHC is negative and Her2 Scottie by FISH is unamplified. 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 In addition to that she had mantle radiation therapy. Although the 2D echo shows an EF of 60%, I discussed with Tram the concerns of anthracycline based therapy and cardiac disease and she agrees and desires to receive adjuvant therapy without an anthracycline. Taxotere and Cytoxan x 6 cycles are recommended and initiated on 04/20/12. The last cycle of chemotherapy was delivered on 08/09/12. Femara is initiated on 09/06/12 with 10 years planned. TREATMENT SUMMARY:  1. Right modified radical mastectomy 03/21/12. 2. Taxotere and Cytoxan given 04/20/12 through 08/09/12 x 6 cycles. 3. Femara is initiated on 09/06/12.     3RD PRIMARY TUMOR: Left Stage I (pT1c, pN0, pMx),  grade 1, ER positive HER-2/radha negative Invasive lobular carcinoma of the breast 1/29/2020  Tram was seen in routine follow-up on 12/20/2019 and physical exam revealed a 4 x 5 cm palpable mass anteriorly in the left axillary region.     Ultrasound left breast at [facility] on 1/10/2020 revealed an oblong 4.7 x 1.3 x 5.5 cm fat-containing mass in the upper outer quadrant of the left breast felt to be stable from a prior mammogram and likely representing a hamartoma.  A 1.3 x 1 cm irregular nodularity with associated architectural distortion in the upper outer quadrant.     Spot compression mammogram 1/10/2020 revealed a developing focus of irregular nodularity measuring 1.3 x 1 cm in the upper outer quadrant of the left breast with malignant features with ultrasound-guided biopsy recommended BI-RADS 5.     The palpable abnormality on examination appears to be a benign hamartoma.  However there was an irregular focus that is suspicious in the upper outer quadrant of the left breast and she will be referred to surgery for evaluationCarolina Alex Herrera CA-15-3 above which is minimally elevated a 25.5 and lymphoma markers were negative.     Referral was made for her to see Dr. Jeffrey Sanchez at her 1/17/2020 clinic visit.     CA-15-3 25.5 (0-25)     US-guided biopsy of the 1.4 x 1.1 x 0.80 cm left breast mass was performed on 1/29/2020 by Dr. Jeffrey Sanchez. Pathology revealed the following:  · Invasive lobular carcinoma, low-grade  · Tumor measures at least 9 mm in greatest linear  · Tumor invades present in multiple core biopsies  · Adjacent intraductal papilloma with apocrine metaplasia     IHC quantative breast panel is as follows:  · ER: positive 94%  · PA: negative 0.2%  · HER2: negative, Score 1+  · KI-67: low, 8%     Mammaprint on 1/29/2020 resulted a LOW RISK luminal-type (A).  Sandi Barbosa has a 97.8% risk of living without distant recurrence of breast cancer at 5-years if treated with adjuvant endocrine therapy alone.     On 3/10/2020 Dr. Héctor Juan performed the following procedures:  1. Injection of radionuclide. 2. Lymphatic mapping. 3. Left-sided pectoral block. 4. Left simple mastectomy. 5. Left sentinel lymph node biopsy.     Pathology revealed the following:  Breast, left mastectomy:  · Invasive lobular carcinoma, grade 1  · Invasive carcinoma measures 1.6 cm in greatest dimension. · Invasive carcinoma is located greater than 1.0 cm from the nearest deep surgical excision margin. · Incidental complex sclerosing lesion, margins negative. · Changes consistent with fibrocystic mastopathy involving nonneoplastic breast parenchyma  · Sections of skin, negative for evidence of malignancy. · Sections of nipple, negative for evidence of malignancy  Lymph node, left sentinel lymph node biopsy:   · 10 lymph nodes, negative for evidence of malignancy      AJCC STAGE: pT1c, pN0, pMx     Adjuvant endocrine therapy with aromatase inhibitor letrozole (Femara) 2.5 mg daily is prescribed (continued)      1st HEMATOLOGY HISTORY: Iron deficiency/ Acute GI bleed secondary to gastric AVM, 06/27/16  Mrs. Aponte presented to [facility]"" Emergency Department on 06/24/16 with weakness, fatigue and exertional dyspnea. She was profoundly anemic with a hemoglobin of 6.0, MCV of 84.3. WBC and platelets were normal at 7.97 and 163,000, respectively. GI evaluation was sought, with an endoscopy on 06/27/16 by Dr. Ronaldo mitchell for angiodysplastic lesions of the gastric body. Colonoscopy on 06/28/16 was negative. Mrs. Aponte was transfused as warranted, given parenteral iron supplementation and anticoagulation with Xarelto for paroxysmal atrial fibrillation was discontinued due to UGI bleed with AVM. 2nd HEMATOLOGY HISTORY: Thrombocytopenia  Tram fam had a chronically low platelet count between 120 and 150.   DEONDRE - negative  Antiplatelet antibody - negative  SPEP - negative  Hepatitis A, B, C - negative    Serology 10/28/2020  Folate = 22  B12 = 12        Past Medical History:   Diagnosis Date    Arteriovenous malformation, other site     Atrial fibrillation ([facility] 04/28/2016    sees dr. Master Diaz [facility])     breast; surgery and chemo x 2 and radiation    COPD (chronic obstructive pulmonary disease) ([facility]     Diabetes mellitus ([facility]     H/O screening mammography 07/08/2019    Lyubov Saavedra H/O: GI bleed 06/24/2016 2016 while on xarelto for a.fib; no longer on any anti-coags.  Xarelto discontinued     Hypertension     Hypothyroidism     Iron deficiency anemia     Kidney disease     sees dr. Annette Grey [facility]) Boo Julio    sees dr. Adam Dobbins Osteopenia of the elderly     Osteoporosis     Post-menopausal     Thrombocytopenia ([facility]     Thyroid disease         Past Surgical History:   Procedure Laterality Date    BREAST SURGERY      right mastectomy; no sticks/bp right arm    CARDIAC SURGERY      \"watchman's device\" per dr. Nash Bustos COLONOSCOPY  06/28/2016    per  recall 10 years     ENDOSCOPY, COLON, DIAGNOSTIC      IVC FILTER INSERTION      JOINT REPLACEMENT      LYMPH NODE BIOPSY Left     Supraclavicular     MASTECTOMY Left 3/10/2020    FLAPS WITH LEFT MASTECTOMY, SENTINEL NODE BIOPSY, PEC BLOCK performed by Ivan Messina MD at [address] [place] [address] Right 9/7/2017    KNEE TOTAL ARTHROPLASTY performed by Lexis Perales MD at [facility] TUNNELED VENOUS PORT PLACEMENT      x2 and removed           Current Outpatient Medications:     letrozole (FEMARA) 2.5 MG tablet, TAKE 1 TABLET BY MOUTH DAILY, Disp: 90 tablet, Rfl: 3    Insulin Glargine, 2 Unit Dial, (TOUJEO MAX SOLOSTAR) 300 UNIT/ML SOPN, Inject 80 Units into the skin daily Indications: Diabetes, Disp: , Rfl:     levothyroxine (SYNTHROID) 137 MCG tablet, Take 137 mcg by mouth Daily Indications: Underactive Thyroid, Disp: , Rfl:     dilTIAZem (CARDIZEM CD) 180 MG extended release capsule, Take 360 mg by mouth daily Indications: High Blood Pressure Disorder, Disp: , Rfl:     metoprolol tartrate (LOPRESSOR) 25 MG tablet, Take 25 mg by mouth daily Indications: High Blood Pressure Disorder, Disp: , Rfl:     aspirin EC 81 MG EC tablet, Take 1 tablet by mouth 2 times daily, Disp: 60 tablet, Rfl: 0    losartan (COZAAR) 25 MG tablet, Take 25 mg by mouth daily Indications: High Blood Pressure Disorder , Disp: , Rfl:     Multiple Vitamins-Minerals (MULTI FOR HER PO), Take 1 tablet by mouth daily , Disp: , Rfl:     vitamin D (CHOLECALCIFEROL) 1000 UNIT TABS tablet, Take 1,000 Units by mouth daily, Disp: , Rfl:      Allergies   Allergen Reactions    Hydrocodone-Acetaminophen Nausea And Vomiting    Amoxicillin Hives    Clarithromycin     Penicillins Hives    Multihance [Gadobenate] Nausea Only     NAUSEOUS AFTER MRI CONTRAST (MULTIHANCE)       Social History     Tobacco Use    Smoking status: Former Smoker     Types: Cigarettes     Quit date: 1993     Years since quittin.4    Smokeless tobacco: Never Used   Vaping Use    Vaping Use: Never used   Substance Use Topics    Alcohol use: No    Drug use: No       Family History   Problem Relation Age of Onset    Heart Disease Mother     Stroke Mother     Coronary Art Dis Mother     Cancer Father         Lung-Age Unknown    Cancer Paternal Aunt         Breast-Age Unknown    Lung Cancer Sister         Non-small cell lung cancer-Age Unknown    Heart Attack Brother     Cancer Brother         Venal Cell-Age Unknown    Pancreatic Cancer Child 47       Subjective   REVIEW OF SYSTEMS:   Review of Systems   Constitutional: Negative for chills, diaphoresis, fatigue, fever and unexpected weight change. HENT: Negative for mouth sores, nosebleeds, sore throat, trouble swallowing and voice change. Eyes: Negative for photophobia, discharge and itching. Respiratory: Negative for cough, shortness of breath and wheezing.     Cardiovascular: Negative for chest pain, palpitations and leg swelling. Gastrointestinal: Negative for abdominal distention, abdominal pain, blood in stool, constipation, diarrhea, nausea and vomiting. Endocrine: Negative for cold intolerance, heat intolerance, polydipsia and polyuria. Genitourinary: Negative for difficulty urinating, dysuria, hematuria and urgency. Musculoskeletal: Positive for arthralgias. Negative for back pain, joint swelling and myalgias. Skin: Negative for color change and rash. Neurological: Negative for dizziness, tremors, seizures, syncope and light-headedness. Hematological: Negative for adenopathy. Does not bruise/bleed easily. Psychiatric/Behavioral: Negative for behavioral problems and suicidal ideas. The patient is not nervous/anxious. All other systems reviewed and are negative. Objective   /62   Pulse 86   Ht 5' 4\" (1.626 m)   Wt 187 lb 1.6 oz (84.9 kg)   SpO2 95%   BMI 32.12 kg/m²     PHYSICAL EXAM:  Physical Exam  Exam conducted with a chaperone present. Constitutional:       Appearance: She is well-developed. HENT:      Head: Normocephalic and atraumatic. Eyes:      General: No scleral icterus. Conjunctiva/sclera: Conjunctivae normal.   Neck:      Trachea: No tracheal deviation. Cardiovascular:      Rate and Rhythm: Normal rate and regular rhythm. Heart sounds: Normal heart sounds. No murmur heard. Pulmonary:      Effort: Pulmonary effort is normal. No respiratory distress. Breath sounds: Normal breath sounds. Chest:      Chest wall: No mass or edema. Breasts:      Right: Absent. No axillary adenopathy or supraclavicular adenopathy. Left: Absent. No axillary adenopathy or supraclavicular adenopathy. Abdominal:      General: Bowel sounds are normal. There is no distension. Palpations: Abdomen is soft. There is no splenomegaly. Tenderness: There is no abdominal tenderness.    Musculoskeletal:         General: No tenderness. Cervical back: Normal range of motion and neck supple. Comments: Full ROM in all 4 extremities   Lymphadenopathy:      Cervical: No cervical adenopathy. Right cervical: No posterior cervical adenopathy. Upper Body:      Right upper body: No supraclavicular or axillary adenopathy. Left upper body: No supraclavicular or axillary adenopathy. Lower Body: No right inguinal adenopathy. No left inguinal adenopathy. Skin:     General: Skin is warm and dry. Findings: No rash. Neurological:      Mental Status: She is alert and oriented to person, place, and time. Coordination: Coordination normal.   Psychiatric:         Behavior: Behavior normal.         Thought Content: Thought content normal.          CBC reviewed by me  Lab Results   Component Value Date    WBC 10.30 (H) 04/12/2022    HGB 13.3 04/12/2022    HCT 40.8 04/12/2022    MCV 90.5 04/12/2022     (L) 04/12/2022     Lab Results   Component Value Date    NEUTROABS 7.26 (H) 07/14/2021         VISIT DIAGNOSES  1. Malignant neoplasm of upper-outer quadrant of left breast in female, estrogen receptor positive ([address]    2. Malignant neoplasm of lower-outer quadrant of right breast of female, estrogen receptor positive ([address]    3. Long term current use of aromatase inhibitor    4. Leukocytosis, unspecified type    5. Thrombocytopenia ([address]    6. Iron deficiency anemia due to chronic blood loss        ASSESSMENT/PLAN:    1. Left Stage I (pT1c, pN0, pMx),  grade 1, ER positive HER-2/radha negative Invasive lobular carcinoma of the breast 1/29/2020  2. HX Stage II A right breast carcinoma. She has had bilateral mastectomies        Physical examination today continues to be unrevealing. She will continue her Femara 2.5 daily. CA 15-3 will be rechecked. She does report moderate intermittent hot flashes. I discussed potential use of oxybutynin 5 twice daily with her.   She will think about this and if she decides to take it she will let me know. At present she wants to hold off on taking any other medication. 3.  Leukocytosis. BCR/ABL on peripheral blood to [facility] on 10/28/2020 was negative. WBC today is stable at 10.3 with 80.6% PMN. 4.  Thrombocytopenia. Her PLT is stable at 133,000.      5.  Long-term use of aromatase inhibitor/bone health. I again encouraged her to get a bone density study. She reports that she is becoming more open to potentially getting a bone density study performed. She knows she has osteoporosis. She is taking extra vitamin D. I have discussed Prolia with her in the past, discussed this again today but she declines. I encouraged her to discuss potential oral bisphosphonate therapy with Dr. Lazaro Neff. 6.  Remote history of large B-cell immunoblastic lymphoma in 1993. She does not have any B symptoms. Serology 3/3/2021  CMP - crt 1.1 with GFR 48, Ca 10,  alk phos normal at 101  LDH  - 370 - WNL  B2M - 3.3 (0.6-2.4)  SPEP no M spike with immunofixation negative  QI IgG 1077, IgA 401, IgM 56 all WNL  Free serum light chains - kappa 37.5, lambda 26.7 with K/L ratio 1.40 (WNL)    She does not have any significant B symptoms. She does not have any peripheral lymphadenopathy, splenomegaly. 7. History of iron deficiency anemia. Hemoglobin stable at 13.3 with MCV 90.5. HEALTH MAINTENANCE    · Colon cancer screening. Her colonoscopy is up-to-date performed last on 6/28/2016 with recall in 10 years. · Gynecologic screening. She no longer gets Pap smears. She denies any pelvic pain, abnormal bleeding. Immunization History   Administered Date(s) Administered    COVID-19, [place] Sox, Primary or Immunocompromised, PF, 100mcg/0.5mL 03/10/2021, 04/08/2021         Orders Placed This Encounter   Procedures    Cancer Antigen 15-3        Return in about 4 months (around 8/12/2022) for With Michael Shaw.      Wilder Francois PA-C  12:30 PM  4/12/2022

## 2022-04-12 ENCOUNTER — HOSPITAL ENCOUNTER (OUTPATIENT)
Dept: INFUSION THERAPY | Age: 77
Discharge: HOME | End: 2022-04-12
Payer: MEDICARE

## 2022-04-12 ENCOUNTER — OFFICE VISIT (OUTPATIENT)
Dept: HEMATOLOGY | Age: 77
End: 2022-04-12
Payer: MEDICARE

## 2022-04-12 VITALS
OXYGEN SATURATION: 95 % | BODY MASS INDEX: 31.94 KG/M2 | HEIGHT: 64 IN | SYSTOLIC BLOOD PRESSURE: 115 MMHG | HEART RATE: 86 BPM | WEIGHT: 187.1 LBS | DIASTOLIC BLOOD PRESSURE: 62 MMHG

## 2022-04-12 DIAGNOSIS — Z79.811 LONG TERM CURRENT USE OF AROMATASE INHIBITOR: ICD-10-CM

## 2022-04-12 DIAGNOSIS — C50.511 MALIGNANT NEOPLASM OF LOWER-OUTER QUADRANT OF RIGHT BREAST OF FEMALE, ESTROGEN RECEPTOR POSITIVE (HCC): ICD-10-CM

## 2022-04-12 DIAGNOSIS — Z17.0 MALIGNANT NEOPLASM OF UPPER-OUTER QUADRANT OF LEFT BREAST IN FEMALE, ESTROGEN RECEPTOR POSITIVE (HCC): ICD-10-CM

## 2022-04-12 DIAGNOSIS — C50.412 MALIGNANT NEOPLASM OF UPPER-OUTER QUADRANT OF LEFT BREAST IN FEMALE, ESTROGEN RECEPTOR POSITIVE (HCC): ICD-10-CM

## 2022-04-12 DIAGNOSIS — D50.0 IRON DEFICIENCY ANEMIA DUE TO CHRONIC BLOOD LOSS: ICD-10-CM

## 2022-04-12 DIAGNOSIS — Z17.0 MALIGNANT NEOPLASM OF LOWER-OUTER QUADRANT OF RIGHT BREAST OF FEMALE, ESTROGEN RECEPTOR POSITIVE (HCC): ICD-10-CM

## 2022-04-12 DIAGNOSIS — Z17.0 MALIGNANT NEOPLASM OF UPPER-OUTER QUADRANT OF LEFT BREAST IN FEMALE, ESTROGEN RECEPTOR POSITIVE (HCC): Primary | ICD-10-CM

## 2022-04-12 DIAGNOSIS — D72.829 LEUKOCYTOSIS, UNSPECIFIED TYPE: ICD-10-CM

## 2022-04-12 DIAGNOSIS — D69.6 THROMBOCYTOPENIA (HCC): ICD-10-CM

## 2022-04-12 DIAGNOSIS — C50.412 MALIGNANT NEOPLASM OF UPPER-OUTER QUADRANT OF LEFT BREAST IN FEMALE, ESTROGEN RECEPTOR POSITIVE (HCC): Primary | ICD-10-CM

## 2022-04-12 LAB
CA 15-3: 28 U/ML (ref 0–25)
HCT VFR BLD CALC: 40.8 % (ref 34.1–44.9)
HEMOGLOBIN: 13.3 G/DL (ref 11.2–15.7)
MCH RBC QN AUTO: 29.5 PG (ref 25.6–32.2)
MCHC RBC AUTO-ENTMCNC: 32.6 G/DL (ref 32.3–35.5)
MCV RBC AUTO: 90.5 FL (ref 79.4–94.8)
PDW BLD-RTO: 13.1 % (ref 11.7–14.4)
PLATELET # BLD: 133 K/UL (ref 182–369)
PMV BLD AUTO: 10.2 FL (ref 7.4–10.4)
RBC # BLD: 4.51 M/UL (ref 3.93–5.22)
WBC # BLD: 10.3 K/UL (ref 3.98–10.04)

## 2022-04-12 PROCEDURE — 1036F TOBACCO NON-USER: CPT | Performed by: PHYSICIAN ASSISTANT

## 2022-04-12 PROCEDURE — 99214 OFFICE O/P EST MOD 30 MIN: CPT | Performed by: PHYSICIAN ASSISTANT

## 2022-04-12 PROCEDURE — 1090F PRES/ABSN URINE INCON ASSESS: CPT | Performed by: PHYSICIAN ASSISTANT

## 2022-04-12 PROCEDURE — 85027 COMPLETE CBC AUTOMATED: CPT

## 2022-04-12 PROCEDURE — G8427 DOCREV CUR MEDS BY ELIG CLIN: HCPCS | Performed by: PHYSICIAN ASSISTANT

## 2022-04-12 PROCEDURE — 99212 OFFICE O/P EST SF 10 MIN: CPT

## 2022-04-12 PROCEDURE — 1123F ACP DISCUSS/DSCN MKR DOCD: CPT | Performed by: PHYSICIAN ASSISTANT

## 2022-04-12 PROCEDURE — 36415 COLL VENOUS BLD VENIPUNCTURE: CPT

## 2022-04-12 PROCEDURE — G8399 PT W/DXA RESULTS DOCUMENT: HCPCS | Performed by: PHYSICIAN ASSISTANT

## 2022-04-12 PROCEDURE — G8417 CALC BMI ABV UP PARAM F/U: HCPCS | Performed by: PHYSICIAN ASSISTANT

## 2022-04-12 PROCEDURE — 4040F PNEUMOC VAC/ADMIN/RCVD: CPT | Performed by: PHYSICIAN ASSISTANT

## 2022-04-12 ASSESSMENT — ENCOUNTER SYMPTOMS
SORE THROAT: 0
COUGH: 0
EYE ITCHING: 0
NAUSEA: 0
ABDOMINAL PAIN: 0
DIARRHEA: 0
CONSTIPATION: 0
COLOR CHANGE: 0
PHOTOPHOBIA: 0
BACK PAIN: 0
ABDOMINAL DISTENTION: 0
EYE DISCHARGE: 0
TROUBLE SWALLOWING: 0
SHORTNESS OF BREATH: 0
BLOOD IN STOOL: 0
VOICE CHANGE: 0
VOMITING: 0
WHEEZING: 0

## 2022-05-12 RX ORDER — DILTIAZEM HYDROCHLORIDE 180 MG/1
CAPSULE, COATED, EXTENDED RELEASE ORAL
Qty: 180 CAPSULE | Refills: 3 | Status: SHIPPED | OUTPATIENT
Start: 2022-05-12

## 2022-05-12 RX ORDER — METOPROLOL SUCCINATE 25 MG/1
25 TABLET, EXTENDED RELEASE ORAL DAILY
Qty: 90 TABLET | Refills: 3 | Status: SHIPPED | OUTPATIENT
Start: 2022-05-12

## 2022-05-18 ENCOUNTER — OFFICE VISIT (OUTPATIENT)
Dept: CARDIOLOGY | Facility: CLINIC | Age: 77
End: 2022-05-18

## 2022-05-18 VITALS
DIASTOLIC BLOOD PRESSURE: 62 MMHG | WEIGHT: 187 LBS | HEART RATE: 79 BPM | SYSTOLIC BLOOD PRESSURE: 134 MMHG | BODY MASS INDEX: 31.92 KG/M2 | HEIGHT: 64 IN | OXYGEN SATURATION: 94 %

## 2022-05-18 DIAGNOSIS — I48.21 PERMANENT ATRIAL FIBRILLATION: Primary | ICD-10-CM

## 2022-05-18 DIAGNOSIS — Z95.818 PRESENCE OF WATCHMAN LEFT ATRIAL APPENDAGE CLOSURE DEVICE: ICD-10-CM

## 2022-05-18 DIAGNOSIS — I10 ESSENTIAL HYPERTENSION: ICD-10-CM

## 2022-05-18 DIAGNOSIS — E03.9 ACQUIRED HYPOTHYROIDISM: ICD-10-CM

## 2022-05-18 DIAGNOSIS — R01.1 HEART MURMUR: ICD-10-CM

## 2022-05-18 PROCEDURE — 93000 ELECTROCARDIOGRAM COMPLETE: CPT | Performed by: NURSE PRACTITIONER

## 2022-05-18 PROCEDURE — 99214 OFFICE O/P EST MOD 30 MIN: CPT | Performed by: NURSE PRACTITIONER

## 2022-05-18 RX ORDER — LOSARTAN POTASSIUM 25 MG/1
25 TABLET ORAL DAILY
Qty: 90 TABLET | Refills: 3 | Status: SHIPPED | OUTPATIENT
Start: 2022-05-18 | End: 2023-03-30

## 2022-05-18 NOTE — PROGRESS NOTES
"    Subjective:     Encounter Date:05/18/2022      Patient ID: Dianne Pritchard is a 76 y.o. female with permanent afib s/p watchman implant, carotid stenosis, HTN, DM2, bilateral breast cancer s/p mastectomy, lymphoma and obesity.    Chief Complaint: \"no complaints\"  Atrial Fibrillation  Presents for follow-up visit. Symptoms are negative for chest pain, dizziness, palpitations, shortness of breath, syncope and weakness. The symptoms have been stable. Past medical history includes atrial fibrillation.   Hypertension  This is a chronic problem. The current episode started more than 1 year ago. The problem has been rapidly improving since onset. The problem is controlled. Pertinent negatives include no chest pain, malaise/fatigue, orthopnea, palpitations, PND or shortness of breath.   Shortness of Breath  Pertinent negatives include no chest pain, leg swelling, orthopnea, PND, rash, sputum production, syncope or wheezing.     Patient presents today for a routine follow up. Patient is followed for permanent afib s/p watchman implant in 1/22/2019. She reports she has been well. At her last appointment she reported she had been off ASA for some time due to her history of GI bleeding. She was advised to resume 81mg daily. She denies bleeding issues since restarting her ASA. She continues to note chronic dyspnea with exertion that remains unchanged from previous. She notes occasional left ankle swelling that resolves overnight. She notes her A1C is elevated and has been following with her endocrinologist regarding further treatment. She denies chest pain, palpitations, orthopnea and PND.      The following portions of the patient's history were reviewed and updated as appropriate: allergies, current medications, past family history, past medical history, past social history, past surgical history and problem list.    Allergies   Allergen Reactions   • Amoxil [Amoxicillin] Hives   • Penicillins Hives   • Biaxin " "[Clarithromycin] Other (See Comments)     NOT SURE OF REACTION, \"SORE MOUTH OF DIARRHEA\"   • Lortab [Hydrocodone-Acetaminophen] GI Intolerance       Current Outpatient Medications:   •  aspirin 81 MG chewable tablet, Chew 1 tablet Daily., Disp: , Rfl:   •  dilTIAZem CD (CARDIZEM CD) 180 MG 24 hr capsule, TAKE 2 CAPSULES BY MOUTH DAILY, Disp: 180 capsule, Rfl: 3  •  Insulin Pen Needle (PEN NEEDLES) 31G X 5 MM misc, 1 each 2 (Two) Times a Day., Disp: 100 each, Rfl: 5  •  letrozole (FEMARA) 2.5 MG tablet, Take 2.5 mg by mouth Daily., Disp: , Rfl:   •  levothyroxine (SYNTHROID, LEVOTHROID) 137 MCG tablet, TAKE 1 TABLET BY MOUTH DAILY, Disp: 90 tablet, Rfl: 0  •  losartan (COZAAR) 25 MG tablet, Take 1 tablet by mouth Daily., Disp: 90 tablet, Rfl: 3  •  metoprolol succinate XL (TOPROL-XL) 25 MG 24 hr tablet, TAKE 1 TABLET BY MOUTH DAILY, Disp: 90 tablet, Rfl: 3  •  Misc. Devices (VIRAGE CUSTOM BREAST PROSTHES) misc, To be used with Mastectomy Bra-, Disp: 2 each, Rfl: 0  •  Multiple Vitamins-Minerals (CENTRUM SILVER PO), Take 1 tablet by mouth Daily., Disp: , Rfl:   •  TOUJEO SOLOSTAR 300 UNIT/ML solution pen-injector injection, INJECT 80 UNITS UNDER THE SKIN UTD QD, Disp: , Rfl: 1  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)   • Carotid artery occlusion    • Diabetes mellitus, type II (HCC)    • Dizziness    • Drug therapy    • Essential hypertension    • GERD (gastroesophageal reflux disease)    • GI bleed requiring more than 4 units of blood in 24 hours, ICU, or surgery    • History of chemotherapy    • History of lymphoma    • Hx of radiation therapy    • Hypomagnesemia    • Hypothyroidism    • On amiodarone therapy        Social History     Socioeconomic History   • Marital status:    Tobacco Use   • Smoking status: Former Smoker     Years: 20.00     Types: Cigarettes "     Quit date:      Years since quittin.3   • Smokeless tobacco: Never Used   Vaping Use   • Vaping Use: Never used   Substance and Sexual Activity   • Alcohol use: No   • Drug use: No   • Sexual activity: Defer       Review of Systems   Constitutional: Negative for malaise/fatigue, weight gain and weight loss.   Cardiovascular: Positive for dyspnea on exertion. Negative for chest pain, irregular heartbeat, leg swelling, near-syncope, orthopnea, palpitations, paroxysmal nocturnal dyspnea and syncope.   Respiratory: Negative for cough, shortness of breath, sleep disturbances due to breathing, sputum production and wheezing.    Skin: Negative for dry skin, flushing, itching and rash.   Gastrointestinal: Negative for hematemesis and hematochezia.   Neurological: Negative for dizziness, light-headedness, loss of balance and weakness.   All other systems reviewed and are negative.         Objective:     Vitals reviewed.   Constitutional:       General: Not in acute distress.     Appearance: Well-developed. Not diaphoretic.   Eyes:      General: No scleral icterus.     Conjunctiva/sclera: Conjunctivae normal.      Pupils: Pupils are equal, round, and reactive to light.   HENT:      Head: Normocephalic.    Mouth/Throat:      Pharynx: No oropharyngeal exudate.   Pulmonary:      Effort: Pulmonary effort is normal. No respiratory distress.      Breath sounds: Normal breath sounds. No wheezing. No rales.   Chest:      Chest wall: Not tender to palpatation.   Cardiovascular:      Normal rate. Irregularly irregular rhythm.      Murmurs: There is a systolic murmur.   Pulses:     Intact distal pulses.   Edema:     Peripheral edema absent.   Abdominal:      General: Bowel sounds are normal. There is no distension.      Palpations: Abdomen is soft.      Tenderness: There is no abdominal tenderness.   Musculoskeletal: Normal range of motion.      Cervical back: Normal range of motion and neck supple. Skin:     General: Skin  "is warm and dry.      Coloration: Skin is not pale.      Findings: No erythema or rash.   Neurological:      Mental Status: Alert and oriented to person, place, and time.      Deep Tendon Reflexes: Reflexes are normal and symmetric.   Psychiatric:         Behavior: Behavior normal.             ECG 12 Lead    Date/Time: 5/18/2022 11:57 AM  Performed by: Esperanza Valles APRN  Authorized by: Esperanza Valles APRN   Comparison: compared with previous ECG from 5/18/2021  Similar to previous ECG  Rhythm: atrial fibrillation  Rate: normal  BPM: 79  Conduction: conduction normal  ST Segments: ST segments normal  T Waves: T waves normal  QRS axis: normal  Other findings: T wave abnormality    Clinical impression: abnormal EKG          /62   Pulse 79   Ht 162.6 cm (64\")   Wt 84.8 kg (187 lb)   SpO2 94%   BMI 32.10 kg/m²     Lab Review:   I have reviewed previous office notes, recent labs and recent cardiac testing.       Results for orders placed during the hospital encounter of 06/20/19    Adult Transthoracic Echo Complete W/ Cont if Necessary Per Protocol    Interpretation Summary  · Left ventricular wall thickness is consistent with borderline concentric hypertrophy.  · Estimated EF = 70%.  · Left ventricular systolic function is normal.  · Left ventricular diastolic dysfunction.  · Left atrial cavity size is mildly dilated.  · Mild mitral valve stenosis is present          Assessment:          Diagnosis Plan   1. Permanent atrial fibrillation (HCC)     2. Presence of Watchman left atrial appendage closure device     3. Essential hypertension     4. Acquired hypothyroidism     5. Heart murmur  Adult Transthoracic Echo Complete w/ Color, Spectral and Contrast if necessary per protocol          Plan:       1. Permanent afib- rates controlled. Continue Toprol, Cardizem and ASA (s/p watchman).   2. Presence of watchman- 21mm SETH watchman occluder placed in 1/22/2019. Continue ASA 81mg indefinitely.   3. HTN- " controlled. Followed by PCP   4. Hypothyroidism- controlled.  followed by PCP  5. Murmur- noted on auscultation today. Mild MS noted per echo in 2019. Will repeat echo.     Follow up in 1 year or sooner if symptoms worsen.     I spent 30 minutes caring for Dianne on this date of service. This time includes time spent by me in the following activities:preparing for the visit, reviewing tests, obtaining and/or reviewing a separately obtained history, performing a medically appropriate examination and/or evaluation , documenting information in the medical record, independently interpreting results and communicating that information with the patient/family/caregiver and care coordination

## 2022-07-18 ENCOUNTER — HOSPITAL ENCOUNTER (OUTPATIENT)
Dept: CARDIOLOGY | Facility: HOSPITAL | Age: 77
Discharge: HOME | End: 2022-07-18
Admitting: NURSE PRACTITIONER

## 2022-07-18 DIAGNOSIS — R01.1 HEART MURMUR: ICD-10-CM

## 2022-07-18 PROCEDURE — 93306 TTE W/DOPPLER COMPLETE: CPT

## 2022-07-18 PROCEDURE — 93306 TTE W/DOPPLER COMPLETE: CPT | Performed by: INTERNAL MEDICINE

## 2022-07-21 LAB
BH CV ECHO MEAS - AO MAX PG: 15.4 MMHG
BH CV ECHO MEAS - AO MEAN PG: 7.2 MMHG
BH CV ECHO MEAS - AO ROOT DIAM: 2.6 CM
BH CV ECHO MEAS - AO V2 MAX: 196 CM/SEC
BH CV ECHO MEAS - AO V2 VTI: 36.7 CM
BH CV ECHO MEAS - AVA(I,D): 1.45 CM2
BH CV ECHO MEAS - EDV(CUBED): 93 ML
BH CV ECHO MEAS - EDV(MOD-SP4): 42.4 ML
BH CV ECHO MEAS - EF(MOD-SP4): 64.2 %
BH CV ECHO MEAS - ESV(CUBED): 9.8 ML
BH CV ECHO MEAS - ESV(MOD-SP4): 15.2 ML
BH CV ECHO MEAS - FS: 52.8 %
BH CV ECHO MEAS - IVS/LVPW: 1.06 CM
BH CV ECHO MEAS - IVSD: 1.06 CM
BH CV ECHO MEAS - LA DIMENSION: 4 CM
BH CV ECHO MEAS - LAT PEAK E' VEL: 8 CM/SEC
BH CV ECHO MEAS - LV DIASTOLIC VOL/BSA (35-75): 22.3 CM2
BH CV ECHO MEAS - LV MASS(C)D: 161.4 GRAMS
BH CV ECHO MEAS - LV MAX PG: 6.7 MMHG
BH CV ECHO MEAS - LV MEAN PG: 2 MMHG
BH CV ECHO MEAS - LV SYSTOLIC VOL/BSA (12-30): 8 CM2
BH CV ECHO MEAS - LV V1 MAX: 128.4 CM/SEC
BH CV ECHO MEAS - LV V1 VTI: 16.9 CM
BH CV ECHO MEAS - LVIDD: 4.5 CM
BH CV ECHO MEAS - LVIDS: 2.14 CM
BH CV ECHO MEAS - LVOT AREA: 3.1 CM2
BH CV ECHO MEAS - LVOT DIAM: 2 CM
BH CV ECHO MEAS - LVPWD: 1 CM
BH CV ECHO MEAS - MED PEAK E' VEL: 5.7 CM/SEC
BH CV ECHO MEAS - MV DEC SLOPE: 669.4 CM/SEC2
BH CV ECHO MEAS - MV DEC TIME: 0.3 MSEC
BH CV ECHO MEAS - MV E MAX VEL: 204.2 CM/SEC
BH CV ECHO MEAS - MV MAX PG: 19.7 MMHG
BH CV ECHO MEAS - MV MEAN PG: 8.4 MMHG
BH CV ECHO MEAS - MV P1/2T: 96.3 MSEC
BH CV ECHO MEAS - MV V2 VTI: 52.8 CM
BH CV ECHO MEAS - MVA(P1/2T): 2.28 CM2
BH CV ECHO MEAS - MVA(VTI): 1.01 CM2
BH CV ECHO MEAS - PA V2 MAX: 48.5 CM/SEC
BH CV ECHO MEAS - RAP SYSTOLE: 5 MMHG
BH CV ECHO MEAS - RVSP: 30.2 MMHG
BH CV ECHO MEAS - SI(MOD-SP4): 14.3 ML/M2
BH CV ECHO MEAS - SV(LVOT): 53.1 ML
BH CV ECHO MEAS - SV(MOD-SP4): 27.2 ML
BH CV ECHO MEAS - TR MAX PG: 25.2 MMHG
BH CV ECHO MEAS - TR MAX VEL: 251 CM/SEC
BH CV ECHO MEASUREMENTS AVERAGE E/E' RATIO: 29.81
LEFT ATRIUM VOLUME INDEX: 46.8 ML/M2
LEFT ATRIUM VOLUME: 89 ML
LV EF 2D ECHO EST: 65 %
MAXIMAL PREDICTED HEART RATE: 143 BPM
STRESS TARGET HR: 122 BPM

## 2022-07-22 ENCOUNTER — TELEPHONE (OUTPATIENT)
Dept: CARDIOLOGY | Facility: CLINIC | Age: 77
End: 2022-07-22

## 2022-07-22 NOTE — TELEPHONE ENCOUNTER
Attempted to reach the patient by phone with no answer and message is left for her to call back.  Notification of echo result letter is mailed to the patient.  Laura Silverman MA

## 2022-07-22 NOTE — TELEPHONE ENCOUNTER
----- Message from ДМИТРИЙ Best sent at 7/21/2022  3:46 PM CDT -----  Please let patient know her aortic valve is a little more narrow than it was 3 years ago. We will continue to watch it with yearly echos. No change in medications or treatment at this time.

## 2022-08-15 NOTE — PROGRESS NOTES
Progress Note      Pt Name: Josette Le  YOB: 1945  MRN: [id number]    Date of evaluation: 8/17/2022  History Obtained From:  patient, electronic medical record    CHIEF COMPLAINT:    Chief Complaint   Patient presents with    Follow-up     Malignant neoplasm of upper-outer quadrant of left breast in female, estrogen receptor positive ([facility] [address]     Current active problems  History of breast cancer  Remote history immunoblastic lymphoma  Thrombocytopenia    HISTORY OF PRESENT ILLNESS:    Josette Le is a 68 y.o.  female with right stage II A breast carcinoma from 3/21/2012. She was also diagnosed with a left stage I breast carcinoma 1/29/2020 and did have a left simple mastectomy. She continues taking her Femara 2.5 mg daily. She does not have any significant side effects. She does have mild hot flashes which have improved these are only at night. She denies feeling any nodules on her chest wall or any lymph nodes in the axillary regions. She did have a small skin cancer excised from her left antecubital region by Dr. Rosen. She does have significant osteoarthritis which basically is stable. She does have fatigue issues related to her atrial fibrillation. She continues on Cardizem CD and metoprolol. She is diabetic and reports that her A1c has been improving. She does have hypothyroidism and is on Synthroid 137 mcg daily. TARGET LYMPHOMA SITES:  Left supraclavicular area. Left axilla. 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 Irma Delarosa with a left supraclavicular lymph node. She was referred to Dr. Emily Orozco who performed a biopsy on 10/13/93 that revealed a large cell malignant immunoblastic lymphoma, CD20 was 53% positive  A bone marrow biopsy and aspirate on 11/1/1993 was negative for any evidence of malignant lymphoma. She received 6 cycles of chemotherapy with CHOP/Bleomycin.  The last 3 cycles were done without Vincristine due to peripheral neuropathy problems. Following the chemotherapy she received XRT to the neck and upper chest due to the stage II-A presentation in the left supraclavicular area and left axilla. She received a total of 3,960cGy under the direction of Dr. Lena Ruff that was completed on 6/22/94. TREATMENT SUMMARY:  CHOP/ Bleomycin x 6. Mantle radiation therapy. 2ND PRIMARY TUMOR: Right stage II (pT2 N1 M0) infiltrating high grade breast cancer 03/21/12  Tram had an abnormal mammogram at [facility] on 02/22/12 revealing a 1.6 cm worrisome mass at the 7:00 position in the right lower quadrant of the right breast. On 03/21/12 Dr. Daphney Mclaughlin took Tram to the OR and performed an intraoperative biopsy with frozen section that revealed an infiltrating breast cancer. The procedure then followed immediately with a right modified radical mastectomy with pathology revealing an invasive ductal carcinoma, high grade, grade III 3.3 cm tumor. Nineteen right axillary lymph nodes were submitted, one was positive for metastatic carcinoma. This places Tram in a stage II (pT2 N1 M0) breast cancer. The ER is positive, AK is also positive, Her2 Scottie by IHC is negative and Her2 Scottie by FISH is unamplified. 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 In addition to that she had mantle radiation therapy. Although the 2D echo shows an EF of 60%, I discussed with Tram the concerns of anthracycline based therapy and cardiac disease and she agrees and desires to receive adjuvant therapy without an anthracycline. Taxotere and Cytoxan x 6 cycles are recommended and initiated on 04/20/12. The last cycle of chemotherapy was delivered on 08/09/12. Femara is initiated on 09/06/12 with 10 years planned. TREATMENT SUMMARY:  Right modified radical mastectomy 03/21/12. Taxotere and Cytoxan given 04/20/12 through 08/09/12 x 6 cycles.    Femara is initiated on 09/06/12. 3RD PRIMARY TUMOR: Left Stage I (pT1c, pN0, pMx),  grade 1, ER positive HER-2/radha negative Invasive lobular carcinoma of the breast 1/29/2020  Tram was seen in routine follow-up on 12/20/2019 and physical exam revealed a 4 x 5 cm palpable mass anteriorly in the left axillary region. Ultrasound left breast at [facility] on 1/10/2020 revealed an oblong 4.7 x 1.3 x 5.5 cm fat-containing mass in the upper outer quadrant of the left breast felt to be stable from a prior mammogram and likely representing a hamartoma. A 1.3 x 1 cm irregular nodularity with associated architectural distortion in the upper outer quadrant. Spot compression mammogram 1/10/2020 revealed a developing focus of irregular nodularity measuring 1.3 x 1 cm in the upper outer quadrant of the left breast with malignant features with ultrasound-guided biopsy recommended- BI-RADS 5. The palpable abnormality on examination appears to be a benign hamartoma. However there was an irregular focus that is suspicious in the upper outer quadrant of the left breast and she will be referred to surgery for evaluation. Her CA-15-3 above which is minimally elevated a 25.5 and lymphoma markers were negative. Referral was made for her to see Dr. Rafia Clements at her 1/17/2020 clinic visit. CA-15-3 25.5 (0-25)     US-guided biopsy of the 1.4 x 1.1 x 0.80 cm left breast mass was performed on 1/29/2020 by Dr. Rafia Clements. Pathology revealed the following:  Invasive lobular carcinoma, low-grade  Tumor measures at least 9 mm in greatest linear  Tumor invades present in multiple core biopsies  Adjacent intraductal papilloma with apocrine metaplasia     IHC quantative breast panel is as follows:  ER: positive 94%  IN: negative 0.2%  HER2: negative, Score 1+  KI-67: low, 8%     Mammaprint on 1/29/2020 resulted a LOW RISK luminal-type (A).  Brooks Osler has a 97.8% risk of living without distant recurrence of breast cancer at 5-years if treated with adjuvant endocrine therapy alone. On 3/10/2020 Dr. Gilma Saldana performed the following procedures:  Injection of radionuclide. Lymphatic mapping. Left-sided pectoral block. Left simple mastectomy. Left sentinel lymph node biopsy. Pathology revealed the following:  Breast, left mastectomy:  Invasive lobular carcinoma, grade 1  Invasive carcinoma measures 1.6 cm in greatest dimension. Invasive carcinoma is located greater than 1.0 cm from the nearest deep surgical excision margin. Incidental complex sclerosing lesion, margins negative. Changes consistent with fibrocystic mastopathy involving nonneoplastic breast parenchyma  Sections of skin, negative for evidence of malignancy. Sections of nipple, negative for evidence of malignancy  Lymph node, left sentinel lymph node biopsy:   10 lymph nodes, negative for evidence of malignancy      AJCC STAGE: pT1c, pN0, pMx     Adjuvant endocrine therapy with aromatase inhibitor letrozole (Femara) 2.5 mg daily is prescribed (continued)      1st HEMATOLOGY HISTORY: Iron deficiency/ Acute GI bleed secondary to gastric AVM, 06/27/16  Mrs. Aponte presented to [facility] Emergency Department on 06/24/16 with weakness, fatigue and exertional dyspnea. She was profoundly anemic with a hemoglobin of 6.0, MCV of 84.3. WBC and platelets were normal at 7.97 and 163,000, respectively. GI evaluation was sought, with an endoscopy on 06/27/16 by Dr. Juliano Narayanan remarkable for angiodysplastic lesions of the gastric body. Colonoscopy on 06/28/16 was negative. Mrs. Aponte was transfused as warranted, given parenteral iron supplementation and anticoagulation with Xarelto for paroxysmal atrial fibrillation was discontinued due to UGI bleed with AVM. 2nd HEMATOLOGY HISTORY: Thrombocytopenia  Tram fam had a chronically low platelet count between 120 and 150.   DEONDRE - negative  Antiplatelet antibody - negative  SPEP - negative  Hepatitis A, B, C - negative    Serology 10/28/2020  Folate = 22  B12 = 12        Past Medical History:   Diagnosis Date    Arteriovenous malformation, other site     Atrial fibrillation ([facility] 04/28/2016    sees dr. Bryan Bowie [facility])     breast; surgery and chemo x 2 and radiation    COPD (chronic obstructive pulmonary disease) ([facility]     Diabetes mellitus ([facility]     H/O screening mammography 07/08/2019    Deven Saavedra     H/O: GI bleed 06/24/2016 2016 while on xarelto for a.fib; no longer on any anti-coags.  Xarelto discontinued     Hypertension     Hypothyroidism     Iron deficiency anemia     Kidney disease     sees dr. Cotton Post [facility]) Meghan Lomas    sees dr. Tere Clayton    Osteoarthritis     Osteopenia of the elderly     Osteoporosis     Post-menopausal     Thrombocytopenia ([facility]     Thyroid disease         Past Surgical History:   Procedure Laterality Date    BREAST SURGERY      right mastectomy; no sticks/bp right arm    CARDIAC SURGERY      \"watchman's device\" per dr. Pierre Her    COLONOSCOPY  06/28/2016    per  recall 10 years     ENDOSCOPY, COLON, DIAGNOSTIC      IVC FILTER INSERTION      JOINT REPLACEMENT      LYMPH NODE BIOPSY Left     Supraclavicular     MASTECTOMY Left 3/10/2020    FLAPS WITH LEFT MASTECTOMY, SENTINEL NODE BIOPSY, PEC BLOCK performed by Krissy Shea MD at [address] [place] [address] Right 9/7/2017    KNEE TOTAL ARTHROPLASTY performed by Ronnie Coats MD at [facility]    TUNNELED VENOUS PORT PLACEMENT      x2 and removed           Current Outpatient Medications:     letrozole (FEMARA) 2.5 MG tablet, TAKE 1 TABLET BY MOUTH DAILY, Disp: 90 tablet, Rfl: 3    Insulin Glargine, 2 Unit Dial, 300 UNIT/ML SOPN, Inject 80 Units into the skin daily Indications: Diabetes, Disp: , Rfl:     levothyroxine (SYNTHROID) 137 MCG tablet, Take 137 mcg by mouth Daily Indications: Underactive Thyroid, Disp: , Rfl:     dilTIAZem (CARDIZEM CD) 180 MG extended release capsule, Take 360 mg by mouth daily Indications: High Blood Pressure Disorder, Disp: , Rfl:     metoprolol tartrate (LOPRESSOR) 25 MG tablet, Take 25 mg by mouth daily Indications: High Blood Pressure Disorder, Disp: , Rfl:     aspirin EC 81 MG EC tablet, Take 1 tablet by mouth 2 times daily, Disp: 60 tablet, Rfl: 0    losartan (COZAAR) 25 MG tablet, Take 25 mg by mouth daily Indications: High Blood Pressure Disorder , Disp: , Rfl:     Multiple Vitamins-Minerals (MULTI FOR HER PO), Take 1 tablet by mouth daily , Disp: , Rfl:     vitamin D (CHOLECALCIFEROL) 1000 UNIT TABS tablet, Take 1,000 Units by mouth daily, Disp: , Rfl:      Allergies   Allergen Reactions    Hydrocodone-Acetaminophen Nausea And Vomiting    Amoxicillin Hives    Clarithromycin     Penicillins Hives    Multihance [Gadobenate] Nausea Only     NAUSEOUS AFTER MRI CONTRAST (MULTIHANCE)       Social History     Tobacco Use    Smoking status: Former     Types: Cigarettes     Quit date: 1993     Years since quittin.8    Smokeless tobacco: Never   Vaping Use    Vaping Use: Never used   Substance Use Topics    Alcohol use: No    Drug use: No       Family History   Problem Relation Age of Onset    Heart Disease Mother     Stroke Mother     Coronary Art Dis Mother     Cancer Father         Lung-Age Unknown    Cancer Paternal Aunt         Breast-Age Unknown    Lung Cancer Sister         Non-small cell lung cancer-Age Unknown    Heart Attack Brother     Cancer Brother         Venal Cell-Age Unknown    Pancreatic Cancer Child 47       Subjective   REVIEW OF SYSTEMS:   Review of Systems   Constitutional:  Negative for chills, diaphoresis, fatigue, fever and unexpected weight change. HENT:  Negative for mouth sores, nosebleeds, sore throat, trouble swallowing and voice change. Eyes:  Negative for photophobia, discharge and itching. Respiratory:  Negative for cough, shortness of breath and wheezing. Cardiovascular:  Negative for chest pain, palpitations and leg swelling.    Gastrointestinal:  Negative for abdominal distention, abdominal pain, blood in stool, constipation, diarrhea, nausea and vomiting. Endocrine: Negative for cold intolerance, heat intolerance, polydipsia and polyuria. Genitourinary:  Negative for difficulty urinating, dysuria, hematuria and urgency. Musculoskeletal:  Positive for arthralgias. Negative for back pain, joint swelling and myalgias. Skin:  Negative for color change and rash. Neurological:  Negative for dizziness, tremors, seizures, syncope and light-headedness. Hematological:  Negative for adenopathy. Does not bruise/bleed easily. Psychiatric/Behavioral:  Negative for behavioral problems and suicidal ideas. The patient is not nervous/anxious. All other systems reviewed and are negative. Objective   BP (!) 150/86 (Site: Right Upper Arm, Position: Sitting)   Pulse 65   Ht 5' 4\" (1.626 m)   Wt 186 lb 3.2 oz (84.5 kg)   SpO2 95%   BMI 31.96 kg/m²     PHYSICAL EXAM:  Physical Exam  Exam conducted with a chaperone present. Constitutional:       Appearance: She is well-developed. HENT:      Head: Normocephalic and atraumatic. Eyes:      General: No scleral icterus. Conjunctiva/sclera: Conjunctivae normal.   Neck:      Trachea: No tracheal deviation. Cardiovascular:      Rate and Rhythm: Normal rate and regular rhythm. Heart sounds: Normal heart sounds. No murmur heard. Pulmonary:      Effort: Pulmonary effort is normal. No respiratory distress. Breath sounds: Normal breath sounds. Chest:      Chest wall: No mass or edema. Breasts:     Right: Absent. No axillary adenopathy or supraclavicular adenopathy. Left: Absent. No axillary adenopathy or supraclavicular adenopathy. Abdominal:      General: Bowel sounds are normal. There is no distension. Palpations: Abdomen is soft. There is no splenomegaly. Tenderness: There is no abdominal tenderness. Musculoskeletal:         General: No tenderness.       Cervical back: Normal range of motion and neck supple. Lymphadenopathy:      Cervical: No cervical adenopathy. Right cervical: No posterior cervical adenopathy. Upper Body:      Right upper body: No supraclavicular or axillary adenopathy. Left upper body: No supraclavicular or axillary adenopathy. Lower Body: No right inguinal adenopathy. No left inguinal adenopathy. Comments: Lipoma right supraclavicular area   Skin:     General: Skin is warm and dry. Findings: No rash. Neurological:      Mental Status: She is alert and oriented to person, place, and time. Coordination: Coordination normal.   Psychiatric:         Behavior: Behavior normal.         Thought Content: Thought content normal.        CBC reviewed by me  Lab Results   Component Value Date    WBC 10.65 (H) 08/17/2022    HGB 14.4 08/17/2022    HCT 45.0 (H) 08/17/2022    MCV 90.2 08/17/2022     (L) 08/17/2022     Lab Results   Component Value Date    NEUTROABS 8.56 (H) 08/17/2022         VISIT DIAGNOSES  1. Malignant neoplasm of upper-outer quadrant of left breast in female, estrogen receptor positive ([address]    2. Malignant neoplasm of lower-outer quadrant of right breast of female, estrogen receptor positive ([address]    3. Long term current use of aromatase inhibitor    4. Leukocytosis, unspecified type    5. Thrombocytopenia ([address]    6. Iron deficiency anemia due to chronic blood loss        ASSESSMENT/PLAN:    1. Left Stage I (pT1c, pN0, pMx),  grade 1, ER positive HER-2/radha negative Invasive lobular carcinoma of the breast 1/29/2020  2. HX Stage II A right breast carcinoma. She has had bilateral mastectomies        Physical examination does not reveal any evidence of recurrence. She has a mild increase in her CA 15-3 on 4/12/2022. This will be rechecked today. 3.  Leukocytosis. BCR/ABL on peripheral blood to [facility] on 10/28/2020 was negative. WBC today stable at 10.65. 4.  Thrombocytopenia.           ,000 today which is stable. I am rechecking a B12 level today. 5.  Long-term use of aromatase inhibitor/bone health. Previously documented osteoporosis. She is taking vitamin D, she only takes a multivitamin with calcium. Her CMP's have revealed calcium to be high normal.  She is not on any bisphosphonates or any treatment of her osteoporosis and she has declined to consider any treatment. I am continuing to defer her to discuss this with Dr. Jonathon Perez. 6.  Remote history of large B-cell immunoblastic lymphoma in 1993. Serology 3/3/2021  CMP - crt 1.1 with GFR 48, Ca 10,  alk phos normal at 101  LDH  - 370 - WNL  B2M - 3.3 (0.6-2.4)  SPEP no M spike with immunofixation negative  QI IgG 1077, IgA 401, IgM 56 all WNL  Free serum light chains - kappa 37.5, lambda 26.7 with K/L ratio 1.40 (WNL)    She denies any significant extreme fatigue, weight loss, pruritus. She does not have any night sweats-she does have hot flashes at night. She does not have any palpable peripheral adenopathy, hepatosplenomegaly. Serology above will be repeated. 7. History of iron deficiency anemia. Hemoglobin 14.1 with MCV 90.2. HEALTH MAINTENANCE    Colon cancer screening. Her colonoscopy is up-to-date performed last on 6/28/2016 with recall in 10 years. Gynecologic screening. She no longer gets Pap smears. She denies any pelvic pain, abnormal bleeding. Immunization History   Administered Date(s) Administered    COVID-19, MODERNA BLUE border, Primary or Immunocompromised, (age 12y+), IM, 100 mcg/0.5mL 03/10/2021, 04/08/2021         Orders Placed This Encounter   Procedures    Comprehensive Metabolic Panel    MONOCLONAL PROTEIN AND FLC, SERUM    Lactate Dehydrogenase    Vitamin B12    Cancer Antigen 15-3        Return in about 4 months (around 12/17/2022) for With Michael Rogers PA-C  1:07 PM  8/17/2022

## 2022-08-16 DIAGNOSIS — D72.829 LEUKOCYTOSIS, UNSPECIFIED TYPE: Primary | ICD-10-CM

## 2022-08-16 DIAGNOSIS — D50.0 IRON DEFICIENCY ANEMIA DUE TO CHRONIC BLOOD LOSS: ICD-10-CM

## 2022-08-16 DIAGNOSIS — D69.6 THROMBOCYTOPENIA (HCC): ICD-10-CM

## 2022-08-17 ENCOUNTER — HOSPITAL ENCOUNTER (OUTPATIENT)
Dept: INFUSION THERAPY | Age: 77
Discharge: HOME | End: 2022-08-17
Payer: MEDICARE

## 2022-08-17 ENCOUNTER — OFFICE VISIT (OUTPATIENT)
Dept: HEMATOLOGY | Age: 77
End: 2022-08-17
Payer: MEDICARE

## 2022-08-17 VITALS
HEIGHT: 64 IN | SYSTOLIC BLOOD PRESSURE: 150 MMHG | DIASTOLIC BLOOD PRESSURE: 86 MMHG | HEART RATE: 65 BPM | BODY MASS INDEX: 31.79 KG/M2 | WEIGHT: 186.2 LBS | OXYGEN SATURATION: 95 %

## 2022-08-17 DIAGNOSIS — Z17.0 MALIGNANT NEOPLASM OF UPPER-OUTER QUADRANT OF LEFT BREAST IN FEMALE, ESTROGEN RECEPTOR POSITIVE (HCC): Primary | ICD-10-CM

## 2022-08-17 DIAGNOSIS — Z17.0 MALIGNANT NEOPLASM OF UPPER-OUTER QUADRANT OF LEFT BREAST IN FEMALE, ESTROGEN RECEPTOR POSITIVE (HCC): ICD-10-CM

## 2022-08-17 DIAGNOSIS — Z17.0 MALIGNANT NEOPLASM OF LOWER-OUTER QUADRANT OF RIGHT BREAST OF FEMALE, ESTROGEN RECEPTOR POSITIVE (HCC): ICD-10-CM

## 2022-08-17 DIAGNOSIS — C50.412 MALIGNANT NEOPLASM OF UPPER-OUTER QUADRANT OF LEFT BREAST IN FEMALE, ESTROGEN RECEPTOR POSITIVE (HCC): Primary | ICD-10-CM

## 2022-08-17 DIAGNOSIS — D69.6 THROMBOCYTOPENIA (HCC): ICD-10-CM

## 2022-08-17 DIAGNOSIS — C50.511 MALIGNANT NEOPLASM OF LOWER-OUTER QUADRANT OF RIGHT BREAST OF FEMALE, ESTROGEN RECEPTOR POSITIVE (HCC): ICD-10-CM

## 2022-08-17 DIAGNOSIS — Z79.811 LONG TERM CURRENT USE OF AROMATASE INHIBITOR: ICD-10-CM

## 2022-08-17 DIAGNOSIS — D50.0 IRON DEFICIENCY ANEMIA DUE TO CHRONIC BLOOD LOSS: ICD-10-CM

## 2022-08-17 DIAGNOSIS — C50.412 MALIGNANT NEOPLASM OF UPPER-OUTER QUADRANT OF LEFT BREAST IN FEMALE, ESTROGEN RECEPTOR POSITIVE (HCC): ICD-10-CM

## 2022-08-17 DIAGNOSIS — D72.829 LEUKOCYTOSIS, UNSPECIFIED TYPE: ICD-10-CM

## 2022-08-17 LAB
ALBUMIN SERPL-MCNC: 4.7 G/DL (ref 3.5–5.2)
ALP BLD-CCNC: 116 U/L (ref 35–104)
ALT SERPL-CCNC: 16 U/L (ref 9–52)
ANION GAP SERPL CALCULATED.3IONS-SCNC: 11 MMOL/L (ref 7–19)
AST SERPL-CCNC: 30 U/L (ref 14–36)
BASOPHILS ABSOLUTE: 0.06 K/UL (ref 0.01–0.08)
BASOPHILS RELATIVE PERCENT: 0.6 % (ref 0.1–1.2)
BILIRUB SERPL-MCNC: 0.6 MG/DL (ref 0.2–1.3)
BUN BLDV-MCNC: 19 MG/DL (ref 7–17)
CA 15-3: 27 U/ML (ref 0–25)
CALCIUM SERPL-MCNC: 10.6 MG/DL (ref 8.4–10.2)
CHLORIDE BLD-SCNC: 97 MMOL/L (ref 98–111)
CO2: 32 MMOL/L (ref 22–29)
CREAT SERPL-MCNC: 1.1 MG/DL (ref 0.5–1)
EOSINOPHILS ABSOLUTE: 0.17 K/UL (ref 0.04–0.54)
EOSINOPHILS RELATIVE PERCENT: 1.6 % (ref 0.7–7)
GFR NON-AFRICAN AMERICAN: 48
GLUCOSE BLD-MCNC: 133 MG/DL (ref 74–106)
HCT VFR BLD CALC: 45 % (ref 34.1–44.9)
HEMOGLOBIN: 14.4 G/DL (ref 11.2–15.7)
LACTATE DEHYDROGENASE: 387 U/L (ref 313–618)
LYMPHOCYTES ABSOLUTE: 1.2 K/UL (ref 1.18–3.74)
LYMPHOCYTES RELATIVE PERCENT: 11.3 % (ref 19.3–53.1)
MCH RBC QN AUTO: 28.9 PG (ref 25.6–32.2)
MCHC RBC AUTO-ENTMCNC: 32 G/DL (ref 32.3–35.5)
MCV RBC AUTO: 90.2 FL (ref 79.4–94.8)
MONOCYTES ABSOLUTE: 0.62 K/UL (ref 0.24–0.82)
MONOCYTES RELATIVE PERCENT: 5.8 % (ref 4.7–12.5)
NEUTROPHILS ABSOLUTE: 8.56 K/UL (ref 1.56–6.13)
NEUTROPHILS RELATIVE PERCENT: 80.3 % (ref 34–71.1)
PDW BLD-RTO: 13.9 % (ref 11.7–14.4)
PLATELET # BLD: 124 K/UL (ref 182–369)
PMV BLD AUTO: 10.9 FL (ref 7.4–10.4)
POTASSIUM SERPL-SCNC: 4.4 MMOL/L (ref 3.5–5.1)
RBC # BLD: 4.99 M/UL (ref 3.93–5.22)
SODIUM BLD-SCNC: 140 MMOL/L (ref 137–145)
TOTAL PROTEIN: 8 G/DL (ref 6.3–8.2)
VITAMIN B-12: 999 PG/ML (ref 211–946)
WBC # BLD: 10.65 K/UL (ref 3.98–10.04)

## 2022-08-17 PROCEDURE — 36415 COLL VENOUS BLD VENIPUNCTURE: CPT | Performed by: PHYSICIAN ASSISTANT

## 2022-08-17 PROCEDURE — G8427 DOCREV CUR MEDS BY ELIG CLIN: HCPCS | Performed by: PHYSICIAN ASSISTANT

## 2022-08-17 PROCEDURE — G8399 PT W/DXA RESULTS DOCUMENT: HCPCS | Performed by: PHYSICIAN ASSISTANT

## 2022-08-17 PROCEDURE — 99212 OFFICE O/P EST SF 10 MIN: CPT

## 2022-08-17 PROCEDURE — 1036F TOBACCO NON-USER: CPT | Performed by: PHYSICIAN ASSISTANT

## 2022-08-17 PROCEDURE — 36415 COLL VENOUS BLD VENIPUNCTURE: CPT

## 2022-08-17 PROCEDURE — 80053 COMPREHEN METABOLIC PANEL: CPT

## 2022-08-17 PROCEDURE — 1123F ACP DISCUSS/DSCN MKR DOCD: CPT | Performed by: PHYSICIAN ASSISTANT

## 2022-08-17 PROCEDURE — 1090F PRES/ABSN URINE INCON ASSESS: CPT | Performed by: PHYSICIAN ASSISTANT

## 2022-08-17 PROCEDURE — 99214 OFFICE O/P EST MOD 30 MIN: CPT | Performed by: PHYSICIAN ASSISTANT

## 2022-08-17 PROCEDURE — G8417 CALC BMI ABV UP PARAM F/U: HCPCS | Performed by: PHYSICIAN ASSISTANT

## 2022-08-17 PROCEDURE — 85025 COMPLETE CBC W/AUTO DIFF WBC: CPT

## 2022-08-17 PROCEDURE — 83615 LACTATE (LD) (LDH) ENZYME: CPT

## 2022-08-17 ASSESSMENT — ENCOUNTER SYMPTOMS
DIARRHEA: 0
EYE ITCHING: 0
NAUSEA: 0
ABDOMINAL PAIN: 0
COLOR CHANGE: 0
VOMITING: 0
EYE DISCHARGE: 0
SORE THROAT: 0
TROUBLE SWALLOWING: 0
BACK PAIN: 0
CONSTIPATION: 0
COUGH: 0
BLOOD IN STOOL: 0
ABDOMINAL DISTENTION: 0
WHEEZING: 0
VOICE CHANGE: 0
SHORTNESS OF BREATH: 0
PHOTOPHOBIA: 0

## 2022-08-18 ENCOUNTER — TELEPHONE (OUTPATIENT)
Dept: HEMATOLOGY | Age: 77
End: 2022-08-18

## 2022-08-18 DIAGNOSIS — E83.52 SERUM CALCIUM ELEVATED: Primary | ICD-10-CM

## 2022-08-21 LAB
+IMM: ABNORMAL
ALBUMIN SERPL-MCNC: 3.75 G/DL (ref 3.75–5.01)
ALPHA-1-GLOBULIN: 0.41 G/DL (ref 0.19–0.46)
ALPHA-2-GLOBULIN: 1.02 G/DL (ref 0.48–1.05)
BETA GLOBULIN: 1.09 G/DL (ref 0.48–1.1)
GAMMA GLOBULIN: 1.24 G/DL (ref 0.62–1.51)
IGA: 431 MG/DL (ref 68–408)
IGG: 1170 MG/DL (ref 768–1632)
IGM: 64 MG/DL (ref 35–263)
KAPPA FREE LIGHT CHAINS QNT: 49.57 MG/L (ref 3.3–19.4)
KAPPA/LAMBDA FREE LIGHT CHAIN RATIO: 1.51 (ref 0.26–1.65)
LAMBDA FREE LIGHT CHAINS QNT: 32.78 MG/L (ref 5.71–26.3)
SPE/IFE INTERPRETATION: ABNORMAL
TOTAL PROTEIN: 7.5 G/DL (ref 6.3–8.2)

## 2022-09-06 ENCOUNTER — TELEPHONE (OUTPATIENT)
Dept: HEMATOLOGY | Age: 77
End: 2022-09-06

## 2022-09-06 ENCOUNTER — HOSPITAL ENCOUNTER (OUTPATIENT)
Dept: INFUSION THERAPY | Age: 77
Discharge: HOME | End: 2022-09-06
Payer: MEDICARE

## 2022-09-06 DIAGNOSIS — D72.829 LEUKOCYTOSIS, UNSPECIFIED TYPE: ICD-10-CM

## 2022-09-06 DIAGNOSIS — E83.52 SERUM CALCIUM ELEVATED: ICD-10-CM

## 2022-09-06 DIAGNOSIS — D50.0 IRON DEFICIENCY ANEMIA DUE TO CHRONIC BLOOD LOSS: ICD-10-CM

## 2022-09-06 DIAGNOSIS — D69.6 THROMBOCYTOPENIA (HCC): ICD-10-CM

## 2022-09-06 LAB
ALBUMIN SERPL-MCNC: 4.5 G/DL (ref 3.5–5.2)
ALP BLD-CCNC: 111 U/L (ref 35–104)
ALT SERPL-CCNC: 17 U/L (ref 9–52)
ANION GAP SERPL CALCULATED.3IONS-SCNC: 11 MMOL/L (ref 7–19)
AST SERPL-CCNC: 30 U/L (ref 14–36)
BASOPHILS ABSOLUTE: 0.05 K/UL (ref 0.01–0.08)
BASOPHILS RELATIVE PERCENT: 0.6 % (ref 0.1–1.2)
BILIRUB SERPL-MCNC: 0.5 MG/DL (ref 0.2–1.3)
BUN BLDV-MCNC: 22 MG/DL (ref 7–17)
CALCIUM SERPL-MCNC: 9.7 MG/DL (ref 8.4–10.2)
CHLORIDE BLD-SCNC: 100 MMOL/L (ref 98–111)
CO2: 29 MMOL/L (ref 22–29)
CREAT SERPL-MCNC: 1.1 MG/DL (ref 0.5–1)
EOSINOPHILS ABSOLUTE: 0.14 K/UL (ref 0.04–0.54)
EOSINOPHILS RELATIVE PERCENT: 1.5 % (ref 0.7–7)
GFR NON-AFRICAN AMERICAN: 48
GLOBULIN: 3.6 G/DL
GLUCOSE BLD-MCNC: 168 MG/DL (ref 74–106)
HCT VFR BLD CALC: 44.3 % (ref 34.1–44.9)
HEMOGLOBIN: 13.7 G/DL (ref 11.2–15.7)
LYMPHOCYTES ABSOLUTE: 1.07 K/UL (ref 1.18–3.74)
LYMPHOCYTES RELATIVE PERCENT: 11.8 % (ref 19.3–53.1)
MCH RBC QN AUTO: 28.5 PG (ref 25.6–32.2)
MCHC RBC AUTO-ENTMCNC: 30.9 G/DL (ref 32.3–35.5)
MCV RBC AUTO: 92.3 FL (ref 79.4–94.8)
MONOCYTES ABSOLUTE: 0.57 K/UL (ref 0.24–0.82)
MONOCYTES RELATIVE PERCENT: 6.3 % (ref 4.7–12.5)
NEUTROPHILS ABSOLUTE: 7.2 K/UL (ref 1.56–6.13)
NEUTROPHILS RELATIVE PERCENT: 79.4 % (ref 34–71.1)
PARATHYROID HORMONE INTACT: 58 PG/ML (ref 15–65)
PDW BLD-RTO: 13.9 % (ref 11.7–14.4)
PLATELET # BLD: 108 K/UL (ref 182–369)
PMV BLD AUTO: 11.2 FL (ref 7.4–10.4)
POTASSIUM SERPL-SCNC: 4.9 MMOL/L (ref 3.5–5.1)
RBC # BLD: 4.8 M/UL (ref 3.93–5.22)
SODIUM BLD-SCNC: 140 MMOL/L (ref 137–145)
TOTAL PROTEIN: 8.1 G/DL (ref 6.3–8.2)
WBC # BLD: 9.07 K/UL (ref 3.98–10.04)

## 2022-09-06 PROCEDURE — 85025 COMPLETE CBC W/AUTO DIFF WBC: CPT

## 2022-09-06 PROCEDURE — 36415 COLL VENOUS BLD VENIPUNCTURE: CPT

## 2022-09-06 PROCEDURE — 80053 COMPREHEN METABOLIC PANEL: CPT

## 2022-09-06 NOTE — TELEPHONE ENCOUNTER
----- Message from Sam Miller PA-C sent at 9/6/2022  1:07 PM CDT -----  Okay for her to resume her calcium every other day

## 2022-09-08 LAB
ALK PHOS OTHER CALC: 0 U/L
ALK PHOSPHATASE: 111 U/L (ref 40–120)
ALKALINE PHOSPHATASE BONE FRACTION: 38 U/L (ref 0–55)
ALKALINE PHOSPHATASE LIVER FRACTION: 73 U/L (ref 0–94)

## 2022-09-27 ENCOUNTER — OFFICE VISIT (OUTPATIENT)
Dept: FAMILY MEDICINE CLINIC | Facility: CLINIC | Age: 77
End: 2022-09-27

## 2022-09-27 VITALS
HEIGHT: 64 IN | WEIGHT: 182 LBS | OXYGEN SATURATION: 90 % | SYSTOLIC BLOOD PRESSURE: 158 MMHG | HEART RATE: 86 BPM | DIASTOLIC BLOOD PRESSURE: 80 MMHG | BODY MASS INDEX: 31.07 KG/M2

## 2022-09-27 DIAGNOSIS — Z23 NEED FOR PNEUMOCOCCAL VACCINE: ICD-10-CM

## 2022-09-27 DIAGNOSIS — E11.59 TYPE 2 DIABETES MELLITUS WITH OTHER CIRCULATORY COMPLICATIONS: ICD-10-CM

## 2022-09-27 DIAGNOSIS — Z12.11 SCREEN FOR COLON CANCER: ICD-10-CM

## 2022-09-27 DIAGNOSIS — I10 ESSENTIAL HYPERTENSION: Primary | ICD-10-CM

## 2022-09-27 DIAGNOSIS — E03.9 ACQUIRED HYPOTHYROIDISM: ICD-10-CM

## 2022-09-27 PROBLEM — M62.82 RHABDOMYOLYSIS: Status: ACTIVE | Noted: 2022-09-27

## 2022-09-27 PROCEDURE — 99213 OFFICE O/P EST LOW 20 MIN: CPT | Performed by: FAMILY MEDICINE

## 2022-09-27 NOTE — PROGRESS NOTES
"Glenn Pritchard is a 77 y.o. female.     Chief Complaint   Patient presents with   • Hypertension   • Hypothyroidism   • Diabetes      History of Present Illness     she notes good bp and bs control and telrianfg snthroid wthout heat or cold intoancles      Current Outpatient Medications:   •  aspirin 81 MG chewable tablet, Chew 1 tablet Daily., Disp: , Rfl:   •  dilTIAZem CD (CARDIZEM CD) 180 MG 24 hr capsule, TAKE 2 CAPSULES BY MOUTH DAILY, Disp: 180 capsule, Rfl: 3  •  Insulin Pen Needle (PEN NEEDLES) 31G X 5 MM misc, 1 each 2 (Two) Times a Day., Disp: 100 each, Rfl: 5  •  letrozole (FEMARA) 2.5 MG tablet, Take 2.5 mg by mouth Daily., Disp: , Rfl:   •  levothyroxine (SYNTHROID, LEVOTHROID) 137 MCG tablet, TAKE 1 TABLET BY MOUTH DAILY, Disp: 90 tablet, Rfl: 0  •  losartan (COZAAR) 25 MG tablet, Take 1 tablet by mouth Daily., Disp: 90 tablet, Rfl: 3  •  metoprolol succinate XL (TOPROL-XL) 25 MG 24 hr tablet, TAKE 1 TABLET BY MOUTH DAILY, Disp: 90 tablet, Rfl: 3  •  Misc. Devices (VIRAGE CUSTOM BREAST PROSTHES) misc, To be used with Mastectomy Bra-, Disp: 2 each, Rfl: 0  •  Multiple Vitamins-Minerals (CENTRUM SILVER PO), Take 1 tablet by mouth Daily., Disp: , Rfl:   •  TOUJEO SOLOSTAR 300 UNIT/ML solution pen-injector injection, INJECT 80 UNITS UNDER THE SKIN UTD QD, Disp: , Rfl: 1  Allergies   Allergen Reactions   • Amoxil [Amoxicillin] Hives   • Penicillins Hives   • Biaxin [Clarithromycin] Other (See Comments)     NOT SURE OF REACTION, \"SORE MOUTH OF DIARRHEA\"   • Lortab [Hydrocodone-Acetaminophen] GI Intolerance       BMI is >= 30 and <35. (Class 1 Obesity). The following options were offered after discussion;: nutrition counseling/recommendations      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)   • Carotid artery occlusion    • Diabetes mellitus, type II (HCC) " "   • Dizziness    • Drug therapy    • Essential hypertension    • GERD (gastroesophageal reflux disease)    • GI bleed requiring more than 4 units of blood in 24 hours, ICU, or surgery    • History of chemotherapy    • History of lymphoma    • Hx of radiation therapy    • Hypomagnesemia    • Hypothyroidism    • On amiodarone therapy      Past Surgical History:   Procedure Laterality Date   • ATRIAL APPENDAGE EXCLUSION LEFT WITH TRANSESOPHAGEAL ECHOCARDIOGRAM Left 11/27/2018    Procedure: Atrial Appendage Occlusion;  Surgeon: Mick Orantes MD;  Location:  [facility] CATH INVASIVE LOCATION;  Service: Cardiology   • ATRIAL APPENDAGE EXCLUSION LEFT WITH TRANSESOPHAGEAL ECHOCARDIOGRAM Left 1/22/2019    Procedure: Atrial Appendage Occlusion;  Surgeon: Mick Orantes MD;  Location:  [facility] CATH INVASIVE LOCATION;  Service: Cardiology   • BREAST BIOPSY     • MASTECTOMY     • MASTECTOMY  03/10/2020   • OTHER SURGICAL HISTORY      Mediport insertion X 2   • OTHER SURGICAL HISTORY      remote lymphoma treatment   • REPLACEMENT TOTAL KNEE Right 09/2017   • TUBAL ABDOMINAL LIGATION         Review of Systems   Constitutional: Negative.    HENT: Negative.    Eyes: Negative.    Respiratory: Negative.    Cardiovascular: Negative.    Gastrointestinal: Negative.    Endocrine: Negative.    Genitourinary: Negative.    Musculoskeletal: Negative.    Skin: Negative.    Allergic/Immunologic: Negative.    Neurological: Negative.    Hematological: Negative.    Psychiatric/Behavioral: Negative.        Objective  /80   Pulse 86   Ht 162.6 cm (64\")   Wt 82.6 kg (182 lb)   SpO2 90%   BMI 31.24 kg/m²   Physical Exam  Vitals and nursing note reviewed.   Constitutional:       Appearance: Normal appearance. She is normal weight.   HENT:      Head: Normocephalic and atraumatic.      Nose: Nose normal.      Mouth/Throat:      Mouth: Mucous membranes are moist.   Eyes:      Pupils: Pupils are equal, round, and reactive to light.   Cardiovascular: "      Rate and Rhythm: Normal rate and regular rhythm.      Pulses: Normal pulses.      Heart sounds: Normal heart sounds.   Pulmonary:      Effort: Pulmonary effort is normal.      Breath sounds: Normal breath sounds.   Abdominal:      General: Abdomen is flat. Bowel sounds are normal.   Musculoskeletal:         General: Normal range of motion.      Cervical back: Normal range of motion and neck supple.   Skin:     General: Skin is warm.      Capillary Refill: Capillary refill takes less than 2 seconds.   Neurological:      General: No focal deficit present.      Mental Status: She is alert and oriented to person, place, and time. Mental status is at baseline.   Psychiatric:         Mood and Affect: Mood normal.         Behavior: Behavior normal.         Thought Content: Thought content normal.         Judgment: Judgment normal.         Assessment & Plan   Diagnoses and all orders for this visit:    1. Essential hypertension (Primary)    2. Acquired hypothyroidism  -     Hemoglobin A1c  -     Lipid Panel With / Chol / HDL Ratio  -     TSH    3. Type 2 diabetes mellitus with other circulatory complications (HCC)  -     Hemoglobin A1c  -     Lipid Panel With / Chol / HDL Ratio  -     TSH      She will see endocinology in nov  She will monitgor bp and bs and keep me infornd           Orders Placed This Encounter   Procedures   • Hemoglobin A1c     Order Specific Question:   Release to patient     Answer:   Routine Release   • Lipid Panel With / Chol / HDL Ratio     Order Specific Question:   Release to patient     Answer:   Routine Release   • TSH     Order Specific Question:   Release to patient     Answer:   Routine Release       Follow up: 6 month(s)

## 2022-09-28 LAB
CHOLEST SERPL-MCNC: 213 MG/DL (ref 0–200)
CHOLEST/HDLC SERPL: 4.44 {RATIO}
HBA1C MFR BLD: 8.2 % (ref 4.8–5.6)
HDLC SERPL-MCNC: 48 MG/DL (ref 40–60)
LDLC SERPL CALC-MCNC: 130 MG/DL (ref 0–100)
TRIGL SERPL-MCNC: 196 MG/DL (ref 0–150)
TSH SERPL DL<=0.005 MIU/L-ACNC: 0.1 UIU/ML (ref 0.27–4.2)
VLDLC SERPL CALC-MCNC: 35 MG/DL (ref 5–40)

## 2022-11-09 ENCOUNTER — TELEPHONE (OUTPATIENT)
Dept: VASCULAR SURGERY | Facility: CLINIC | Age: 77
End: 2022-11-09

## 2022-11-09 NOTE — TELEPHONE ENCOUNTER
UNABLE TO CONFIRM APPOINTMENT. LEFT VM ABOUT 12:30PM TESTING @  FOLLOWED UP BY 2PM APPOINTMENT WITH ANUSHA ON 11/10/22.

## 2022-11-10 ENCOUNTER — HOSPITAL ENCOUNTER (OUTPATIENT)
Dept: ULTRASOUND IMAGING | Facility: HOSPITAL | Age: 77
Discharge: HOME | End: 2022-11-10
Admitting: SURGERY

## 2022-11-10 ENCOUNTER — OFFICE VISIT (OUTPATIENT)
Dept: VASCULAR SURGERY | Facility: CLINIC | Age: 77
End: 2022-11-10

## 2022-11-10 VITALS
WEIGHT: 183.4 LBS | HEIGHT: 64 IN | DIASTOLIC BLOOD PRESSURE: 62 MMHG | BODY MASS INDEX: 31.31 KG/M2 | HEART RATE: 76 BPM | SYSTOLIC BLOOD PRESSURE: 140 MMHG | OXYGEN SATURATION: 94 %

## 2022-11-10 DIAGNOSIS — I65.23 BILATERAL CAROTID ARTERY STENOSIS: Primary | ICD-10-CM

## 2022-11-10 DIAGNOSIS — I65.23 BILATERAL CAROTID ARTERY STENOSIS: ICD-10-CM

## 2022-11-10 DIAGNOSIS — I10 ESSENTIAL HYPERTENSION: ICD-10-CM

## 2022-11-10 DIAGNOSIS — E11.59 TYPE 2 DIABETES MELLITUS WITH OTHER CIRCULATORY COMPLICATIONS: ICD-10-CM

## 2022-11-10 PROCEDURE — 93880 EXTRACRANIAL BILAT STUDY: CPT | Performed by: SURGERY

## 2022-11-10 PROCEDURE — 99214 OFFICE O/P EST MOD 30 MIN: CPT | Performed by: SURGERY

## 2022-11-10 PROCEDURE — 93880 EXTRACRANIAL BILAT STUDY: CPT

## 2022-11-10 NOTE — PROGRESS NOTES
"11/10/2022       Darryl Bang MD   [address] [place] [id number]    Dianne Pritchard  1945    Chief Complaint   Patient presents with   • Follow-up     1 yr f/u with carotid testing.  Last seen in the office on 11/30/21.  Pt denies any stroke type symptoms. Pt states still in Afib. Smoking status non smoker.        Dear Darryl Bang MD       HPI  I had the pleasure of seeing your patient Dianne Pritchard in the office today.  As you recall, Dianne Pritchard is a 77 y.o.  female who we are following for asymptomatic carotid occlusive disease.  Currently she is doing well and denies any strokelike symptoms or claudication to her lower extremities.  She is maintained on aspirin.  She has previously undergone a radiation to her left neck which is caused narrowing to her left carotid system.  She did have noninvasive testing performed today, which I did review in office.      Review of Systems   Constitutional: Negative.    HENT: Negative.    Eyes: Negative.    Respiratory: Negative.    Cardiovascular: Negative.    Gastrointestinal: Negative.    Endocrine: Negative.    Genitourinary: Negative.    Musculoskeletal: Negative.    Skin: Negative.    Allergic/Immunologic: Negative.    Neurological: Negative.    Hematological: Negative.    Psychiatric/Behavioral: Negative.         /62 (BP Location: Right arm, Patient Position: Sitting, Cuff Size: Adult)   Pulse 76   Ht 162.6 cm (64\")   Wt 83.2 kg (183 lb 6.4 oz)   SpO2 94%   BMI 31.48 kg/m²   Physical Exam  Vitals and nursing note reviewed.   Constitutional:       General: She is not in acute distress.     Appearance: She is well-developed. She is not diaphoretic.   HENT:      Head: Normocephalic and atraumatic.   Eyes:      General: No scleral icterus.     Pupils: Pupils are equal, round, and reactive to light.   Neck:      Thyroid: No thyromegaly.      Vascular: No carotid bruit or JVD.   Cardiovascular:      Rate and Rhythm: Normal rate and " regular rhythm.      Pulses: Normal pulses.           Carotid pulses are on the left side with bruit.     Heart sounds: Normal heart sounds and S2 normal. No murmur heard.    No friction rub. No gallop.   Pulmonary:      Effort: Pulmonary effort is normal.      Breath sounds: Normal breath sounds.   Abdominal:      General: Bowel sounds are normal.      Palpations: Abdomen is soft.   Musculoskeletal:         General: Normal range of motion.      Cervical back: Normal range of motion and neck supple.   Skin:     General: Skin is warm and dry.   Neurological:      Mental Status: She is alert and oriented to person, place, and time.      Cranial Nerves: No cranial nerve deficit.   Psychiatric:         Behavior: Behavior normal.         Thought Content: Thought content normal.         Judgment: Judgment normal.           Diagnostic Data:  US Carotid Bilateral    Result Date: 11/10/2022  Narrative: History: Carotid occlusive disease      Impression: Impression: 1. There is 50-69% stenosis of the right internal carotid artery. 2. There is less than 50% stenosis of the left internal carotid artery. This is unchanged from previous exam. The left internal carotid artery is diminutive all the way to the skull base. 3. Antegrade flow is demonstrated in bilateral vertebral arteries.  Comments: Bilateral carotid vertebral arterial duplex scan was performed.  Grayscale imaging shows intimal thickening and calcified elements at the carotid bifurcation. The right internal carotid artery peak systolic velocity is 141.3 cm/sec. The end-diastolic velocity is 27.3 cm/sec. The right ICA/CCA ratio is approximately 1.1 . These findings correlate with 50-69% stenosis of the right internal carotid artery.  Grayscale imaging shows intimal thickening and calcified elements at the carotid bifurcation. The left internal carotid artery peak systolic velocity is 30.6 cm/sec. The end-diastolic velocity is 7.4 cm/sec. The left ICA/CCA ratio is  approximately 0.2 . These findings correlate with less than 50% stenosis of the left internal carotid artery.  Antegrade flow is demonstrated in bilateral vertebral arteries. There is greater than 50% stenosis in bilateral common carotid arteries and the left external carotid artery. This report was finalized on 11/10/2022 15:34 by Dr. Babar Oviedo MD.       Patient Active Problem List   Diagnosis   • Tremor   • Essential hypertension   • Type 2 diabetes mellitus with other circulatory complications (HCC)   • Acquired hypothyroidism   • Dizziness   • Multiple falls   • Bruit of left carotid artery   • Acute pain of right knee   • Right knee pain   • Status post right knee replacement   • BMI 33.0-33.9,adult   • Bilateral carotid artery disease (HCC)   • Restrictive lung disease   • A-fib (HCC)   • Presence of Watchman left atrial appendage closure device   • Malignant neoplasm of female breast (HCC)   • Status post bilateral mastectomy   • Class 1 obesity due to excess calories with serious comorbidity and body mass index (BMI) of 31.0 to 31.9 in adult   • After cataract not obscuring vision   • Nonproliferative diabetic retinopathy (HCC)   • Pseudophakia   • Puckering of macula, left eye   • Vitreous degeneration   • Rhabdomyolysis         ICD-10-CM ICD-9-CM   1. Bilateral carotid artery stenosis  I65.23 433.10     433.30   2. Essential hypertension  I10 401.9   3. Type 2 diabetes mellitus with other circulatory complications (HCC)  E11.59 250.70       Plan: After thoroughly evaluating Dianne Pritchard, I believe the best course of action is to remain conservative from vascular surgery standpoint.  I did review her testing which shows 50 to 69% right carotid stenosis and less than 50% left carotid stenosis.  Her testing is unchanged but previously her CTA of the neck showed diminutive flow all the way into the skull base and due to this she is not a surgical candidate.  This was because from previous radiation to  her left neck.  I will see her back in 1 year with repeat noninvasive testing for continued surveillance, including a carotid duplex.  I did discuss vascular risk factors as they pertain to the progression of vascular disease including controlling her hypertension and diabetes.  Her blood pressure stable on her current medication regimen.  Her diabetes is improved but still uncontrolled at 8.2%.  The patient can continue taking her current medication regimen as previously planned.  This was all discussed in full with complete understanding.    Thank you for allowing me to participate in the care of your patient.  Please do not hesitate with any questions or concerns.  I will keep you aware of any further encounters with Dianne Pritchard.        Sincerely yours,         ДМИТРИЙ Jc Thomas Waldon, MD

## 2022-12-12 NOTE — PROGRESS NOTES
Progress Note      Pt Name: Prem Espinal  YOB: 1945  MRN: [id number]    Date of evaluation: 12/14/2022  History Obtained From:  patient, electronic medical record    CHIEF COMPLAINT:    Chief Complaint   Patient presents with    Follow-up     Malignant neoplasm of upper-outer quadrant of left breast in female, estrogen receptor positive ([facility] [address]     Current active problems  History of breast cancer  Remote history immunoblastic lymphoma  Thrombocytopenia    HISTORY OF PRESENT ILLNESS:    Prem Espinal is a 68 y.o.  female with right stage II A breast carcinoma from 3/21/2012. She was also diagnosed with a left stage I breast carcinoma 1/29/2020 and did have a left simple mastectomy. She continues taking her Femara 2.5 mg daily. She denies any issues with the Femara dosing. She denies feeling any nodules of her chest wall. She does have significant osteoarthritis which basically is stable. She does have fatigue issues related to her atrial fibrillation. She continues on Cardizem  daily and metoprolol 25 daily. She has essential hypertension and is also on losartan 25 daily with stable BP. She does have hypothyroidism and continues on Synthroid 137 mcg daily. She is diabetic with A1c doing fairly well. She does not know the exact level. TARGET LYMPHOMA SITES:  Left supraclavicular area. Left axilla. 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 Deion He with a left supraclavicular lymph node. She was referred to Dr. Carlita Brantley who performed a biopsy on 10/13/93 that revealed a large cell malignant immunoblastic lymphoma, CD20 was 53% positive  A bone marrow biopsy and aspirate on 11/1/1993 was negative for any evidence of malignant lymphoma. She received 6 cycles of chemotherapy with CHOP/Bleomycin. The last 3 cycles were done without Vincristine due to peripheral neuropathy problems.  Following the chemotherapy she received XRT to the neck and upper chest due to the stage II-A presentation in the left supraclavicular area and left axilla. She received a total of 3,960cGy under the direction of Dr. John Flores that was completed on 6/22/94. TREATMENT SUMMARY:  CHOP/ Bleomycin x 6. Mantle radiation therapy. 2ND PRIMARY TUMOR: Right stage II (pT2 N1 M0) infiltrating high grade breast cancer 03/21/12  Tram had an abnormal mammogram at [facility] on 02/22/12 revealing a 1.6 cm worrisome mass at the 7:00 position in the right lower quadrant of the right breast. On 03/21/12 Dr. Bentley Castrejon took Tram to the OR and performed an intraoperative biopsy with frozen section that revealed an infiltrating breast cancer. The procedure then followed immediately with a right modified radical mastectomy with pathology revealing an invasive ductal carcinoma, high grade, grade III 3.3 cm tumor. Nineteen right axillary lymph nodes were submitted, one was positive for metastatic carcinoma. This places Tram in a stage II (pT2 N1 M0) breast cancer. The ER is positive, FL is also positive, Her2 Scottie by IHC is negative and Her2 Scottie by FISH is unamplified. 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 In addition to that she had mantle radiation therapy. Although the 2D echo shows an EF of 60%, I discussed with Tram the concerns of anthracycline based therapy and cardiac disease and she agrees and desires to receive adjuvant therapy without an anthracycline. Taxotere and Cytoxan x 6 cycles are recommended and initiated on 04/20/12. The last cycle of chemotherapy was delivered on 08/09/12. Femara is initiated on 09/06/12 with 10 years planned. TREATMENT SUMMARY:  Right modified radical mastectomy 03/21/12. Taxotere and Cytoxan given 04/20/12 through 08/09/12 x 6 cycles. Femara is initiated on 09/06/12.     3RD PRIMARY TUMOR: Left Stage I (pT1c, pN0, pMx), grade 1, ER positive HER-2/radha negative Invasive lobular carcinoma of the breast 1/29/2020  Tram was seen in routine follow-up on 12/20/2019 and physical exam revealed a 4 x 5 cm palpable mass anteriorly in the left axillary region. Ultrasound left breast at [facility] on 1/10/2020 revealed an oblong 4.7 x 1.3 x 5.5 cm fat-containing mass in the upper outer quadrant of the left breast felt to be stable from a prior mammogram and likely representing a hamartoma. A 1.3 x 1 cm irregular nodularity with associated architectural distortion in the upper outer quadrant. Spot compression mammogram 1/10/2020 revealed a developing focus of irregular nodularity measuring 1.3 x 1 cm in the upper outer quadrant of the left breast with malignant features with ultrasound-guided biopsy recommended- BI-RADS 5. The palpable abnormality on examination appears to be a benign hamartoma. However there was an irregular focus that is suspicious in the upper outer quadrant of the left breast and she will be referred to surgery for evaluation. Her CA-15-3 above which is minimally elevated a 25.5 and lymphoma markers were negative. Referral was made for her to see Dr. Glenis Black at her 1/17/2020 clinic visit. CA-15-3 25.5 (0-25)     US-guided biopsy of the 1.4 x 1.1 x 0.80 cm left breast mass was performed on 1/29/2020 by Dr. Glenis Black. Pathology revealed the following:  Invasive lobular carcinoma, low-grade  Tumor measures at least 9 mm in greatest linear  Tumor invades present in multiple core biopsies  Adjacent intraductal papilloma with apocrine metaplasia     IHC quantative breast panel is as follows:  ER: positive 94%  CA: negative 0.2%  HER2: negative, Score 1+  KI-67: low, 8%     Mammaprint on 1/29/2020 resulted a LOW RISK luminal-type (A). Yoko Faria has a 97.8% risk of living without distant recurrence of breast cancer at 5-years if treated with adjuvant endocrine therapy alone.      On 3/10/2020 Dr. Glenis Black performed the following procedures:  Injection of radionuclide. Lymphatic mapping. Left-sided pectoral block. Left simple mastectomy. Left sentinel lymph node biopsy. Pathology revealed the following:  Breast, left mastectomy:  Invasive lobular carcinoma, grade 1  Invasive carcinoma measures 1.6 cm in greatest dimension. Invasive carcinoma is located greater than 1.0 cm from the nearest deep surgical excision margin. Incidental complex sclerosing lesion, margins negative. Changes consistent with fibrocystic mastopathy involving nonneoplastic breast parenchyma  Sections of skin, negative for evidence of malignancy. Sections of nipple, negative for evidence of malignancy  Lymph node, left sentinel lymph node biopsy:   10 lymph nodes, negative for evidence of malignancy      AJCC STAGE: pT1c, pN0, pMx     Adjuvant endocrine therapy with aromatase inhibitor letrozole (Femara) 2.5 mg daily is prescribed (continued)      1st HEMATOLOGY HISTORY: Iron deficiency/ Acute GI bleed secondary to gastric AVM, 06/27/16  Mrs. Aponte presented to [facility] Emergency Department on 06/24/16 with weakness, fatigue and exertional dyspnea. She was profoundly anemic with a hemoglobin of 6.0, MCV of 84.3. WBC and platelets were normal at 7.97 and 163,000, respectively. GI evaluation was sought, with an endoscopy on 06/27/16 by Dr. Sheba Dai remarkable for angiodysplastic lesions of the gastric body. Colonoscopy on 06/28/16 was negative. Mrs. Aponte was transfused as warranted, given parenteral iron supplementation and anticoagulation with Xarelto for paroxysmal atrial fibrillation was discontinued due to UGI bleed with AVM. 2nd HEMATOLOGY HISTORY: Thrombocytopenia  Tram fam had a chronically low platelet count between 120 and 150.   DEONDRE - negative  Antiplatelet antibody - negative  SPEP - negative  Hepatitis A, B, C - negative    Serology 10/28/2020  Folate = 22  B12 = 799        Past Medical History:   Diagnosis Date    Arteriovenous malformation, other site     Atrial fibrillation ([facility] 04/28/2016    sees dr. Jazmyn Maldonado [facility])     breast; surgery and chemo x 2 and radiation    COPD (chronic obstructive pulmonary disease) ([facility]     Diabetes mellitus ([facility]     H/O screening mammography 07/08/2019    Peggy Saavedra     H/O: GI bleed 06/24/2016 2016 while on xarelto for a.fib; no longer on any anti-coags.  Xarelto discontinued     Hypertension     Hypothyroidism     Iron deficiency anemia     Kidney disease     sees dr. Kristin Overton [facility]) Helen Cowper    sees dr. Shelley Roth    Osteoarthritis     Osteopenia of the elderly     Osteoporosis     Post-menopausal     Thrombocytopenia ([facility]     Thyroid disease         Past Surgical History:   Procedure Laterality Date    BREAST SURGERY      right mastectomy; no sticks/bp right arm    CARDIAC SURGERY      \"watchman's device\" per dr. Rip Jc    COLONOSCOPY  06/28/2016    per  recall 10 years     ENDOSCOPY, COLON, DIAGNOSTIC      IVC FILTER INSERTION      JOINT REPLACEMENT      LYMPH NODE BIOPSY Left     Supraclavicular     MASTECTOMY Left 3/10/2020    FLAPS WITH LEFT MASTECTOMY, SENTINEL NODE BIOPSY, PEC BLOCK performed by Nelda Delgadillo MD at [facility] Right 9/7/2017    KNEE TOTAL ARTHROPLASTY performed by Bere Torre MD at . [facility] [address]    TUNNELED VENOUS PORT PLACEMENT      x2 and removed           Current Outpatient Medications:     letrozole (FEMARA) 2.5 MG tablet, TAKE 1 TABLET BY MOUTH DAILY, Disp: 90 tablet, Rfl: 3    Insulin Glargine, 2 Unit Dial, 300 UNIT/ML SOPN, Inject 80 Units into the skin daily Indications: Diabetes, Disp: , Rfl:     levothyroxine (SYNTHROID) 137 MCG tablet, Take 126 mcg by mouth Daily Indications: Underactive Thyroid, Disp: , Rfl:     dilTIAZem (CARDIZEM CD) 180 MG extended release capsule, Take 360 mg by mouth daily Indications: High Blood Pressure Disorder, Disp: , Rfl:     metoprolol tartrate (LOPRESSOR) 25 MG tablet, Take 25 mg by mouth daily Indications: High Blood Pressure Disorder, Disp: , Rfl:     aspirin EC 81 MG EC tablet, Take 1 tablet by mouth 2 times daily, Disp: 60 tablet, Rfl: 0    losartan (COZAAR) 25 MG tablet, Take 25 mg by mouth daily Indications: High Blood Pressure Disorder , Disp: , Rfl:     Multiple Vitamins-Minerals (MULTI FOR HER PO), Take 1 tablet by mouth daily , Disp: , Rfl:     vitamin D (CHOLECALCIFEROL) 1000 UNIT TABS tablet, Take 1,000 Units by mouth daily, Disp: , Rfl:      Allergies   Allergen Reactions    Hydrocodone-Acetaminophen Nausea And Vomiting    Amoxicillin Hives    Clarithromycin     Penicillins Hives    Multihance [Gadobenate] Nausea Only     NAUSEOUS AFTER MRI CONTRAST (MULTIHANCE)       Social History     Tobacco Use    Smoking status: Former     Types: Cigarettes     Quit date: 1993     Years since quittin.1    Smokeless tobacco: Never   Vaping Use    Vaping Use: Never used   Substance Use Topics    Alcohol use: No    Drug use: No       Family History   Problem Relation Age of Onset    Heart Disease Mother     Stroke Mother     Coronary Art Dis Mother     Cancer Father         Lung-Age Unknown    Cancer Paternal Aunt         Breast-Age Unknown    Lung Cancer Sister         Non-small cell lung cancer-Age Unknown    Heart Attack Brother     Cancer Brother         Venal Cell-Age Unknown    Pancreatic Cancer Child 52       Subjective   REVIEW OF SYSTEMS:   Review of Systems   Constitutional:  Negative for chills, diaphoresis, fatigue, fever and unexpected weight change. HENT:  Negative for mouth sores, nosebleeds, sore throat, trouble swallowing and voice change. Eyes:  Negative for photophobia, discharge and itching. Respiratory:  Negative for cough, shortness of breath and wheezing. Cardiovascular:  Negative for chest pain, palpitations and leg swelling.    Gastrointestinal:  Negative for abdominal distention, abdominal pain, blood in stool, constipation, diarrhea, nausea and vomiting. Endocrine: Negative for cold intolerance, heat intolerance, polydipsia and polyuria. Genitourinary:  Negative for difficulty urinating, dysuria, hematuria and urgency. Musculoskeletal:  Positive for arthralgias. Negative for back pain, joint swelling and myalgias. Skin:  Negative for color change and rash. Neurological:  Negative for dizziness, tremors, seizures, syncope and light-headedness. Hematological:  Negative for adenopathy. Does not bruise/bleed easily. Psychiatric/Behavioral:  Negative for behavioral problems and suicidal ideas. The patient is not nervous/anxious. All other systems reviewed and are negative. Objective   /78 (Site: Left Upper Arm, Position: Sitting)   Pulse 65   Ht 5' 4\" (1.626 m)   Wt 184 lb 9.6 oz (83.7 kg)   SpO2 95%   BMI 31.69 kg/m²     PHYSICAL EXAM:  Physical Exam  Exam conducted with a chaperone present. Constitutional:       Appearance: She is well-developed. HENT:      Head: Normocephalic and atraumatic. Eyes:      General: No scleral icterus. Conjunctiva/sclera: Conjunctivae normal.   Neck:      Trachea: No tracheal deviation. Cardiovascular:      Rate and Rhythm: Normal rate and regular rhythm. Heart sounds: Normal heart sounds. No murmur heard. Pulmonary:      Effort: Pulmonary effort is normal. No respiratory distress. Breath sounds: Normal breath sounds. Chest:      Chest wall: No mass or edema. Breasts:     Right: Absent. Left: Absent. Abdominal:      General: Bowel sounds are normal. There is no distension. Palpations: Abdomen is soft. There is no splenomegaly. Tenderness: There is no abdominal tenderness. Musculoskeletal:         General: No tenderness. Cervical back: Normal range of motion and neck supple. Lymphadenopathy:      Cervical: No cervical adenopathy. Right cervical: No posterior cervical adenopathy.      Upper Body:      Right upper body: No supraclavicular or axillary adenopathy. Left upper body: No supraclavicular or axillary adenopathy. Lower Body: No right inguinal adenopathy. No left inguinal adenopathy. Comments: Lipoma right supraclavicular area   Skin:     General: Skin is warm and dry. Findings: No rash. Neurological:      Mental Status: She is alert and oriented to person, place, and time. Coordination: Coordination normal.   Psychiatric:         Behavior: Behavior normal.         Thought Content: Thought content normal.        CBC reviewed by me  Lab Results   Component Value Date    WBC 9.42 12/14/2022    HGB 14.4 12/14/2022    HCT 44.8 12/14/2022    MCV 88.2 12/14/2022     (L) 12/14/2022     Lab Results   Component Value Date    NEUTROABS 7.69 (H) 12/14/2022         VISIT DIAGNOSES  1. Malignant neoplasm of upper-outer quadrant of left breast in female, estrogen receptor positive ([address]    2. Malignant neoplasm of lower-outer quadrant of right breast of female, estrogen receptor positive ([address]    3. Long term current use of aromatase inhibitor    4. Leukocytosis, unspecified type    5. Thrombocytopenia ([address]    6. Iron deficiency anemia due to chronic blood loss        ASSESSMENT/PLAN:    1. Left Stage I (pT1c, pN0, pMx),  grade 1, ER positive HER-2/radha negative Invasive lobular carcinoma of the breast 1/29/2020  2. HX Stage II A right breast carcinoma. She has had bilateral mastectomies        Physical examination today again is unrevealing for any findings of recurrence. CA 15-3 will be rechecked. PLAN  CA 15-3, CMP  Continue Femara 2.5 daily    3. Leukocytosis. BCR/ABL on peripheral blood to [facility] on 10/28/2020 was negative. WBC normal today at 9.42 with 81.7% PMN. 4.  Thrombocytopenia. Serology 8/17/2022  B12 - 999  CMP - crt 1.1 with GFR 48, Ca 10.6  ALT - 16  AST - 30    ,000-stable overall.       5.  Long-term use of aromatase inhibitor/bone health. Previously documented osteoporosis. She is taking vitamin D, she only takes a multivitamin with calcium. She has developed hypercalcemia at times normal PTH. Her calcium level last visit was normal-she was told she could resume her calcium every other day however she says she takes it for about a week and then goes off of it for 2 weeks. She again has declined to take any treatment whatsoever for her osteoporosis. 6.  Remote history of large B-cell immunoblastic lymphoma in 1993. Serology 8/17/2022  SPEP - no M spike negative immunofixation   QI - IgG 1170, IgA 431, IgM 64  Free serum light chains - Kappa 49.57, Lambda 32.78, K/L 1.51  LDH - 387    She denies any significant extreme fatigue, weight loss, pruritus. She does not have any night sweats-she does have hot flashes at night. She does not have any palpable peripheral adenopathy, hepatosplenomegaly. Serology from 8/17/2022 was unrevealing. 7. History of iron deficiency anemia. Hgb 14.4 today. HEALTH MAINTENANCE    Colon cancer screening. Her colonoscopy is up-to-date performed last on 6/28/2016 with recall in 10 years. Gynecologic screening. She no longer gets Pap smears. She denies any pelvic pain, abnormal bleeding. Immunization History   Administered Date(s) Administered    COVID-19, MODERNA BLUE border, Primary or Immunocompromised, (age 12y+), IM, 100 mcg/0.5mL 03/10/2021, 04/08/2021         Orders Placed This Encounter   Procedures    Comprehensive Metabolic Panel    Cancer Antigen 15-3        Return in about 4 months (around 4/14/2023) for With Tor Interiano.      Amauri Stuart PA-C  11:36 AM  12/14/2022

## 2022-12-14 ENCOUNTER — HOSPITAL ENCOUNTER (OUTPATIENT)
Dept: INFUSION THERAPY | Age: 77
Discharge: HOME | End: 2022-12-14
Payer: MEDICARE

## 2022-12-14 ENCOUNTER — OFFICE VISIT (OUTPATIENT)
Dept: HEMATOLOGY | Age: 77
End: 2022-12-14
Payer: MEDICARE

## 2022-12-14 VITALS
BODY MASS INDEX: 31.51 KG/M2 | WEIGHT: 184.6 LBS | OXYGEN SATURATION: 95 % | HEART RATE: 65 BPM | HEIGHT: 64 IN | DIASTOLIC BLOOD PRESSURE: 78 MMHG | SYSTOLIC BLOOD PRESSURE: 122 MMHG

## 2022-12-14 DIAGNOSIS — C50.511 MALIGNANT NEOPLASM OF LOWER-OUTER QUADRANT OF RIGHT BREAST OF FEMALE, ESTROGEN RECEPTOR POSITIVE (HCC): ICD-10-CM

## 2022-12-14 DIAGNOSIS — Z17.0 MALIGNANT NEOPLASM OF UPPER-OUTER QUADRANT OF LEFT BREAST IN FEMALE, ESTROGEN RECEPTOR POSITIVE (HCC): Primary | ICD-10-CM

## 2022-12-14 DIAGNOSIS — D50.0 IRON DEFICIENCY ANEMIA DUE TO CHRONIC BLOOD LOSS: ICD-10-CM

## 2022-12-14 DIAGNOSIS — Z17.0 MALIGNANT NEOPLASM OF LOWER-OUTER QUADRANT OF RIGHT BREAST OF FEMALE, ESTROGEN RECEPTOR POSITIVE (HCC): ICD-10-CM

## 2022-12-14 DIAGNOSIS — D72.829 LEUKOCYTOSIS, UNSPECIFIED TYPE: ICD-10-CM

## 2022-12-14 DIAGNOSIS — Z79.811 LONG TERM CURRENT USE OF AROMATASE INHIBITOR: ICD-10-CM

## 2022-12-14 DIAGNOSIS — D69.6 THROMBOCYTOPENIA (HCC): ICD-10-CM

## 2022-12-14 DIAGNOSIS — Z17.0 MALIGNANT NEOPLASM OF UPPER-OUTER QUADRANT OF LEFT BREAST IN FEMALE, ESTROGEN RECEPTOR POSITIVE (HCC): ICD-10-CM

## 2022-12-14 DIAGNOSIS — C50.412 MALIGNANT NEOPLASM OF UPPER-OUTER QUADRANT OF LEFT BREAST IN FEMALE, ESTROGEN RECEPTOR POSITIVE (HCC): ICD-10-CM

## 2022-12-14 DIAGNOSIS — C50.412 MALIGNANT NEOPLASM OF UPPER-OUTER QUADRANT OF LEFT BREAST IN FEMALE, ESTROGEN RECEPTOR POSITIVE (HCC): Primary | ICD-10-CM

## 2022-12-14 LAB
ALBUMIN SERPL-MCNC: 4.2 G/DL (ref 3.5–5.2)
ALP BLD-CCNC: 132 U/L (ref 35–104)
ALT SERPL-CCNC: 18 U/L (ref 9–52)
ANION GAP SERPL CALCULATED.3IONS-SCNC: 10 MMOL/L (ref 7–19)
AST SERPL-CCNC: 28 U/L (ref 14–36)
BASOPHILS ABSOLUTE: 0.06 K/UL (ref 0.01–0.08)
BASOPHILS RELATIVE PERCENT: 0.6 % (ref 0.1–1.2)
BILIRUB SERPL-MCNC: 0.6 MG/DL (ref 0.2–1.3)
BUN BLDV-MCNC: 19 MG/DL (ref 7–17)
CA 15-3: 29 U/ML (ref 0–25)
CALCIUM SERPL-MCNC: 9.6 MG/DL (ref 8.4–10.2)
CHLORIDE BLD-SCNC: 101 MMOL/L (ref 98–111)
CO2: 29 MMOL/L (ref 22–29)
CREAT SERPL-MCNC: 1 MG/DL (ref 0.5–1)
EOSINOPHILS ABSOLUTE: 0.11 K/UL (ref 0.04–0.54)
EOSINOPHILS RELATIVE PERCENT: 1.2 % (ref 0.7–7)
GFR SERPL CREATININE-BSD FRML MDRD: 58 ML/MIN/{1.73_M2}
GLUCOSE BLD-MCNC: 228 MG/DL (ref 74–106)
HCT VFR BLD CALC: 44.8 % (ref 34.1–44.9)
HEMOGLOBIN: 14.4 G/DL (ref 11.2–15.7)
LYMPHOCYTES ABSOLUTE: 1 K/UL (ref 1.18–3.74)
LYMPHOCYTES RELATIVE PERCENT: 10.6 % (ref 19.3–53.1)
MCH RBC QN AUTO: 28.3 PG (ref 25.6–32.2)
MCHC RBC AUTO-ENTMCNC: 32.1 G/DL (ref 32.3–35.5)
MCV RBC AUTO: 88.2 FL (ref 79.4–94.8)
MONOCYTES ABSOLUTE: 0.52 K/UL (ref 0.24–0.82)
MONOCYTES RELATIVE PERCENT: 5.5 % (ref 4.7–12.5)
NEUTROPHILS ABSOLUTE: 7.69 K/UL (ref 1.56–6.13)
NEUTROPHILS RELATIVE PERCENT: 81.7 % (ref 34–71.1)
PDW BLD-RTO: 13.6 % (ref 11.7–14.4)
PLATELET # BLD: 110 K/UL (ref 182–369)
PMV BLD AUTO: 11.1 FL (ref 7.4–10.4)
POTASSIUM SERPL-SCNC: 4.3 MMOL/L (ref 3.5–5.1)
RBC # BLD: 5.08 M/UL (ref 3.93–5.22)
SODIUM BLD-SCNC: 140 MMOL/L (ref 137–145)
TOTAL PROTEIN: 8 G/DL (ref 6.3–8.2)
WBC # BLD: 9.42 K/UL (ref 3.98–10.04)

## 2022-12-14 PROCEDURE — 99214 OFFICE O/P EST MOD 30 MIN: CPT | Performed by: PHYSICIAN ASSISTANT

## 2022-12-14 PROCEDURE — 36415 COLL VENOUS BLD VENIPUNCTURE: CPT

## 2022-12-14 PROCEDURE — 1123F ACP DISCUSS/DSCN MKR DOCD: CPT | Performed by: PHYSICIAN ASSISTANT

## 2022-12-14 PROCEDURE — 99212 OFFICE O/P EST SF 10 MIN: CPT

## 2022-12-14 PROCEDURE — 1036F TOBACCO NON-USER: CPT | Performed by: PHYSICIAN ASSISTANT

## 2022-12-14 PROCEDURE — 80053 COMPREHEN METABOLIC PANEL: CPT

## 2022-12-14 PROCEDURE — 85025 COMPLETE CBC W/AUTO DIFF WBC: CPT

## 2022-12-14 PROCEDURE — G8399 PT W/DXA RESULTS DOCUMENT: HCPCS | Performed by: PHYSICIAN ASSISTANT

## 2022-12-14 PROCEDURE — G8484 FLU IMMUNIZE NO ADMIN: HCPCS | Performed by: PHYSICIAN ASSISTANT

## 2022-12-14 PROCEDURE — G8427 DOCREV CUR MEDS BY ELIG CLIN: HCPCS | Performed by: PHYSICIAN ASSISTANT

## 2022-12-14 PROCEDURE — 1090F PRES/ABSN URINE INCON ASSESS: CPT | Performed by: PHYSICIAN ASSISTANT

## 2022-12-14 PROCEDURE — G8417 CALC BMI ABV UP PARAM F/U: HCPCS | Performed by: PHYSICIAN ASSISTANT

## 2022-12-14 ASSESSMENT — ENCOUNTER SYMPTOMS
BACK PAIN: 0
EYE DISCHARGE: 0
VOMITING: 0
COUGH: 0
SHORTNESS OF BREATH: 0
VOICE CHANGE: 0
ABDOMINAL PAIN: 0
NAUSEA: 0
EYE ITCHING: 0
WHEEZING: 0
COLOR CHANGE: 0
ABDOMINAL DISTENTION: 0
SORE THROAT: 0
BLOOD IN STOOL: 0
TROUBLE SWALLOWING: 0
CONSTIPATION: 0
PHOTOPHOBIA: 0
DIARRHEA: 0

## 2023-01-30 DIAGNOSIS — E55.9 VITAMIN D DEFICIENCY: ICD-10-CM

## 2023-01-30 DIAGNOSIS — I10 ESSENTIAL HYPERTENSION: Primary | ICD-10-CM

## 2023-01-30 DIAGNOSIS — E03.9 ACQUIRED HYPOTHYROIDISM: ICD-10-CM

## 2023-01-30 DIAGNOSIS — E04.1 THYROID NODULE: ICD-10-CM

## 2023-01-30 DIAGNOSIS — E11.59 TYPE 2 DIABETES MELLITUS WITH OTHER CIRCULATORY COMPLICATIONS: ICD-10-CM

## 2023-01-31 LAB
ALBUMIN SERPL-MCNC: 4.6 G/DL (ref 3.5–5.2)
ALBUMIN/GLOB SERPL: 1.7 G/DL
ALP SERPL-CCNC: 132 U/L (ref 39–117)
ALT SERPL-CCNC: 11 U/L (ref 1–33)
AST SERPL-CCNC: 20 U/L (ref 1–32)
BILIRUB SERPL-MCNC: 0.5 MG/DL (ref 0–1.2)
BUN SERPL-MCNC: 18 MG/DL (ref 8–23)
BUN/CREAT SERPL: 15.7 (ref 7–25)
CALCIUM SERPL-MCNC: 10.1 MG/DL (ref 8.6–10.5)
CHLORIDE SERPL-SCNC: 98 MMOL/L (ref 98–107)
CHOLEST SERPL-MCNC: 210 MG/DL (ref 0–200)
CHOLEST/HDLC SERPL: 4.29 {RATIO}
CO2 SERPL-SCNC: 29.4 MMOL/L (ref 22–29)
CREAT SERPL-MCNC: 1.15 MG/DL (ref 0.57–1)
EGFRCR SERPLBLD CKD-EPI 2021: 49.2 ML/MIN/1.73
FRUCTOSAMINE SERPL-SCNC: 337 UMOL/L (ref 0–285)
GLOBULIN SER CALC-MCNC: 2.7 GM/DL
GLUCOSE SERPL-MCNC: 141 MG/DL (ref 65–99)
HBA1C MFR BLD: 9.1 % (ref 4.8–5.6)
HDLC SERPL-MCNC: 49 MG/DL (ref 40–60)
LDLC SERPL CALC-MCNC: 129 MG/DL (ref 0–100)
POTASSIUM SERPL-SCNC: 4.6 MMOL/L (ref 3.5–5.2)
PROT SERPL-MCNC: 7.3 G/DL (ref 6–8.5)
SODIUM SERPL-SCNC: 141 MMOL/L (ref 136–145)
TRIGL SERPL-MCNC: 178 MG/DL (ref 0–150)
TSH SERPL DL<=0.005 MIU/L-ACNC: 1.43 UIU/ML (ref 0.27–4.2)
VLDLC SERPL CALC-MCNC: 32 MG/DL (ref 5–40)

## 2023-02-20 ENCOUNTER — TELEPHONE (OUTPATIENT)
Dept: FAMILY MEDICINE CLINIC | Facility: CLINIC | Age: 78
End: 2023-02-20

## 2023-02-20 RX ORDER — AZITHROMYCIN 250 MG/1
TABLET, FILM COATED ORAL
Qty: 6 TABLET | Refills: 0 | Status: SHIPPED | OUTPATIENT
Start: 2023-02-20 | End: 2023-03-29

## 2023-02-20 NOTE — TELEPHONE ENCOUNTER
Caller: GABRIEL MATA   Relationship: SELF     Best call back number:    [phone] (Home)         What medication are you requesting:  Z PACK     What are your current symptoms: COUGH   GREEN MUCOUS   SORE THROAT       How long have you been experiencing symptoms:  02/13/23    Have you had these symptoms before:    [x] Yes  [] No    Have you been treated for these symptoms before:   [x] Yes  [] No    If a prescription is needed, what is your preferred pharmacy and phone number:  [id number] - [place], [place] - [address] - [phone]  - [phone]   [phone]    Additional notes: NONE

## 2023-03-29 ENCOUNTER — OFFICE VISIT (OUTPATIENT)
Dept: FAMILY MEDICINE CLINIC | Facility: CLINIC | Age: 78
End: 2023-03-29
Payer: MEDICARE

## 2023-03-29 VITALS
HEART RATE: 62 BPM | DIASTOLIC BLOOD PRESSURE: 70 MMHG | HEIGHT: 64 IN | BODY MASS INDEX: 31.07 KG/M2 | OXYGEN SATURATION: 96 % | WEIGHT: 182 LBS | SYSTOLIC BLOOD PRESSURE: 124 MMHG

## 2023-03-29 DIAGNOSIS — E03.9 ACQUIRED HYPOTHYROIDISM: ICD-10-CM

## 2023-03-29 DIAGNOSIS — E11.59 TYPE 2 DIABETES MELLITUS WITH OTHER CIRCULATORY COMPLICATIONS: ICD-10-CM

## 2023-03-29 DIAGNOSIS — I10 ESSENTIAL HYPERTENSION: Primary | ICD-10-CM

## 2023-03-29 PROCEDURE — G0439 PPPS, SUBSEQ VISIT: HCPCS | Performed by: FAMILY MEDICINE

## 2023-03-29 PROCEDURE — 3074F SYST BP LT 130 MM HG: CPT | Performed by: FAMILY MEDICINE

## 2023-03-29 PROCEDURE — 1170F FXNL STATUS ASSESSED: CPT | Performed by: FAMILY MEDICINE

## 2023-03-29 PROCEDURE — 3078F DIAST BP <80 MM HG: CPT | Performed by: FAMILY MEDICINE

## 2023-03-29 NOTE — PROGRESS NOTES
"Glenn Pritchard is a 77 y.o. female.     Chief Complaint   Patient presents with   • Medicare Wellness-subsequent   • Hypertension   • Diabetes      History of Present Illness     she notes good bp and bs control       Current Outpatient Medications:   •  aspirin 81 MG chewable tablet, Chew 1 tablet Daily., Disp: , Rfl:   •  dilTIAZem CD (CARDIZEM CD) 180 MG 24 hr capsule, TAKE 2 CAPSULES BY MOUTH DAILY, Disp: 180 capsule, Rfl: 3  •  Insulin Pen Needle (PEN NEEDLES) 31G X 5 MM misc, 1 each 2 (Two) Times a Day., Disp: 100 each, Rfl: 5  •  letrozole (FEMARA) 2.5 MG tablet, Take 1 tablet by mouth Daily., Disp: , Rfl:   •  levothyroxine (SYNTHROID, LEVOTHROID) 137 MCG tablet, TAKE 1 TABLET BY MOUTH DAILY, Disp: 90 tablet, Rfl: 0  •  losartan (COZAAR) 25 MG tablet, Take 1 tablet by mouth Daily., Disp: 90 tablet, Rfl: 3  •  metoprolol succinate XL (TOPROL-XL) 25 MG 24 hr tablet, TAKE 1 TABLET BY MOUTH DAILY, Disp: 90 tablet, Rfl: 3  •  Misc. Devices (VIRAGE CUSTOM BREAST PROSTHES) misc, To be used with Mastectomy Bra-, Disp: 2 each, Rfl: 0  •  Multiple Vitamins-Minerals (CENTRUM SILVER PO), Take 1 tablet by mouth Daily., Disp: , Rfl:   •  TOUJEO SOLOSTAR 300 UNIT/ML solution pen-injector injection, INJECT 80 UNITS UNDER THE SKIN UTD QD, Disp: , Rfl: 1  Allergies   Allergen Reactions   • Amoxil [Amoxicillin] Hives   • Penicillins Hives   • Biaxin [Clarithromycin] Other (See Comments)     NOT SURE OF REACTION, \"SORE MOUTH OF DIARRHEA\"   • Lortab [Hydrocodone-Acetaminophen] GI Intolerance       BMI is >= 30 and <35. (Class 1 Obesity). The following options were offered after discussion;: nutrition counseling/recommendations      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)   • Carotid artery occlusion    • Diabetes mellitus, type II (HCC)    • Dizziness    • Drug therapy  " "  • Essential hypertension    • GERD (gastroesophageal reflux disease)    • GI bleed requiring more than 4 units of blood in 24 hours, ICU, or surgery    • History of chemotherapy    • History of lymphoma    • Hx of radiation therapy    • Hypomagnesemia    • Hypothyroidism    • On amiodarone therapy      Past Surgical History:   Procedure Laterality Date   • ATRIAL APPENDAGE EXCLUSION LEFT WITH TRANSESOPHAGEAL ECHOCARDIOGRAM Left 11/27/2018    Procedure: Atrial Appendage Occlusion;  Surgeon: Mick Orantes MD;  Location:  [facility];  Service: Cardiology   • ATRIAL APPENDAGE EXCLUSION LEFT WITH TRANSESOPHAGEAL ECHOCARDIOGRAM Left 1/22/2019    Procedure: Atrial Appendage Occlusion;  Surgeon: Mick Orantes MD;  Location:  [facility];  Service: Cardiology   • BREAST BIOPSY     • MASTECTOMY     • MASTECTOMY  03/10/2020   • OTHER SURGICAL HISTORY      Mediport insertion X 2   • OTHER SURGICAL HISTORY      remote lymphoma treatment   • REPLACEMENT TOTAL KNEE Right 09/2017   • TUBAL ABDOMINAL LIGATION         Review of Systems   Constitutional: Negative.    HENT: Negative.    Eyes: Negative.    Respiratory: Negative.    Cardiovascular: Negative.    Gastrointestinal: Negative.    Endocrine: Negative.    Genitourinary: Negative.    Musculoskeletal: Negative.    Skin: Negative.    Allergic/Immunologic: Negative.    Neurological: Negative.    Hematological: Negative.    Psychiatric/Behavioral: Negative.        Objective  /70   Pulse 62   Ht 162.6 cm (64\")   Wt 82.6 kg (182 lb)   SpO2 96%   BMI 31.24 kg/m²   Physical Exam  Vitals and nursing note reviewed.   Constitutional:       Appearance: Normal appearance. She is normal weight.   HENT:      Head: Normocephalic and atraumatic.      Nose: Nose normal.      Mouth/Throat:      Mouth: Mucous membranes are moist.   Eyes:      Extraocular Movements: Extraocular movements intact.      Pupils: Pupils are equal, round, and reactive to " light.   Cardiovascular:      Rate and Rhythm: Normal rate and regular rhythm.      Pulses: Normal pulses.      Heart sounds: Murmur heard.   Pulmonary:      Effort: Pulmonary effort is normal.      Breath sounds: Normal breath sounds.   Abdominal:      General: Abdomen is flat. Bowel sounds are normal.      Palpations: Abdomen is soft.   Musculoskeletal:         General: Normal range of motion.      Cervical back: Normal range of motion and neck supple.   Skin:     General: Skin is warm and dry.      Capillary Refill: Capillary refill takes less than 2 seconds.   Neurological:      General: No focal deficit present.      Mental Status: She is alert and oriented to person, place, and time. Mental status is at baseline.   Psychiatric:         Mood and Affect: Mood normal.         Assessment & Plan   Diagnoses and all orders for this visit:    1. Essential hypertension (Primary)    2. Acquired hypothyroidism    3. Type 2 diabetes mellitus with other circulatory complications (HCC)                 No orders of the defined types were placed in this encounter.      Follow up: 6 month(s)

## 2023-03-30 RX ORDER — LOSARTAN POTASSIUM 25 MG/1
25 TABLET ORAL DAILY
Qty: 90 TABLET | Refills: 3 | Status: SHIPPED | OUTPATIENT
Start: 2023-03-30

## 2023-03-30 RX ORDER — LETROZOLE 2.5 MG/1
2.5 TABLET, FILM COATED ORAL DAILY
Qty: 90 TABLET | Refills: 3 | Status: SHIPPED | OUTPATIENT
Start: 2023-03-30

## 2023-04-17 ENCOUNTER — TELEPHONE (OUTPATIENT)
Dept: INFUSION THERAPY | Age: 78
End: 2023-04-17

## 2023-04-17 NOTE — PROGRESS NOTES
initiated on 09/06/12 with 10 years planned. TREATMENT SUMMARY:  Right modified radical mastectomy 03/21/12. Taxotere and Cytoxan given 04/20/12 through 08/09/12 x 6 cycles. Femara is initiated on 09/06/12. 3RD PRIMARY TUMOR: Left Stage I (pT1c, pN0, pMx),  grade 1, ER positive HER-2/radha negative Invasive lobular carcinoma of the breast 1/29/2020  Tram was seen in routine follow-up on 12/20/2019 and physical exam revealed a 4 x 5 cm palpable mass anteriorly in the left axillary region. Ultrasound left breast at [facility] on 1/10/2020 revealed an oblong 4.7 x 1.3 x 5.5 cm fat-containing mass in the upper outer quadrant of the left breast felt to be stable from a prior mammogram and likely representing a hamartoma. A 1.3 x 1 cm irregular nodularity with associated architectural distortion in the upper outer quadrant. Spot compression mammogram 1/10/2020 revealed a developing focus of irregular nodularity measuring 1.3 x 1 cm in the upper outer quadrant of the left breast with malignant features with ultrasound-guided biopsy recommended- BI-RADS 5. The palpable abnormality on examination appears to be a benign hamartoma. However there was an irregular focus that is suspicious in the upper outer quadrant of the left breast and she will be referred to surgery for evaluation. Her CA-15-3 above which is minimally elevated a 25.5 and lymphoma markers were negative. Referral was made for her to see Dr. Yung Hutchins at her 1/17/2020 clinic visit. CA-15-3 25.5 (0-25)     US-guided biopsy of the 1.4 x 1.1 x 0.80 cm left breast mass was performed on 1/29/2020 by Dr. Yung Hutchins.    Pathology revealed the following:  Invasive lobular carcinoma, low-grade  Tumor measures at least 9 mm in greatest linear  Tumor invades present in multiple core biopsies  Adjacent intraductal papilloma with apocrine metaplasia     [facility] quantative breast panel is as follows:  ER: positive 94%  NE: negative 0.2%  HER2: negative, Score

## 2023-04-18 ENCOUNTER — OFFICE VISIT (OUTPATIENT)
Dept: HEMATOLOGY | Age: 78
End: 2023-04-18
Payer: MEDICARE

## 2023-04-18 ENCOUNTER — HOSPITAL ENCOUNTER (OUTPATIENT)
Dept: INFUSION THERAPY | Age: 78
Discharge: HOME | End: 2023-04-18
Payer: MEDICARE

## 2023-04-18 VITALS
BODY MASS INDEX: 31.24 KG/M2 | DIASTOLIC BLOOD PRESSURE: 60 MMHG | HEART RATE: 77 BPM | SYSTOLIC BLOOD PRESSURE: 118 MMHG | WEIGHT: 182 LBS | OXYGEN SATURATION: 96 %

## 2023-04-18 DIAGNOSIS — Z79.811 LONG TERM CURRENT USE OF AROMATASE INHIBITOR: ICD-10-CM

## 2023-04-18 DIAGNOSIS — D69.6 THROMBOCYTOPENIA (HCC): ICD-10-CM

## 2023-04-18 DIAGNOSIS — C50.412 MALIGNANT NEOPLASM OF UPPER-OUTER QUADRANT OF LEFT BREAST IN FEMALE, ESTROGEN RECEPTOR POSITIVE (HCC): Primary | ICD-10-CM

## 2023-04-18 DIAGNOSIS — D72.829 LEUKOCYTOSIS, UNSPECIFIED TYPE: ICD-10-CM

## 2023-04-18 DIAGNOSIS — C50.511 MALIGNANT NEOPLASM OF LOWER-OUTER QUADRANT OF RIGHT BREAST OF FEMALE, ESTROGEN RECEPTOR POSITIVE (HCC): ICD-10-CM

## 2023-04-18 DIAGNOSIS — D50.0 IRON DEFICIENCY ANEMIA DUE TO CHRONIC BLOOD LOSS: ICD-10-CM

## 2023-04-18 DIAGNOSIS — Z17.0 MALIGNANT NEOPLASM OF UPPER-OUTER QUADRANT OF LEFT BREAST IN FEMALE, ESTROGEN RECEPTOR POSITIVE (HCC): Primary | ICD-10-CM

## 2023-04-18 DIAGNOSIS — Z17.0 MALIGNANT NEOPLASM OF LOWER-OUTER QUADRANT OF RIGHT BREAST OF FEMALE, ESTROGEN RECEPTOR POSITIVE (HCC): ICD-10-CM

## 2023-04-18 LAB
ALBUMIN SERPL-MCNC: 4.5 G/DL (ref 3.5–5.2)
ALP SERPL-CCNC: 116 U/L (ref 35–104)
ALT SERPL-CCNC: 20 U/L (ref 9–52)
ANION GAP SERPL CALCULATED.3IONS-SCNC: 10 MMOL/L (ref 7–19)
AST SERPL-CCNC: 28 U/L (ref 14–36)
BASOPHILS # BLD: 0.05 K/UL (ref 0.01–0.08)
BASOPHILS NFR BLD: 0.5 % (ref 0.1–1.2)
BILIRUB SERPL-MCNC: 0.4 MG/DL (ref 0.2–1.3)
BUN SERPL-MCNC: 20 MG/DL (ref 7–17)
CALCIUM SERPL-MCNC: 10.7 MG/DL (ref 8.4–10.2)
CHLORIDE SERPL-SCNC: 96 MMOL/L (ref 98–111)
CO2 SERPL-SCNC: 31 MMOL/L (ref 22–29)
CREAT SERPL-MCNC: 1.2 MG/DL (ref 0.5–1)
EOSINOPHIL # BLD: 0.16 K/UL (ref 0.04–0.54)
EOSINOPHIL NFR BLD: 1.5 % (ref 0.7–7)
ERYTHROCYTE [DISTWIDTH] IN BLOOD BY AUTOMATED COUNT: 13.3 % (ref 11.7–14.4)
GLUCOSE SERPL-MCNC: 249 MG/DL (ref 74–106)
HCT VFR BLD AUTO: 41.8 % (ref 34.1–44.9)
HGB BLD-MCNC: 13.6 G/DL (ref 11.2–15.7)
LYMPHOCYTES # BLD: 1.25 K/UL (ref 1.18–3.74)
LYMPHOCYTES NFR BLD: 12 % (ref 19.3–53.1)
MCH RBC QN AUTO: 29.3 PG (ref 25.6–32.2)
MCHC RBC AUTO-ENTMCNC: 32.5 G/DL (ref 32.3–35.5)
MCV RBC AUTO: 90.1 FL (ref 79.4–94.8)
MONOCYTES # BLD: 0.62 K/UL (ref 0.24–0.82)
MONOCYTES NFR BLD: 6 % (ref 4.7–12.5)
NEUTROPHILS # BLD: 8.3 K/UL (ref 1.56–6.13)
NEUTS SEG NFR BLD: 79.6 % (ref 34–71.1)
PLATELET # BLD AUTO: 112 K/UL (ref 182–369)
PMV BLD AUTO: 11.2 FL (ref 7.4–10.4)
POTASSIUM SERPL-SCNC: 4.8 MMOL/L (ref 3.5–5.1)
PROT SERPL-MCNC: 8.2 G/DL (ref 6.3–8.2)
RBC # BLD AUTO: 4.64 M/UL (ref 3.93–5.22)
SODIUM SERPL-SCNC: 137 MMOL/L (ref 137–145)
WBC # BLD AUTO: 10.42 K/UL (ref 3.98–10.04)

## 2023-04-18 PROCEDURE — 99212 OFFICE O/P EST SF 10 MIN: CPT

## 2023-04-18 PROCEDURE — 1090F PRES/ABSN URINE INCON ASSESS: CPT | Performed by: PHYSICIAN ASSISTANT

## 2023-04-18 PROCEDURE — G8417 CALC BMI ABV UP PARAM F/U: HCPCS | Performed by: PHYSICIAN ASSISTANT

## 2023-04-18 PROCEDURE — G8427 DOCREV CUR MEDS BY ELIG CLIN: HCPCS | Performed by: PHYSICIAN ASSISTANT

## 2023-04-18 PROCEDURE — 1036F TOBACCO NON-USER: CPT | Performed by: PHYSICIAN ASSISTANT

## 2023-04-18 PROCEDURE — 99214 OFFICE O/P EST MOD 30 MIN: CPT | Performed by: PHYSICIAN ASSISTANT

## 2023-04-18 PROCEDURE — 80053 COMPREHEN METABOLIC PANEL: CPT

## 2023-04-18 PROCEDURE — 85025 COMPLETE CBC W/AUTO DIFF WBC: CPT

## 2023-04-18 PROCEDURE — G8399 PT W/DXA RESULTS DOCUMENT: HCPCS | Performed by: PHYSICIAN ASSISTANT

## 2023-04-18 PROCEDURE — 1123F ACP DISCUSS/DSCN MKR DOCD: CPT | Performed by: PHYSICIAN ASSISTANT

## 2023-04-18 PROCEDURE — 36415 COLL VENOUS BLD VENIPUNCTURE: CPT

## 2023-04-18 RX ORDER — ASPIRIN 81 MG/1
81 TABLET ORAL DAILY
COMMUNITY

## 2023-04-18 ASSESSMENT — ENCOUNTER SYMPTOMS
DIARRHEA: 0
VOICE CHANGE: 0
VOMITING: 0
ABDOMINAL PAIN: 0
PHOTOPHOBIA: 0
EYE DISCHARGE: 0
BACK PAIN: 0
COUGH: 0
SORE THROAT: 0
BLOOD IN STOOL: 0
CONSTIPATION: 0
TROUBLE SWALLOWING: 0
EYE ITCHING: 0
COLOR CHANGE: 0
WHEEZING: 0
ABDOMINAL DISTENTION: 0
NAUSEA: 0
SHORTNESS OF BREATH: 0

## 2023-05-19 ENCOUNTER — OFFICE VISIT (OUTPATIENT)
Dept: CARDIOLOGY | Facility: CLINIC | Age: 78
End: 2023-05-19
Payer: MEDICARE

## 2023-05-19 VITALS
WEIGHT: 182 LBS | HEIGHT: 64 IN | DIASTOLIC BLOOD PRESSURE: 52 MMHG | OXYGEN SATURATION: 98 % | BODY MASS INDEX: 31.07 KG/M2 | SYSTOLIC BLOOD PRESSURE: 106 MMHG

## 2023-05-19 DIAGNOSIS — E03.9 ACQUIRED HYPOTHYROIDISM: ICD-10-CM

## 2023-05-19 DIAGNOSIS — I48.21 PERMANENT ATRIAL FIBRILLATION: Primary | ICD-10-CM

## 2023-05-19 DIAGNOSIS — E66.09 CLASS 1 OBESITY DUE TO EXCESS CALORIES WITH SERIOUS COMORBIDITY AND BODY MASS INDEX (BMI) OF 31.0 TO 31.9 IN ADULT: ICD-10-CM

## 2023-05-19 DIAGNOSIS — I34.2 NONRHEUMATIC MITRAL VALVE STENOSIS: ICD-10-CM

## 2023-05-19 DIAGNOSIS — I10 ESSENTIAL HYPERTENSION: ICD-10-CM

## 2023-05-19 PROBLEM — I05.0 MITRAL VALVE STENOSIS: Status: ACTIVE | Noted: 2023-05-19

## 2023-05-19 PROCEDURE — 93000 ELECTROCARDIOGRAM COMPLETE: CPT | Performed by: NURSE PRACTITIONER

## 2023-05-19 PROCEDURE — 1160F RVW MEDS BY RX/DR IN RCRD: CPT | Performed by: NURSE PRACTITIONER

## 2023-05-19 PROCEDURE — 3078F DIAST BP <80 MM HG: CPT | Performed by: NURSE PRACTITIONER

## 2023-05-19 PROCEDURE — 99214 OFFICE O/P EST MOD 30 MIN: CPT | Performed by: NURSE PRACTITIONER

## 2023-05-19 PROCEDURE — 1159F MED LIST DOCD IN RCRD: CPT | Performed by: NURSE PRACTITIONER

## 2023-05-19 PROCEDURE — 3074F SYST BP LT 130 MM HG: CPT | Performed by: NURSE PRACTITIONER

## 2023-05-19 RX ORDER — DILTIAZEM HYDROCHLORIDE 180 MG/1
360 CAPSULE, COATED, EXTENDED RELEASE ORAL DAILY
Qty: 180 CAPSULE | Refills: 3 | Status: SHIPPED | OUTPATIENT
Start: 2023-05-19

## 2023-05-19 RX ORDER — METOPROLOL SUCCINATE 25 MG/1
25 TABLET, EXTENDED RELEASE ORAL DAILY
Qty: 90 TABLET | Refills: 3 | Status: SHIPPED | OUTPATIENT
Start: 2023-05-19

## 2023-05-19 NOTE — PROGRESS NOTES
"    Subjective:     Encounter Date:05/19/2023      Patient ID: Dianne Pritchard is a 77 y.o. female     Chief Complaint: \"no complaints\"  Atrial Fibrillation  Presents for follow-up visit. Symptoms are negative for chest pain, dizziness, palpitations, shortness of breath, syncope and weakness. The symptoms have been stable. Past medical history includes atrial fibrillation.   Shortness of Breath  This is a chronic problem. The current episode started more than 1 year ago. The problem has been unchanged. Pertinent negatives include no chest pain, leg swelling, orthopnea, PND, rash, sputum production, syncope or wheezing.   Hypertension  This is a chronic problem. The current episode started more than 1 year ago. The problem has been rapidly improving since onset. The problem is controlled. Pertinent negatives include no chest pain, malaise/fatigue, orthopnea, palpitations, PND or shortness of breath.     Patient presents today for a routine follow up. Patient is followed for permanent afib s/p 21mm Watchman implant in 1/2019 and mitral stenosis which was noted to be moderate-severe per most recent echo in 5/2022.     She reports she has been well. She denies issues with bleeding and ASA use. Her chronic dyspnea with exertion remains unchanged from previous. She notes occasional left ankle swelling that resolves overnight. She monitors her BP at home with readings 120 at home. She denies chest pain, palpitations, orthopnea and PND.        The following portions of the patient's history were reviewed and updated as appropriate: allergies, current medications, past family history, past medical history, past social history, past surgical history and problem list.    Allergies   Allergen Reactions    Amoxil [Amoxicillin] Hives    Penicillins Hives    Biaxin [Clarithromycin] Other (See Comments)     NOT SURE OF REACTION, \"SORE MOUTH OF DIARRHEA\"    Lortab [Hydrocodone-Acetaminophen] GI Intolerance       Current Outpatient " Medications:     aspirin 81 MG chewable tablet, Chew 1 tablet Daily., Disp: , Rfl:     dilTIAZem CD (CARDIZEM CD) 180 MG 24 hr capsule, Take 2 capsules by mouth Daily., Disp: 180 capsule, Rfl: 3    Insulin Pen Needle (PEN NEEDLES) 31G X 5 MM misc, 1 each 2 (Two) Times a Day., Disp: 100 each, Rfl: 5    letrozole (FEMARA) 2.5 MG tablet, Take 1 tablet by mouth Daily., Disp: , Rfl:     levothyroxine (SYNTHROID, LEVOTHROID) 137 MCG tablet, TAKE 1 TABLET BY MOUTH DAILY, Disp: 90 tablet, Rfl: 0    losartan (COZAAR) 25 MG tablet, TAKE 1 TABLET BY MOUTH DAILY, Disp: 90 tablet, Rfl: 3    metoprolol succinate XL (TOPROL-XL) 25 MG 24 hr tablet, Take 1 tablet by mouth Daily., Disp: 90 tablet, Rfl: 3    Misc. Devices (VIRAGE CUSTOM BREAST PROSTHES) misc, To be used with Mastectomy Bra-, Disp: 2 each, Rfl: 0    Multiple Vitamins-Minerals (CENTRUM SILVER PO), Take 1 tablet by mouth Daily., Disp: , Rfl:     TOUJEO SOLOSTAR 300 UNIT/ML solution pen-injector injection, INJECT 80 UNITS UNDER THE SKIN UTD QD, Disp: , Rfl: 1  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)    Carotid artery occlusion     Diabetes mellitus, type II     Dizziness     Drug therapy     Essential hypertension     GERD (gastroesophageal reflux disease)     GI bleed requiring more than 4 units of blood in 24 hours, ICU, or surgery     History of chemotherapy     History of lymphoma     Hx of radiation therapy     Hypomagnesemia     Hypothyroidism     On amiodarone therapy        Social History     Socioeconomic History    Marital status:    Tobacco Use    Smoking status: Former     Years: 20.00     Types: Cigarettes     Quit date:      Years since quittin.3    Smokeless tobacco: Never   Vaping Use    Vaping Use: Never used   Substance and Sexual Activity    Alcohol use: No    Drug use: No    Sexual activity: Defer        Review of Systems   Constitutional: Negative for malaise/fatigue, weight gain and weight loss.   Cardiovascular:  Positive for dyspnea on exertion. Negative for chest pain, irregular heartbeat, leg swelling, near-syncope, orthopnea, palpitations, paroxysmal nocturnal dyspnea and syncope.   Respiratory:  Negative for cough, shortness of breath, sleep disturbances due to breathing, sputum production and wheezing.    Skin:  Negative for dry skin, flushing, itching and rash.   Gastrointestinal:  Negative for hematemesis and hematochezia.   Neurological:  Negative for dizziness, light-headedness, loss of balance and weakness.   All other systems reviewed and are negative.       Objective:     Vitals reviewed.   Constitutional:       General: Not in acute distress.     Appearance: Well-developed. Not diaphoretic.   Eyes:      General: No scleral icterus.     Conjunctiva/sclera: Conjunctivae normal.      Pupils: Pupils are equal, round, and reactive to light.   HENT:      Head: Normocephalic.    Mouth/Throat:      Pharynx: No oropharyngeal exudate.   Pulmonary:      Effort: Pulmonary effort is normal. No respiratory distress.      Breath sounds: Normal breath sounds. No wheezing. No rales.   Chest:      Chest wall: Not tender to palpatation.   Cardiovascular:      Normal rate. Irregularly irregular rhythm.      Murmurs: There is a grade 2/4 low frequency crescendo, presystolic diastolic murmur at the apex.   Pulses:     Intact distal pulses.   Edema:     Peripheral edema absent.   Abdominal:      General: Bowel sounds are normal. There is no distension.      Palpations: Abdomen is soft.      Tenderness: There is no abdominal tenderness.   Musculoskeletal: Normal range of motion.      Cervical back: Normal range of motion and neck supple. Skin:     General: Skin is warm and dry.      Coloration: Skin is not pale.      Findings: No erythema or rash.   Neurological:      Mental Status: Alert and oriented to person,  "place, and time.      Deep Tendon Reflexes: Reflexes are normal and symmetric.   Psychiatric:         Behavior: Behavior normal.           ECG 12 Lead    Date/Time: 5/19/2023 12:26 PM  Performed by: Esperanza Valles APRN  Authorized by: Esperanza Valles APRN   Comparison: compared with previous ECG from 5/18/2022  Similar to previous ECG  Rhythm: atrial fibrillation  Rate: bradycardic  BPM: 58  Conduction: conduction normal  ST Segments: ST segments normal  QRS axis: normal  Other findings: T wave abnormality    Clinical impression: abnormal EKG      /52   Ht 162.6 cm (64\")   Wt 82.6 kg (182 lb)   SpO2 98%   BMI 31.24 kg/m²     Lab Review:   I have reviewed previous office notes, recent labs and recent cardiac testing.       Results for orders placed during the hospital encounter of 07/18/22    Adult Transthoracic Echo Complete w/ Color, Spectral and Contrast if necessary per protocol    Interpretation Summary  · Left ventricular wall thickness is consistent with mild concentric hypertrophy.  · Estimated left ventricular EF = 65% Left ventricular systolic function is normal.  · Left ventricular diastolic dysfunction is noted.  · The right ventricular cavity is borderline dilated.  · There is calcification of the aortic valve.  · Moderate to severe mitral valve stenosis is present.  · The right atrial cavity is mild to moderately dilated.          Assessment:          Diagnosis Plan   1. Permanent atrial fibrillation        2. Essential hypertension        3. Acquired hypothyroidism        4. Nonrheumatic mitral valve stenosis  Adult Transthoracic Echo Complete w/ Color, Spectral and Contrast if necessary per protocol      5. Class 1 obesity due to excess calories with serious comorbidity and body mass index (BMI) of 31.0 to 31.9 in adult               Plan:       1. Permanent afib- rates controlled. Continue Toprol, Cardizem and ASA (s/p watchman).    2. HTN- controlled. Followed by PCP   3. " Hypothyroidism- controlled.  followed by PCP  4. Mitral valve stenosis- moderate-severe per echo in 5/2022. Will repeat echo in July as surveillance.   5. BMI- BMI is >= 30 and <35. (Class 1 Obesity). The following options were offered after discussion;: weight loss educational material (shared in after visit summary), exercise counseling/recommendations, and nutrition counseling/recommendations      Follow up in 1 year or sooner if symptoms worsen.     I spent 30 minutes caring for Dianne on this date of service. This time includes time spent by me in the following activities:preparing for the visit, reviewing tests, obtaining and/or reviewing a separately obtained history, performing a medically appropriate examination and/or evaluation , documenting information in the medical record, independently interpreting results and communicating that information with the patient/family/caregiver and care coordination    I spent 2 minutes on the separately reported service of EKG interpretation. This time is not included in the time used to support the E/M service also reported today.

## 2023-08-07 ENCOUNTER — TELEPHONE (OUTPATIENT)
Dept: HEMATOLOGY | Age: 78
End: 2023-08-07

## 2023-08-07 NOTE — PROGRESS NOTES
neck supple. Lymphadenopathy:      Cervical: No cervical adenopathy. Right cervical: No posterior cervical adenopathy. Upper Body:      Right upper body: No supraclavicular or axillary adenopathy. Left upper body: No supraclavicular or axillary adenopathy. Lower Body: No right inguinal adenopathy. No left inguinal adenopathy. Comments: Lipoma right supraclavicular area   Skin:     General: Skin is warm and dry. Findings: No rash. Neurological:      Mental Status: She is alert and oriented to person, place, and time. Coordination: Coordination normal.   Psychiatric:         Behavior: Behavior normal.         Thought Content: Thought content normal.        CBC reviewed by me  Lab Results   Component Value Date    WBC 8.40 08/08/2023    HGB 13.5 08/08/2023    HCT 40.7 08/08/2023    MCV 86.8 08/08/2023     (L) 08/08/2023     Lab Results   Component Value Date    NEUTROABS 7.05 (H) 08/08/2023         VISIT DIAGNOSES  1. Malignant neoplasm of upper-outer quadrant of left breast in female, estrogen receptor positive ([address])    2. Malignant neoplasm of lower-outer quadrant of right breast of female, estrogen receptor positive ([address])    3. Long term current use of aromatase inhibitor    4. Leukocytosis, unspecified type    5. Thrombocytopenia ([address])    6. Iron deficiency anemia due to chronic blood loss    7. History of lymphoma        ASSESSMENT/PLAN:    1. Left Stage I (pT1c, pN0, pMx),  grade 1, ER positive HER-2/radha negative Invasive lobular carcinoma of the breast 1/29/2020  2. HX Stage II A right breast carcinoma. She has had bilateral mastectomies        Serology 12/14/2022  CMP       Physical examination is unrevealing for any nodules of the chest wall. She does not have any axillary lymphadenopathy. CA 15-3 has risen slightly but overall stable, will continue to be monitored and rechecked today. PLAN  Continue Femara 2.5 daily  CA 15-3  CMP    3.  Leukocytosis.

## 2023-08-08 ENCOUNTER — OFFICE VISIT (OUTPATIENT)
Dept: HEMATOLOGY | Age: 78
End: 2023-08-08
Payer: MEDICARE

## 2023-08-08 ENCOUNTER — HOSPITAL ENCOUNTER (OUTPATIENT)
Dept: INFUSION THERAPY | Age: 78
Discharge: HOME | End: 2023-08-08
Payer: MEDICARE

## 2023-08-08 VITALS
BODY MASS INDEX: 30.9 KG/M2 | WEIGHT: 181 LBS | DIASTOLIC BLOOD PRESSURE: 86 MMHG | HEIGHT: 64 IN | OXYGEN SATURATION: 97 % | HEART RATE: 93 BPM | SYSTOLIC BLOOD PRESSURE: 130 MMHG

## 2023-08-08 DIAGNOSIS — D72.829 LEUKOCYTOSIS, UNSPECIFIED TYPE: ICD-10-CM

## 2023-08-08 DIAGNOSIS — D69.6 THROMBOCYTOPENIA (HCC): ICD-10-CM

## 2023-08-08 DIAGNOSIS — Z79.811 LONG TERM CURRENT USE OF AROMATASE INHIBITOR: ICD-10-CM

## 2023-08-08 DIAGNOSIS — Z85.72 HISTORY OF LYMPHOMA: ICD-10-CM

## 2023-08-08 DIAGNOSIS — D50.0 IRON DEFICIENCY ANEMIA DUE TO CHRONIC BLOOD LOSS: ICD-10-CM

## 2023-08-08 DIAGNOSIS — C50.412 MALIGNANT NEOPLASM OF UPPER-OUTER QUADRANT OF LEFT BREAST IN FEMALE, ESTROGEN RECEPTOR POSITIVE (HCC): ICD-10-CM

## 2023-08-08 DIAGNOSIS — Z17.0 MALIGNANT NEOPLASM OF UPPER-OUTER QUADRANT OF LEFT BREAST IN FEMALE, ESTROGEN RECEPTOR POSITIVE (HCC): Primary | ICD-10-CM

## 2023-08-08 DIAGNOSIS — Z79.811 LONG TERM CURRENT USE OF AROMATASE INHIBITOR: Primary | ICD-10-CM

## 2023-08-08 DIAGNOSIS — C50.511 MALIGNANT NEOPLASM OF LOWER-OUTER QUADRANT OF RIGHT BREAST OF FEMALE, ESTROGEN RECEPTOR POSITIVE (HCC): ICD-10-CM

## 2023-08-08 DIAGNOSIS — Z17.0 MALIGNANT NEOPLASM OF UPPER-OUTER QUADRANT OF LEFT BREAST IN FEMALE, ESTROGEN RECEPTOR POSITIVE (HCC): ICD-10-CM

## 2023-08-08 DIAGNOSIS — Z17.0 MALIGNANT NEOPLASM OF LOWER-OUTER QUADRANT OF RIGHT BREAST OF FEMALE, ESTROGEN RECEPTOR POSITIVE (HCC): ICD-10-CM

## 2023-08-08 DIAGNOSIS — C50.412 MALIGNANT NEOPLASM OF UPPER-OUTER QUADRANT OF LEFT BREAST IN FEMALE, ESTROGEN RECEPTOR POSITIVE (HCC): Primary | ICD-10-CM

## 2023-08-08 LAB
ALBUMIN SERPL-MCNC: 4.5 G/DL (ref 3.5–5.2)
ALP SERPL-CCNC: 135 U/L (ref 35–104)
ALT SERPL-CCNC: 26 U/L (ref 9–52)
ANION GAP SERPL CALCULATED.3IONS-SCNC: 12 MMOL/L (ref 7–19)
AST SERPL-CCNC: 35 U/L (ref 14–36)
BASOPHILS # BLD: 0.04 K/UL (ref 0.01–0.08)
BASOPHILS NFR BLD: 0.5 % (ref 0.1–1.2)
BILIRUB SERPL-MCNC: 0.5 MG/DL (ref 0.2–1.3)
BUN SERPL-MCNC: 19 MG/DL (ref 7–17)
CA 15-3: 33 U/ML (ref 0–25)
CALCIUM SERPL-MCNC: 10.2 MG/DL (ref 8.4–10.2)
CHLORIDE SERPL-SCNC: 96 MMOL/L (ref 98–111)
CO2 SERPL-SCNC: 30 MMOL/L (ref 22–29)
CREAT SERPL-MCNC: 1 MG/DL (ref 0.5–1)
EOSINOPHIL # BLD: 0.1 K/UL (ref 0.04–0.54)
EOSINOPHIL NFR BLD: 1.2 % (ref 0.7–7)
ERYTHROCYTE [DISTWIDTH] IN BLOOD BY AUTOMATED COUNT: 13.2 % (ref 11.7–14.4)
GLUCOSE SERPL-MCNC: 249 MG/DL (ref 74–106)
HCT VFR BLD AUTO: 40.7 % (ref 34.1–44.9)
HGB BLD-MCNC: 13.5 G/DL (ref 11.2–15.7)
LDH SERPL-CCNC: 162 U/L (ref 120–246)
LYMPHOCYTES # BLD: 0.72 K/UL (ref 1.18–3.74)
LYMPHOCYTES NFR BLD: 8.6 % (ref 19.3–53.1)
MCH RBC QN AUTO: 28.8 PG (ref 25.6–32.2)
MCHC RBC AUTO-ENTMCNC: 33.2 G/DL (ref 32.3–35.5)
MCV RBC AUTO: 86.8 FL (ref 79.4–94.8)
MONOCYTES # BLD: 0.44 K/UL (ref 0.24–0.82)
MONOCYTES NFR BLD: 5.2 % (ref 4.7–12.5)
NEUTROPHILS # BLD: 7.05 K/UL (ref 1.56–6.13)
NEUTS SEG NFR BLD: 83.9 % (ref 34–71.1)
PLATELET # BLD AUTO: 150 K/UL (ref 182–369)
PMV BLD AUTO: 10.8 FL (ref 7.4–10.4)
POTASSIUM SERPL-SCNC: 5.1 MMOL/L (ref 3.5–5.1)
PROT SERPL-MCNC: 8.2 G/DL (ref 6.3–8.2)
RBC # BLD AUTO: 4.69 M/UL (ref 3.93–5.22)
SODIUM SERPL-SCNC: 138 MMOL/L (ref 137–145)
WBC # BLD AUTO: 8.4 K/UL (ref 3.98–10.04)

## 2023-08-08 PROCEDURE — 80053 COMPREHEN METABOLIC PANEL: CPT

## 2023-08-08 PROCEDURE — G8399 PT W/DXA RESULTS DOCUMENT: HCPCS | Performed by: PHYSICIAN ASSISTANT

## 2023-08-08 PROCEDURE — G8427 DOCREV CUR MEDS BY ELIG CLIN: HCPCS | Performed by: PHYSICIAN ASSISTANT

## 2023-08-08 PROCEDURE — 85025 COMPLETE CBC W/AUTO DIFF WBC: CPT

## 2023-08-08 PROCEDURE — 1036F TOBACCO NON-USER: CPT | Performed by: PHYSICIAN ASSISTANT

## 2023-08-08 PROCEDURE — 1123F ACP DISCUSS/DSCN MKR DOCD: CPT | Performed by: PHYSICIAN ASSISTANT

## 2023-08-08 PROCEDURE — 83615 LACTATE (LD) (LDH) ENZYME: CPT

## 2023-08-08 PROCEDURE — G8417 CALC BMI ABV UP PARAM F/U: HCPCS | Performed by: PHYSICIAN ASSISTANT

## 2023-08-08 PROCEDURE — 99212 OFFICE O/P EST SF 10 MIN: CPT

## 2023-08-08 PROCEDURE — 1090F PRES/ABSN URINE INCON ASSESS: CPT | Performed by: PHYSICIAN ASSISTANT

## 2023-08-08 PROCEDURE — 36415 COLL VENOUS BLD VENIPUNCTURE: CPT

## 2023-08-08 PROCEDURE — 99214 OFFICE O/P EST MOD 30 MIN: CPT | Performed by: PHYSICIAN ASSISTANT

## 2023-08-08 RX ORDER — LEVOTHYROXINE SODIUM 0.12 MG/1
125 TABLET ORAL DAILY
COMMUNITY

## 2023-08-08 ASSESSMENT — ENCOUNTER SYMPTOMS
VOMITING: 0
VOICE CHANGE: 0
BACK PAIN: 0
COUGH: 0
PHOTOPHOBIA: 0
EYE DISCHARGE: 0
DIARRHEA: 0
TROUBLE SWALLOWING: 0
SHORTNESS OF BREATH: 0
ABDOMINAL PAIN: 0
NAUSEA: 0
COLOR CHANGE: 0
ABDOMINAL DISTENTION: 0
BLOOD IN STOOL: 0
EYE ITCHING: 0
WHEEZING: 0
SORE THROAT: 0
CONSTIPATION: 0

## 2023-08-11 LAB
ALP BONE SERPL-CCNC: 42 U/L (ref 0–55)
ALP ISOS SERPL HS-CCNC: 0 U/L
ALP LIVER SERPL-CCNC: 94 U/L (ref 0–94)
ALP SERPL-CCNC: 136 U/L (ref 40–120)

## 2023-10-04 ENCOUNTER — OFFICE VISIT (OUTPATIENT)
Dept: FAMILY MEDICINE CLINIC | Facility: CLINIC | Age: 78
End: 2023-10-04
Payer: MEDICARE

## 2023-10-04 VITALS
SYSTOLIC BLOOD PRESSURE: 110 MMHG | DIASTOLIC BLOOD PRESSURE: 60 MMHG | TEMPERATURE: 97 F | OXYGEN SATURATION: 97 % | HEART RATE: 64 BPM | WEIGHT: 181 LBS | HEIGHT: 64 IN | RESPIRATION RATE: 18 BRPM | BODY MASS INDEX: 30.9 KG/M2

## 2023-10-04 DIAGNOSIS — I48.21 PERMANENT ATRIAL FIBRILLATION: Primary | ICD-10-CM

## 2023-10-04 DIAGNOSIS — I10 ESSENTIAL HYPERTENSION: ICD-10-CM

## 2023-10-04 DIAGNOSIS — E11.59 TYPE 2 DIABETES MELLITUS WITH OTHER CIRCULATORY COMPLICATIONS: ICD-10-CM

## 2023-10-04 DIAGNOSIS — E03.9 HYPOTHYROIDISM, UNSPECIFIED TYPE: ICD-10-CM

## 2023-10-04 PROCEDURE — 99214 OFFICE O/P EST MOD 30 MIN: CPT | Performed by: FAMILY MEDICINE

## 2023-10-04 PROCEDURE — 3074F SYST BP LT 130 MM HG: CPT | Performed by: FAMILY MEDICINE

## 2023-10-04 PROCEDURE — 3078F DIAST BP <80 MM HG: CPT | Performed by: FAMILY MEDICINE

## 2023-10-04 NOTE — PROGRESS NOTES
"Glenn Pritchard is a 78 y.o. female.     Chief Complaint   Patient presents with    Hypertension       Hypertension       She thiinks bp and bs are controlled...denies any chest pain or ha      Current Outpatient Medications:     aspirin 81 MG chewable tablet, Chew 1 tablet Daily., Disp: , Rfl:     dilTIAZem CD (CARDIZEM CD) 180 MG 24 hr capsule, Take 2 capsules by mouth Daily., Disp: 180 capsule, Rfl: 3    Insulin Pen Needle (PEN NEEDLES) 31G X 5 MM misc, 1 each 2 (Two) Times a Day., Disp: 100 each, Rfl: 5    letrozole (FEMARA) 2.5 MG tablet, Take 1 tablet by mouth Daily., Disp: , Rfl:     levothyroxine (SYNTHROID, LEVOTHROID) 137 MCG tablet, TAKE 1 TABLET BY MOUTH DAILY, Disp: 90 tablet, Rfl: 0    losartan (COZAAR) 25 MG tablet, TAKE 1 TABLET BY MOUTH DAILY, Disp: 90 tablet, Rfl: 3    metoprolol succinate XL (TOPROL-XL) 25 MG 24 hr tablet, Take 1 tablet by mouth Daily., Disp: 90 tablet, Rfl: 3    Misc. Devices (VIRAGE CUSTOM BREAST PROSTHES) misc, To be used with Mastectomy Bra-, Disp: 2 each, Rfl: 0    Multiple Vitamins-Minerals (CENTRUM SILVER PO), Take 1 tablet by mouth Daily., Disp: , Rfl:     TOUJEO SOLOSTAR 300 UNIT/ML solution pen-injector injection, 84 Units., Disp: , Rfl: 1  Allergies   Allergen Reactions    Amoxil [Amoxicillin] Hives    Penicillins Hives    Biaxin [Clarithromycin] Other (See Comments)     NOT SURE OF REACTION, \"SORE MOUTH OF DIARRHEA\"    Lortab [Hydrocodone-Acetaminophen] GI Intolerance              Facility age limit for growth percentiles is 20 years.    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)    Carotid artery occlusion     Diabetes mellitus, type II     Dizziness     Drug therapy     Essential hypertension     GERD (gastroesophageal reflux disease)     GI bleed requiring more than 4 units of blood in 24 hours, ICU, or surgery     History of " "chemotherapy     History of lymphoma     Hx of radiation therapy     Hypomagnesemia     Hypothyroidism     On amiodarone therapy      Past Surgical History:   Procedure Laterality Date    ATRIAL APPENDAGE EXCLUSION LEFT WITH TRANSESOPHAGEAL ECHOCARDIOGRAM Left 11/27/2018    Procedure: Atrial Appendage Occlusion;  Surgeon: Mick Orantes MD;  Location:  [facility];  Service: Cardiology    ATRIAL APPENDAGE EXCLUSION LEFT WITH TRANSESOPHAGEAL ECHOCARDIOGRAM Left 1/22/2019    Procedure: Atrial Appendage Occlusion;  Surgeon: Mick Orantes MD;  Location:  [facility];  Service: Cardiology    BREAST BIOPSY      MASTECTOMY      MASTECTOMY  03/10/2020    OTHER SURGICAL HISTORY      Mediport insertion X 2    OTHER SURGICAL HISTORY      remote lymphoma treatment    REPLACEMENT TOTAL KNEE Right 09/2017    TUBAL ABDOMINAL LIGATION         Review of Systems   Constitutional:  Positive for fatigue.   HENT: Negative.     Eyes: Negative.    Respiratory: Negative.     Cardiovascular: Negative.    Gastrointestinal: Negative.    Endocrine: Negative.    Genitourinary: Negative.    Musculoskeletal: Negative.    Skin: Negative.    Allergic/Immunologic: Negative.    Neurological: Negative.    Hematological: Negative.    Psychiatric/Behavioral: Negative.       Objective /60   Pulse 64   Temp 97 °F (36.1 °C)   Resp 18   Ht 162.6 cm (64.02\")   Wt 82.1 kg (181 lb)   SpO2 97%   BMI 31.05 kg/m²    Physical Exam  Vitals and nursing note reviewed.   Constitutional:       Appearance: Normal appearance.   HENT:      Head: Normocephalic.      Nose: Nose normal.      Mouth/Throat:      Mouth: Mucous membranes are moist.   Eyes:      Conjunctiva/sclera: Conjunctivae normal.      Pupils: Pupils are equal, round, and reactive to light.   Cardiovascular:      Rate and Rhythm: Normal rate and regular rhythm.      Pulses: Normal pulses.      Heart sounds: Normal heart sounds.   Pulmonary:      Effort: " Pulmonary effort is normal.   Abdominal:      General: Abdomen is flat.   Musculoskeletal:         General: Normal range of motion.      Cervical back: Normal range of motion and neck supple.   Skin:     General: Skin is warm and dry.      Capillary Refill: Capillary refill takes less than 2 seconds.   Neurological:      General: No focal deficit present.      Mental Status: She is alert and oriented to person, place, and time. Mental status is at baseline.   Psychiatric:         Mood and Affect: Mood normal.       Assessment & Plan   Diagnoses and all orders for this visit:    1. Permanent atrial fibrillation (Primary)    2. Essential hypertension  -     CBC & Differential  -     Comprehensive metabolic panel  -     Lipid Panel With / Chol / HDL Ratio  -     TSH  -     Hemoglobin A1c    3. Type 2 diabetes mellitus with other circulatory complications  -     CBC & Differential  -     Comprehensive metabolic panel  -     Lipid Panel With / Chol / HDL Ratio  -     TSH  -     Hemoglobin A1c  -     Microalbumin / Creatinine Urine Ratio - Urine, Clean Catch    4. Hypothyroidism, unspecified type  -     CBC & Differential  -     Comprehensive metabolic panel  -     Lipid Panel With / Chol / HDL Ratio  -     TSH  -     Hemoglobin A1c    She will montiro bp and bs and keep me infomnrd             Orders Placed This Encounter   Procedures    Comprehensive metabolic panel     Order Specific Question:   Release to patient     Answer:   Routine Release [[id number]]    Lipid Panel With / Chol / HDL Ratio     Order Specific Question:   Release to patient     Answer:   Routine Release [[id number]]    TSH     Order Specific Question:   Release to patient     Answer:   Routine Release [[id number]]    Hemoglobin A1c     Order Specific Question:   Release to patient     Answer:   Routine Release [[id number]]    Microalbumin / Creatinine Urine Ratio - Urine, Clean Catch     Order Specific Question:   Release to patient     Answer:    Routine Release [[id number]]    CBC & Differential     Order Specific Question:   Release to patient     Answer:   Routine Release [[id number]]       Follow up: 6 month(s)

## 2023-10-05 LAB
ALBUMIN SERPL-MCNC: 4.5 G/DL (ref 3.5–5.2)
ALBUMIN/CREAT UR: 769 MG/G CREAT (ref 0–29)
ALBUMIN/GLOB SERPL: 1.6 G/DL
ALP SERPL-CCNC: 120 U/L (ref 39–117)
ALT SERPL-CCNC: 16 U/L (ref 1–33)
AST SERPL-CCNC: 30 U/L (ref 1–32)
BASOPHILS # BLD AUTO: 0.06 10*3/MM3 (ref 0–0.2)
BASOPHILS NFR BLD AUTO: 0.5 % (ref 0–1.5)
BILIRUB SERPL-MCNC: 0.5 MG/DL (ref 0–1.2)
BUN SERPL-MCNC: 17 MG/DL (ref 8–23)
BUN/CREAT SERPL: 14 (ref 7–25)
CALCIUM SERPL-MCNC: 10.7 MG/DL (ref 8.6–10.5)
CHLORIDE SERPL-SCNC: 99 MMOL/L (ref 98–107)
CHOLEST SERPL-MCNC: 220 MG/DL (ref 0–200)
CHOLEST/HDLC SERPL: 4.31 {RATIO}
CO2 SERPL-SCNC: 29.6 MMOL/L (ref 22–29)
CREAT SERPL-MCNC: 1.21 MG/DL (ref 0.57–1)
CREAT UR-MCNC: 47.1 MG/DL
EGFRCR SERPLBLD CKD-EPI 2021: 46 ML/MIN/1.73
EOSINOPHIL # BLD AUTO: 0.13 10*3/MM3 (ref 0–0.4)
EOSINOPHIL NFR BLD AUTO: 1.2 % (ref 0.3–6.2)
ERYTHROCYTE [DISTWIDTH] IN BLOOD BY AUTOMATED COUNT: 13.1 % (ref 12.3–15.4)
GLOBULIN SER CALC-MCNC: 2.9 GM/DL
GLUCOSE SERPL-MCNC: 148 MG/DL (ref 65–99)
HBA1C MFR BLD: 8.3 % (ref 4.8–5.6)
HCT VFR BLD AUTO: 41.2 % (ref 34–46.6)
HDLC SERPL-MCNC: 51 MG/DL (ref 40–60)
HGB BLD-MCNC: 13.7 G/DL (ref 12–15.9)
IMM GRANULOCYTES # BLD AUTO: 0.05 10*3/MM3 (ref 0–0.05)
IMM GRANULOCYTES NFR BLD AUTO: 0.4 % (ref 0–0.5)
LDLC SERPL CALC-MCNC: 133 MG/DL (ref 0–100)
LYMPHOCYTES # BLD AUTO: 1.23 10*3/MM3 (ref 0.7–3.1)
LYMPHOCYTES NFR BLD AUTO: 10.9 % (ref 19.6–45.3)
MCH RBC QN AUTO: 29.5 PG (ref 26.6–33)
MCHC RBC AUTO-ENTMCNC: 33.3 G/DL (ref 31.5–35.7)
MCV RBC AUTO: 88.6 FL (ref 79–97)
MICROALBUMIN UR-MCNC: 362.2 UG/ML
MONOCYTES # BLD AUTO: 0.64 10*3/MM3 (ref 0.1–0.9)
MONOCYTES NFR BLD AUTO: 5.7 % (ref 5–12)
NEUTROPHILS # BLD AUTO: 9.16 10*3/MM3 (ref 1.7–7)
NEUTROPHILS NFR BLD AUTO: 81.3 % (ref 42.7–76)
NRBC BLD AUTO-RTO: 0 /100 WBC (ref 0–0.2)
PLATELET # BLD AUTO: 147 10*3/MM3 (ref 140–450)
POTASSIUM SERPL-SCNC: 5.4 MMOL/L (ref 3.5–5.2)
PROT SERPL-MCNC: 7.4 G/DL (ref 6–8.5)
RBC # BLD AUTO: 4.65 10*6/MM3 (ref 3.77–5.28)
SODIUM SERPL-SCNC: 140 MMOL/L (ref 136–145)
TRIGL SERPL-MCNC: 201 MG/DL (ref 0–150)
TSH SERPL DL<=0.005 MIU/L-ACNC: 1.13 UIU/ML (ref 0.27–4.2)
VLDLC SERPL CALC-MCNC: 36 MG/DL (ref 5–40)
WBC # BLD AUTO: 11.27 10*3/MM3 (ref 3.4–10.8)

## 2023-10-25 ENCOUNTER — TELEPHONE (OUTPATIENT)
Dept: VASCULAR SURGERY | Facility: CLINIC | Age: 78
End: 2023-10-25
Payer: MEDICARE

## 2023-10-25 NOTE — TELEPHONE ENCOUNTER
PATIENT CONFIRMED TESTING AND APPOINTMENT ON 10/26/23. PT IS OKAY WITH SEEING ANDREW INSTEAD OF BICKING.

## 2023-10-26 ENCOUNTER — HOSPITAL ENCOUNTER (OUTPATIENT)
Dept: ULTRASOUND IMAGING | Facility: HOSPITAL | Age: 78
Discharge: HOME | End: 2023-10-26
Payer: MEDICARE

## 2023-10-26 ENCOUNTER — OFFICE VISIT (OUTPATIENT)
Dept: VASCULAR SURGERY | Facility: CLINIC | Age: 78
End: 2023-10-26
Payer: MEDICARE

## 2023-10-26 VITALS
DIASTOLIC BLOOD PRESSURE: 68 MMHG | WEIGHT: 183 LBS | HEIGHT: 64 IN | HEART RATE: 68 BPM | SYSTOLIC BLOOD PRESSURE: 126 MMHG | BODY MASS INDEX: 31.24 KG/M2 | OXYGEN SATURATION: 97 %

## 2023-10-26 DIAGNOSIS — I10 ESSENTIAL HYPERTENSION: ICD-10-CM

## 2023-10-26 DIAGNOSIS — I65.23 BILATERAL CAROTID ARTERY STENOSIS: ICD-10-CM

## 2023-10-26 DIAGNOSIS — I65.23 BILATERAL CAROTID ARTERY STENOSIS: Primary | ICD-10-CM

## 2023-10-26 DIAGNOSIS — E11.59 TYPE 2 DIABETES MELLITUS WITH OTHER CIRCULATORY COMPLICATIONS: ICD-10-CM

## 2023-10-26 PROCEDURE — 99214 OFFICE O/P EST MOD 30 MIN: CPT | Performed by: NURSE PRACTITIONER

## 2023-10-26 PROCEDURE — 3078F DIAST BP <80 MM HG: CPT | Performed by: NURSE PRACTITIONER

## 2023-10-26 PROCEDURE — 1160F RVW MEDS BY RX/DR IN RCRD: CPT | Performed by: NURSE PRACTITIONER

## 2023-10-26 PROCEDURE — 93880 EXTRACRANIAL BILAT STUDY: CPT

## 2023-10-26 PROCEDURE — 3074F SYST BP LT 130 MM HG: CPT | Performed by: NURSE PRACTITIONER

## 2023-10-26 PROCEDURE — 1159F MED LIST DOCD IN RCRD: CPT | Performed by: NURSE PRACTITIONER

## 2023-10-26 NOTE — PROGRESS NOTES
"10/26/2023       Darryl Bang MD   [address] [place] [id number]    Dianne Pritchard  1945    Chief Complaint   Patient presents with    Follow-up     1 year follow up w/ testing. Last seen 11/10/22. Patient denies any stroke type symptoms.        Dear Darryl Bang MD       HPI  I had the pleasure of seeing your patient Dianne Pritchard in the office today.  As you recall, Dianne Pritchard is a 78 y.o.  female who we are following for asymptomatic carotid occlusive disease.  Currently she is doing well and denies any strokelike symptoms or claudication to her lower extremities.  She is maintained on aspirin.  She has previously undergone radiation to her left neck which caused narrowing of her left carotid system. She did have noninvasive testing which I did review in office.      Review of Systems   Constitutional: Negative.    HENT: Negative.     Eyes: Negative.    Respiratory: Negative.     Cardiovascular: Negative.    Gastrointestinal: Negative.    Endocrine: Negative.    Genitourinary: Negative.    Musculoskeletal: Negative.    Skin: Negative.    Allergic/Immunologic: Negative.    Neurological: Negative.    Hematological: Negative.    Psychiatric/Behavioral: Negative.           /68   Pulse 68   Ht 162.6 cm (64\")   Wt 83 kg (183 lb)   SpO2 97%   BMI 31.41 kg/m²   Physical Exam  Vitals and nursing note reviewed.   Constitutional:       General: She is not in acute distress.     Appearance: She is well-developed. She is not diaphoretic.   HENT:      Head: Normocephalic and atraumatic.   Eyes:      General: No scleral icterus.     Pupils: Pupils are equal, round, and reactive to light.   Neck:      Thyroid: No thyromegaly.      Vascular: No carotid bruit or JVD.   Cardiovascular:      Rate and Rhythm: Normal rate and regular rhythm.      Pulses: Normal pulses.           Carotid pulses are  on the left side with bruit.     Heart sounds: Normal heart sounds and S2 normal. No murmur " heard.     No friction rub. No gallop.   Pulmonary:      Effort: Pulmonary effort is normal.      Breath sounds: Normal breath sounds.   Abdominal:      General: Bowel sounds are normal.      Palpations: Abdomen is soft.   Musculoskeletal:         General: Normal range of motion.      Cervical back: Normal range of motion and neck supple.   Skin:     General: Skin is warm and dry.   Neurological:      Mental Status: She is alert and oriented to person, place, and time.      Cranial Nerves: No cranial nerve deficit.   Psychiatric:         Behavior: Behavior normal.         Thought Content: Thought content normal.         Judgment: Judgment normal.            Diagnostic Data:  US Carotid Bilateral    Result Date: 10/26/2023  Narrative: History: Carotid occlusive disease      Impression: Impression: 1. There is 50 to 69% stenosis of the right internal carotid artery. 2. There is less than 50% stenosis of the left internal carotid artery. This is unchanged from previous exam. The left internal carotid artery is diminutive although way to the skull base. 3. Antegrade flow is demonstrated in bilateral vertebral arteries.  Comments: Bilateral carotid vertebral arterial duplex scan was performed.  Grayscale imaging shows intimal thickening and calcified elements at the carotid bifurcation. The right internal carotid artery peak systolic velocity is 229.9 cm/sec. The end-diastolic velocity is 42.8 cm/sec. The right ICA/CCA ratio is approximately 2.0. These findings correlate with 50 to 69% stenosis of the right internal carotid artery.  Grayscale imaging shows intimal thickening and calcified elements at the carotid bifurcation. The left internal carotid artery peak systolic velocity is 38.3 cm/sec. The end-diastolic velocity is 8.4 cm/sec. The left ICA/CCA ratio is approximately 0.4. These findings correlate with less than 50% stenosis of the left internal carotid artery.  Antegrade flow is demonstrated in bilateral  vertebral arteries. There is greater than 50% stenosis in the left external carotid artery.  This report was signed and finalized on 10/26/2023 3:00 PM CDT by Dr. Babar Oviedo MD.          Patient Active Problem List   Diagnosis    Tremor    Essential hypertension    Type 2 diabetes mellitus with other circulatory complications    Hypothyroidism    Dizziness    Multiple falls    Bruit of left carotid artery    Acute pain of right knee    Right knee pain    Status post right knee replacement    BMI 33.0-33.9,adult    Bilateral carotid artery disease    Restrictive lung disease    A-fib    Presence of Watchman left atrial appendage closure device    Malignant neoplasm of female breast    Status post bilateral mastectomy    Class 1 obesity due to excess calories with serious comorbidity and body mass index (BMI) of 31.0 to 31.9 in adult    After cataract not obscuring vision    Nonproliferative diabetic retinopathy    Pseudophakia    Puckering of macula, left eye    Vitreous degeneration    Rhabdomyolysis    Mitral valve stenosis         ICD-10-CM ICD-9-CM   1. Bilateral carotid artery stenosis  I65.23 433.10     433.30   2. Essential hypertension  I10 401.9   3. Type 2 diabetes mellitus with other circulatory complications  E11.59 250.70         Plan: After thoroughly evaluating Dianne Pritchard, I believe the best course of action is to remain conservative from the vascular surgery standpoint.  I did review her testing which shows 50 to 69% right carotid stenosis and less than 50% left carotid stenosis which is unchanged.  We will see her back in 1 year with repeat noninvasive testing for continued surveillance, including a carotid duplex.  Her previous CTA of the neck showed diminutive flow all the way into the skull base and due to this not a surgical candidate related to previous radiation to her neck.  I did discuss vascular risk factors as they pertain to the progression of vascular disease including controlling  her hypertension and diabetes.  Her blood pressure stable on her current medication.  Her diabetes is uncontrolled with most recent hemoglobin A1c of 8.3%.  She should continue on her aspirin 81 mg daily in addition to her other medications.  This was all discussed in full with complete understanding.    Thank you for allowing me to participate in the care of your patient.  Please do not hesitate with any questions or concerns.  I will keep you aware of any further encounters with Dianne Pritchard.        Sincerely yours,         ДМИТРИЙ cJ Thomas Waldon, MD

## 2023-12-04 NOTE — PROGRESS NOTES
distant recurrence of breast cancer at 5-years if treated with adjuvant endocrine therapy alone. On 3/10/2020 Dr. Yolanda Mcknight performed the following procedures:  Injection of radionuclide. Lymphatic mapping. Left-sided pectoral block. Left simple mastectomy. Left sentinel lymph node biopsy. Pathology revealed the following:  Breast, left mastectomy:  Invasive lobular carcinoma, grade 1  Invasive carcinoma measures 1.6 cm in greatest dimension. Invasive carcinoma is located greater than 1.0 cm from the nearest deep surgical excision margin. Incidental complex sclerosing lesion, margins negative. Changes consistent with fibrocystic mastopathy involving nonneoplastic breast parenchyma  Sections of skin, negative for evidence of malignancy. Sections of nipple, negative for evidence of malignancy  Lymph node, left sentinel lymph node biopsy:   10 lymph nodes, negative for evidence of malignancy      AJCC STAGE: pT1c, pN0, pMx     Adjuvant endocrine therapy with aromatase inhibitor letrozole (Femara) 2.5 mg daily       1st HEMATOLOGY HISTORY: Iron deficiency/ Acute GI bleed secondary to gastric AVM, 06/27/16  Mrs. Aponte presented to [facility] Emergency Department on 06/24/16 with weakness, fatigue and exertional dyspnea. She was profoundly anemic with a hemoglobin of 6.0, MCV of 84.3. WBC and platelets were normal at 7.97 and 163,000, respectively. GI evaluation was sought, with an endoscopy on 06/27/16 by Dr. Chapin Mayfield remarkable for angiodysplastic lesions of the gastric body. Colonoscopy on 06/28/16 was negative. Mrs. Aponte was transfused as warranted, given parenteral iron supplementation and anticoagulation with Xarelto for paroxysmal atrial fibrillation was discontinued due to UGI bleed with AVM. 2nd HEMATOLOGY HISTORY: Thrombocytopenia  Tram fam had a chronically low platelet count between 120 and 150.   DEONDRE - negative  Antiplatelet antibody - negative  SPEP - negative  Hepatitis A, B, C -

## 2023-12-11 ENCOUNTER — TELEPHONE (OUTPATIENT)
Dept: HEMATOLOGY | Age: 78
End: 2023-12-11

## 2023-12-11 NOTE — TELEPHONE ENCOUNTER
Called patient and reminded patient of their appointment on 12/12/2023 and patient confirmed they would be here.

## 2023-12-12 ENCOUNTER — OFFICE VISIT (OUTPATIENT)
Dept: HEMATOLOGY | Age: 78
End: 2023-12-12
Payer: MEDICARE

## 2023-12-12 ENCOUNTER — HOSPITAL ENCOUNTER (OUTPATIENT)
Dept: INFUSION THERAPY | Age: 78
Discharge: HOME | End: 2023-12-12
Payer: MEDICARE

## 2023-12-12 VITALS
BODY MASS INDEX: 30.93 KG/M2 | WEIGHT: 181.2 LBS | HEIGHT: 64 IN | OXYGEN SATURATION: 98 % | HEART RATE: 87 BPM | SYSTOLIC BLOOD PRESSURE: 140 MMHG | DIASTOLIC BLOOD PRESSURE: 72 MMHG

## 2023-12-12 DIAGNOSIS — D69.6 THROMBOCYTOPENIA (HCC): ICD-10-CM

## 2023-12-12 DIAGNOSIS — C50.412 MALIGNANT NEOPLASM OF UPPER-OUTER QUADRANT OF LEFT BREAST IN FEMALE, ESTROGEN RECEPTOR POSITIVE (HCC): ICD-10-CM

## 2023-12-12 DIAGNOSIS — Z79.811 LONG TERM CURRENT USE OF AROMATASE INHIBITOR: ICD-10-CM

## 2023-12-12 DIAGNOSIS — Z17.0 MALIGNANT NEOPLASM OF LOWER-OUTER QUADRANT OF RIGHT BREAST OF FEMALE, ESTROGEN RECEPTOR POSITIVE (HCC): ICD-10-CM

## 2023-12-12 DIAGNOSIS — D72.829 LEUKOCYTOSIS, UNSPECIFIED TYPE: ICD-10-CM

## 2023-12-12 DIAGNOSIS — C50.511 MALIGNANT NEOPLASM OF LOWER-OUTER QUADRANT OF RIGHT BREAST OF FEMALE, ESTROGEN RECEPTOR POSITIVE (HCC): ICD-10-CM

## 2023-12-12 DIAGNOSIS — Z17.0 MALIGNANT NEOPLASM OF UPPER-OUTER QUADRANT OF LEFT BREAST IN FEMALE, ESTROGEN RECEPTOR POSITIVE (HCC): ICD-10-CM

## 2023-12-12 DIAGNOSIS — Z85.72 HISTORY OF LYMPHOMA: ICD-10-CM

## 2023-12-12 DIAGNOSIS — D50.0 IRON DEFICIENCY ANEMIA DUE TO CHRONIC BLOOD LOSS: ICD-10-CM

## 2023-12-12 DIAGNOSIS — Z17.0 MALIGNANT NEOPLASM OF UPPER-OUTER QUADRANT OF LEFT BREAST IN FEMALE, ESTROGEN RECEPTOR POSITIVE (HCC): Primary | ICD-10-CM

## 2023-12-12 DIAGNOSIS — C50.412 MALIGNANT NEOPLASM OF UPPER-OUTER QUADRANT OF LEFT BREAST IN FEMALE, ESTROGEN RECEPTOR POSITIVE (HCC): Primary | ICD-10-CM

## 2023-12-12 LAB
BASOPHILS # BLD: 0.04 K/UL (ref 0.01–0.08)
BASOPHILS NFR BLD: 0.4 % (ref 0.1–1.2)
EOSINOPHIL # BLD: 0.1 K/UL (ref 0.04–0.54)
EOSINOPHIL NFR BLD: 1.1 % (ref 0.7–7)
ERYTHROCYTE [DISTWIDTH] IN BLOOD BY AUTOMATED COUNT: 13.2 % (ref 11.7–14.4)
HCT VFR BLD AUTO: 40 % (ref 34.1–44.9)
HGB BLD-MCNC: 13.7 G/DL (ref 11.2–15.7)
LYMPHOCYTES # BLD: 0.82 K/UL (ref 1.18–3.74)
LYMPHOCYTES NFR BLD: 9.2 % (ref 19.3–53.1)
MCH RBC QN AUTO: 29.3 PG (ref 25.6–32.2)
MCHC RBC AUTO-ENTMCNC: 34.3 G/DL (ref 32.3–35.5)
MCV RBC AUTO: 85.5 FL (ref 79.4–94.8)
MONOCYTES # BLD: 0.48 K/UL (ref 0.24–0.82)
MONOCYTES NFR BLD: 5.4 % (ref 4.7–12.5)
NEUTROPHILS # BLD: 7.46 K/UL (ref 1.56–6.13)
NEUTS SEG NFR BLD: 83.3 % (ref 34–71.1)
PLATELET # BLD AUTO: 140 K/UL (ref 182–369)
PMV BLD AUTO: 10.5 FL (ref 7.4–10.4)
RBC # BLD AUTO: 4.68 M/UL (ref 3.93–5.22)
WBC # BLD AUTO: 8.95 K/UL (ref 3.98–10.04)

## 2023-12-12 PROCEDURE — 1123F ACP DISCUSS/DSCN MKR DOCD: CPT | Performed by: PHYSICIAN ASSISTANT

## 2023-12-12 PROCEDURE — G8417 CALC BMI ABV UP PARAM F/U: HCPCS | Performed by: PHYSICIAN ASSISTANT

## 2023-12-12 PROCEDURE — G8427 DOCREV CUR MEDS BY ELIG CLIN: HCPCS | Performed by: PHYSICIAN ASSISTANT

## 2023-12-12 PROCEDURE — 99211 OFF/OP EST MAY X REQ PHY/QHP: CPT

## 2023-12-12 PROCEDURE — 99213 OFFICE O/P EST LOW 20 MIN: CPT | Performed by: PHYSICIAN ASSISTANT

## 2023-12-12 PROCEDURE — G8484 FLU IMMUNIZE NO ADMIN: HCPCS | Performed by: PHYSICIAN ASSISTANT

## 2023-12-12 PROCEDURE — G8399 PT W/DXA RESULTS DOCUMENT: HCPCS | Performed by: PHYSICIAN ASSISTANT

## 2023-12-12 PROCEDURE — 85025 COMPLETE CBC W/AUTO DIFF WBC: CPT

## 2023-12-12 PROCEDURE — 1090F PRES/ABSN URINE INCON ASSESS: CPT | Performed by: PHYSICIAN ASSISTANT

## 2023-12-12 PROCEDURE — 1036F TOBACCO NON-USER: CPT | Performed by: PHYSICIAN ASSISTANT

## 2023-12-12 PROCEDURE — 36415 COLL VENOUS BLD VENIPUNCTURE: CPT

## 2023-12-12 ASSESSMENT — ENCOUNTER SYMPTOMS
NAUSEA: 0
PHOTOPHOBIA: 0
ABDOMINAL PAIN: 0
ABDOMINAL DISTENTION: 0
WHEEZING: 0
EYE DISCHARGE: 0
DIARRHEA: 0
COUGH: 0
VOICE CHANGE: 0
COLOR CHANGE: 0
CONSTIPATION: 0
EYE ITCHING: 0
BACK PAIN: 0
SHORTNESS OF BREATH: 0
SORE THROAT: 0
VOMITING: 0
TROUBLE SWALLOWING: 0
BLOOD IN STOOL: 0

## 2023-12-20 ENCOUNTER — TRANSCRIBE ORDERS (OUTPATIENT)
Dept: ADMINISTRATIVE | Facility: HOSPITAL | Age: 78
End: 2023-12-20
Payer: MEDICARE

## 2023-12-20 DIAGNOSIS — E03.9 HYPOTHYROIDISM, ADULT: ICD-10-CM

## 2023-12-20 DIAGNOSIS — I10 HYPERTENSION, UNSPECIFIED TYPE: Primary | ICD-10-CM

## 2023-12-20 DIAGNOSIS — E55.9 VITAMIN D DEFICIENCY DISEASE: ICD-10-CM

## 2023-12-20 DIAGNOSIS — E04.1 THYROID NODULE: ICD-10-CM

## 2023-12-20 DIAGNOSIS — E11.65 POORLY CONTROLLED TYPE 2 DIABETES MELLITUS: ICD-10-CM

## 2023-12-29 ENCOUNTER — HOSPITAL ENCOUNTER (OUTPATIENT)
Dept: ULTRASOUND IMAGING | Facility: HOSPITAL | Age: 78
Discharge: HOME | End: 2023-12-29
Admitting: INTERNAL MEDICINE
Payer: MEDICARE

## 2023-12-29 DIAGNOSIS — I10 HYPERTENSION, UNSPECIFIED TYPE: ICD-10-CM

## 2023-12-29 DIAGNOSIS — E03.9 HYPOTHYROIDISM, ADULT: ICD-10-CM

## 2023-12-29 DIAGNOSIS — E55.9 VITAMIN D DEFICIENCY DISEASE: ICD-10-CM

## 2023-12-29 DIAGNOSIS — E04.1 THYROID NODULE: ICD-10-CM

## 2023-12-29 DIAGNOSIS — E11.65 POORLY CONTROLLED TYPE 2 DIABETES MELLITUS: ICD-10-CM

## 2023-12-29 PROCEDURE — 76536 US EXAM OF HEAD AND NECK: CPT

## 2024-02-07 RX ORDER — LOSARTAN POTASSIUM 25 MG/1
25 TABLET ORAL DAILY
Qty: 90 TABLET | Refills: 3 | OUTPATIENT
Start: 2024-02-07

## 2024-02-07 RX ORDER — DILTIAZEM HYDROCHLORIDE 180 MG/1
360 CAPSULE, COATED, EXTENDED RELEASE ORAL DAILY
Qty: 180 CAPSULE | Refills: 3 | Status: SHIPPED | OUTPATIENT
Start: 2024-02-07

## 2024-02-07 RX ORDER — LOSARTAN POTASSIUM 25 MG/1
25 TABLET ORAL DAILY
Qty: 90 TABLET | Refills: 3 | Status: SHIPPED | OUTPATIENT
Start: 2024-02-07

## 2024-02-07 RX ORDER — METOPROLOL SUCCINATE 25 MG/1
25 TABLET, EXTENDED RELEASE ORAL DAILY
Qty: 90 TABLET | Refills: 3 | OUTPATIENT
Start: 2024-02-07

## 2024-02-07 RX ORDER — DILTIAZEM HYDROCHLORIDE 180 MG/1
360 CAPSULE, COATED, EXTENDED RELEASE ORAL DAILY
Qty: 180 CAPSULE | Refills: 3 | OUTPATIENT
Start: 2024-02-07

## 2024-02-07 RX ORDER — METOPROLOL SUCCINATE 25 MG/1
25 TABLET, EXTENDED RELEASE ORAL DAILY
Qty: 90 TABLET | Refills: 3 | Status: SHIPPED | OUTPATIENT
Start: 2024-02-07

## 2024-03-20 RX ORDER — LETROZOLE 2.5 MG/1
2.5 TABLET, FILM COATED ORAL DAILY
Qty: 90 TABLET | Refills: 3 | Status: SHIPPED | OUTPATIENT
Start: 2024-03-20

## 2024-04-04 ENCOUNTER — OFFICE VISIT (OUTPATIENT)
Dept: FAMILY MEDICINE CLINIC | Facility: CLINIC | Age: 79
End: 2024-04-04
Payer: MEDICARE

## 2024-04-04 VITALS
WEIGHT: 180 LBS | SYSTOLIC BLOOD PRESSURE: 126 MMHG | HEART RATE: 102 BPM | BODY MASS INDEX: 30.73 KG/M2 | RESPIRATION RATE: 18 BRPM | OXYGEN SATURATION: 94 % | HEIGHT: 64 IN | TEMPERATURE: 98.6 F | DIASTOLIC BLOOD PRESSURE: 74 MMHG

## 2024-04-04 DIAGNOSIS — I10 ESSENTIAL HYPERTENSION: Primary | ICD-10-CM

## 2024-04-04 DIAGNOSIS — Z09 ENCOUNTER FOR FOLLOW-UP EXAMINATION AFTER COMPLETED TREATMENT FOR CONDITIONS OTHER THAN MALIGNANT NEOPLASM: ICD-10-CM

## 2024-04-04 DIAGNOSIS — Z12.11 SCREEN FOR COLON CANCER: ICD-10-CM

## 2024-04-04 DIAGNOSIS — Z13.820 SCREENING FOR OSTEOPOROSIS: ICD-10-CM

## 2024-04-04 NOTE — PROGRESS NOTES
The ABCs of the Annual Wellness Visit  Subsequent Medicare Wellness Visit    Subjective      Dianne Pritchard is a 78 y.o. female who presents for a Subsequent Medicare Wellness Visit.    The following portions of the patient's history were reviewed and   updated as appropriate: allergies, current medications, past family history, past medical history, past social history, past surgical history, and problem list.    Compared to one year ago, the patient feels her physical   health is the same.    Compared to one year ago, the patient feels her mental   health is the same.    Recent Hospitalizations:  She was not admitted to the hospital during the last year.       Current Medical Providers:  Patient Care Team:  Darryl Bang MD as PCP - General (Family Medicine)  Xiang Fernandes APRN as Nurse Practitioner (Pulmonary Disease)  Esperanza Valles APRN as Nurse Practitioner (Cardiology)    Outpatient Medications Prior to Visit   Medication Sig Dispense Refill    aspirin 81 MG chewable tablet Chew 1 tablet Daily.      dilTIAZem CD (CARDIZEM CD) 180 MG 24 hr capsule Take 2 capsules by mouth Daily. 180 capsule 3    Insulin Pen Needle (PEN NEEDLES) 31G X 5 MM misc 1 each 2 (Two) Times a Day. 100 each 5    letrozole (FEMARA) 2.5 MG tablet Take 1 tablet by mouth Daily.      levothyroxine (SYNTHROID, LEVOTHROID) 137 MCG tablet TAKE 1 TABLET BY MOUTH DAILY 90 tablet 0    losartan (COZAAR) 25 MG tablet Take 1 tablet by mouth Daily. 90 tablet 3    metoprolol succinate XL (TOPROL-XL) 25 MG 24 hr tablet Take 1 tablet by mouth Daily. 90 tablet 3    Misc. Devices (VIRAGE CUSTOM BREAST PROSTHES) misc To be used with Mastectomy Bra- 2 each 0    Multiple Vitamins-Minerals (CENTRUM SILVER PO) Take 1 tablet by mouth Daily.      TOUJEO SOLOSTAR 300 UNIT/ML solution pen-injector injection 84 Units.  1     No facility-administered medications prior to visit.       No opioid medication identified on active medication list. I  "have reviewed chart for other potential  high risk medication/s and harmful drug interactions in the elderly.        Aspirin is on active medication list. Aspirin use is indicated based on review of current medical condition/s. Pros and cons of this therapy have been discussed today. Benefits of this medication outweigh potential harm.  Patient has been encouraged to continue taking this medication.  .      Patient Active Problem List   Diagnosis    Tremor    Essential hypertension    Type 2 diabetes mellitus with other circulatory complications    Hypothyroidism    Dizziness    Multiple falls    Bruit of left carotid artery    Acute pain of right knee    Right knee pain    Status post right knee replacement    BMI 33.0-33.9,adult    Bilateral carotid artery disease    Restrictive lung disease    A-fib    Presence of Watchman left atrial appendage closure device    Malignant neoplasm of female breast    Status post bilateral mastectomy    Class 1 obesity due to excess calories with serious comorbidity and body mass index (BMI) of 31.0 to 31.9 in adult    After cataract not obscuring vision    Nonproliferative diabetic retinopathy    Pseudophakia    Puckering of macula, left eye    Vitreous degeneration    Rhabdomyolysis    Mitral valve stenosis     Advance Care Planning   Advance Care Planning     Advance Directive is not on file.  ACP discussion was held with the patient during this visit. Patient does not have an advance directive, information provided.     Objective    Vitals:    04/04/24 0958   BP: 126/74   Pulse: 102   Resp: 18   Temp: 98.6 °F (37 °C)   SpO2: 94%   Weight: 81.6 kg (180 lb)   Height: 162.6 cm (64.02\")     Estimated body mass index is 30.88 kg/m² as calculated from the following:    Height as of this encounter: 162.6 cm (64.02\").    Weight as of this encounter: 81.6 kg (180 lb).           Does the patient have evidence of cognitive impairment?   No            HEALTH RISK ASSESSMENT    Smoking " Status:  Social History     Tobacco Use   Smoking Status Former    Current packs/day: 0.00    Types: Cigarettes    Start date:     Quit date:     Years since quittin.2   Smokeless Tobacco Never     Alcohol Consumption:  Social History     Substance and Sexual Activity   Alcohol Use No     Fall Risk Screen:    CATHERINE Fall Risk Assessment was completed, and patient is at LOW risk for falls.Assessment completed on:2024    Depression Screenin/4/2024    10:00 AM   PHQ-2/PHQ-9 Depression Screening   Little Interest or Pleasure in Doing Things 0-->not at all   Feeling Down, Depressed or Hopeless 0-->not at all   PHQ-9: Brief Depression Severity Measure Score 0       Health Habits and Functional and Cognitive Screenin/4/2024    10:00 AM   Functional & Cognitive Status   Do you have difficulty preparing food and eating? Yes   Do you have difficulty bathing yourself, getting dressed or grooming yourself? No   Do you have difficulty using the toilet? No   Do you have difficulty moving around from place to place? No   Do you have trouble with steps or getting out of a bed or a chair? No   Current Diet Well Balanced Diet   Dental Exam Up to date   Eye Exam Up to date   Exercise (times per week) 0 times per week   Current Exercises Include No Regular Exercise   Do you need help using the phone?  No   Are you deaf or do you have serious difficulty hearing?  No   Do you need help to go to places out of walking distance? Yes   Do you need help shopping? Yes   Do you need help preparing meals?  Yes   Do you need help with housework?  Yes   Do you need help with laundry? Yes   Do you need help taking your medications? No   Do you need help managing money? No   Do you ever drive or ride in a car without wearing a seat belt? No   Have you felt unusual stress, anger or loneliness in the last month? No   Who do you live with? Child   If you need help, do you have trouble finding someone available to you?  No   Have you been bothered in the last four weeks by sexual problems? No   Do you have difficulty concentrating, remembering or making decisions? No       Age-appropriate Screening Schedule:  Refer to the list below for future screening recommendations based on patient's age, sex and/or medical conditions. Orders for these recommended tests are listed in the plan section. The patient has been provided with a written plan.    Health Maintenance   Topic Date Due    Pneumococcal Vaccine 65+ (1 of 2 - PCV) Never done    TDAP/TD VACCINES (1 - Tdap) Never done    ZOSTER VACCINE (1 of 2) Never done    RSV Vaccine - Adults (1 - 1-dose 60+ series) Never done    COLORECTAL CANCER SCREENING  06/28/2016    DXA SCAN  08/14/2020    COVID-19 Vaccine (3 - 2023-24 season) 09/01/2023    DIABETIC EYE EXAM  01/11/2024    ANNUAL WELLNESS VISIT  03/29/2024    HEMOGLOBIN A1C  06/08/2024    BMI FOLLOWUP  05/19/2024    INFLUENZA VACCINE  08/01/2024    URINE MICROALBUMIN  10/04/2024    HEPATITIS C SCREENING  Completed                  CMS Preventative Services Quick Reference  Risk Factors Identified During Encounter:    Fall Risk-High or Moderate: Discussed Fall Prevention in the home    The above risks/problems have been discussed with the patient.  Pertinent information has been shared with the patient in the After Visit Summary.    Diagnoses and all orders for this visit:    1. Essential hypertension (Primary)    2. Screening for osteoporosis  -     DEXA Bone Density Axial    3. Encounter for follow-up examination after completed treatment for conditions other than malignant neoplasm  -     DEXA Bone Density Axial    4. Screen for colon cancer  -     Cologuard - Stool, Per Rectum; Future        Follow Up:   Next Medicare Wellness visit to be scheduled in 1 year.      An After Visit Summary and PPPS were made available to the patient.

## 2024-04-04 NOTE — PROGRESS NOTES
"Subjective   Dianne Pritchard is a 78 y.o. female.     Chief Complaint   Patient presents with    Hypertension    Medicare Wellness-subsequent        Hypertension         She thinks bs and bp are doing well       Current Outpatient Medications:     aspirin 81 MG chewable tablet, Chew 1 tablet Daily., Disp: , Rfl:     dilTIAZem CD (CARDIZEM CD) 180 MG 24 hr capsule, Take 2 capsules by mouth Daily., Disp: 180 capsule, Rfl: 3    Insulin Pen Needle (PEN NEEDLES) 31G X 5 MM misc, 1 each 2 (Two) Times a Day., Disp: 100 each, Rfl: 5    letrozole (FEMARA) 2.5 MG tablet, Take 1 tablet by mouth Daily., Disp: , Rfl:     levothyroxine (SYNTHROID, LEVOTHROID) 137 MCG tablet, TAKE 1 TABLET BY MOUTH DAILY, Disp: 90 tablet, Rfl: 0    losartan (COZAAR) 25 MG tablet, Take 1 tablet by mouth Daily., Disp: 90 tablet, Rfl: 3    metoprolol succinate XL (TOPROL-XL) 25 MG 24 hr tablet, Take 1 tablet by mouth Daily., Disp: 90 tablet, Rfl: 3    Misc. Devices (VIRAGE CUSTOM BREAST PROSTHES) misc, To be used with Mastectomy Bra-, Disp: 2 each, Rfl: 0    Multiple Vitamins-Minerals (CENTRUM SILVER PO), Take 1 tablet by mouth Daily., Disp: , Rfl:     TOUJEO SOLOSTAR 300 UNIT/ML solution pen-injector injection, 84 Units., Disp: , Rfl: 1  Allergies   Allergen Reactions    Amoxil [Amoxicillin] Hives    Penicillins Hives    Biaxin [Clarithromycin] Other (See Comments)     NOT SURE OF REACTION, \"SORE MOUTH OF DIARRHEA\"    Lortab [Hydrocodone-Acetaminophen] GI Intolerance              Facility age limit for growth %anup is 20 years.    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)    Carotid artery occlusion     Diabetes mellitus, type II     Dizziness     Drug therapy     Essential hypertension     GERD (gastroesophageal reflux disease)     GI bleed requiring more than 4 units of blood in 24 hours, ICU, or surgery     History of " "chemotherapy     History of lymphoma     Hx of radiation therapy     Hypomagnesemia     Hypothyroidism     On amiodarone therapy      Past Surgical History:   Procedure Laterality Date    ATRIAL APPENDAGE EXCLUSION LEFT WITH TRANSESOPHAGEAL ECHOCARDIOGRAM Left 11/27/2018    Procedure: Atrial Appendage Occlusion;  Surgeon: Mick Orantes MD;  Location:  [facility];  Service: Cardiology    ATRIAL APPENDAGE EXCLUSION LEFT WITH TRANSESOPHAGEAL ECHOCARDIOGRAM Left 1/22/2019    Procedure: Atrial Appendage Occlusion;  Surgeon: Mick Orantes MD;  Location:  [facility];  Service: Cardiology    BREAST BIOPSY      MASTECTOMY      MASTECTOMY  03/10/2020    OTHER SURGICAL HISTORY      Mediport insertion X 2    OTHER SURGICAL HISTORY      remote lymphoma treatment    REPLACEMENT TOTAL KNEE Right 09/2017    TUBAL ABDOMINAL LIGATION         Review of Systems   Constitutional: Negative.    HENT: Negative.     Eyes: Negative.    Respiratory: Negative.     Cardiovascular: Negative.    Gastrointestinal: Negative.    Endocrine: Negative.    Genitourinary: Negative.    Musculoskeletal: Negative.    Skin: Negative.    Allergic/Immunologic: Negative.    Neurological: Negative.    Hematological: Negative.    Psychiatric/Behavioral: Negative.         Objective /74   Pulse 102   Temp 98.6 °F (37 °C)   Resp 18   Ht 162.6 cm (64.02\")   Wt 81.6 kg (180 lb)   SpO2 94%   BMI 30.88 kg/m²    Physical Exam  Vitals and nursing note reviewed.   Constitutional:       Appearance: Normal appearance.   HENT:      Head: Normocephalic and atraumatic.      Nose: Nose normal.      Mouth/Throat:      Mouth: Mucous membranes are moist.   Eyes:      Extraocular Movements: Extraocular movements intact.      Pupils: Pupils are equal, round, and reactive to light.   Cardiovascular:      Rate and Rhythm: Normal rate and regular rhythm.      Pulses: Normal pulses.      Heart sounds: Normal heart sounds.   Pulmonary:    "   Effort: Pulmonary effort is normal.   Abdominal:      General: Abdomen is flat. Bowel sounds are normal.      Palpations: Abdomen is soft.   Musculoskeletal:         General: Normal range of motion.      Cervical back: Normal range of motion and neck supple.   Skin:     General: Skin is warm and dry.      Capillary Refill: Capillary refill takes less than 2 seconds.   Neurological:      General: No focal deficit present.      Mental Status: She is alert and oriented to person, place, and time. Mental status is at baseline.   Psychiatric:         Mood and Affect: Mood normal.         Assessment & Plan   Diagnoses and all orders for this visit:    1. Essential hypertension (Primary)    2. Screening for osteoporosis  -     DEXA Bone Density Axial    3. Encounter for follow-up examination after completed treatment for conditions other than malignant neoplasm  -     DEXA Bone Density Axial    4. Screen for colon cancer  -     Cologuard - Stool, Per Rectum; Future                 Orders Placed This Encounter   Procedures    DEXA Bone Density Axial     Order Specific Question:   Is patient taking or have taken long term Glucocorticoid (steroids)?     Answer:   No     Order Specific Question:   Does the patient have rheumatoid arthritis?     Answer:   No     Order Specific Question:   Does the patient have secondary osteoporosis?     Answer:   No     Order Specific Question:   Reason for Exam:     Answer:   screen for osteoporosis     Order Specific Question:   Does this patient have a diabetic monitoring/medication delivering device on?     Answer:   No     Order Specific Question:   Release to patient     Answer:   Routine Release [[id number]]    Cologuard - Stool, Per Rectum     Standing Status:   Future     Standing Expiration Date:   4/4/2025     Order Specific Question:   Release to patient     Answer:   Routine Release [[id number]]       Follow up: 4 month(s)

## 2024-04-12 ENCOUNTER — HOSPITAL ENCOUNTER (OUTPATIENT)
Dept: BONE DENSITY | Facility: HOSPITAL | Age: 79
Discharge: HOME | End: 2024-04-12
Payer: MEDICARE

## 2024-04-12 PROCEDURE — 77080 DXA BONE DENSITY AXIAL: CPT

## 2024-05-20 ENCOUNTER — OFFICE VISIT (OUTPATIENT)
Dept: CARDIOLOGY | Facility: CLINIC | Age: 79
End: 2024-05-20
Payer: MEDICARE

## 2024-05-20 VITALS
DIASTOLIC BLOOD PRESSURE: 58 MMHG | BODY MASS INDEX: 30.73 KG/M2 | WEIGHT: 180 LBS | HEIGHT: 64 IN | OXYGEN SATURATION: 95 % | HEART RATE: 66 BPM | SYSTOLIC BLOOD PRESSURE: 130 MMHG

## 2024-05-20 DIAGNOSIS — I48.21 PERMANENT ATRIAL FIBRILLATION: Primary | ICD-10-CM

## 2024-05-20 DIAGNOSIS — I34.2 NONRHEUMATIC MITRAL VALVE STENOSIS: ICD-10-CM

## 2024-05-20 DIAGNOSIS — E66.09 CLASS 1 OBESITY DUE TO EXCESS CALORIES WITH SERIOUS COMORBIDITY AND BODY MASS INDEX (BMI) OF 30.0 TO 30.9 IN ADULT: ICD-10-CM

## 2024-05-20 PROCEDURE — 1159F MED LIST DOCD IN RCRD: CPT | Performed by: NURSE PRACTITIONER

## 2024-05-20 PROCEDURE — 93000 ELECTROCARDIOGRAM COMPLETE: CPT | Performed by: NURSE PRACTITIONER

## 2024-05-20 PROCEDURE — 3075F SYST BP GE 130 - 139MM HG: CPT | Performed by: NURSE PRACTITIONER

## 2024-05-20 PROCEDURE — 1160F RVW MEDS BY RX/DR IN RCRD: CPT | Performed by: NURSE PRACTITIONER

## 2024-05-20 PROCEDURE — 99214 OFFICE O/P EST MOD 30 MIN: CPT | Performed by: NURSE PRACTITIONER

## 2024-05-20 PROCEDURE — 3078F DIAST BP <80 MM HG: CPT | Performed by: NURSE PRACTITIONER

## 2024-05-20 NOTE — PROGRESS NOTES
"    Subjective:     Encounter Date:05/19/2023      Patient ID: Dianne Pritchard is a 78 y.o. female     Chief Complaint: \"no complaints\"  Atrial Fibrillation  Presents for follow-up visit. Symptoms are negative for chest pain, dizziness, palpitations, shortness of breath, syncope and weakness. The symptoms have been stable. Past medical history includes atrial fibrillation.   Shortness of Breath  This is a chronic problem. The current episode started more than 1 year ago. The problem has been unchanged. Pertinent negatives include no chest pain, leg swelling, orthopnea, PND, rash, sputum production, syncope or wheezing.   Hypertension  This is a chronic problem. The current episode started more than 1 year ago. The problem has been rapidly improving since onset. The problem is controlled. Pertinent negatives include no chest pain, malaise/fatigue, orthopnea, palpitations, PND or shortness of breath.       Patient presents today for a routine follow up. Patient is followed for permanent afib s/p 21mm Watchman implant in 1/2019 as well as mitral stenosis which was noted to be moderate per most recent echo in 5/2023.     She presents today alone and reports she has been well. She continues to report chronic dyspnea with exertion that is no worse than previous. She denies issues with bleeding with her ASA use. She notes occasional left ankle swelling that resolves overnight. She monitors her BP at home with readings 120-130 at home. She denies chest pain, palpitations, orthopnea and PND.        The following portions of the patient's history were reviewed and updated as appropriate: allergies, current medications, past family history, past medical history, past social history, past surgical history and problem list.    Allergies   Allergen Reactions    Amoxil [Amoxicillin] Hives    Penicillins Hives    Biaxin [Clarithromycin] Other (See Comments)     NOT SURE OF REACTION, \"SORE MOUTH OF DIARRHEA\"    Lortab " [Hydrocodone-Acetaminophen] GI Intolerance       Current Outpatient Medications:     aspirin 81 MG chewable tablet, Chew 1 tablet Daily., Disp: , Rfl:     dilTIAZem CD (CARDIZEM CD) 180 MG 24 hr capsule, Take 2 capsules by mouth Daily., Disp: 180 capsule, Rfl: 3    Insulin Pen Needle (PEN NEEDLES) 31G X 5 MM misc, 1 each 2 (Two) Times a Day., Disp: 100 each, Rfl: 5    letrozole (FEMARA) 2.5 MG tablet, Take 1 tablet by mouth Daily., Disp: , Rfl:     levothyroxine (SYNTHROID, LEVOTHROID) 137 MCG tablet, TAKE 1 TABLET BY MOUTH DAILY, Disp: 90 tablet, Rfl: 0    losartan (COZAAR) 25 MG tablet, Take 1 tablet by mouth Daily., Disp: 90 tablet, Rfl: 3    metoprolol succinate XL (TOPROL-XL) 25 MG 24 hr tablet, Take 1 tablet by mouth Daily., Disp: 90 tablet, Rfl: 3    Misc. Devices (VIRAGE CUSTOM BREAST PROSTHES) misc, To be used with Mastectomy Bra-, Disp: 2 each, Rfl: 0    Multiple Vitamins-Minerals (CENTRUM SILVER PO), Take 1 tablet by mouth Daily., Disp: , Rfl:     TOUJEO SOLOSTAR 300 UNIT/ML solution pen-injector injection, 84 Units., Disp: , Rfl: 1  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)    Carotid artery occlusion     Diabetes mellitus, type II     Dizziness     Drug therapy     Essential hypertension     GERD (gastroesophageal reflux disease)     GI bleed requiring more than 4 units of blood in 24 hours, ICU, or surgery     History of chemotherapy     History of lymphoma     Hx of radiation therapy     Hypomagnesemia     Hypothyroidism     On amiodarone therapy        Social History     Socioeconomic History    Marital status:    Tobacco Use    Smoking status: Former     Current packs/day: 0.00     Types: Cigarettes     Start date:      Quit date:      Years since quittin.4    Smokeless tobacco: Never   Vaping Use    Vaping status: Never Used   Substance and Sexual  Activity    Alcohol use: No    Drug use: No    Sexual activity: Defer       Review of Systems   Constitutional: Negative for malaise/fatigue, weight gain and weight loss.   Cardiovascular:  Positive for dyspnea on exertion. Negative for chest pain, irregular heartbeat, leg swelling, near-syncope, orthopnea, palpitations, paroxysmal nocturnal dyspnea and syncope.   Respiratory:  Negative for cough, shortness of breath, sleep disturbances due to breathing, sputum production and wheezing.    Skin:  Negative for dry skin, flushing, itching and rash.   Gastrointestinal:  Negative for hematemesis and hematochezia.   Neurological:  Negative for dizziness, light-headedness, loss of balance and weakness.   All other systems reviewed and are negative.         Objective:     Vitals reviewed.   Constitutional:       General: Not in acute distress.     Appearance: Well-developed. Not diaphoretic.   Eyes:      General: No scleral icterus.     Conjunctiva/sclera: Conjunctivae normal.      Pupils: Pupils are equal, round, and reactive to light.   HENT:      Head: Normocephalic.    Mouth/Throat:      Pharynx: No oropharyngeal exudate.   Pulmonary:      Effort: Pulmonary effort is normal. No respiratory distress.      Breath sounds: Normal breath sounds. No wheezing. No rales.   Chest:      Chest wall: Not tender to palpatation.   Cardiovascular:      Normal rate. Irregularly irregular rhythm.      Murmurs: There is a grade 1/4 low frequency crescendo, presystolic diastolic murmur at the apex.   Pulses:     Intact distal pulses.   Edema:     Peripheral edema absent.   Abdominal:      General: Bowel sounds are normal. There is no distension.      Palpations: Abdomen is soft.      Tenderness: There is no abdominal tenderness.   Musculoskeletal: Normal range of motion.      Cervical back: Normal range of motion and neck supple. Skin:     General: Skin is warm and dry.      Coloration: Skin is not pale.      Findings: No erythema or  "rash.   Neurological:      Mental Status: Alert and oriented to person, place, and time.      Deep Tendon Reflexes: Reflexes are normal and symmetric.   Psychiatric:         Behavior: Behavior normal.             ECG 12 Lead    Date/Time: 5/20/2024 10:57 AM  Performed by: Esperanza Valles APRN    Authorized by: Esperanza Valles APRN  Comparison: compared with previous ECG from 5/19/2023  Similar to previous ECG  Rhythm: atrial fibrillation  Rate: normal  BPM: 66  Conduction: conduction normal  ST Segments: ST segments normal  T Waves: T waves normal  QRS axis: normal  Other findings: T wave abnormality    Clinical impression: abnormal EKG        /58 (BP Location: Right arm, Patient Position: Sitting)   Pulse 66   Ht 162.6 cm (64\")   Wt 81.6 kg (180 lb)   SpO2 95%   BMI 30.90 kg/m²     Lab Review:   I have reviewed previous office notes, recent labs and recent cardiac testing.       Results for orders placed during the hospital encounter of 07/17/23    Adult Transthoracic Echo Complete w/ Color, Spectral and Contrast if necessary per protocol    Interpretation Summary    Left ventricular systolic function is normal. Estimated left ventricular EF = 65%    Left ventricular wall thickness is consistent with mild to moderate concentric hypertrophy.    Left ventricular diastolic dysfunction is noted.    The left atrial cavity is moderately dilated.    The right atrial cavity is mildly  dilated.    There is mild calcification of the aortic valve.    Estimated right ventricular systolic pressure from tricuspid regurgitation is normal (<35 mmHg).    There is a trivial pericardial effusion.          Assessment:          Diagnosis Plan   1. Permanent atrial fibrillation        2. Nonrheumatic mitral valve stenosis  Adult Transthoracic Echo Complete w/ Color, Spectral and Contrast if necessary per protocol      3. Class 1 obesity due to excess calories with serious comorbidity and body mass index (BMI) of 30.0 to " 30.9 in adult                 Plan:       1. Permanent afib- rates controlled per EKG today. Continue Toprol, Cardizem and ASA (s/p watchman).    2. Mitral valve stenosis- moderate per echo in 5/2023. Will repeat echo.   3. HTN- controlled. Followed by PCP   4. Hypothyroidism- controlled.  followed by PCP  5. BMI- BMI is >= 30 and <35. (Class 1 Obesity). The following options were offered after discussion;: weight loss educational material (shared in after visit summary), exercise counseling/recommendations, and nutrition counseling/recommendations      Follow up in 1 year or sooner if symptoms worsen.     I spent 30 minutes caring for Dianne on this date of service. This time includes time spent by me in the following activities:preparing for the visit, reviewing tests, obtaining and/or reviewing a separately obtained history, performing a medically appropriate examination and/or evaluation , documenting information in the medical record, independently interpreting results and communicating that information with the patient/family/caregiver and care coordination    I spent 2 minutes on the separately reported service of EKG interpretation. This time is not included in the time used to support the E/M service also reported today.

## 2024-06-10 ENCOUNTER — TELEPHONE (OUTPATIENT)
Dept: HEMATOLOGY | Age: 79
End: 2024-06-10

## 2024-06-10 NOTE — TELEPHONE ENCOUNTER
Called patient to remind of appointment on 6/12/2024 and was unable to leave a message or talk to patient due the phone kept ringing and no one ever answered or no option to leave voicemail.Patient has no other phone number in chart.

## 2024-06-11 NOTE — PROGRESS NOTES
Progress Note      Pt Name: Tram Aponte  YOB: 1945  MRN: [id number]    Date of evaluation: 8/29/2024  History Obtained From:  patient, electronic medical record    CHIEF COMPLAINT:    Chief Complaint   Patient presents with    Follow-up    Cancer     Malignant neoplasm of upper-outer quadrant of left breast in female, estrogen receptor positive      Current active problems  Left Stage I (pT1c, pN0, pMx),ER+ H2N - Invasive lobular carcinoma of the breast 1/29/2020   Remote history immunoblastic lymphoma  Thrombocytopenia    HISTORY OF PRESENT ILLNESS:    Tram Aponte is a 79 y.o.  female with right stage II A breast carcinoma from 3/21/2012.  She was also diagnosed with a left stage I breast carcinoma 1/29/2020 and did have a left simple mastectomy.  She continues taking her Femara 2.5 mg daily.  She has not felt any new nodules on her chest wall.  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    She did have an episode of nausea vomiting on the way home from [facility] in [place] on 6/26/2024.  She was sick for several weeks there after mostly with fatigue.    She has osteoporosis on aromatase inhibitor but declines to take any other treatment other than vitamin D and calcium-she continues taking.  She denies any new bone pain.      She does have significant osteoarthritis, exacerbations that time but overall stable.  She continues on Cardizem  daily and metoprolol 25 daily without any exacerbation of atrial fibrillation.  She previously had Watchman procedure.  She has essential hypertension and is also on losartan 25 daily with stable BP.  She does have hypothyroidism currently on levothyroxine 125 mcg daily.  She is diabetic, A1c stable, followed by endocrinologist Dr. Sunitha Lu at [facility].      TARGET LYMPHOMA SITES:  Left supraclavicular area.   Left axilla.    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 Soriano with a left supraclavicular lymph node. She was referred to Dr. Felix who performed a biopsy on 10/13/93 that revealed a large cell malignant immunoblastic lymphoma, CD20 was 53% positive  A bone marrow biopsy and aspirate on 11/1/1993 was negative for any evidence of malignant lymphoma.   She received 6 cycles of chemotherapy with CHOP/Bleomycin. The last 3 cycles were done without Vincristine due to peripheral neuropathy problems. Following the chemotherapy she received XRT to the neck and upper chest due to the stage II-A presentation in the left supraclavicular area and left axilla. She received a total of 3,960cGy under the direction of Dr. Hunter that was completed on 6/22/94.     TREATMENT SUMMARY:  CHOP/ Bleomycin x 6.   Mantle radiation therapy.    2ND PRIMARY TUMOR: Right stage II (pT2 N1 M0) infiltrating high grade breast cancer 03/21/12  Tram had an abnormal mammogram at [facility] on 02/22/12 revealing a 1.6 cm worrisome mass at the 7:00 position in the right lower quadrant of the right breast. On 03/21/12 Dr. Yusuf Mckay took Tram to the OR and performed an intraoperative biopsy with frozen section that revealed an infiltrating breast cancer. The procedure then followed immediately with a right modified radical mastectomy with pathology revealing an invasive ductal carcinoma, high grade, grade III 3.3 cm tumor. Nineteen right axillary lymph nodes were submitted, one was positive for metastatic carcinoma. This places Tram in a stage II (pT2 N1 M0) breast cancer. The ER is positive, HI is also positive, Her2 Scottie by IHC is negative and Her2 Scottie by FISH is unamplified. 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 In addition to that she had mantle radiation therapy. Although the 2D echo shows an EF of 60%, I discussed with Tram the concerns of anthracycline based therapy and cardiac

## 2024-08-27 ENCOUNTER — TELEPHONE (OUTPATIENT)
Dept: HEMATOLOGY | Age: 79
End: 2024-08-27

## 2024-08-27 NOTE — TELEPHONE ENCOUNTER
Called Patient and reminded patient of their appointment on 08/29/2024 and patient confirmed they would be here. Reminded patient to just come at appointment time, and to not come at the lab appointment time. Reminded patient that we will not check them in any more than 30 minutes before appointment time.  We have now moved to the [facility] [place] that is located between our old office and the ER at the [facility]

## 2024-08-29 ENCOUNTER — OFFICE VISIT (OUTPATIENT)
Dept: HEMATOLOGY | Age: 79
End: 2024-08-29
Payer: MEDICARE

## 2024-08-29 ENCOUNTER — HOSPITAL ENCOUNTER (OUTPATIENT)
Dept: INFUSION THERAPY | Age: 79
Discharge: HOME | End: 2024-08-29
Payer: MEDICARE

## 2024-08-29 VITALS
HEIGHT: 64 IN | BODY MASS INDEX: 29.88 KG/M2 | DIASTOLIC BLOOD PRESSURE: 68 MMHG | WEIGHT: 175 LBS | TEMPERATURE: 97.3 F | HEART RATE: 52 BPM | OXYGEN SATURATION: 100 % | SYSTOLIC BLOOD PRESSURE: 138 MMHG

## 2024-08-29 DIAGNOSIS — D69.6 THROMBOCYTOPENIA (HCC): ICD-10-CM

## 2024-08-29 DIAGNOSIS — Z17.0 MALIGNANT NEOPLASM OF UPPER-OUTER QUADRANT OF LEFT BREAST IN FEMALE, ESTROGEN RECEPTOR POSITIVE (HCC): ICD-10-CM

## 2024-08-29 DIAGNOSIS — D50.0 IRON DEFICIENCY ANEMIA DUE TO CHRONIC BLOOD LOSS: ICD-10-CM

## 2024-08-29 DIAGNOSIS — Z85.72 HISTORY OF LYMPHOMA: ICD-10-CM

## 2024-08-29 DIAGNOSIS — C50.412 MALIGNANT NEOPLASM OF UPPER-OUTER QUADRANT OF LEFT BREAST IN FEMALE, ESTROGEN RECEPTOR POSITIVE (HCC): ICD-10-CM

## 2024-08-29 DIAGNOSIS — Z79.811 LONG TERM CURRENT USE OF AROMATASE INHIBITOR: ICD-10-CM

## 2024-08-29 DIAGNOSIS — D72.829 LEUKOCYTOSIS, UNSPECIFIED TYPE: ICD-10-CM

## 2024-08-29 DIAGNOSIS — C50.412 MALIGNANT NEOPLASM OF UPPER-OUTER QUADRANT OF LEFT BREAST IN FEMALE, ESTROGEN RECEPTOR POSITIVE (HCC): Primary | ICD-10-CM

## 2024-08-29 DIAGNOSIS — Z17.0 MALIGNANT NEOPLASM OF UPPER-OUTER QUADRANT OF LEFT BREAST IN FEMALE, ESTROGEN RECEPTOR POSITIVE (HCC): Primary | ICD-10-CM

## 2024-08-29 LAB
ALBUMIN SERPL-MCNC: 3.8 G/DL (ref 3.5–5.2)
ALP SERPL-CCNC: 104 U/L (ref 35–104)
ALT SERPL-CCNC: 11 U/L (ref 5–33)
ANION GAP SERPL CALCULATED.3IONS-SCNC: 9 MMOL/L (ref 7–19)
AST SERPL-CCNC: 33 U/L (ref 5–32)
BASOPHILS # BLD: 0.04 K/UL (ref 0.01–0.08)
BASOPHILS NFR BLD: 0.5 % (ref 0.1–1.2)
BILIRUB SERPL-MCNC: 0.6 MG/DL (ref 0–1.2)
BUN SERPL-MCNC: 24 MG/DL (ref 8–23)
CA 15-3: 46 U/ML (ref 0–25)
CALCIUM SERPL-MCNC: 10.3 MG/DL (ref 8.8–10.2)
CHLORIDE SERPL-SCNC: 95 MMOL/L (ref 98–107)
CO2 SERPL-SCNC: 28 MMOL/L (ref 22–29)
CREAT SERPL-MCNC: 1.3 MG/DL (ref 0.5–0.9)
EOSINOPHIL # BLD: 0.08 K/UL (ref 0.04–0.54)
EOSINOPHIL NFR BLD: 1 % (ref 0.7–7)
ERYTHROCYTE [DISTWIDTH] IN BLOOD BY AUTOMATED COUNT: 13.1 % (ref 11.7–14.4)
GLUCOSE SERPL-MCNC: 253 MG/DL (ref 70–99)
HCT VFR BLD AUTO: 39 % (ref 34.1–44.9)
HGB BLD-MCNC: 13.2 G/DL (ref 11.2–15.7)
LYMPHOCYTES # BLD: 0.57 K/UL (ref 1.18–3.74)
LYMPHOCYTES NFR BLD: 7.2 % (ref 19.3–53.1)
MCH RBC QN AUTO: 29.8 PG (ref 25.6–32.2)
MCHC RBC AUTO-ENTMCNC: 33.8 G/DL (ref 32.3–35.5)
MCV RBC AUTO: 88 FL (ref 79.4–94.8)
MONOCYTES # BLD: 0.42 K/UL (ref 0.24–0.82)
MONOCYTES NFR BLD: 5.3 % (ref 4.7–12.5)
NEUTROPHILS # BLD: 6.82 K/UL (ref 1.56–6.13)
NEUTS SEG NFR BLD: 85.6 % (ref 34–71.1)
PLATELET # BLD AUTO: 120 K/UL (ref 182–369)
PMV BLD AUTO: 10.6 FL (ref 7.4–10.4)
POTASSIUM SERPL-SCNC: 5.1 MMOL/L (ref 3.5–5.1)
PROT SERPL-MCNC: 7.6 G/DL (ref 6.4–8.3)
RBC # BLD AUTO: 4.43 M/UL (ref 3.93–5.22)
SODIUM SERPL-SCNC: 132 MMOL/L (ref 136–145)
WBC # BLD AUTO: 7.96 K/UL (ref 3.98–10.04)

## 2024-08-29 PROCEDURE — 99212 OFFICE O/P EST SF 10 MIN: CPT

## 2024-08-29 PROCEDURE — 1123F ACP DISCUSS/DSCN MKR DOCD: CPT | Performed by: PHYSICIAN ASSISTANT

## 2024-08-29 PROCEDURE — 36415 COLL VENOUS BLD VENIPUNCTURE: CPT

## 2024-08-29 PROCEDURE — 99213 OFFICE O/P EST LOW 20 MIN: CPT | Performed by: PHYSICIAN ASSISTANT

## 2024-08-29 PROCEDURE — G8417 CALC BMI ABV UP PARAM F/U: HCPCS | Performed by: PHYSICIAN ASSISTANT

## 2024-08-29 PROCEDURE — 1090F PRES/ABSN URINE INCON ASSESS: CPT | Performed by: PHYSICIAN ASSISTANT

## 2024-08-29 PROCEDURE — 85025 COMPLETE CBC W/AUTO DIFF WBC: CPT

## 2024-08-29 PROCEDURE — 80053 COMPREHEN METABOLIC PANEL: CPT

## 2024-08-29 PROCEDURE — 1036F TOBACCO NON-USER: CPT | Performed by: PHYSICIAN ASSISTANT

## 2024-08-29 PROCEDURE — G8399 PT W/DXA RESULTS DOCUMENT: HCPCS | Performed by: PHYSICIAN ASSISTANT

## 2024-08-29 PROCEDURE — G2211 COMPLEX E/M VISIT ADD ON: HCPCS | Performed by: PHYSICIAN ASSISTANT

## 2024-08-29 PROCEDURE — G8427 DOCREV CUR MEDS BY ELIG CLIN: HCPCS | Performed by: PHYSICIAN ASSISTANT

## 2024-08-29 ASSESSMENT — ENCOUNTER SYMPTOMS
BACK PAIN: 0
ABDOMINAL PAIN: 0
CONSTIPATION: 0
BLOOD IN STOOL: 0
SHORTNESS OF BREATH: 0
WHEEZING: 0
TROUBLE SWALLOWING: 0
SORE THROAT: 0
COUGH: 0
NAUSEA: 0
VOMITING: 0
EYE ITCHING: 0
EYE DISCHARGE: 0
COLOR CHANGE: 0
VOICE CHANGE: 0
PHOTOPHOBIA: 0
ABDOMINAL DISTENTION: 0
DIARRHEA: 0

## 2024-08-30 ENCOUNTER — TELEPHONE (OUTPATIENT)
Dept: INFUSION THERAPY | Age: 79
End: 2024-08-30

## 2024-08-30 DIAGNOSIS — C50.412 MALIGNANT NEOPLASM OF UPPER-OUTER QUADRANT OF LEFT BREAST IN FEMALE, ESTROGEN RECEPTOR POSITIVE (HCC): Primary | ICD-10-CM

## 2024-08-30 DIAGNOSIS — Z17.0 MALIGNANT NEOPLASM OF LOWER-OUTER QUADRANT OF RIGHT BREAST OF FEMALE, ESTROGEN RECEPTOR POSITIVE (HCC): Primary | ICD-10-CM

## 2024-08-30 DIAGNOSIS — Z17.0 MALIGNANT NEOPLASM OF UPPER-OUTER QUADRANT OF LEFT BREAST IN FEMALE, ESTROGEN RECEPTOR POSITIVE (HCC): Primary | ICD-10-CM

## 2024-08-30 DIAGNOSIS — C50.511 MALIGNANT NEOPLASM OF LOWER-OUTER QUADRANT OF RIGHT BREAST OF FEMALE, ESTROGEN RECEPTOR POSITIVE (HCC): Primary | ICD-10-CM

## 2024-08-30 DIAGNOSIS — Z17.0 MALIGNANT NEOPLASM OF LOWER-OUTER QUADRANT OF RIGHT BREAST OF FEMALE, ESTROGEN RECEPTOR POSITIVE (HCC): ICD-10-CM

## 2024-08-30 DIAGNOSIS — C50.511 MALIGNANT NEOPLASM OF LOWER-OUTER QUADRANT OF RIGHT BREAST OF FEMALE, ESTROGEN RECEPTOR POSITIVE (HCC): ICD-10-CM

## 2024-08-30 NOTE — TELEPHONE ENCOUNTER
Tram called stating that she saw shalonda yesterday in the office and forgot to tell him that she need mastectomy bras and prosthesis. Ordered and faxed to [employer] at [phone].

## 2024-09-10 ENCOUNTER — TELEPHONE (OUTPATIENT)
Dept: HEMATOLOGY | Age: 79
End: 2024-09-10

## 2024-09-10 DIAGNOSIS — C50.412 MALIGNANT NEOPLASM OF UPPER-OUTER QUADRANT OF LEFT BREAST IN FEMALE, ESTROGEN RECEPTOR POSITIVE (HCC): Primary | ICD-10-CM

## 2024-09-10 DIAGNOSIS — Z17.0 MALIGNANT NEOPLASM OF UPPER-OUTER QUADRANT OF LEFT BREAST IN FEMALE, ESTROGEN RECEPTOR POSITIVE (HCC): Primary | ICD-10-CM

## 2024-09-10 DIAGNOSIS — R97.8 ELEVATED CA 15-3 LEVEL: ICD-10-CM

## 2024-10-04 ENCOUNTER — OFFICE VISIT (OUTPATIENT)
Dept: FAMILY MEDICINE CLINIC | Facility: CLINIC | Age: 79
End: 2024-10-04
Payer: MEDICARE

## 2024-10-04 VITALS
HEIGHT: 64 IN | DIASTOLIC BLOOD PRESSURE: 78 MMHG | OXYGEN SATURATION: 96 % | WEIGHT: 176.6 LBS | HEART RATE: 69 BPM | BODY MASS INDEX: 30.15 KG/M2 | SYSTOLIC BLOOD PRESSURE: 132 MMHG

## 2024-10-04 DIAGNOSIS — I10 ESSENTIAL HYPERTENSION: Primary | ICD-10-CM

## 2024-10-04 DIAGNOSIS — I48.91 ATRIAL FIBRILLATION, UNSPECIFIED TYPE: ICD-10-CM

## 2024-10-04 DIAGNOSIS — E03.9 HYPOTHYROIDISM, UNSPECIFIED TYPE: ICD-10-CM

## 2024-10-04 DIAGNOSIS — E78.2 MIXED HYPERLIPIDEMIA: ICD-10-CM

## 2024-10-04 DIAGNOSIS — C50.912 MALIGNANT NEOPLASM OF LEFT FEMALE BREAST, UNSPECIFIED ESTROGEN RECEPTOR STATUS, UNSPECIFIED SITE OF BREAST: ICD-10-CM

## 2024-10-04 DIAGNOSIS — E11.59 TYPE 2 DIABETES MELLITUS WITH OTHER CIRCULATORY COMPLICATIONS: ICD-10-CM

## 2024-10-04 PROBLEM — M25.561 ACUTE PAIN OF RIGHT KNEE: Status: RESOLVED | Noted: 2017-05-19 | Resolved: 2024-10-04

## 2024-10-04 RX ORDER — ROSUVASTATIN CALCIUM 5 MG/1
5 TABLET, COATED ORAL DAILY
Qty: 90 TABLET | Refills: 1 | Status: SHIPPED | OUTPATIENT
Start: 2024-10-04

## 2024-10-04 NOTE — PROGRESS NOTES
"Chief Complaint  Establish Care, Diabetes, Hyperlipidemia, and Hypertension    Subjective      Dianne Pritchard presents to [facility]  History of Present Illness  The patient is a 79-year-old female here today for a 6-month follow-up.    She reports no new health concerns today.    She has a history of hypertension, managed with losartan 25 mg daily, metoprolol 25 mg daily, and Cardizem 360 mg daily.    She also has a history of atrial fibrillation, for which she takes diltiazem and is under the care of a cardiologist. She has not experienced any recent episodes of atrial fibrillation. A Watchman device has been implanted, and she is on aspirin therapy.    Her diabetes is managed with Toujeo 300 at 84 units in the morning. Additionally, she is on levothyroxine for hypothyroidism.    She has a history of breast cancer, which is currently in remission, and is on letrozole as an estrogen blocker. She is under the care of an oncologist and has undergone a bilateral mastectomy.    She had a Cologuard test in May 2024, which was negative.      Objective   Vital Signs:  /78   Pulse 69   Ht 162.6 cm (64\")   Wt 80.1 kg (176 lb 9.6 oz)   SpO2 96%   BMI 30.31 kg/m²   Estimated body mass index is 30.31 kg/m² as calculated from the following:    Height as of this encounter: 162.6 cm (64\").    Weight as of this encounter: 80.1 kg (176 lb 9.6 oz).            Physical Exam     Physical Exam        Result Review :  The following labs/imaging/notes were reviewed and discussed with the patient by Chun Woods MD on 10/04/2024:   Latest Reference Range & Units 08/29/24 09:48   Sodium 136 - 145 mmol/L 132 (L) (E)   Potassium 3.5 - 5.1 mmol/L 5.1 (E)   Chloride 98 - 107 mmol/L 95 (L) (E)   CO2 22 - 29 mmol/L 28 (E)   Anion Gap 7 - 19 mmol/L 9 (E)   BUN 8 - 23 mg/dL 24 (H) (E)   Creatinine 0.5 - 0.9 mg/dL 1.3 (H) (E)   Est GFR by Clearance >60  42 ! (E)   Glucose 70 - 99 mg/dL 253 (H) (E) "   Calcium 8.8 - 10.2 mg/dL 10.3 (H) (E)   Alkaline Phosphatase 35 - 104 U/L 104 (E)   Total Protein 6.4 - 8.3 g/dL 7.6 (E)   Albumin 3.5 - 5.2 g/dL 3.8 (E)   AST (SGOT) 5 - 32 U/L 33 (H) (E)   ALT (SGPT) 5 - 33 U/L 11 (E)   (L): Data is abnormally low  (H): Data is abnormally high  !: Data is abnormal  (E): External lab result         Latest Reference Range & Units 01/30/23 09:08 10/04/23 10:13   Hemoglobin A1C 4.80 - 5.60 % 9.10 (H) 8.30 (H)   (H): Data is abnormally high     Latest Reference Range & Units 01/30/23 09:08 10/04/23 10:13   TSH Baseline 0.270 - 4.200 uIU/mL 1.430 1.130      Latest Reference Range & Units 01/30/23 09:08 10/04/23 10:13   Total Cholesterol 0 - 200 mg/dL 210 (H) 220 (H)   HDL Cholesterol 40 - 60 mg/dL 49 51   LDL Cholesterol  0 - 100 mg/dL 129 (H) 133 (H)   Triglycerides 0 - 150 mg/dL 178 (H) 201 (H)   Chol/HDL Ratio  4.29 4.31   VLDL Cholesterol Bakari 5 - 40 mg/dL 32 36   (H): Data is abnormally high        Assessment      Diagnoses and all orders for this visit:    1. Essential hypertension (Primary)    2. Atrial fibrillation, unspecified type    3. Type 2 diabetes mellitus with other circulatory complications  -     Hemoglobin A1c  -     Microalbumin / Creatinine Urine Ratio - Urine, Clean Catch    4. Hypothyroidism, unspecified type  -     TSH Rfx On Abnormal To Free T4    5. Malignant neoplasm of left female breast, unspecified estrogen receptor status, unspecified site of breast    6. Mixed hyperlipidemia  -     Lipid Panel    Other orders  -     rosuvastatin (Crestor) 5 MG tablet; Take 1 tablet by mouth Daily.  Dispense: 90 tablet; Refill: 1             Assessment & Plan  1. Hypertension.  Her blood pressure is well-managed with her current medication regimen, which includes losartan 25 mg daily, metoprolol 25 mg daily, and Cardizem 360 mg daily. Continuation of the current antihypertensive medications is advised.    2. Atrial Fibrillation.  She is advised to maintain her follow-ups  with the cardiologist and continue taking diltiazem and aspirin. No recent bouts of atrial fibrillation have been reported, and the condition is well-controlled.    3. Type 2 Diabetes Mellitus.  Her A1c levels have shown improvement, decreasing from 9.1 in January 2023 to 8.3 in October 2023. A1c and microalbumin levels will be checked today. If the A1c remains elevated, additional medication may be considered. She is currently on Toujeo 300 units, 84 units taken in the morning.    4. Hypothyroidism.  Her TSH levels are within the normal range. She is advised to continue her current dose of levothyroxine. TSH levels will be rechecked today as it has not been done in over a year.    5. Hyperlipidemia.  A trial of rosuvastatin 5 mg is recommended to assess tolerance. If tolerated, it may aid in managing her cholesterol levels. A baseline lipid panel will be obtained to monitor her cholesterol status. If leg cramps occur, discontinuation of rosuvastatin is advised, and an alternative medication will be considered.    6. Breast Cancer in Remission.  She is currently taking letrozole (Femara) as an estrogen blocker. She continues to follow up with oncology and is in remission. She underwent bilateral mastectomies in 2015 and 2019.    7. Health Maintenance.  A Cologuard test was performed in May 2024, and the results were negative. Routine colon cancer screening is up to date.      Orders Placed This Encounter   Procedures    Hemoglobin A1c     Order Specific Question:   Release to patient     Answer:   Routine Release [[id number]]    Microalbumin / Creatinine Urine Ratio - Urine, Clean Catch     Order Specific Question:   Release to patient     Answer:   Routine Release [[id number]]    Lipid Panel     Order Specific Question:   Release to patient     Answer:   Routine Release [[id number]]    TSH Rfx On Abnormal To Free T4     Order Specific Question:   Release to patient     Answer:   Routine Release [[id number]]        Follow Up   Return in about 6 months (around 4/4/2025) for Medicare Wellness HTN, HLD, DMII, and labs. .  Patient was given instructions and counseling regarding her condition or for health maintenance advice. Please see specific information pulled into the AVS if appropriate.         Patient or patient representative verbalized consent for the use of Ambient Listening during the visit with  Chun Woods MD for chart documentation. 10/4/2024  11:30 CDT

## 2024-10-05 LAB
ALBUMIN/CREAT UR: 1166 MG/G CREAT (ref 0–29)
CHOLEST SERPL-MCNC: 181 MG/DL (ref 0–200)
CREAT UR-MCNC: 57.1 MG/DL
HBA1C MFR BLD: 7.9 % (ref 4.8–5.6)
HDLC SERPL-MCNC: 50 MG/DL (ref 40–60)
LDLC SERPL CALC-MCNC: 104 MG/DL (ref 0–100)
MICROALBUMIN UR-MCNC: 666 UG/ML
TRIGL SERPL-MCNC: 153 MG/DL (ref 0–150)
TSH SERPL DL<=0.005 MIU/L-ACNC: 2.11 UIU/ML (ref 0.27–4.2)
VLDLC SERPL CALC-MCNC: 27 MG/DL (ref 5–40)

## 2024-10-18 ENCOUNTER — TELEPHONE (OUTPATIENT)
Dept: VASCULAR SURGERY | Facility: CLINIC | Age: 79
End: 2024-10-18

## 2024-10-18 NOTE — TELEPHONE ENCOUNTER
Called to inform patient of testing and appointment being moved to 11/22/24 due to not able to fit testing until this date. No answer and unable to leave VM.

## 2024-10-19 DIAGNOSIS — N18.32 STAGE 3B CHRONIC KIDNEY DISEASE: Primary | ICD-10-CM

## 2024-11-04 ENCOUNTER — HOSPITAL ENCOUNTER (OUTPATIENT)
Dept: INFUSION THERAPY | Age: 79
Discharge: HOME | End: 2024-11-04
Payer: MEDICARE

## 2024-11-04 DIAGNOSIS — I65.23 BILATERAL CAROTID ARTERY STENOSIS: Primary | ICD-10-CM

## 2024-11-04 DIAGNOSIS — Z17.0 MALIGNANT NEOPLASM OF UPPER-OUTER QUADRANT OF LEFT BREAST IN FEMALE, ESTROGEN RECEPTOR POSITIVE (HCC): ICD-10-CM

## 2024-11-04 DIAGNOSIS — C50.412 MALIGNANT NEOPLASM OF UPPER-OUTER QUADRANT OF LEFT BREAST IN FEMALE, ESTROGEN RECEPTOR POSITIVE (HCC): ICD-10-CM

## 2024-11-04 DIAGNOSIS — R97.8 ELEVATED CA 15-3 LEVEL: ICD-10-CM

## 2024-11-04 LAB — CA 15-3: 44 U/ML (ref 0–25)

## 2024-11-04 PROCEDURE — 86300 IMMUNOASSAY TUMOR CA 15-3: CPT

## 2024-11-04 PROCEDURE — 36415 COLL VENOUS BLD VENIPUNCTURE: CPT

## 2024-11-21 ENCOUNTER — TELEPHONE (OUTPATIENT)
Dept: FAMILY MEDICINE CLINIC | Facility: CLINIC | Age: 79
End: 2024-11-21

## 2024-11-21 NOTE — TELEPHONE ENCOUNTER
Caller: Dianne Pritchard    Relationship: Self    Best call back number: [phone]     What form or medical record are you requesting: LAB RESULTS FROM 11.04.24     Who is requesting this form or medical record from you: DR. SIGRID CHAMPION    How would you like to receive the form or medical records (pick-up, mail, fax): FAX  If fax, what is the fax number: WILL CALL BACK WITH FAX NUMBER     Timeframe paperwork needed: 11.21.24

## 2024-11-27 ENCOUNTER — TELEPHONE (OUTPATIENT)
Dept: VASCULAR SURGERY | Facility: CLINIC | Age: 79
End: 2024-11-27
Payer: MEDICARE

## 2024-11-27 NOTE — TELEPHONE ENCOUNTER
Spoke with patient and reminded of testing and appointment on 12/02/2024 and she has voiced understanding

## 2024-12-18 NOTE — PROGRESS NOTES
The ABCs of the Annual Wellness Visit  Subsequent Medicare Wellness Visit    Subjective      Dianne Pritchard is a 77 y.o. female who presents for a Subsequent Medicare Wellness Visit.    The following portions of the patient's history were reviewed and   updated as appropriate: allergies, current medications, past family history, past medical history, past social history, past surgical history and problem list.    Compared to one year ago, the patient feels her physical   health is the same.    Compared to one year ago, the patient feels her mental   health is the same.    Recent Hospitalizations:  She was not admitted to the hospital during the last year.       Current Medical Providers:  Patient Care Team:  Darryl Bang MD as PCP - General (Family Medicine)  Xiang Fernandes APRN as Nurse Practitioner (Pulmonary Disease)    Outpatient Medications Prior to Visit   Medication Sig Dispense Refill   • aspirin 81 MG chewable tablet Chew 1 tablet Daily.     • dilTIAZem CD (CARDIZEM CD) 180 MG 24 hr capsule TAKE 2 CAPSULES BY MOUTH DAILY 180 capsule 3   • Insulin Pen Needle (PEN NEEDLES) 31G X 5 MM misc 1 each 2 (Two) Times a Day. 100 each 5   • letrozole (FEMARA) 2.5 MG tablet Take 1 tablet by mouth Daily.     • levothyroxine (SYNTHROID, LEVOTHROID) 137 MCG tablet TAKE 1 TABLET BY MOUTH DAILY 90 tablet 0   • losartan (COZAAR) 25 MG tablet Take 1 tablet by mouth Daily. 90 tablet 3   • metoprolol succinate XL (TOPROL-XL) 25 MG 24 hr tablet TAKE 1 TABLET BY MOUTH DAILY 90 tablet 3   • Misc. Devices (VIRAGE CUSTOM BREAST PROSTHES) misc To be used with Mastectomy Bra- 2 each 0   • Multiple Vitamins-Minerals (CENTRUM SILVER PO) Take 1 tablet by mouth Daily.     • TOUJEO SOLOSTAR 300 UNIT/ML solution pen-injector injection INJECT 80 UNITS UNDER THE SKIN UTD QD  1   • azithromycin (Zithromax Z-João) 250 MG tablet Take 2 tablets the first day, then 1 tablet daily for 4 days. 6 tablet 0     No facility-administered  "medications prior to visit.       No opioid medication identified on active medication list. I have reviewed chart for other potential  high risk medication/s and harmful drug interactions in the elderly.          Aspirin is on active medication list. Aspirin use is indicated based on review of current medical condition/s. Pros and cons of this therapy have been discussed today. Benefits of this medication outweigh potential harm.  Patient has been encouraged to continue taking this medication.  .      Patient Active Problem List   Diagnosis   • Tremor   • Essential hypertension   • Type 2 diabetes mellitus with other circulatory complications (HCC)   • Acquired hypothyroidism   • Dizziness   • Multiple falls   • Bruit of left carotid artery   • Acute pain of right knee   • Right knee pain   • Status post right knee replacement   • BMI 33.0-33.9,adult   • Bilateral carotid artery disease (HCC)   • Restrictive lung disease   • A-fib (HCC)   • Presence of Watchman left atrial appendage closure device   • Malignant neoplasm of female breast (HCC)   • Status post bilateral mastectomy   • Class 1 obesity due to excess calories with serious comorbidity and body mass index (BMI) of 31.0 to 31.9 in adult   • After cataract not obscuring vision   • Nonproliferative diabetic retinopathy (HCC)   • Pseudophakia   • Puckering of macula, left eye   • Vitreous degeneration   • Rhabdomyolysis     Advance Care Planning  Advance Directive is not on file.  ACP discussion was held with the patient during this visit. Patient does not have an advance directive, information provided.     Objective    Vitals:    03/29/23 0959   BP: 124/70   Pulse: 62   SpO2: 96%   Weight: 82.6 kg (182 lb)   Height: 162.6 cm (64\")     Estimated body mass index is 31.24 kg/m² as calculated from the following:    Height as of this encounter: 162.6 cm (64\").    Weight as of this encounter: 82.6 kg (182 lb).    BMI is >= 30 and <35. (Class 1 Obesity). The " following options were offered after discussion;: nutrition counseling/recommendations      Does the patient have evidence of cognitive impairment?   No    Lab Results   Component Value Date    CHLPL 210 (H) 2023    TRIG 178 (H) 2023    HDL 49 2023     (H) 2023    VLDL 32 2023    HGBA1C 9.10 (H) 2023          HEALTH RISK ASSESSMENT    Smoking Status:  Social History     Tobacco Use   Smoking Status Former   • Years: 20.00   • Types: Cigarettes   • Quit date:    • Years since quittin.2   Smokeless Tobacco Never     Alcohol Consumption:  Social History     Substance and Sexual Activity   Alcohol Use No     Fall Risk Screen:    STEADI Fall Risk Assessment was completed, and patient is at LOW risk for falls.Assessment completed on:3/29/2023    Depression Screening:  PHQ-2/PHQ-9 Depression Screening 3/29/2023   Little Interest or Pleasure in Doing Things 0-->not at all   Feeling Down, Depressed or Hopeless 0-->not at all   PHQ-9: Brief Depression Severity Measure Score 0       Health Habits and Functional and Cognitive Screening:  Functional & Cognitive Status 3/29/2023   Do you have difficulty preparing food and eating? No   Do you have difficulty bathing yourself, getting dressed or grooming yourself? No   Do you have difficulty using the toilet? No   Do you have difficulty moving around from place to place? No   Do you have trouble with steps or getting out of a bed or a chair? No   Current Diet Well Balanced Diet   Dental Exam Up to date   Eye Exam Up to date   Exercise (times per week) 5 times per week   Current Exercises Include Walking;House Cleaning   Current Exercise Activities Include -   Do you need help using the phone?  No   Are you deaf or do you have serious difficulty hearing?  No   Do you need help with transportation? No   Do you need help shopping? No   Do you need help preparing meals?  No   Do you need help with housework?  No   Do you need help  with laundry? No   Do you need help taking your medications? No   Do you need help managing money? No   Do you ever drive or ride in a car without wearing a seat belt? No   Have you felt unusual stress, anger or loneliness in the last month? No   Who do you live with? Spouse   If you need help, do you have trouble finding someone available to you? No   Have you been bothered in the last four weeks by sexual problems? No   Do you have difficulty concentrating, remembering or making decisions? No       Age-appropriate Screening Schedule:  Refer to the list below for future screening recommendations based on patient's age, sex and/or medical conditions. Orders for these recommended tests are listed in the plan section. The patient has been provided with a written plan.    Health Maintenance   Topic Date Due   • Pneumococcal Vaccine 65+ (1 - PCV) Never done   • TDAP/TD VACCINES (1 - Tdap) Never done   • ZOSTER VACCINE (1 of 2) Never done   • COLORECTAL CANCER SCREENING  06/28/2016   • DXA SCAN  08/14/2020   • COVID-19 Vaccine (3 - Booster for Moderna series) 06/03/2021   • URINE MICROALBUMIN  09/23/2022   • ANNUAL WELLNESS VISIT  03/24/2023   • HEMOGLOBIN A1C  07/30/2023   • DIABETIC EYE EXAM  01/11/2024   • HEPATITIS C SCREENING  Completed   • INFLUENZA VACCINE  Completed   • MAMMOGRAM  Discontinued                CMS Preventative Services Quick Reference  Risk Factors Identified During Encounter:    Fall Risk-High or Moderate: Discussed Fall Prevention in the home    The above risks/problems have been discussed with the patient.  Pertinent information has been shared with the patient in the After Visit Summary.    Diagnoses and all orders for this visit:    1. Essential hypertension (Primary)    2. Acquired hypothyroidism    3. Type 2 diabetes mellitus with other circulatory complications (HCC)        Follow Up:   Next Medicare Wellness visit to be scheduled in 1 year.      An After Visit Summary and PPPS were made  available to the patient.           Statement Selected

## 2024-12-31 ENCOUNTER — TELEPHONE (OUTPATIENT)
Dept: VASCULAR SURGERY | Facility: CLINIC | Age: 79
End: 2024-12-31
Payer: MEDICARE

## 2025-01-02 ENCOUNTER — PATIENT ROUNDING (BHMG ONLY) (OUTPATIENT)
Dept: VASCULAR SURGERY | Facility: CLINIC | Age: 80
End: 2025-01-02
Payer: MEDICARE

## 2025-01-02 ENCOUNTER — HOSPITAL ENCOUNTER (OUTPATIENT)
Dept: ULTRASOUND IMAGING | Facility: HOSPITAL | Age: 80
Discharge: HOME | End: 2025-01-02
Admitting: NURSE PRACTITIONER
Payer: MEDICARE

## 2025-01-02 ENCOUNTER — OFFICE VISIT (OUTPATIENT)
Dept: VASCULAR SURGERY | Facility: CLINIC | Age: 80
End: 2025-01-02
Payer: MEDICARE

## 2025-01-02 VITALS
DIASTOLIC BLOOD PRESSURE: 68 MMHG | HEART RATE: 100 BPM | SYSTOLIC BLOOD PRESSURE: 142 MMHG | WEIGHT: 167 LBS | BODY MASS INDEX: 28.51 KG/M2 | OXYGEN SATURATION: 95 % | HEIGHT: 64 IN

## 2025-01-02 DIAGNOSIS — E11.59 TYPE 2 DIABETES MELLITUS WITH OTHER CIRCULATORY COMPLICATIONS: ICD-10-CM

## 2025-01-02 DIAGNOSIS — I10 ESSENTIAL HYPERTENSION: ICD-10-CM

## 2025-01-02 DIAGNOSIS — I65.23 BILATERAL CAROTID ARTERY STENOSIS: ICD-10-CM

## 2025-01-02 DIAGNOSIS — I65.23 BILATERAL CAROTID ARTERY STENOSIS: Primary | ICD-10-CM

## 2025-01-02 PROCEDURE — 93880 EXTRACRANIAL BILAT STUDY: CPT

## 2025-01-02 PROCEDURE — 3077F SYST BP >= 140 MM HG: CPT | Performed by: NURSE PRACTITIONER

## 2025-01-02 PROCEDURE — 1160F RVW MEDS BY RX/DR IN RCRD: CPT | Performed by: NURSE PRACTITIONER

## 2025-01-02 PROCEDURE — 3078F DIAST BP <80 MM HG: CPT | Performed by: NURSE PRACTITIONER

## 2025-01-02 PROCEDURE — 1159F MED LIST DOCD IN RCRD: CPT | Performed by: NURSE PRACTITIONER

## 2025-01-02 PROCEDURE — 99214 OFFICE O/P EST MOD 30 MIN: CPT | Performed by: NURSE PRACTITIONER

## 2025-01-02 NOTE — LETTER
"January 2, 2025     Chun Woods MD  [address] [place] [id number]    Patient: Dianne Pritchard   YOB: 1945   Date of Visit: 1/2/2025     Dear Chun Woods MD:       Thank you for referring Dianne Pritchard to me for evaluation. Below are the relevant portions of my assessment and plan of care.    If you have questions, please do not hesitate to call me. I look forward to following Dianne along with you.         Sincerely,        ДМИТРИЙ Jc        CC: No Recipients    Kalina Ames APRN  01/02/25 1222  Sign when Signing Visit  1/2/2025       Chun Woods MD   [address] [place] [id number]    Dianne Pritchard  1945    Chief Complaint   Patient presents with   • Follow-up     1 year follow up w/ testing. Last seen 10/26/23. Patient denies any stroke type symptoms.        Dear Chun Woods MD       HPI  I had the pleasure of seeing your patient Dianne Pritchard in the office today.  As you recall, Dianne Pritchard is a 79 y.o.  female who we are following for asymptomatic carotid occlusive disease.   She has previously undergone radiation to her left neck which caused narrowing of her left carotid system.  Currently she is well and denies any strokelike symptoms.  She is maintained on aspirin and Crestor.  She did have noninvasive testing performed today, which I did review in office.      Review of Systems   Constitutional: Negative.    HENT: Negative.     Eyes: Negative.    Respiratory: Negative.     Cardiovascular: Negative.    Gastrointestinal: Negative.    Endocrine: Negative.    Genitourinary: Negative.    Musculoskeletal: Negative.    Skin: Negative.    Allergic/Immunologic: Negative.    Neurological: Negative.    Hematological: Negative.    Psychiatric/Behavioral: Negative.           /68   Pulse 100   Ht 162.6 cm (64\")   Wt 75.8 kg (167 lb)   SpO2 95%   BMI 28.67 kg/m²   Physical Exam  Vitals and nursing note reviewed.   Constitutional:       General: She is not in acute " distress.     Appearance: Normal appearance. She is well-developed and overweight. She is not diaphoretic.   HENT:      Head: Normocephalic and atraumatic.   Eyes:      General: No scleral icterus.     Pupils: Pupils are equal, round, and reactive to light.   Neck:      Thyroid: No thyromegaly.      Vascular: No carotid bruit or JVD.   Cardiovascular:      Rate and Rhythm: Normal rate and regular rhythm.      Pulses: Normal pulses.      Heart sounds: Normal heart sounds and S2 normal. No murmur heard.     No friction rub. No gallop.   Pulmonary:      Effort: Pulmonary effort is normal.      Breath sounds: Normal breath sounds.   Abdominal:      General: Bowel sounds are normal.      Palpations: Abdomen is soft.   Musculoskeletal:         General: Normal range of motion.      Cervical back: Normal range of motion and neck supple.   Skin:     General: Skin is warm and dry.   Neurological:      Mental Status: She is alert and oriented to person, place, and time.      Cranial Nerves: No cranial nerve deficit.   Psychiatric:         Mood and Affect: Mood normal.         Behavior: Behavior normal. Behavior is cooperative.         Thought Content: Thought content normal.         Judgment: Judgment normal.          Diagnostic Data:  Noninvasive testing including a carotid duplex shows 50 to 69% carotid stenosis bilaterally.       Patient Active Problem List   Diagnosis   • Tremor   • Essential hypertension   • Type 2 diabetes mellitus with other circulatory complications   • Hypothyroidism   • Dizziness   • Multiple falls   • Bruit of left carotid artery   • Right knee pain   • Status post right knee replacement   • BMI 33.0-33.9,adult   • Bilateral carotid artery disease   • Restrictive lung disease   • A-fib   • Presence of Watchman left atrial appendage closure device   • Malignant neoplasm of female breast   • Status post bilateral mastectomy   • Class 1 obesity due to excess calories with serious comorbidity and body  mass index (BMI) of 30.0 to 30.9 in adult   • After cataract not obscuring vision   • Nonproliferative diabetic retinopathy   • Pseudophakia   • Puckering of macula, left eye   • Vitreous degeneration   • Rhabdomyolysis   • Mitral valve stenosis   • Mixed hyperlipidemia       No diagnosis found.        Plan: After thoroughly evaluating Dianne Pritchard, I believe the best course of action is to proceed with a CTA of the neck for further evaluation of her right carotid.  I will use half dose contrast as recent creatinine 1.3.  Previously her CTA of the neck showed diminutive flow of the left carotid although into the skull base and was not a surgical candidate due to previous radiation.  We will see her back in the next 2 weeks to review testing as she does have significant plaque noted on the right.  I did discuss vascular risk factors as they pertain to the progression of vascular disease including controlling her hypertension and diabetes.  Her blood pressure is slightly elevated at 142/68.  Her diabetes is uncontrolled with most recent hemoglobin A1c of 7.9%.  She should continue on her aspirin 81 mg daily in addition to her other medications.  This was all discussed in full with complete understanding.    Thank you for allowing me to participate in the care of your patient.  Please do not hesitate with any questions or concerns.  I will keep you aware of any further encounters with Dianne Pritchard.        Sincerely yours,         Kalina Ames, Chun Arboleda MD

## 2025-01-02 NOTE — PROGRESS NOTES
January 2, 2025    Hello, may I speak with Dianne Pritchard?    My name is dimple      I am  with [facility]  [address], [address]  [facility] [address] [phone].    Before we get started may I verify your date of birth? 1945    I am calling to ask about your recent visit. Is this a good time to talk? yes    Tell me about your visit with us. What things went well?  Everyone was great! I just love Kalina she is so sweet!       We're always looking for ways to make our patients' experiences even better. Do you have recommendations on ways we may improve?  no    Overall were you satisfied with your visit to our practice? yes       I appreciate you taking the time to speak with me today. Is there anything else I can do for you? no      Thank you, and have a great day.

## 2025-01-02 NOTE — PROGRESS NOTES
"1/2/2025       Chun Woods MD   [address] [place] [id number]    Dianne Pritchard  1945    Chief Complaint   Patient presents with   • Follow-up     1 year follow up w/ testing. Last seen 10/26/23. Patient denies any stroke type symptoms.        Dear Chun Woods MD       HPI  I had the pleasure of seeing your patient Dianne Pritchard in the office today.  As you recall, Dianne Pritchard is a 79 y.o.  female who we are following for asymptomatic carotid occlusive disease.   She has previously undergone radiation to her left neck which caused narrowing of her left carotid system.  Currently she is well and denies any strokelike symptoms.  She is maintained on aspirin and Crestor.  She did have noninvasive testing performed today, which I did review in office.      Review of Systems   Constitutional: Negative.    HENT: Negative.     Eyes: Negative.    Respiratory: Negative.     Cardiovascular: Negative.    Gastrointestinal: Negative.    Endocrine: Negative.    Genitourinary: Negative.    Musculoskeletal: Negative.    Skin: Negative.    Allergic/Immunologic: Negative.    Neurological: Negative.    Hematological: Negative.    Psychiatric/Behavioral: Negative.           /68   Pulse 100   Ht 162.6 cm (64\")   Wt 75.8 kg (167 lb)   SpO2 95%   BMI 28.67 kg/m²   Physical Exam  Vitals and nursing note reviewed.   Constitutional:       General: She is not in acute distress.     Appearance: Normal appearance. She is well-developed and overweight. She is not diaphoretic.   HENT:      Head: Normocephalic and atraumatic.   Eyes:      General: No scleral icterus.     Pupils: Pupils are equal, round, and reactive to light.   Neck:      Thyroid: No thyromegaly.      Vascular: No carotid bruit or JVD.   Cardiovascular:      Rate and Rhythm: Normal rate and regular rhythm.      Pulses: Normal pulses.      Heart sounds: Normal heart sounds and S2 normal. No murmur heard.     No friction rub. No gallop.   Pulmonary:      " Effort: Pulmonary effort is normal.      Breath sounds: Normal breath sounds.   Abdominal:      General: Bowel sounds are normal.      Palpations: Abdomen is soft.   Musculoskeletal:         General: Normal range of motion.      Cervical back: Normal range of motion and neck supple.   Skin:     General: Skin is warm and dry.   Neurological:      Mental Status: She is alert and oriented to person, place, and time.      Cranial Nerves: No cranial nerve deficit.   Psychiatric:         Mood and Affect: Mood normal.         Behavior: Behavior normal. Behavior is cooperative.         Thought Content: Thought content normal.         Judgment: Judgment normal.          Diagnostic Data:  Noninvasive testing including a carotid duplex shows 50 to 69% carotid stenosis bilaterally.       Patient Active Problem List   Diagnosis   • Tremor   • Essential hypertension   • Type 2 diabetes mellitus with other circulatory complications   • Hypothyroidism   • Dizziness   • Multiple falls   • Bruit of left carotid artery   • Right knee pain   • Status post right knee replacement   • BMI 33.0-33.9,adult   • Bilateral carotid artery disease   • Restrictive lung disease   • A-fib   • Presence of Watchman left atrial appendage closure device   • Malignant neoplasm of female breast   • Status post bilateral mastectomy   • Class 1 obesity due to excess calories with serious comorbidity and body mass index (BMI) of 30.0 to 30.9 in adult   • After cataract not obscuring vision   • Nonproliferative diabetic retinopathy   • Pseudophakia   • Puckering of macula, left eye   • Vitreous degeneration   • Rhabdomyolysis   • Mitral valve stenosis   • Mixed hyperlipidemia       No diagnosis found.        Plan: After thoroughly evaluating Dianne Pritchard, I believe the best course of action is to proceed with a CTA of the neck for further evaluation of her right carotid.  I will use half dose contrast as recent creatinine 1.3.  Previously her CTA of the neck  showed diminutive flow of the left carotid although into the skull base and was not a surgical candidate due to previous radiation.  We will see her back in the next 2 weeks to review testing as she does have significant plaque noted on the right.  I did discuss vascular risk factors as they pertain to the progression of vascular disease including controlling her hypertension and diabetes.  Her blood pressure is slightly elevated at 142/68.  Her diabetes is uncontrolled with most recent hemoglobin A1c of 7.9%.  She should continue on her aspirin 81 mg daily in addition to her other medications.  This was all discussed in full with complete understanding.    Thank you for allowing me to participate in the care of your patient.  Please do not hesitate with any questions or concerns.  I will keep you aware of any further encounters with Dianne Pritchard.        Sincerely yours,         ДМИТРИЙ Jc Timothy, MD

## 2025-01-14 ENCOUNTER — HOSPITAL ENCOUNTER (OUTPATIENT)
Dept: CT IMAGING | Facility: HOSPITAL | Age: 80
Discharge: HOME | End: 2025-01-14
Admitting: NURSE PRACTITIONER
Payer: MEDICARE

## 2025-01-14 ENCOUNTER — TELEPHONE (OUTPATIENT)
Dept: VASCULAR SURGERY | Facility: CLINIC | Age: 80
End: 2025-01-14
Payer: MEDICARE

## 2025-01-14 ENCOUNTER — OFFICE VISIT (OUTPATIENT)
Dept: VASCULAR SURGERY | Facility: CLINIC | Age: 80
End: 2025-01-14
Payer: MEDICARE

## 2025-01-14 ENCOUNTER — TELEPHONE (OUTPATIENT)
Dept: CARDIOLOGY | Facility: CLINIC | Age: 80
End: 2025-01-14
Payer: MEDICARE

## 2025-01-14 VITALS
BODY MASS INDEX: 28 KG/M2 | HEART RATE: 76 BPM | WEIGHT: 164 LBS | DIASTOLIC BLOOD PRESSURE: 64 MMHG | OXYGEN SATURATION: 98 % | SYSTOLIC BLOOD PRESSURE: 118 MMHG | HEIGHT: 64 IN

## 2025-01-14 DIAGNOSIS — I65.23 BILATERAL CAROTID ARTERY STENOSIS: ICD-10-CM

## 2025-01-14 DIAGNOSIS — I65.23 BILATERAL CAROTID ARTERY STENOSIS: Primary | ICD-10-CM

## 2025-01-14 DIAGNOSIS — E11.59 TYPE 2 DIABETES MELLITUS WITH OTHER CIRCULATORY COMPLICATIONS: ICD-10-CM

## 2025-01-14 DIAGNOSIS — I10 ESSENTIAL HYPERTENSION: ICD-10-CM

## 2025-01-14 DIAGNOSIS — E66.3 OVERWEIGHT (BMI 25.0-29.9): ICD-10-CM

## 2025-01-14 PROBLEM — E83.42 HYPOMAGNESEMIA: Status: RESOLVED | Noted: 2025-01-14 | Resolved: 2025-01-14

## 2025-01-14 PROBLEM — N25.81 SECONDARY HYPERPARATHYROIDISM: Status: RESOLVED | Noted: 2025-01-14 | Resolved: 2025-01-14

## 2025-01-14 PROBLEM — E11.21 DIABETIC RENAL DISEASE: Status: ACTIVE | Noted: 2025-01-14

## 2025-01-14 PROBLEM — N18.31 STAGE 3A CHRONIC KIDNEY DISEASE: Status: ACTIVE | Noted: 2025-01-14

## 2025-01-14 PROBLEM — E87.1 HYPONATREMIA: Status: RESOLVED | Noted: 2025-01-14 | Resolved: 2025-01-14

## 2025-01-14 LAB — CREAT BLDA-MCNC: 1.3 MG/DL (ref 0.6–1.3)

## 2025-01-14 PROCEDURE — 1159F MED LIST DOCD IN RCRD: CPT | Performed by: SURGERY

## 2025-01-14 PROCEDURE — 99214 OFFICE O/P EST MOD 30 MIN: CPT | Performed by: SURGERY

## 2025-01-14 PROCEDURE — 82565 ASSAY OF CREATININE: CPT

## 2025-01-14 PROCEDURE — 25510000001 IOPAMIDOL PER 1 ML: Performed by: NURSE PRACTITIONER

## 2025-01-14 PROCEDURE — 1160F RVW MEDS BY RX/DR IN RCRD: CPT | Performed by: SURGERY

## 2025-01-14 PROCEDURE — 3074F SYST BP LT 130 MM HG: CPT | Performed by: SURGERY

## 2025-01-14 PROCEDURE — 3078F DIAST BP <80 MM HG: CPT | Performed by: SURGERY

## 2025-01-14 PROCEDURE — 70498 CT ANGIOGRAPHY NECK: CPT

## 2025-01-14 RX ORDER — CLOPIDOGREL BISULFATE 75 MG/1
75 TABLET ORAL DAILY
Qty: 30 TABLET | Refills: 3 | Status: SHIPPED | OUTPATIENT
Start: 2025-01-14

## 2025-01-14 RX ORDER — IOPAMIDOL 755 MG/ML
100 INJECTION, SOLUTION INTRAVASCULAR
Status: COMPLETED | OUTPATIENT
Start: 2025-01-14 | End: 2025-01-14

## 2025-01-14 RX ADMIN — IOPAMIDOL 100 ML: 755 INJECTION, SOLUTION INTRAVENOUS at 14:45

## 2025-01-14 NOTE — TELEPHONE ENCOUNTER
----- Message from Esperanza Valles sent at 1/14/2025  4:02 PM CST -----  Regarding: RE:  Oh gosh. Ok! Yea she needs to be on asa to prevent her watchman from clotting.  ----- Message -----  From: Brandy Duran APRN  Sent: 1/14/2025   3:48 PM CST  To: ДМИТРИЙ Best    Well she has graciously taken herself off of her aspirin, so I will call her and tell her to start that back today!  ----- Message -----  From: Esperanza Valles APRN  Sent: 1/14/2025   3:22 PM CST  To: ДМИТРИЙ Weinberg    No. I'll get my MA to send a letter over. She just needs to be on at least one antiplatelet from my standpoint, due to her watchman.     Thanks!  ----- Message -----  From: Brandy Duran APRN  Sent: 1/14/2025   3:05 PM CST  To: ДМИТРИЙ Best    She will need right TCAR.  Does she need to see you prior for clearance?  I will be placing her on Plavix as well. You last saw in May

## 2025-01-14 NOTE — TELEPHONE ENCOUNTER
"----- Message from Esperanza Valles sent at 1/14/2025  3:22 PM CST -----  Regarding: cardiac risk assessment.  Please send to vascular, ДМИТРИЙ Blanco and Dr. Oviedo    Risk assessment- from a revised cardiac risk index standpoint, patient is low risk for a major cardiac event with this surgery. This is primarily because she has no known history of coronary/ischemic heart disease, congestive heart failure, cerebrovascular disease, insulin-dependent diabetes mellitus, or creatinine greater than 2. Though the risk is low (0.9%), it is not zero. However, this is non-modifiable because she has no signs or symptoms of the following \"active cardiac conditions\"- acute coronary syndrome, acutely decompensated congestive heart failure, uncontrolled arrhythmias, or significant valvular disease. Therefore, recommend proceeding with the planned surgery without further delay.    She must remain on at least 1 antiplatelet uninterrupted due to her previously placed watchman device.  "

## 2025-01-14 NOTE — PROGRESS NOTES
"1/14/2025       Chun Woods MD   [address] [place] [id number]    Dianne Pritchard  1945    Chief Complaint   Patient presents with    Follow-up     2 week follow up. Last seen 1/2/25. Patient denies any stroke type symptoms.        Dear Chun Woods MD       HPI  I had the pleasure of seeing your patient Dianne Pritchard in the office today.  As you recall, Dianne Pritchard is a 79 y.o.  female who we are following for asymptomatic carotid occlusive disease.  She is here today for 2-week follow-up with testing.  She has previously undergone radiation to her left neck which caused narrowing of her left carotid system.  She denies any strokelike symptoms.  She is maintained on Crestor.  She did have noninvasive testing performed today, which I did review in office.      Review of Systems   Constitutional: Negative.    HENT: Negative.     Eyes: Negative.    Respiratory: Negative.     Cardiovascular: Negative.    Gastrointestinal: Negative.    Endocrine: Negative.    Genitourinary: Negative.    Musculoskeletal: Negative.    Skin: Negative.    Allergic/Immunologic: Negative.    Neurological: Negative.    Hematological: Negative.    Psychiatric/Behavioral: Negative.     All other systems reviewed and are negative.        /64   Pulse 76   Ht 162.6 cm (64\")   Wt 74.4 kg (164 lb)   SpO2 98%   BMI 28.15 kg/m²       Physical Exam  Vitals and nursing note reviewed.   Constitutional:       Appearance: She is well-developed and overweight.   HENT:      Head: Normocephalic and atraumatic.   Eyes:      General: No scleral icterus.     Pupils: Pupils are equal, round, and reactive to light.   Neck:      Thyroid: No thyromegaly.      Vascular: No carotid bruit or JVD.   Cardiovascular:      Rate and Rhythm: Normal rate and regular rhythm.      Heart sounds: Normal heart sounds.   Pulmonary:      Effort: Pulmonary effort is normal.      Breath sounds: Normal breath sounds.   Abdominal:      General: Bowel sounds are " normal. There is no distension or abdominal bruit.      Palpations: Abdomen is soft. There is no mass.      Tenderness: There is no abdominal tenderness.   Musculoskeletal:         General: Normal range of motion.      Cervical back: Neck supple.   Lymphadenopathy:      Cervical: No cervical adenopathy.   Skin:     General: Skin is warm and dry.   Neurological:      Mental Status: She is alert and oriented to person, place, and time.      Cranial Nerves: No cranial nerve deficit.      Sensory: No sensory deficit.   Psychiatric:         Mood and Affect: Mood normal.         Behavior: Behavior normal.         Thought Content: Thought content normal.         Judgment: Judgment normal.            Diagnostic Data:  US Carotid Bilateral    Result Date: 1/3/2025  Narrative: History: Carotid occlusive disease      Impression: Impression: 1. There is 50 to 69% stenosis of the right internal carotid artery. 2. There is 50 to 69% stenosis of the left internal carotid artery. 3. Antegrade flow is demonstrated in bilateral vertebral arteries. 4. There is heavy plaque burden at both bifurcations. Further imaging with a CTA of the neck may be warranted.  Comments: Bilateral carotid vertebral arterial duplex scan was performed.  Grayscale imaging shows intimal thickening and calcified elements at the carotid bifurcation. The right internal carotid artery peak systolic velocity is 169.9 cm/sec. The end-diastolic velocity is 34.2 cm/sec. The right ICA/CCA ratio is approximately 1.7. These findings correlate with 50 to 69% stenosis of the right internal carotid artery.  Grayscale imaging shows intimal thickening and calcified elements at the carotid bifurcation. The left internal carotid artery peak systolic velocity is 150.6 cm/sec. The end-diastolic velocity is 7.8 cm/sec. The left ICA/CCA ratio is approximately 1.2. These findings correlate with 50 to 69% stenosis of the left internal carotid artery.  Antegrade flow is demonstrated  in bilateral vertebral arteries. There is greater than 50% stenosis in the left common carotid artery and the left external carotid artery.  This report was signed and finalized on 1/3/2025 4:42 PM by Dr. Babar Oviedo MD.           Patient Active Problem List   Diagnosis    Tremor    Benign essential hypertension    Type 2 diabetes mellitus with other circulatory complications    Hypothyroidism    Dizziness    Multiple falls    Bruit of left carotid artery    Right knee pain    Status post right knee replacement    BMI 33.0-33.9,adult    Bilateral carotid artery disease    Restrictive lung disease    A-fib    Presence of Watchman left atrial appendage closure device    Malignant neoplasm of female breast    Status post bilateral mastectomy    Class 1 obesity due to excess calories with serious comorbidity and body mass index (BMI) of 30.0 to 30.9 in adult    After cataract not obscuring vision    Nonproliferative diabetic retinopathy    Pseudophakia    Puckering of macula, left eye    Vitreous degeneration    Rhabdomyolysis    Mitral valve stenosis    Mixed hyperlipidemia    Stage 3a chronic kidney disease    Diabetic renal disease         ICD-10-CM ICD-9-CM   1. Bilateral carotid artery stenosis  I65.23 433.10     433.30   2. Essential hypertension  I10 401.9   3. Type 2 diabetes mellitus with other circulatory complications  E11.59 250.70   4. Overweight (BMI 25.0-29.9)  E66.3 278.02           Plan: After thoroughly evaluating Dianne Pritchard, I believe the best course of action is to proceed with right transcarotid artery revascularization.  A formal shared decision making interaction with the patient was had during this discussion.  The shared decision making interaction included discussion of all treatment options including carotid endarterectomy, carotid artery stenting which does include TCAR, and optimal medical therapy.  Explanation of the risks/benefits for each option specific to the patient's clinical  situation was also performed.  Integration of clinical guidelines which include patient comorbidities and concomitant treatments were discussed.  Lastly, the discussion and incorporation of the patient's personal preferences and priorities in choosing the treatment plan were also discussed.  Risks of transcarotid artery revascularization include, but are not limited to, bleeding, infection, vessel damage, nerve damage, MI, stroke, and death.  The patient understands these risks and would like to proceed with the procedure. NIHSS=0.  I did review her testing and she has greater than 70% right carotid stenosis.  No intervention needed for left carotid as there is diminutive flow all the way into the skull base not a surgical candidate due to previous radiation.  I would like her to continue her Crestor 5 mg daily in addition to her other medications.  She has stopped taking her aspirin so I have instructed her to restart this as well as sent in a prescription for Plavix to begin 1 week prior to surgery.  She will continue aspirin, Plavix, and Crestor without interruption prior to surgery.  We have contacted ДМИТРИЙ Marr and no further cardiac testing needed prior to surgery.   I did discuss vascular risk factors as they pertain to the progression of vascular disease including controlling her hypertention and diabetes.  Her blood pressure is stable today in office.  Her hemoglobin A1C is uncontrolled with the most recent hemoglobin at 7.9%.  Body mass index is 28.15 kg/m².       This was all discussed in full with complete understanding.    Thank you for allowing me to participate in the care of your patient.  Please do not hesitate with any questions or concerns.  I will keep you aware of any further encounters with Dianne Pritchard.        Sincerely yours,         Babar Oviedo, Chun Hendricks MD

## 2025-01-14 NOTE — TELEPHONE ENCOUNTER
Called patient to let her know that I spoke with ДМИТРИЙ Marr with cardiology and she should be taking her aspirin 81 mg daily with that watchman in place.  I left a voicemail on her cell phone to begin aspirin back today, we stated that she could start a week prior to surgery, however Esperanza stated that she needs to be on at least one antiplatelet.

## 2025-01-15 ENCOUNTER — TELEPHONE (OUTPATIENT)
Dept: VASCULAR SURGERY | Facility: CLINIC | Age: 80
End: 2025-01-15
Payer: MEDICARE

## 2025-01-15 ENCOUNTER — PREP FOR SURGERY (OUTPATIENT)
Dept: OTHER | Facility: HOSPITAL | Age: 80
End: 2025-01-15
Payer: MEDICARE

## 2025-01-15 DIAGNOSIS — I65.23 BILATERAL CAROTID ARTERY STENOSIS: Primary | ICD-10-CM

## 2025-01-15 DIAGNOSIS — Z01.818 PREOP TESTING: ICD-10-CM

## 2025-01-15 DIAGNOSIS — Z51.81 ENCOUNTER FOR MONITORING ANTIPLATELET THERAPY: ICD-10-CM

## 2025-01-15 DIAGNOSIS — Z79.02 ENCOUNTER FOR MONITORING ANTIPLATELET THERAPY: ICD-10-CM

## 2025-01-15 RX ORDER — CLINDAMYCIN PHOSPHATE 900 MG/50ML
900 INJECTION, SOLUTION INTRAVENOUS ONCE
OUTPATIENT
Start: 2025-01-15 | End: 2025-01-15

## 2025-01-15 NOTE — TELEPHONE ENCOUNTER
Spoke with patient this morning, she did not get my message left on her voicemail I explained to her that it was important for her to start her aspirin back so that her watchman did not clot off her Esperanza ДМИТРИЙ Valles.  And she said she would start it right back today.  I reminded her to make sure that she started the Plavix 1 week prior to surgery, and she said she would remember to start that 1 week before surgery.

## 2025-01-30 RX ORDER — LOSARTAN POTASSIUM 25 MG/1
25 TABLET ORAL DAILY
Qty: 90 TABLET | Refills: 3 | Status: SHIPPED | OUTPATIENT
Start: 2025-01-30

## 2025-01-30 RX ORDER — METOPROLOL SUCCINATE 25 MG/1
25 TABLET, EXTENDED RELEASE ORAL DAILY
Qty: 90 TABLET | Refills: 3 | Status: SHIPPED | OUTPATIENT
Start: 2025-01-30

## 2025-02-12 RX ORDER — DILTIAZEM HYDROCHLORIDE 180 MG/1
360 CAPSULE, COATED, EXTENDED RELEASE ORAL DAILY
Qty: 180 CAPSULE | Refills: 3 | Status: SHIPPED | OUTPATIENT
Start: 2025-02-12

## 2025-02-14 NOTE — TELEPHONE ENCOUNTER
"HISTORY OF PRESENT ILLNESS:    Bushra Kahn is a 37 y.o. female, ,  Patient's last menstrual period was 2024.  for a routine exam complaining of amenorrhea and positive home UPT. Having frequent nausea and vomiting. Has chronic back pain with nerve issues. Was taking gabapentin daily but has weaned off. Having hip, back, and pelvic pain. Denies vaginal bleeding and cramping. Reports intense anxiety and depression. Feels lost and sad at home. Was excited to go part time at work and having more time with her kids when she found out she is pregnant. She knows she will love her baby but she was excited that her life was about to get easier. She is grieving the idea of the life she was about to have. Seeing a counselor through her Jehovah's witness. Denies SI/HI. Having chest tightness and short of breath. Has a breakdown"each day. Denies chest pain or LOC. This is her third pregnancy. Same partner. Children are healthy. Had preeclampsia with both pregnancies.  Denies history GDM. 2 prior C/S. CHTN-was taking amlodipine 2.5 mg. Stopped in January. Trying to take PNV. Denies family history genetic disorder or congenital defects. Nonsmoker. No cats. Nurse at [employer].  This is the extent of the patient's complaints at this time.     Past Medical History:   Diagnosis Date    Acid reflux     Allergy     Chronic hypertension with superimposed pre-eclampsia, post partum 10/21/2022    On chart review patient with essential HTN outside of pregnancy at initial PNC visit     Fatty liver     Pre-eclampsia, severe, postpartum condition 2021    Severe hypertension affecting pregnancy in third trimester 2021    Severe pre-eclampsia in third trimester 2021       Past Surgical History:   Procedure Laterality Date    ADENOIDECTOMY  age 14    BREAST BIOPSY Right     benign     SECTION N/A 2021    Procedure:  SECTION;  Surgeon: Denita Xiong MD;  Location: [facility] L&D;  Service: " DONE   OB/GYN;  Laterality: N/A;     SECTION N/A 10/10/2022    Procedure:  SECTION;  Surgeon: Denita Xiong MD;  Location: [facility] L&D;  Service: OB/GYN;  Laterality: N/A;     SECTION  21, 10/10/22    CHOLECYSTECTOMY      Laparoscopic    ESOPHAGOGASTRODUODENOSCOPY N/A 2018    Procedure: ESOPHAGOGASTRODUODENOSCOPY (EGD);  Surgeon: Aurelio James MD;  Location: [address] FLR);  Service: Endoscopy;  Laterality: N/A;    ESOPHAGOGASTRODUODENOSCOPY N/A 2023    Procedure: EGD (ESOPHAGOGASTRODUODENOSCOPY);  Surgeon: Uziel Duran MD;  Location: [facility] ENDOSCOPY;  Service: Endoscopy;  Laterality: N/A;  8.18  inst sent via [facility]   9.25 pre call attempted; [facility]    ESOPHAGOGASTRODUODENOSCOPY N/A 2024    Procedure: EGD (ESOPHAGOGASTRODUODENOSCOPY);  Surgeon: Uziel Duran MD;  Location: [facility] ENDOSCOPY;  Service: Endoscopy;  Laterality: N/A;  10/5 ref by Dr. Duran, instr. to [place]-  - pre call complete. DBM   Left VM. SG    REDUCTION OF BOTH BREASTS Bilateral 2024    Procedure: MAMMOPLASTY, REDUCTION, BILATERAL  ///bilateral with free nipple graft;  Surgeon: Luis Antonio Meek MD;  Location: [facility] OR;  Service: Plastics;  Laterality: Bilateral;  1822 grams removed from right breast, 1612 grams removed from left breast    TOTAL REDUCTION MAMMOPLASTY Bilateral 2024       MEDICATIONS AND ALLERGIES:      Current Outpatient Medications:     famotidine (PEPCID) 20 MG tablet, Take 20 mg by mouth 2 (two) times daily., Disp: , Rfl:     PNV no.1/iron,carb/docus/folic (PREN VIT COMB.1-IRON CB-FA-DSS ORAL), Take by mouth., Disp: , Rfl:     amLODIPine (NORVASC) 2.5 MG tablet, Take 1 tablet (2.5 mg total) by mouth once daily. (Patient not taking: Reported on 2025), Disp: 90 tablet, Rfl: 3    aspirin (ECOTRIN) 81 MG EC tablet, Take 1 tablet (81 mg total) by mouth once daily. (Patient not taking: Reported on 2025), Disp: , Rfl:     gabapentin  (NEURONTIN) 300 MG capsule, Take 1 capsule (300 mg total) by mouth 2 (two) times daily. (Patient not taking: Reported on 2/14/2025), Disp: 60 capsule, Rfl: 11    pantoprazole (PROTONIX) 40 MG tablet, Take 1 tablet (40 mg total) by mouth 2 (two) times daily. (Patient not taking: Reported on 2/14/2025), Disp: 180 tablet, Rfl: 1    sertraline (ZOLOFT) 25 MG tablet, Take 25 mg (one tablet) by mouth once daily - can increase to 50mg (two tablets) by mouth once daily in 1-2 weeks, Disp: 30 tablet, Rfl: 6    Review of patient's allergies indicates:   Allergen Reactions    Benzyl salicylate Rash     acne    Cefdinir Other (See Comments)     Nausea/vomiting    Nifedipine Other (See Comments)     headache    Sudafed [pseudoephedrine hcl] Other (See Comments)     Emotional lability       Family History   Problem Relation Name Age of Onset    Lupus Mother      Kidney disease Mother      Hypertension Father Silvano Olmos Jr     Sleep apnea Father Silvano Olmos Jr     Breast cancer Paternal Aunt  50    Breast cancer Paternal Aunt      Cataracts Paternal Grandmother      Colon cancer Paternal Grandmother      Cataracts Paternal Grandfather      Cancer Paternal Grandfather          Lung cancer- smoker    Amblyopia Neg Hx      Blindness Neg Hx      Glaucoma Neg Hx      Macular degeneration Neg Hx      Retinal detachment Neg Hx      Strabismus Neg Hx      Stroke Neg Hx      Ovarian cancer Neg Hx      Diabetes Neg Hx      Melanoma Neg Hx      Psoriasis Neg Hx      Asthma Neg Hx      Allergies Neg Hx      Allergic rhinitis Neg Hx      Angioedema Neg Hx      Eczema Neg Hx      Immunodeficiency Neg Hx      Rhinitis Neg Hx      Urticaria Neg Hx      Atopy Neg Hx         Social History     Socioeconomic History    Marital status:    Tobacco Use    Smoking status: Former     Current packs/day: 0.00     Average packs/day: 0.1 packs/day for 13.0 years (1.3 ttl pk-yrs)     Types: Cigarettes     Start date: 8/15/2005     Quit date:  8/15/2018     Years since quittin.5     Passive exposure: Never    Smokeless tobacco: Never   Substance and Sexual Activity    Alcohol use: Not Currently     Comment: has not had alcohol in 3 yrs    Drug use: No    Sexual activity: Yes     Partners: Male     Birth control/protection: None     Comment:  2016, together since : Huang     Social Drivers of Health     Financial Resource Strain: Patient Declined (2024)    Overall Financial Resource Strain (CARDIA)     Difficulty of Paying Living Expenses: Patient declined   Food Insecurity: No Food Insecurity (2024)    Hunger Vital Sign     Worried About Running Out of Food in the Last Year: Never true     Ran Out of Food in the Last Year: Never true   Transportation Needs: Patient Declined (2024)    PRAPARE - Transportation     Lack of Transportation (Medical): Patient declined     Lack of Transportation (Non-Medical): Patient declined   Physical Activity: Inactive (2024)    Exercise Vital Sign     Days of Exercise per Week: 0 days     Minutes of Exercise per Session: 0 min   Stress: Stress Concern Present (2024)    [employer] [place] [employer] - Occupational Stress Questionnaire     Feeling of Stress : To some extent   Housing Stability: Unknown (2024)    Housing Stability Vital Sign     Unable to Pay for Housing in the Last Year: No       COMPREHENSIVE GYN HISTORY:  PAP History: Denies abnormal Paps.  Infection History: Denies STDs. Denies PID.  Benign History: Denies uterine fibroids. Denies ovarian cysts. Denies endometriosis. Denies other conditions.  Cancer History: Denies cervical cancer. Denies uterine cancer or hyperplasia. Denies ovarian cancer. Denies vulvar cancer or pre-cancer. Denies vaginal cancer or pre-cancer. Denies breast cancer. Denies colon cancer.  Sexual Activity History: Reports currently being sexually active  Menstrual History: None.  Contraception: None    ROS:  GENERAL: No weight  "changes. No swelling. +fatigue. No fever.  CARDIOVASCULAR: No chest pain. +shortness of breath. No leg cramps.   NEUROLOGICAL: No headaches. No vision changes.  BREASTS: No pain. No lumps. No discharge.  ABDOMEN: No pain. +nausea. +vomiting. No diarrhea. No constipation.  REPRODUCTIVE: No abnormal bleeding. No pelvic pain  VULVA: No pain. No lesions. No itching.  VAGINA: No relaxation. No itching. No odor. No discharge. No lesions.  URINARY: No incontinence. No nocturia. No frequency. No dysuria.    /81 (BP Location: Left arm, Patient Position: Sitting)   Ht 5' 4" (1.626 m)   Wt 97 kg (213 lb 13.5 oz)   LMP 2024   BMI 36.71 kg/m²     PE:  Physical Exam:   Constitutional: She appears well-developed and well-nourished.    HENT:   Head: Normocephalic.       Pulmonary/Chest: Effort normal.                  Musculoskeletal: Normal range of motion.       Neurological: She is alert.     Psychiatric: Her speech is normal and behavior is normal. Memory, judgment and thought content normal. Her mood appears anxious. She expresses no homicidal and no suicidal ideation. She expresses no suicidal plans and no homicidal plans. She has a tearful affect.     Pelvic exam declined    Indication   ========   Indication: Estimation of Gestational Age   Indication: Advanced Maternal Age (AMA), Multigravida     History   ======   General History   Height 163 cm   Height (ft) 5 ft   Height (in) 4 in   Previous Outcomes    3   Para 2   Risk Factors   History risk factors: Advanced maternal age   History risk factors: Hx    Details: x2     Maternal Assessment   =================   Height 163 cm   Height (ft) 5 ft   Height (in) 4 in     Method   ======   Transabdominal and transvaginal ultrasound examination, [place] S8. View: Good view     Pregnancy   =========   Basilio pregnancy. Number of embryos: 1     Dating   ======   Ultrasound examination on: 2025   GA by U/S based upon: CRL   GA by U/S 9 w + 0 " d   TED by U/S: 2025   Assigned: based on ultrasound (CRL), selected on 2025   Assigned GA 9 w + 0 d   Assigned TED: 2025     Assessment   ==========   Gestational sac: visualized   Location: intrauterine   Yolk sac: visualized   Amniotic sac: visualized   Embryo: visualized   CRL 23.7 mm 9w 0d [place]   Cardiac activity: present    bpm   Other: Subchorionic hemorrhage 12 x 12 x 8mm     Maternal Structures   ===============   Uterus / Cervix   Uterus: Normal   Cervix: Visualized   Approach: Transvaginal   Ovaries / Tubes / Adnexa   Rt ovary: Normal   Rt ovary D1 27 mm   Rt ovary D2 21 mm   Rt ovary D3 20 mm   Rt ovary Vol 5.8 cmï¿½   Lt ovary: Normal   Lt ovary D1 40 mm   Lt ovary D2 23 mm   Lt ovary D3 27 mm   Lt ovary Vol 13.0 cmï¿½   Lt ovarian corpus luteum: visualized   Cul de Sac / Bladder / Kidneys / Other   Free fluid: No free fluid visualized     Impression   =========   A griffin IUP with cardiac activity is identified. TED is assigned based on the CRL from today?s study. If other clinical data, such as IVF conception dating or prior   ultrasound assignment of TED differs from the TED assigned today, please contact the [facility] department so that this report can be revised to reflect the correct TED.   An area of hemorrhage was seen in the subchorionic region of the placenta.   The maternal adnexae are normal in appearance.   The amount of free fluid seen in the pelvis is within normal physiologic range.       PROCEDURES:  UPT Positive  Genprobe sent on urine  Pap utd      DIAGNOSIS:  Gyn exam  IUP with stated LMP of Patient's last menstrual period was 2024.    PLAN:Routine prenatal care  Mental health resources provided, consider referral to  behavioral health    MEDICATIONS PRESCRIBED:  PNV  ASA 81 mg at 12 weeks  Zoloft (unsure if she wants to start)  Mag-ox for restless legs    LABS AND TESTS ORDERED:  New Ob Labs  HgbA1c  Baseline CMP & PC ratio    1st TRIMESTER  COUNSELING: Discussed all, booklet provided  Common complaints of pregnancy  HIV and other routine prenatal tests including  genetic screening  Risk factors identified by prenatal history  Anticipated course of prenatal care  Nutrition and weight gain counseling  Toxoplasmosis precautions (Cats/Raw Meat)  Sexual activity and exercise  Environmental/Work hazards  Travel  Tobacco (Ask, Advise, Assess, Assist, and Arrange), as well as alcohol and drug use  Use of any medications (Including supplements, Vitamins, Herbs, or OTC Drugs)  Indications for Ultrasound  Domestic violence  Seat belt use  Childbirth classes/Hospital facilities     TERATOLOGY COUNSELING: Discussed options for genetic and carrier screening. Pt will let us know her desires. Declines genetic screening.     FOLLOW-UP for a New Ob Visit in    4  weeks with Dr. Xiong  -discussed to call clinic or L&D/ER if after hours for pain/bleeding    50 minutes of total time spent on the encounter, which includes face to face time and non-face to face time preparing to see the patient (eg, review of tests), Obtaining and/or reviewing separately obtained history, Documenting clinical information in the electronic or other health record, Independently interpreting results (not separately reported) and communicating results to the patient/family/caregiver, or Care coordination (not separately reported).

## 2025-02-22 NOTE — H&P
2/22/2025           Chun Woods MD   [address] [place] [id number]     Dianne Pritchard  1945          Chief Complaint   Patient presents with    Follow-up       2 week follow up. Last seen 1/2/25. Patient denies any stroke type symptoms.          Dear Chun Woods MD         HPI  I had the pleasure of seeing your patient Dianne Pritchard in the office today.  As you recall, Dianne Pritchard is a 79 y.o.  female who we are following for asymptomatic carotid occlusive disease.  She is here today for 2-week follow-up with testing.  She has previously undergone radiation to her left neck which caused narrowing of her left carotid system.  She denies any strokelike symptoms.  She is maintained on Crestor.  She did have noninvasive testing performed today, which I did review in office.      Past Medical History:   Diagnosis Date    Acute pain of right knee 05/19/2017    Arrhythmia     Atrial fibrillation, currently in sinus rhythm     Breast cancer     right    Cancer     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)    Carotid artery occlusion     Diabetes mellitus, type II     Dizziness     Drug therapy     Essential hypertension     GERD (gastroesophageal reflux disease)     GI bleed requiring more than 4 units of blood in 24 hours, ICU, or surgery     History of chemotherapy     History of lymphoma     Hx of radiation therapy     Hypomagnesemia     Hyponatremia 01/14/2025    Hypothyroidism     On amiodarone therapy      Past Surgical History:   Procedure Laterality Date    ATRIAL APPENDAGE EXCLUSION LEFT WITH TRANSESOPHAGEAL ECHOCARDIOGRAM Left 11/27/2018    Procedure: Atrial Appendage Occlusion;  Surgeon: Mick Orantes MD;  Location:  [facility];  Service: Cardiology    ATRIAL APPENDAGE EXCLUSION LEFT WITH TRANSESOPHAGEAL ECHOCARDIOGRAM Left 01/22/2019    Procedure: Atrial Appendage Occlusion;  Surgeon: Mick Orantes MD;  Location:  [facility];  Service: Cardiology    BREAST BIOPSY       "MASTECTOMY      MASTECTOMY  03/10/2020    OTHER SURGICAL HISTORY      Mediport insertion X 2    OTHER SURGICAL HISTORY      remote lymphoma treatment    REPLACEMENT TOTAL KNEE Right 2017    REPLACEMENT TOTAL KNEE Right     TUBAL ABDOMINAL LIGATION       Social History     Socioeconomic History    Marital status:    Tobacco Use    Smoking status: Former     Current packs/day: 0.00     Types: Cigarettes     Start date:      Quit date:      Years since quittin.1    Smokeless tobacco: Never   Vaping Use    Vaping status: Never Used   Substance and Sexual Activity    Alcohol use: No    Drug use: No    Sexual activity: Defer     Family History   Problem Relation Age of Onset    Heart disease Mother     Cancer Father     Cancer Sister     No Known Problems Brother     No Known Problems Brother     Heart disease Sister     Heart attack Sister     No Known Problems Sister     No Known Problems Daughter     No Known Problems Son     No Known Problems Maternal Grandmother     No Known Problems Paternal Grandmother     No Known Problems Maternal Aunt     Breast cancer Paternal Aunt     BRCA 1/2 Neg Hx     Colon cancer Neg Hx     Endometrial cancer Neg Hx     Ovarian cancer Neg Hx      Allergies   Allergen Reactions    Amoxil [Amoxicillin] Hives    Penicillins Hives    Biaxin [Clarithromycin] Other (See Comments)     NOT SURE OF REACTION, \"SORE MOUTH OF DIARRHEA\"    Lortab [Hydrocodone-Acetaminophen] GI Intolerance     Current Outpatient Medications   Medication Instructions    aspirin 81 mg, Oral, Daily    clopidogrel (PLAVIX) 75 mg, Oral, Daily    dilTIAZem CD (CARDIZEM CD) 360 mg, Oral, Daily    Insulin Pen Needle (PEN NEEDLES) 31G X 5 MM misc 1 each, Not Applicable, 2 Times Daily    letrozole (FEMARA) 2.5 mg, Daily    levothyroxine (SYNTHROID, LEVOTHROID) 137 mcg, Oral, Daily    losartan (COZAAR) 25 mg, Oral, Daily    metoprolol succinate XL (TOPROL-XL) 25 mg, Oral, Daily    Misc. Devices (VIRAGE " "CUSTOM BREAST PROSTHES) misc To be used with Mastectomy Bra-    Multiple Vitamins-Minerals (CENTRUM SILVER PO) 1 tablet, Daily    rosuvastatin (CRESTOR) 5 mg, Oral, Daily    TOUJEO SOLOSTAR 300 UNIT/ML solution pen-injector injection 84 Units.        Review of Systems   Constitutional: Negative.    HENT: Negative.     Eyes: Negative.    Respiratory: Negative.     Cardiovascular: Negative.    Gastrointestinal: Negative.    Endocrine: Negative.    Genitourinary: Negative.    Musculoskeletal: Negative.    Skin: Negative.    Allergic/Immunologic: Negative.    Neurological: Negative.    Hematological: Negative.    Psychiatric/Behavioral: Negative.     All other systems reviewed and are negative.         /64   Pulse 76   Ht 162.6 cm (64\")   Wt 74.4 kg (164 lb)   SpO2 98%   BMI 28.15 kg/m²         Physical Exam  Vitals and nursing note reviewed.   Constitutional:       Appearance: She is well-developed and overweight.   HENT:      Head: Normocephalic and atraumatic.   Eyes:      General: No scleral icterus.     Pupils: Pupils are equal, round, and reactive to light.   Neck:      Thyroid: No thyromegaly.      Vascular: No carotid bruit or JVD.   Cardiovascular:      Rate and Rhythm: Normal rate and regular rhythm.      Heart sounds: Normal heart sounds.   Pulmonary:      Effort: Pulmonary effort is normal.      Breath sounds: Normal breath sounds.   Abdominal:      General: Bowel sounds are normal. There is no distension or abdominal bruit.      Palpations: Abdomen is soft. There is no mass.      Tenderness: There is no abdominal tenderness.   Musculoskeletal:         General: Normal range of motion.      Cervical back: Neck supple.   Lymphadenopathy:      Cervical: No cervical adenopathy.   Skin:     General: Skin is warm and dry.   Neurological:      Mental Status: She is alert and oriented to person, place, and time.      Cranial Nerves: No cranial nerve deficit.      Sensory: No sensory deficit. "   Psychiatric:         Mood and Affect: Mood normal.         Behavior: Behavior normal.         Thought Content: Thought content normal.         Judgment: Judgment normal.              Diagnostic Data:  US Carotid Bilateral     Result Date: 1/3/2025  Narrative: History: Carotid occlusive disease       Impression: Impression: 1. There is 50 to 69% stenosis of the right internal carotid artery. 2. There is 50 to 69% stenosis of the left internal carotid artery. 3. Antegrade flow is demonstrated in bilateral vertebral arteries. 4. There is heavy plaque burden at both bifurcations. Further imaging with a CTA of the neck may be warranted.  Comments: Bilateral carotid vertebral arterial duplex scan was performed.  Grayscale imaging shows intimal thickening and calcified elements at the carotid bifurcation. The right internal carotid artery peak systolic velocity is 169.9 cm/sec. The end-diastolic velocity is 34.2 cm/sec. The right ICA/CCA ratio is approximately 1.7. These findings correlate with 50 to 69% stenosis of the right internal carotid artery.  Grayscale imaging shows intimal thickening and calcified elements at the carotid bifurcation. The left internal carotid artery peak systolic velocity is 150.6 cm/sec. The end-diastolic velocity is 7.8 cm/sec. The left ICA/CCA ratio is approximately 1.2. These findings correlate with 50 to 69% stenosis of the left internal carotid artery.  Antegrade flow is demonstrated in bilateral vertebral arteries. There is greater than 50% stenosis in the left common carotid artery and the left external carotid artery.  This report was signed and finalized on 1/3/2025 4:42 PM by Dr. Babar Oviedo MD.           Problem List       Patient Active Problem List   Diagnosis    Tremor    Benign essential hypertension    Type 2 diabetes mellitus with other circulatory complications    Hypothyroidism    Dizziness    Multiple falls    Bruit of left carotid artery    Right knee pain    Status  post right knee replacement    BMI 33.0-33.9,adult    Bilateral carotid artery disease    Restrictive lung disease    A-fib    Presence of Watchman left atrial appendage closure device    Malignant neoplasm of female breast    Status post bilateral mastectomy    Class 1 obesity due to excess calories with serious comorbidity and body mass index (BMI) of 30.0 to 30.9 in adult    After cataract not obscuring vision    Nonproliferative diabetic retinopathy    Pseudophakia    Puckering of macula, left eye    Vitreous degeneration    Rhabdomyolysis    Mitral valve stenosis    Mixed hyperlipidemia    Stage 3a chronic kidney disease    Diabetic renal disease            Visit Diagnosis       ICD-10-CM ICD-9-CM   1. Bilateral carotid artery stenosis  I65.23 433.10       433.30   2. Essential hypertension  I10 401.9   3. Type 2 diabetes mellitus with other circulatory complications  E11.59 250.70   4. Overweight (BMI 25.0-29.9)  E66.3 278.02                  Plan: After thoroughly evaluating Dianne Pritchard, I believe the best course of action is to proceed with right transcarotid artery revascularization.  A formal shared decision making interaction with the patient was had during this discussion.  The shared decision making interaction included discussion of all treatment options including carotid endarterectomy, carotid artery stenting which does include TCAR, and optimal medical therapy.  Explanation of the risks/benefits for each option specific to the patient's clinical situation was also performed.  Integration of clinical guidelines which include patient comorbidities and concomitant treatments were discussed.  Lastly, the discussion and incorporation of the patient's personal preferences and priorities in choosing the treatment plan were also discussed.  Risks of transcarotid artery revascularization include, but are not limited to, bleeding, infection, vessel damage, nerve damage, MI, stroke, and death.  The patient  understands these risks and would like to proceed with the procedure. NIHSS=0.  I did review her testing and she has greater than 70% right carotid stenosis.  No intervention needed for left carotid as there is diminutive flow all the way into the skull base not a surgical candidate due to previous radiation.  I would like her to continue her Crestor 5 mg daily in addition to her other medications.  She has stopped taking her aspirin so I have instructed her to restart this as well as sent in a prescription for Plavix to begin 1 week prior to surgery.  She will continue aspirin, Plavix, and Crestor without interruption prior to surgery.  We have contacted ДМИТРИЙ Marr and no further cardiac testing needed prior to surgery.   I did discuss vascular risk factors as they pertain to the progression of vascular disease including controlling her hypertention and diabetes.  Her blood pressure is stable today in office.  Her hemoglobin A1C is uncontrolled with the most recent hemoglobin at 7.9%.  Body mass index is 28.15 kg/m².        This was all discussed in full with complete understanding.     Thank you for allowing me to participate in the care of your patient.  Please do not hesitate with any questions or concerns.  I will keep you aware of any further encounters with Dianne Pritchard.           Sincerely yours,           DO Peggy Holbrook Timothy, MD

## 2025-02-24 ENCOUNTER — TELEPHONE (OUTPATIENT)
Dept: HEMATOLOGY | Age: 80
End: 2025-02-24

## 2025-02-24 NOTE — TELEPHONE ENCOUNTER
Called patient today for their upcoming appointment on 02/28/2025 and patient needed to be rescheduled. Patient's name and number was taken and given to  staff ( sent to  via teams    )  so that the patient could be rescheduled to a better date & time for the patient.

## 2025-02-25 ENCOUNTER — HOSPITAL ENCOUNTER (OUTPATIENT)
Dept: GENERAL RADIOLOGY | Facility: HOSPITAL | Age: 80
Discharge: HOME | End: 2025-02-25
Payer: MEDICARE

## 2025-02-25 ENCOUNTER — TELEPHONE (OUTPATIENT)
Dept: VASCULAR SURGERY | Facility: CLINIC | Age: 80
End: 2025-02-25
Payer: MEDICARE

## 2025-02-25 ENCOUNTER — PRE-ADMISSION TESTING (OUTPATIENT)
Dept: PREADMISSION TESTING | Facility: HOSPITAL | Age: 80
DRG: 036 | End: 2025-02-25
Payer: MEDICARE

## 2025-02-25 VITALS
DIASTOLIC BLOOD PRESSURE: 44 MMHG | HEIGHT: 64 IN | WEIGHT: 168.21 LBS | OXYGEN SATURATION: 95 % | RESPIRATION RATE: 18 BRPM | HEART RATE: 64 BPM | SYSTOLIC BLOOD PRESSURE: 160 MMHG | BODY MASS INDEX: 28.72 KG/M2

## 2025-02-25 DIAGNOSIS — I65.23 BILATERAL CAROTID ARTERY STENOSIS: ICD-10-CM

## 2025-02-25 DIAGNOSIS — Z79.02 ENCOUNTER FOR MONITORING ANTIPLATELET THERAPY: ICD-10-CM

## 2025-02-25 DIAGNOSIS — Z51.81 ENCOUNTER FOR MONITORING ANTIPLATELET THERAPY: ICD-10-CM

## 2025-02-25 DIAGNOSIS — Z01.818 PREOP TESTING: ICD-10-CM

## 2025-02-25 LAB
ABO GROUP BLD: NORMAL
ANION GAP SERPL CALCULATED.3IONS-SCNC: 11 MMOL/L (ref 5–15)
APTT PPP: 28.3 SECONDS (ref 24.5–36)
BASOPHILS # BLD AUTO: 0.03 10*3/MM3 (ref 0–0.2)
BASOPHILS NFR BLD AUTO: 0.4 % (ref 0–1.5)
BLD GP AB SCN SERPL QL: NEGATIVE
BUN SERPL-MCNC: 17 MG/DL (ref 8–23)
BUN/CREAT SERPL: 13.2 (ref 7–25)
CALCIUM SPEC-SCNC: 9.5 MG/DL (ref 8.6–10.5)
CHLORIDE SERPL-SCNC: 96 MMOL/L (ref 98–107)
CO2 SERPL-SCNC: 27 MMOL/L (ref 22–29)
CREAT SERPL-MCNC: 1.29 MG/DL (ref 0.57–1)
DEPRECATED RDW RBC AUTO: 40.6 FL (ref 37–54)
EGFRCR SERPLBLD CKD-EPI 2021: 42.3 ML/MIN/1.73
EOSINOPHIL # BLD AUTO: 0.06 10*3/MM3 (ref 0–0.4)
EOSINOPHIL NFR BLD AUTO: 0.7 % (ref 0.3–6.2)
ERYTHROCYTE [DISTWIDTH] IN BLOOD BY AUTOMATED COUNT: 13 % (ref 12.3–15.4)
GLUCOSE SERPL-MCNC: 313 MG/DL (ref 65–99)
HCT VFR BLD AUTO: 39.5 % (ref 34–46.6)
HGB BLD-MCNC: 13.4 G/DL (ref 12–15.9)
IMM GRANULOCYTES # BLD AUTO: 0.04 10*3/MM3 (ref 0–0.05)
IMM GRANULOCYTES NFR BLD AUTO: 0.5 % (ref 0–0.5)
INR PPP: 1.11 (ref 0.91–1.09)
LYMPHOCYTES # BLD AUTO: 0.7 10*3/MM3 (ref 0.7–3.1)
LYMPHOCYTES NFR BLD AUTO: 8.4 % (ref 19.6–45.3)
MCH RBC QN AUTO: 29.5 PG (ref 26.6–33)
MCHC RBC AUTO-ENTMCNC: 33.9 G/DL (ref 31.5–35.7)
MCV RBC AUTO: 86.8 FL (ref 79–97)
MONOCYTES # BLD AUTO: 0.41 10*3/MM3 (ref 0.1–0.9)
MONOCYTES NFR BLD AUTO: 4.9 % (ref 5–12)
NEUTROPHILS NFR BLD AUTO: 7.11 10*3/MM3 (ref 1.7–7)
NEUTROPHILS NFR BLD AUTO: 85.1 % (ref 42.7–76)
PLATELET # BLD AUTO: 119 10*3/MM3 (ref 140–450)
PMV BLD AUTO: 11.5 FL (ref 6–12)
POTASSIUM SERPL-SCNC: 4.6 MMOL/L (ref 3.5–5.2)
PROTHROMBIN TIME: 14.9 SECONDS (ref 11.8–14.8)
QT INTERVAL: 430 MS
QTC INTERVAL: 450 MS
RBC # BLD AUTO: 4.55 10*6/MM3 (ref 3.77–5.28)
RH BLD: POSITIVE
SODIUM SERPL-SCNC: 134 MMOL/L (ref 136–145)
T&S EXPIRATION DATE: NORMAL
WBC NRBC COR # BLD AUTO: 8.35 10*3/MM3 (ref 3.4–10.8)

## 2025-02-25 PROCEDURE — 80048 BASIC METABOLIC PNL TOTAL CA: CPT

## 2025-02-25 PROCEDURE — 85610 PROTHROMBIN TIME: CPT

## 2025-02-25 PROCEDURE — 93010 ELECTROCARDIOGRAM REPORT: CPT | Performed by: INTERNAL MEDICINE

## 2025-02-25 PROCEDURE — 71046 X-RAY EXAM CHEST 2 VIEWS: CPT

## 2025-02-25 PROCEDURE — 36415 COLL VENOUS BLD VENIPUNCTURE: CPT

## 2025-02-25 PROCEDURE — 85730 THROMBOPLASTIN TIME PARTIAL: CPT

## 2025-02-25 PROCEDURE — 86850 RBC ANTIBODY SCREEN: CPT | Performed by: NURSE PRACTITIONER

## 2025-02-25 PROCEDURE — 86901 BLOOD TYPING SEROLOGIC RH(D): CPT | Performed by: NURSE PRACTITIONER

## 2025-02-25 PROCEDURE — 85025 COMPLETE CBC W/AUTO DIFF WBC: CPT

## 2025-02-25 PROCEDURE — 93005 ELECTROCARDIOGRAM TRACING: CPT

## 2025-02-25 PROCEDURE — 86900 BLOOD TYPING SEROLOGIC ABO: CPT | Performed by: NURSE PRACTITIONER

## 2025-02-25 NOTE — TELEPHONE ENCOUNTER
Spoke with patient  Your surgery is scheduled for 02/26/2025, you need to be at the hospital at 6:00 am.  Nothing to eat or drink after midnight the night before your procedure. Patient  will continue to take She has stopped taking her aspirin so I have instructed her to restart this as well as sent in a prescription for Plavix to begin 1 week (02/19/2025) prior to surgery. She will continue aspirin, Plavix, and Crestor without interruption prior to surgery.  Patient stopped by the office stating she had stop Aspirin due to nosebleeds. Patient states she has not taken Aspirin since 02/19/2025. I spoke with Dr. Oviedo he states patient should try saline solution for her nose and to take 325mg of Asprin today and follow instructed for medications tomorrow given by pre-op. Patient verbalized understanding.

## 2025-02-25 NOTE — DISCHARGE INSTRUCTIONS
Preparing for Surgery  Follow these instructions before the procedure:  Several days or weeks before your procedure  Medication(s) you need to stop   _______ days/week prior to surgery: ***      Ask your health care provider about:  Changing or stopping your regular medicines. This is especially important if you are taking diabetes medicines or blood thinners.  Taking medicines such as aspirin and ibuprofen. These medicines can thin your blood. Do not take these medicines unless your health care provider tells you to take them.  Taking over-the-counter medicines, vitamins, herbs, and supplements.    Contact your surgeon if you:  Develop a fever of more than 100.4°F (38°C) or other feelings of illness during the 48 hours before your surgery.  Have symptoms that get worse.  Have questions or concerns about your surgery.  If you are going home the same day of your surgery you will need to arrange for a responsible adult, age 18 years old or older, to drive you home from the hospital and stay with you for 24 hours. Verification of the  will be made prior to any procedure requiring sedation. You may not go home in a taxi or any form of public transportation by yourself.     Day before your procedure  Medication(s) you need to stop the day before your surgery: LOSARTAN    24 hours before your procedure DO NOT drink alcoholic beverages or smoke.  You may be asked to shower with a germ-killing soap.  Day of your procedure   You may take the following medication(s) the morning of surgery with a sip of water: METOPROLOL      8 hours before your scheduled arrival time, STOP all food, any dairy products, and full liquids. This includes hard candy, chewing gum or mints. This is extremely important to prevent serious complications.     Up to 2 hours before your scheduled arrival time, you may have clear liquids no cream, powder, or pulp of any kind. Safe options are water, black coffee, plain tea, soda, Gatorade/Powerade,  clear broth, apple juice.    2 hours before your scheduled arrival time, STOP drinking clear liquids.    You may need to take another shower with a germ-killing soap before you leave home in the morning. Do not use perfumes, colognes, or body lotions.  Wear comfortable loose-fitting clothing.  Remove all jewelry including body piercing and rings, dark colored nail polish, and make up prior to arrival at the hospital. Leave all valuables at home.   Bring your hearing aids if you rely on them.  Do not wear contact lenses. If you wear eyeglasses remember to bring a case to store them in while you are in surgery.  Do not use denture adhesives since you will be asked to remove them during your surgery.    You do not need to bring your home medications into the hospital.   Bring your sleep apnea device with you on the day of your surgery (if this applies to you).  If you have an Inspire implant for sleep apnea, please bring the remote with you on the day of surgery.  If you wear portable oxygen, bring it with you.   If you are staying overnight, you may bring a bag of items you may need such as slippers, robe and a change of clothes for your discharge. You may want to leave these items in the car until you are ready for them since your family will take your belongings when you leave the pre-operative area.  Arrive at the hospital as scheduled by the office. You will be asked to arrive 2 hours prior to your surgery time in order to prepare for your procedure.  When you arrive at the hospital  Go to the registration desk located at the main entrance of the hospital.  After registration is completed, you will be given a beeper and a sticker sheet. Take the stickers to Outpatient Surgery and place in the tray at the end of the desk to notify the staff that you have arrived and registered.   Return to the lobby to wait. You are not always called back according to the time of arrival but rather the time your doctor will be  ready.  When your beeper lights up and vibrates proceed through the double doors, under the stairs, and a member of the Outpatient Surgery staff will escort you to your preoperative room.

## 2025-02-26 ENCOUNTER — APPOINTMENT (OUTPATIENT)
Dept: INTERVENTIONAL RADIOLOGY/VASCULAR | Facility: HOSPITAL | Age: 80
End: 2025-02-26
Payer: MEDICARE

## 2025-02-26 ENCOUNTER — ANESTHESIA EVENT (OUTPATIENT)
Dept: PERIOP | Facility: HOSPITAL | Age: 80
End: 2025-02-26
Payer: MEDICARE

## 2025-02-26 ENCOUNTER — ANESTHESIA (OUTPATIENT)
Dept: PERIOP | Facility: HOSPITAL | Age: 80
End: 2025-02-26
Payer: MEDICARE

## 2025-02-26 ENCOUNTER — HOSPITAL ENCOUNTER (INPATIENT)
Facility: HOSPITAL | Age: 80
LOS: 1 days | Discharge: HOME | End: 2025-02-27
Attending: SURGERY | Admitting: SURGERY
Payer: MEDICARE

## 2025-02-26 DIAGNOSIS — Z01.818 PREOP TESTING: ICD-10-CM

## 2025-02-26 DIAGNOSIS — I65.23 BILATERAL CAROTID ARTERY STENOSIS: ICD-10-CM

## 2025-02-26 LAB
ACT BLD: 273 SECONDS (ref 82–152)
GLUCOSE BLDC GLUCOMTR-MCNC: 294 MG/DL (ref 70–130)
GLUCOSE BLDC GLUCOMTR-MCNC: 304 MG/DL (ref 70–130)
GLUCOSE BLDC GLUCOMTR-MCNC: 304 MG/DL (ref 70–130)
GLUCOSE BLDC GLUCOMTR-MCNC: 307 MG/DL (ref 70–130)
GLUCOSE BLDC GLUCOMTR-MCNC: 311 MG/DL (ref 70–130)

## 2025-02-26 PROCEDURE — 76000 FLUOROSCOPY <1 HR PHYS/QHP: CPT

## 2025-02-26 PROCEDURE — 37215 TRANSCATH STENT CCA W/EPS: CPT | Performed by: SURGERY

## 2025-02-26 PROCEDURE — C1725 CATH, TRANSLUMIN NON-LASER: HCPCS | Performed by: SURGERY

## 2025-02-26 PROCEDURE — 25010000002 PHENYLEPHRINE 10 MG/ML SOLUTION 1 ML VIAL: Performed by: NURSE ANESTHETIST, CERTIFIED REGISTERED

## 2025-02-26 PROCEDURE — 25010000002 PROPOFOL 10 MG/ML EMULSION: Performed by: NURSE ANESTHETIST, CERTIFIED REGISTERED

## 2025-02-26 PROCEDURE — 25010000002 BUPIVACAINE 0.5 % SOLUTION: Performed by: SURGERY

## 2025-02-26 PROCEDURE — 25010000002 GLYCOPYRROLATE 0.4 MG/2ML SOLUTION: Performed by: NURSE ANESTHETIST, CERTIFIED REGISTERED

## 2025-02-26 PROCEDURE — 82948 REAGENT STRIP/BLOOD GLUCOSE: CPT

## 2025-02-26 PROCEDURE — 25010000002 CLINDAMYCIN 900 MG/50ML SOLUTION: Performed by: SURGERY

## 2025-02-26 PROCEDURE — 85347 COAGULATION TIME ACTIVATED: CPT

## 2025-02-26 PROCEDURE — 25010000002 ONDANSETRON PER 1 MG: Performed by: NURSE ANESTHETIST, CERTIFIED REGISTERED

## 2025-02-26 PROCEDURE — 25010000002 CLINDAMYCIN 900 MG/50ML SOLUTION: Performed by: NURSE PRACTITIONER

## 2025-02-26 PROCEDURE — 25510000002 IOVERSOL 74 % SOLUTION: Performed by: SURGERY

## 2025-02-26 PROCEDURE — 25010000002 FENTANYL CITRATE (PF) 100 MCG/2ML SOLUTION: Performed by: NURSE ANESTHETIST, CERTIFIED REGISTERED

## 2025-02-26 PROCEDURE — C1894 INTRO/SHEATH, NON-LASER: HCPCS | Performed by: SURGERY

## 2025-02-26 PROCEDURE — C1876 STENT, NON-COA/NON-COV W/DEL: HCPCS | Performed by: SURGERY

## 2025-02-26 PROCEDURE — 037K3DZ DILATION OF RIGHT INTERNAL CAROTID ARTERY WITH INTRALUMINAL DEVICE, PERCUTANEOUS APPROACH: ICD-10-PCS | Performed by: SURGERY

## 2025-02-26 PROCEDURE — 25010000002 HEPARIN (PORCINE) PER 1000 UNITS: Performed by: SURGERY

## 2025-02-26 PROCEDURE — 25810000003 LACTATED RINGERS PER 1000 ML: Performed by: SURGERY

## 2025-02-26 PROCEDURE — 25010000002 VASOPRESSIN 20 UNIT/ML SOLUTION: Performed by: NURSE ANESTHETIST, CERTIFIED REGISTERED

## 2025-02-26 PROCEDURE — C1769 GUIDE WIRE: HCPCS | Performed by: SURGERY

## 2025-02-26 PROCEDURE — 25010000002 PROTAMINE SULFATE PER 10 MG: Performed by: NURSE ANESTHETIST, CERTIFIED REGISTERED

## 2025-02-26 PROCEDURE — 25010000002 HEPARIN (PORCINE) PER 1000 UNITS: Performed by: NURSE ANESTHETIST, CERTIFIED REGISTERED

## 2025-02-26 PROCEDURE — 63710000001 INSULIN GLARGINE PER 5 UNITS: Performed by: SURGERY

## 2025-02-26 PROCEDURE — 25010000002 LIDOCAINE PF 2% 2 % SOLUTION: Performed by: NURSE ANESTHETIST, CERTIFIED REGISTERED

## 2025-02-26 PROCEDURE — 25810000003 SODIUM CHLORIDE 0.9 % SOLUTION 250 ML FLEX CONT: Performed by: NURSE ANESTHETIST, CERTIFIED REGISTERED

## 2025-02-26 PROCEDURE — X2AH336 CEREBRAL EMBOLIC FILTRATION, EXTRACORPOREAL FLOW REVERSAL CIRCUIT FROM RIGHT COMMON CAROTID ARTERY, PERCUTANEOUS APPROACH, NEW TECHNOLOGY GROUP 6: ICD-10-PCS | Performed by: SURGERY

## 2025-02-26 PROCEDURE — 25010000002 SUGAMMADEX 200 MG/2ML SOLUTION: Performed by: NURSE ANESTHETIST, CERTIFIED REGISTERED

## 2025-02-26 DEVICE — STNT TRANSCAROTID ENROUTE TAPR W/DS 6F 10TO8X40MM: Type: IMPLANTABLE DEVICE | Site: CAROTID | Status: FUNCTIONAL

## 2025-02-26 RX ORDER — ASPIRIN 81 MG/1
81 TABLET, CHEWABLE ORAL DAILY
Status: DISCONTINUED | OUTPATIENT
Start: 2025-02-26 | End: 2025-02-27 | Stop reason: HOSPADM

## 2025-02-26 RX ORDER — MORPHINE SULFATE 2 MG/ML
2 INJECTION, SOLUTION INTRAMUSCULAR; INTRAVENOUS
Status: DISCONTINUED | OUTPATIENT
Start: 2025-02-26 | End: 2025-02-27 | Stop reason: HOSPADM

## 2025-02-26 RX ORDER — HEPARIN SODIUM 1000 [USP'U]/ML
INJECTION, SOLUTION INTRAVENOUS; SUBCUTANEOUS AS NEEDED
Status: DISCONTINUED | OUTPATIENT
Start: 2025-02-26 | End: 2025-02-26 | Stop reason: SURG

## 2025-02-26 RX ORDER — ACETAMINOPHEN 160 MG/5ML
650 SOLUTION ORAL EVERY 8 HOURS
Status: DISCONTINUED | OUTPATIENT
Start: 2025-02-26 | End: 2025-02-27 | Stop reason: HOSPADM

## 2025-02-26 RX ORDER — CLINDAMYCIN PHOSPHATE 900 MG/50ML
900 INJECTION, SOLUTION INTRAVENOUS EVERY 8 HOURS
Status: COMPLETED | OUTPATIENT
Start: 2025-02-26 | End: 2025-02-26

## 2025-02-26 RX ORDER — LEVOTHYROXINE SODIUM 137 UG/1
137 TABLET ORAL
Status: DISCONTINUED | OUTPATIENT
Start: 2025-02-27 | End: 2025-02-27 | Stop reason: HOSPADM

## 2025-02-26 RX ORDER — ROSUVASTATIN CALCIUM 10 MG/1
5 TABLET, COATED ORAL DAILY
Status: DISCONTINUED | OUTPATIENT
Start: 2025-02-26 | End: 2025-02-27 | Stop reason: HOSPADM

## 2025-02-26 RX ORDER — ROCURONIUM BROMIDE 10 MG/ML
INJECTION, SOLUTION INTRAVENOUS AS NEEDED
Status: DISCONTINUED | OUTPATIENT
Start: 2025-02-26 | End: 2025-02-26 | Stop reason: SURG

## 2025-02-26 RX ORDER — ONDANSETRON 2 MG/ML
INJECTION INTRAMUSCULAR; INTRAVENOUS AS NEEDED
Status: DISCONTINUED | OUTPATIENT
Start: 2025-02-26 | End: 2025-02-26 | Stop reason: SURG

## 2025-02-26 RX ORDER — ACETAMINOPHEN 650 MG/1
650 SUPPOSITORY RECTAL EVERY 8 HOURS
Status: DISCONTINUED | OUTPATIENT
Start: 2025-02-26 | End: 2025-02-27 | Stop reason: HOSPADM

## 2025-02-26 RX ORDER — ONDANSETRON 2 MG/ML
4 INJECTION INTRAMUSCULAR; INTRAVENOUS
Status: DISCONTINUED | OUTPATIENT
Start: 2025-02-26 | End: 2025-02-26 | Stop reason: HOSPADM

## 2025-02-26 RX ORDER — FENTANYL CITRATE 50 UG/ML
INJECTION, SOLUTION INTRAMUSCULAR; INTRAVENOUS AS NEEDED
Status: DISCONTINUED | OUTPATIENT
Start: 2025-02-26 | End: 2025-02-26 | Stop reason: SURG

## 2025-02-26 RX ORDER — LIDOCAINE HYDROCHLORIDE 40 MG/ML
SOLUTION TOPICAL AS NEEDED
Status: DISCONTINUED | OUTPATIENT
Start: 2025-02-26 | End: 2025-02-26 | Stop reason: SURG

## 2025-02-26 RX ORDER — PHENYLEPHRINE HCL IN 0.9% NACL 50MG/250ML
.5-3 PLASTIC BAG, INJECTION (ML) INTRAVENOUS
Status: DISCONTINUED | OUTPATIENT
Start: 2025-02-26 | End: 2025-02-26 | Stop reason: HOSPADM

## 2025-02-26 RX ORDER — ONDANSETRON 2 MG/ML
4 INJECTION INTRAMUSCULAR; INTRAVENOUS EVERY 6 HOURS PRN
Status: DISCONTINUED | OUTPATIENT
Start: 2025-02-26 | End: 2025-02-27 | Stop reason: HOSPADM

## 2025-02-26 RX ORDER — HYDROMORPHONE HYDROCHLORIDE 1 MG/ML
0.5 INJECTION, SOLUTION INTRAMUSCULAR; INTRAVENOUS; SUBCUTANEOUS
Status: DISCONTINUED | OUTPATIENT
Start: 2025-02-26 | End: 2025-02-26 | Stop reason: HOSPADM

## 2025-02-26 RX ORDER — LIDOCAINE HYDROCHLORIDE 10 MG/ML
0.5 INJECTION, SOLUTION EPIDURAL; INFILTRATION; INTRACAUDAL; PERINEURAL ONCE AS NEEDED
Status: DISCONTINUED | OUTPATIENT
Start: 2025-02-26 | End: 2025-02-26 | Stop reason: HOSPADM

## 2025-02-26 RX ORDER — HYDROCODONE BITARTRATE AND ACETAMINOPHEN 5; 325 MG/1; MG/1
1 TABLET ORAL EVERY 6 HOURS PRN
Status: DISCONTINUED | OUTPATIENT
Start: 2025-02-26 | End: 2025-02-27 | Stop reason: HOSPADM

## 2025-02-26 RX ORDER — LIDOCAINE HYDROCHLORIDE 20 MG/ML
INJECTION, SOLUTION EPIDURAL; INFILTRATION; INTRACAUDAL; PERINEURAL AS NEEDED
Status: DISCONTINUED | OUTPATIENT
Start: 2025-02-26 | End: 2025-02-26 | Stop reason: SURG

## 2025-02-26 RX ORDER — SODIUM CHLORIDE 0.9 % (FLUSH) 0.9 %
10 SYRINGE (ML) INJECTION AS NEEDED
Status: DISCONTINUED | OUTPATIENT
Start: 2025-02-26 | End: 2025-02-26 | Stop reason: HOSPADM

## 2025-02-26 RX ORDER — OXYCODONE AND ACETAMINOPHEN 10; 325 MG/1; MG/1
1 TABLET ORAL EVERY 4 HOURS PRN
Status: DISCONTINUED | OUTPATIENT
Start: 2025-02-26 | End: 2025-02-26 | Stop reason: HOSPADM

## 2025-02-26 RX ORDER — SODIUM CHLORIDE 0.9 % (FLUSH) 0.9 %
3 SYRINGE (ML) INJECTION AS NEEDED
Status: DISCONTINUED | OUTPATIENT
Start: 2025-02-26 | End: 2025-02-26 | Stop reason: HOSPADM

## 2025-02-26 RX ORDER — CLOPIDOGREL BISULFATE 75 MG/1
75 TABLET ORAL DAILY
Status: DISCONTINUED | OUTPATIENT
Start: 2025-02-26 | End: 2025-02-27 | Stop reason: HOSPADM

## 2025-02-26 RX ORDER — PSEUDOEPHEDRINE HCL 30 MG/1
60 TABLET, FILM COATED ORAL EVERY 4 HOURS PRN
Status: DISCONTINUED | OUTPATIENT
Start: 2025-02-26 | End: 2025-02-27 | Stop reason: HOSPADM

## 2025-02-26 RX ORDER — BUPIVACAINE HYDROCHLORIDE 5 MG/ML
INJECTION, SOLUTION PERINEURAL AS NEEDED
Status: DISCONTINUED | OUTPATIENT
Start: 2025-02-26 | End: 2025-02-26 | Stop reason: HOSPADM

## 2025-02-26 RX ORDER — CLINDAMYCIN PHOSPHATE 900 MG/50ML
900 INJECTION, SOLUTION INTRAVENOUS ONCE
Status: COMPLETED | OUTPATIENT
Start: 2025-02-26 | End: 2025-02-26

## 2025-02-26 RX ORDER — LABETALOL HYDROCHLORIDE 5 MG/ML
5 INJECTION, SOLUTION INTRAVENOUS
Status: DISCONTINUED | OUTPATIENT
Start: 2025-02-26 | End: 2025-02-26 | Stop reason: HOSPADM

## 2025-02-26 RX ORDER — METOPROLOL SUCCINATE 25 MG/1
25 TABLET, EXTENDED RELEASE ORAL DAILY
Status: DISCONTINUED | OUTPATIENT
Start: 2025-02-27 | End: 2025-02-27 | Stop reason: HOSPADM

## 2025-02-26 RX ORDER — SODIUM CHLORIDE, SODIUM LACTATE, POTASSIUM CHLORIDE, CALCIUM CHLORIDE 600; 310; 30; 20 MG/100ML; MG/100ML; MG/100ML; MG/100ML
1000 INJECTION, SOLUTION INTRAVENOUS CONTINUOUS
Status: DISCONTINUED | OUTPATIENT
Start: 2025-02-26 | End: 2025-02-27

## 2025-02-26 RX ORDER — LETROZOLE 2.5 MG/1
2.5 TABLET, FILM COATED ORAL DAILY
Status: DISCONTINUED | OUTPATIENT
Start: 2025-02-26 | End: 2025-02-27 | Stop reason: HOSPADM

## 2025-02-26 RX ORDER — LOSARTAN POTASSIUM 25 MG/1
25 TABLET ORAL DAILY
Status: DISCONTINUED | OUTPATIENT
Start: 2025-02-26 | End: 2025-02-27 | Stop reason: HOSPADM

## 2025-02-26 RX ORDER — FLUMAZENIL 0.1 MG/ML
0.2 INJECTION INTRAVENOUS AS NEEDED
Status: DISCONTINUED | OUTPATIENT
Start: 2025-02-26 | End: 2025-02-26 | Stop reason: HOSPADM

## 2025-02-26 RX ORDER — NALOXONE HCL 0.4 MG/ML
0.4 VIAL (ML) INJECTION
Status: DISCONTINUED | OUTPATIENT
Start: 2025-02-26 | End: 2025-02-27 | Stop reason: HOSPADM

## 2025-02-26 RX ORDER — LABETALOL HYDROCHLORIDE 5 MG/ML
10 INJECTION, SOLUTION INTRAVENOUS
Status: DISCONTINUED | OUTPATIENT
Start: 2025-02-26 | End: 2025-02-27 | Stop reason: HOSPADM

## 2025-02-26 RX ORDER — PROPOFOL 10 MG/ML
VIAL (ML) INTRAVENOUS AS NEEDED
Status: DISCONTINUED | OUTPATIENT
Start: 2025-02-26 | End: 2025-02-26 | Stop reason: SURG

## 2025-02-26 RX ORDER — ONDANSETRON 4 MG/1
4 TABLET, ORALLY DISINTEGRATING ORAL EVERY 6 HOURS PRN
Status: DISCONTINUED | OUTPATIENT
Start: 2025-02-26 | End: 2025-02-27 | Stop reason: HOSPADM

## 2025-02-26 RX ORDER — HYDRALAZINE HYDROCHLORIDE 20 MG/ML
10 INJECTION INTRAMUSCULAR; INTRAVENOUS EVERY 4 HOURS PRN
Status: DISCONTINUED | OUTPATIENT
Start: 2025-02-26 | End: 2025-02-27 | Stop reason: HOSPADM

## 2025-02-26 RX ORDER — PROTAMINE SULFATE 10 MG/ML
INJECTION, SOLUTION INTRAVENOUS AS NEEDED
Status: DISCONTINUED | OUTPATIENT
Start: 2025-02-26 | End: 2025-02-26 | Stop reason: SURG

## 2025-02-26 RX ORDER — FENTANYL CITRATE 50 UG/ML
50 INJECTION, SOLUTION INTRAMUSCULAR; INTRAVENOUS
Status: DISCONTINUED | OUTPATIENT
Start: 2025-02-26 | End: 2025-02-26 | Stop reason: HOSPADM

## 2025-02-26 RX ORDER — ACETAMINOPHEN 325 MG/1
650 TABLET ORAL EVERY 8 HOURS
Status: DISCONTINUED | OUTPATIENT
Start: 2025-02-26 | End: 2025-02-27 | Stop reason: HOSPADM

## 2025-02-26 RX ORDER — GLYCOPYRROLATE 0.2 MG/ML
INJECTION INTRAMUSCULAR; INTRAVENOUS AS NEEDED
Status: DISCONTINUED | OUTPATIENT
Start: 2025-02-26 | End: 2025-02-26 | Stop reason: SURG

## 2025-02-26 RX ORDER — NALOXONE HCL 0.4 MG/ML
0.04 VIAL (ML) INJECTION AS NEEDED
Status: DISCONTINUED | OUTPATIENT
Start: 2025-02-26 | End: 2025-02-26 | Stop reason: HOSPADM

## 2025-02-26 RX ORDER — MULTIPLE VITAMINS W/ MINERALS TAB 9MG-400MCG
1 TAB ORAL DAILY
Status: DISCONTINUED | OUTPATIENT
Start: 2025-02-26 | End: 2025-02-27 | Stop reason: HOSPADM

## 2025-02-26 RX ADMIN — ROSUVASTATIN 5 MG: 10 TABLET, FILM COATED ORAL at 13:25

## 2025-02-26 RX ADMIN — Medication 1 TABLET: at 13:24

## 2025-02-26 RX ADMIN — CLOPIDOGREL BISULFATE 75 MG: 75 TABLET ORAL at 13:25

## 2025-02-26 RX ADMIN — SODIUM CHLORIDE, POTASSIUM CHLORIDE, SODIUM LACTATE AND CALCIUM CHLORIDE 1000 ML: 600; 310; 30; 20 INJECTION, SOLUTION INTRAVENOUS at 07:08

## 2025-02-26 RX ADMIN — PROTAMINE SULFATE 30 MG: 10 INJECTION, SOLUTION INTRAVENOUS at 10:22

## 2025-02-26 RX ADMIN — ONDANSETRON 4 MG: 2 INJECTION INTRAMUSCULAR; INTRAVENOUS at 09:38

## 2025-02-26 RX ADMIN — PHENYLEPHRINE HYDROCHLORIDE 0.5 MCG/KG/MIN: 10 INJECTION INTRAVENOUS at 09:51

## 2025-02-26 RX ADMIN — LIDOCAINE HYDROCHLORIDE 1 EACH: 40 SOLUTION TOPICAL at 09:44

## 2025-02-26 RX ADMIN — CLINDAMYCIN PHOSPHATE 900 MG: 900 INJECTION, SOLUTION INTRAVENOUS at 17:35

## 2025-02-26 RX ADMIN — ROCURONIUM BROMIDE 30 MG: 10 INJECTION, SOLUTION INTRAVENOUS at 09:44

## 2025-02-26 RX ADMIN — ACETAMINOPHEN 650 MG: 325 TABLET ORAL at 13:24

## 2025-02-26 RX ADMIN — HEPARIN SODIUM 8000 UNITS: 1000 INJECTION, SOLUTION INTRAVENOUS; SUBCUTANEOUS at 09:58

## 2025-02-26 RX ADMIN — SUGAMMADEX 200 MG: 100 INJECTION, SOLUTION INTRAVENOUS at 10:33

## 2025-02-26 RX ADMIN — CLINDAMYCIN PHOSPHATE 900 MG: 900 INJECTION, SOLUTION INTRAVENOUS at 09:44

## 2025-02-26 RX ADMIN — INSULIN GLARGINE 38 UNITS: 100 INJECTION, SOLUTION SUBCUTANEOUS at 13:24

## 2025-02-26 RX ADMIN — DILTIAZEM HYDROCHLORIDE 360 MG: 120 CAPSULE, EXTENDED RELEASE ORAL at 13:25

## 2025-02-26 RX ADMIN — LETROZOLE 2.5 MG: 2.5 TABLET ORAL at 13:25

## 2025-02-26 RX ADMIN — GLYCOPYRROLATE 0.2 MG: 0.2 INJECTION INTRAMUSCULAR; INTRAVENOUS at 09:56

## 2025-02-26 RX ADMIN — ASPIRIN 81 MG: 81 TABLET, CHEWABLE ORAL at 13:25

## 2025-02-26 RX ADMIN — LIDOCAINE HYDROCHLORIDE 100 MG: 20 INJECTION, SOLUTION EPIDURAL; INFILTRATION; INTRACAUDAL; PERINEURAL at 09:44

## 2025-02-26 RX ADMIN — PROPOFOL 110 MG: 10 INJECTION, EMULSION INTRAVENOUS at 09:44

## 2025-02-26 RX ADMIN — FENTANYL CITRATE 50 MCG: 50 INJECTION, SOLUTION INTRAMUSCULAR; INTRAVENOUS at 09:44

## 2025-02-26 RX ADMIN — FENTANYL CITRATE 50 MCG: 50 INJECTION, SOLUTION INTRAMUSCULAR; INTRAVENOUS at 09:29

## 2025-02-26 RX ADMIN — LOSARTAN POTASSIUM 25 MG: 25 TABLET, FILM COATED ORAL at 13:25

## 2025-02-26 NOTE — ANESTHESIA PROCEDURE NOTES
Arterial Line      Patient reassessed immediately prior to procedure    Patient location during procedure: pre-op  Start time: 2/26/2025 9:40 AM   Line placed for hemodynamic monitoring, ABGs/Labs/ISTAT and MD/Surgeon request.  Performed By   Anesthesiologist: Chris Byrd MD   Preanesthetic Checklist  Completed: patient identified, IV checked, site marked, risks and benefits discussed, surgical consent, monitors and equipment checked, pre-op evaluation and timeout performed  Arterial Line Prep    Sterile Tech: gloves and sterile barriers  Prep: ChloraPrep  Patient monitoring: blood pressure monitoring, continuous pulse oximetry and EKG  Arterial Line Procedure   Laterality:left  Location:  radial artery  Catheter size: 20 G   Guidance: ultrasound guided  Number of attempts: 2  Successful placement: yes   Post Assessment   Dressing Type: occlusive dressing applied and secured with tape.   Complications no  Circ/Move/Sens Assessment: normal.   Patient Tolerance: patient tolerated the procedure well with no apparent complications  Additional Notes  Right radial removed in OR

## 2025-02-26 NOTE — ANESTHESIA PROCEDURE NOTES
Arterial Line    Pre-sedation assessment completed: 2/26/2025 8:58 AM    Patient reassessed immediately prior to procedure    Patient location during procedure: pre-op  Start time: 2/26/2025 8:54 AM  Stop Time:2/26/2025 8:58 AM       Line placed for hemodynamic monitoring.  Performed By   Anesthesiologist: Chris Byrd MD   Preanesthetic Checklist  Completed: patient identified, IV checked, site marked, risks and benefits discussed, surgical consent, monitors and equipment checked, pre-op evaluation and timeout performed  Arterial Line Prep    Sterile Tech: cap, gloves and mask  Prep: ChloraPrep  Patient monitoring: blood pressure monitoring, continuous pulse oximetry and EKG  Arterial Line Procedure   Laterality:right  Location:  radial artery  Catheter size: 20 G   Guidance: ultrasound guided  PROCEDURE NOTE/ULTRASOUND INTERPRETATION.  Using ultrasound guidance the potential vascular sites for insertion of the catheter were visualized to determine the patency of the vessel to be used for vascular access.  After selecting the appropriate site for insertion, the needle was visualized under ultrasound being inserted into the radial artery, followed by ultrasound confirmation of wire and catheter placement. There were no abnormalities seen on ultrasound; an image was taken; and the patient tolerated the procedure with no complications.   Number of attempts: 1  Successful placement: yes Images: still images not obtained  Post Assessment   Dressing Type: occlusive dressing applied, secured with tape and wrist guard applied.   Complications no  Circ/Move/Sens Assessment: normal.   Patient Tolerance: patient tolerated the procedure well with no apparent complications  Additional Notes  Wire intact. Calibrated/Connections checked, line flushed.  Appropriate waveform. Clear occlusive opsite applied over catheter insertion site. Pt remained hemodynamically stable throughout procedure. Difficult placement-difficult to find  a good target on u/s. Two attempts though-bleeds back well

## 2025-02-26 NOTE — OP NOTE
Dianne Pritchard  2/26/2025     PREOPERATIVE DIAGNOSIS: Bilateral carotid artery stenosis [I65.23]  Preop testing [Z01.818]     POSTOPERATIVE DIAGNOSIS: Post-Op Diagnosis Codes:     * Bilateral carotid artery stenosis [I65.23]     * Preop testing [Z01.818]     PROCEDURE PERFORMED:   1.  Right internal carotid artery TCAR (transcarotid artery revascularization) with the ENROUTE transcarotid neuro protection and stent system     SURGEON: Babar Oviedo DO      ANESTHESIA: General    PREPARATION: Routine.    STAFF: Circulator: Frances Perez RN  Scrub Person: Lo Carl; Velasquez Schmidt; Christie Bello  Vendor Representative: Ally Jewell  Vascular Radiology Technician: Jeanette Manuel    Estimated Blood Loss: minimal    SPECIMENS: None    COMPLICATIONS: None    INDICATIONS: Dianne Pritchard is a 79 y.o. female who we are following for asymptomatic carotid occlusive disease. She is here today for 2-week follow-up with testing. She has previously undergone radiation to her left neck which caused narrowing of her left carotid system. She denies any strokelike symptoms. She is maintained on Crestor. She did have noninvasive testing performed today, which I did review in office. The indications, risks, and possible complications of the procedure were explained to the patient, who voiced understanding and wished to proceed with surgery.     PROCEDURE IN DETAIL: The patient was taken to the operating room and placed on the operating table in a supine position. After general anesthesia was obtained, the right neck and bilateral groins were prepped and draped in a sterile manner.   A 4 cm transverse incision was made between the sternal and clavicular heads of the sternocleidomastoid muscle below the omohyoid.  Following longitudinal division of the carotid sheath the jugular vein was partially dissected and retracted laterally.  Once 3 cm of common carotid artery were isolated, the vessel loop was placed around  the proximal one third of the common carotid artery under direct visualization.  A 5-0 Prolene suture was preplaced in the anterior wall the common carotid artery, in a Z stitch configuration, close to the clavicle to facilitate hemostasis upon removal of the arterial sheath at completion of the procedure.  The contralateral left common femoral vein was accessed under ultrasound guidance, using standard Seldinger and micropuncture access technique.  The venous return sheath was advanced into the common femoral vein over the advantage Glidewire.  Blood was aspirated from the flow line followed by flushing of the venous sheath with heparinized saline.  The venous sheath was secured to the patient's skin with a 2-0 silk suture to maintain optimal position in the vessel.  8000 units of intravenous heparin was given to obtain a therapeutic ACT greater than 250 seconds prior to arterial access.  A 4 Croatian micropuncture set was used, puncturing the artery with a 21-gauge needle through the preplaced Z stitch while holding gentle traction on the vessel loop to stabilize and centralize the common carotid artery within the incision.  Careful attention was paid to the change in common carotid shape when using the vessel loop to control or lift the artery.  The micropuncture wire was then advanced into the external carotid artery and the 21-gauge needle was removed.  The micropuncture sheath was advanced into the external carotid artery and the wire and dilator were removed.  Pulsatile backflow indicated correct positioning.  The J-wire was then inserted into the external carotid artery.  After micro puncture sheath removal, the transcarotid arterial sheath was advanced to the 2.5 cm marker and the J-wire and dilator were removed.    The arterial sheath position was assessed under fluoroscopy in 2 projections to ensure that the sheath tip was oriented coaxially in the common carotid artery.  The arterial sheath was sutured to  the patient in 2 separate areas.  Blood was slowly aspirated followed by flushing with heparinized saline.  Next, the flow controller was connected to the transcarotid arterial sheath, prepared by passively allowing a column of arterial blood to fill the line and connected to the venous return sheath.  The common carotid inflow was occluded proximal to the arteriotomy with a profunda clamp to achieve active flow reversal.  To confirm flow reversal, a saline bolus was delivered in the venous flow line on both high and low flow settings of the flow controller.  Angiograms were performed with slow injections of a small amount of contrast filling just past the lesion to minimize antegrade transmission of microbubbles.  Prior to lesion manipulation, heart rate and blood pressure were managed upwards to optimize flow reversal and procedural neuro protection.  The lesion was crossed with an 014 guidewire and predilatation was performed with a 6 x 35 mm Enflate balloon.  Next, primary stenting was performed with the 10-8 x 40 mm ENROUTE transcarotid stent.  Imaging was performed in both in Panamanian and JULIAN projections to ensure the stent was fully deployed.  Completion angiogram also showed the stent to be widely patent without any residual stenosis, dissection, or occlusion.  At TCAR case completion, antegrade flow was restored by releasing the clamp on the common carotid artery then closing the NPS stopcocks to the flow lines.  The transcarotid arterial sheath was removed and the preclosure suture was tied in place.  30 mg of protamine was given intravenously for reversal.  The venous return sheath from the left groin was removed and hemostasis was achieved with 10 to 15 minutes of manual compression.  The neck wound bed was irrigated with antibiotic saline and hemostasis was observed.  The 2 heads of the sternocleidomastoid were carefully sutured back together with a 3-0 Vicryl in a running fashion.  The platysma muscle was  reapproximated using a 3-0 Vicryl in a running fashion.  The skin was then reapproximated using a 4-0 Monocryl in a subcuticular fashion. Sterile dressings were applied. The patient tolerated the procedure well. Sponge and needle counts were correct. The patient was then awakened and extubated in the operating room and taken to the recovery room in good condition.    ICA stenosis: 80%  Stenosis after stent placement: 0%  Lesion length: 40 mm  Lesion calcification: Moderate  Lesion at level: C2  Neuro: Intact  ICA tortuosity: Moderate  Arch type: Type 1  Flow reversal time: 4 minutes  Karan: 0  Contrast amount: 8 mL  ACT: 273  NIH: 0    Babar Oviedo,   Date: 2/26/2025 Time: 10:41 CST    CC:Chun Woods MD

## 2025-02-26 NOTE — ANESTHESIA POSTPROCEDURE EVALUATION
Patient: Dianne Pritchard    Procedure Summary       Date: 02/26/25 Room / Location:  [facility] OR  /  [facility] HYBRID OR    Anesthesia Start: 0929 Anesthesia Stop: 1047    Procedure: RIGHT TRANSCAROTID ARTERY REVASCULARIZATION (Right: Neck) Diagnosis:       Bilateral carotid artery stenosis      Preop testing      (Bilateral carotid artery stenosis [I65.23])      (Preop testing [Z01.818])    Surgeons: Babar Oviedo DO Provider: Linh Milian CRNA    Anesthesia Type: general, [place] ASA Status: 3            Anesthesia Type: general, Sherrie    Vitals  Vitals Value Taken Time   /47 02/26/25 1148   Temp 97.7 °F (36.5 °C) 02/26/25 1145   Pulse 74 02/26/25 1149   Resp 15 02/26/25 1145   SpO2 100 % 02/26/25 1149   Vitals shown include unfiled device data.        Post Anesthesia Care and Evaluation    Patient location during evaluation: PACU  Patient participation: complete - patient participated  Level of consciousness: awake  Pain management: adequate    Airway patency: patent  Anesthetic complications: No anesthetic complications  PONV Status: none  Cardiovascular status: acceptable  Respiratory status: acceptable  Hydration status: acceptable    Comments: /44 (BP Location: Left arm, Patient Position: Lying)   Pulse 82   Temp 97.8 °F (36.6 °C) (Oral)   Resp 14   Wt 77.3 kg (170 lb 6.7 oz)   SpO2 98%   BMI 29.42 kg/m²   Patient discharged from PACU based on Jacek score. See RN notes for further details.

## 2025-02-26 NOTE — ANESTHESIA PREPROCEDURE EVALUATION
Anesthesia Evaluation     Patient summary reviewed   history of anesthetic complications:  PONV  NPO Solid Status: > 8 hours  NPO Liquid Status: < 2 hours           Airway   Mallampati: II  TM distance: >3 FB  Neck ROM: full  No difficulty expected  Dental - normal exam     Pulmonary    (-) COPD, asthma, sleep apnea, not a smoker  Cardiovascular   Exercise tolerance: good (4-7 METS)    ECG reviewed  Patient on routine beta blocker and Beta blocker given within 24 hours of surgery    (+) hypertension, valvular problems/murmurs MS, dysrhythmias Atrial Fib,  carotid artery disease  (-) pacemaker, past MI, angina, cardiac stents      Neuro/Psych  (+) dizziness/light headedness, tremors, numbness  (-) seizures, TIA, CVA  GI/Hepatic/Renal/Endo    (+) obesity, GERD, GI bleeding , renal disease- CRI, diabetes mellitus poorly controlled using insulin, thyroid problem   (-) liver disease    Musculoskeletal     Abdominal    Substance History      OB/GYN          Other      history of cancer (lymphoma, breast)                      Anesthesia Plan    ASA 3     general and Sherrie     (Multiple sequale of poorly controlled dm )  intravenous induction     Anesthetic plan, risks, benefits, and alternatives have been provided, discussed and informed consent has been obtained with: patient.       complains of pain/discomfort

## 2025-02-26 NOTE — ANESTHESIA PROCEDURE NOTES
Airway  Urgency: elective    Date/Time: 2/26/2025 9:45 AM  Airway not difficult    General Information and Staff    Patient location during procedure: OR  CRNA/CAA: Linh Milian CRNA    Indications and Patient Condition  Indications for airway management: airway protection    Preoxygenated: yes  Mask difficulty assessment: 1 - vent by mask    Final Airway Details  Final airway type: endotracheal airway      Successful airway: ETT  Cuffed: yes   Successful intubation technique: direct laryngoscopy  Facilitating devices/methods: intubating stylet  Endotracheal tube insertion site: oral  Blade: Ezio  Blade size: 3.5.  ETT size (mm): 7.0  Cormack-Lehane Classification: grade I - full view of glottis  Placement verified by: chest auscultation and capnometry   Cuff volume (mL): 6  Measured from: teeth  ETT/EBT  to teeth (cm): 20  Number of attempts at approach: 1  Assessment: lips, teeth, and gum same as pre-op and atraumatic intubation

## 2025-02-27 ENCOUNTER — READMISSION MANAGEMENT (OUTPATIENT)
Dept: CALL CENTER | Facility: HOSPITAL | Age: 80
End: 2025-02-27
Payer: MEDICARE

## 2025-02-27 VITALS
RESPIRATION RATE: 16 BRPM | OXYGEN SATURATION: 96 % | TEMPERATURE: 98 F | HEIGHT: 64 IN | DIASTOLIC BLOOD PRESSURE: 66 MMHG | WEIGHT: 170.42 LBS | BODY MASS INDEX: 29.09 KG/M2 | HEART RATE: 78 BPM | SYSTOLIC BLOOD PRESSURE: 107 MMHG

## 2025-02-27 PROBLEM — I65.29 CAROTID STENOSIS: Status: ACTIVE | Noted: 2025-02-27

## 2025-02-27 PROCEDURE — 25010000002 ONDANSETRON PER 1 MG: Performed by: SURGERY

## 2025-02-27 PROCEDURE — 99024 POSTOP FOLLOW-UP VISIT: CPT | Performed by: SURGERY

## 2025-02-27 RX ORDER — CALCIUM CARBONATE 500 MG/1
3 TABLET, CHEWABLE ORAL 3 TIMES DAILY PRN
Status: DISCONTINUED | OUTPATIENT
Start: 2025-02-27 | End: 2025-02-27 | Stop reason: HOSPADM

## 2025-02-27 RX ADMIN — ASPIRIN 81 MG: 81 TABLET, CHEWABLE ORAL at 08:13

## 2025-02-27 RX ADMIN — CLOPIDOGREL BISULFATE 75 MG: 75 TABLET ORAL at 08:13

## 2025-02-27 RX ADMIN — PSEUDOEPHEDRINE HCL 60 MG: 30 TABLET, FILM COATED ORAL at 00:59

## 2025-02-27 RX ADMIN — ROSUVASTATIN 5 MG: 10 TABLET, FILM COATED ORAL at 08:13

## 2025-02-27 RX ADMIN — DILTIAZEM HYDROCHLORIDE 360 MG: 120 CAPSULE, EXTENDED RELEASE ORAL at 08:13

## 2025-02-27 RX ADMIN — LEVOTHYROXINE SODIUM 137 MCG: 0.14 TABLET ORAL at 06:19

## 2025-02-27 RX ADMIN — METOPROLOL SUCCINATE 25 MG: 25 TABLET, EXTENDED RELEASE ORAL at 08:13

## 2025-02-27 RX ADMIN — ANTACID TABLETS 3 TABLET: 500 TABLET, CHEWABLE ORAL at 01:08

## 2025-02-27 RX ADMIN — LETROZOLE 2.5 MG: 2.5 TABLET ORAL at 08:13

## 2025-02-27 RX ADMIN — ONDANSETRON 4 MG: 2 INJECTION INTRAMUSCULAR; INTRAVENOUS at 00:59

## 2025-02-27 NOTE — PLAN OF CARE
Goal Outcome Evaluation:  Plan of Care Reviewed With: patient        Progress: improving  Outcome Evaluation: Pt resting well through the night. No c/o pain, refused scheduled tylenol. Voiding, up x sba to bathroom. 2L nc required while sleeping. Dressings to rt neck and left groin c/d/i. Neuros intact, pulses palpable. Will ambulate in rodrigues. Safety maintained.

## 2025-02-27 NOTE — PLAN OF CARE
Goal Outcome Evaluation:      Pt is A&Ox4 and afebrile. She is up with assistance. No complaints of pain. Drainage marked. She is on room air.

## 2025-02-27 NOTE — DISCHARGE SUMMARY
Date of Discharge:  2/27/2025    Discharge Diagnosis: Bilateral carotid artery stenosis [I65.23]    Presenting Problem/History of Present Illness  Bilateral carotid artery stenosis [I65.23]  Preop testing [Z01.818]  Carotid stenosis [I65.29]       Hospital Course  Patient is a 79 y.o. female presented for elective right transcarotid artery revascularization.  She did undergo right TCAR without incident.The patient was transferred to  for continued care.  Overnight, the patient has done well. Dianne Pritchard  vitals have remained stable, right neck soft without hematoma, and without neurological deficit.  Groin soft without hematoma.  Dianne Pritchard  is stable and ready for discharge.  We will see her back in 2 weeks for post operative follow up.  Her medications will stay the same.  Written and verbal instructions were given.  This all was discussed in full with complete understanding.     Procedures Performed  Procedure(s):  RIGHT TRANSCAROTID ARTERY REVASCULARIZATION       Consults:   Consults       No orders found for last 30 day(s).              Condition on Discharge:  stable    Discharge Medications     Discharge Medications        Continue These Medications        Instructions Start Date   aspirin 81 MG chewable tablet   81 mg, Oral, Daily      clopidogrel 75 MG tablet  Commonly known as: Plavix   75 mg, Oral, Daily      dilTIAZem  MG 24 hr capsule  Commonly known as: CARDIZEM CD   360 mg, Oral, Daily      letrozole 2.5 MG tablet  Commonly known as: FEMARA   2.5 mg, Daily      levothyroxine 137 MCG tablet  Commonly known as: SYNTHROID, LEVOTHROID   137 mcg, Oral, Daily      losartan 25 MG tablet  Commonly known as: COZAAR   25 mg, Oral, Daily      metoprolol succinate XL 25 MG 24 hr tablet  Commonly known as: TOPROL-XL   25 mg, Oral, Daily      multivitamin with minerals tablet tablet   1 tablet, Daily      Pen Needles 31G X 5 MM misc   1 each, Not Applicable, 2 Times Daily      rosuvastatin 5 MG  tablet  Commonly known as: Crestor   5 mg, Oral, Daily      Toujeo SoloStar 300 UNIT/ML solution pen-injector injection  Generic drug: Insulin Glargine (1 Unit Dial)   84 Units.      Virage Custom Breast Prosthes misc   To be used with Mastectomy Bra-               Discharge Diet:   Diet Instructions       Diet: Regular/House Diet; Regular (IDDSI 7); Thin (IDDSI 0)      Discharge Diet: Regular/House Diet    Texture: Regular (IDDSI 7)    Fluid Consistency: Thin (IDDSI 0)            Activity at Discharge:   Activity Instructions       Bathing Restrictions      Type of Restriction: Bathing    Bathing Restrictions: Other    Explain Bathing Restrictions: May shower tomorrow    Driving Restrictions      Type of Restriction: Driving    Driving Restrictions: No Driving (Time Limited)    Length: 1 Week    Lifting Restrictions      Type of Restriction: Lifting    Lifting Restrictions: Lifting Restriction (Indicate Limit)    Weight Limit (Pounds): 10    Length of Lifting Restriction: 1 week    Other Activity Restrictions      Type of Restriction: Other    Explain Other Restrictions: No bending, stooping, or straining.            Follow-up Appointments  Future Appointments   Date Time Provider Department Center   4/4/2025  1:30 PM Chun Woods MD [facility]   5/20/2025 10:30 AM Esperanza Valles APRN [facility]     Additional Instructions for the Follow-ups that You Need to Schedule       Discharge Follow-up with Specialty: Preet; 2 Weeks   As directed      Specialty: [facility]   Follow Up: 2 Weeks   Follow Up Details: Postop TCAR                 I did spend more than 30 minutes reviewing the chart, face to face encounter, and organizing discharge.    Babar Oviedo DO  02/27/25  08:02 CST

## 2025-02-27 NOTE — OUTREACH NOTE
Prep Survey      Flowsheet Row Responses   [facility] facility patient discharged from? [facility]   Is LACE score < 7 ? No   Eligibility [facility]   Date of Admission 02/26/25   Date of Discharge 02/27/25   Discharge Disposition Home or Self Care   Discharge diagnosis Bilateral carotid artery disease-Right transcarotid artery revascularization   Does the patient have one of the following disease processes/diagnoses(primary or secondary)? General Surgery   Does the patient have Home health ordered? No   Is there a DME ordered? No   Prep survey completed? Yes            MELCHOR HALL - Registered Nurse

## 2025-02-28 ENCOUNTER — TRANSITIONAL CARE MANAGEMENT TELEPHONE ENCOUNTER (OUTPATIENT)
Dept: CALL CENTER | Facility: HOSPITAL | Age: 80
End: 2025-02-28
Payer: MEDICARE

## 2025-02-28 NOTE — OUTREACH NOTE
Call Center TCM Note      Flowsheet Row Responses   [facility] patient discharged from? [place]   Does the patient have one of the following disease processes/diagnoses(primary or secondary)? General Surgery   TCM attempt successful? Yes   Call start time 1213   Call end time 1216   List who call center can speak with pt   Medication alerts for this patient No changes to medications.   Meds reviewed with patient/caregiver? Yes   Comments Son will call the office for hospital f/u appointment.   Does the patient have an appointment with their PCP within 7-14 days of discharge? No   Nursing Interventions Routed TCM call to PCP office   Has home health visited the patient within 72 hours of discharge? N/A   Psychosocial issues? No   Did the patient receive a copy of their discharge instructions? Yes   Nursing interventions Reviewed instructions with patient   What is the patient's perception of their health status since discharge? Improving   Is the patient /caregiver able to teach back basic post-op care? Take showers only when approved by MD-sponge bathe until then, No tub bath, swimming, or hot tub until instructed by MD   Is the patient/caregiver able to teach back signs and symptoms of incisional infection? Increased redness, swelling or pain at the incisonal site, Increased drainage or bleeding, Incisional warmth, Pus or odor from incision, Fever   Is the patient/caregiver able to teach back steps to recovery at home? Set small, achievable goals for return to baseline health, Rest and rebuild strength, gradually increase activity   Is the patient/caregiver able to teach back the hierarchy of who to call/visit for symptoms/problems? PCP, Specialist, Home health nurse, Urgent Care, ED, 911 Yes   TCM call completed? Yes   Wrap up additional comments Pt reports improving. Pt is sore in the neck area. Pt to shower tomorrow. Pt has all instructions. Family with pt.   Call end time 1216   Would this patient benefit  from a Referral to [facility] Social [facility]? No   Is the patient interested in additional calls from an ambulatory ? No            Nadira Montanez RN    2/28/2025, 12:18 EST

## 2025-03-04 LAB
QT INTERVAL: 430 MS
QTC INTERVAL: 450 MS

## 2025-03-11 ENCOUNTER — TELEPHONE (OUTPATIENT)
Dept: VASCULAR SURGERY | Facility: CLINIC | Age: 80
End: 2025-03-11
Payer: MEDICARE

## 2025-03-12 ENCOUNTER — OFFICE VISIT (OUTPATIENT)
Dept: VASCULAR SURGERY | Facility: CLINIC | Age: 80
End: 2025-03-12
Payer: MEDICARE

## 2025-03-12 VITALS
DIASTOLIC BLOOD PRESSURE: 70 MMHG | OXYGEN SATURATION: 97 % | HEART RATE: 74 BPM | WEIGHT: 166 LBS | SYSTOLIC BLOOD PRESSURE: 128 MMHG | BODY MASS INDEX: 28.34 KG/M2 | HEIGHT: 64 IN

## 2025-03-12 DIAGNOSIS — E11.59 TYPE 2 DIABETES MELLITUS WITH OTHER CIRCULATORY COMPLICATIONS: ICD-10-CM

## 2025-03-12 DIAGNOSIS — E66.3 OVERWEIGHT (BMI 25.0-29.9): ICD-10-CM

## 2025-03-12 DIAGNOSIS — I10 ESSENTIAL HYPERTENSION: ICD-10-CM

## 2025-03-12 DIAGNOSIS — I65.23 BILATERAL CAROTID ARTERY STENOSIS: Primary | ICD-10-CM

## 2025-03-12 NOTE — LETTER
March 12, 2025     Chun Woods MD  [address] [place] [id number]    Patient: Dianne Pritchard   YOB: 1945   Date of Visit: 3/12/2025     Dear Chun Woods MD:       Thank you for referring Dianne Pritchard to me for evaluation. Below are the relevant portions of my assessment and plan of care.    If you have questions, please do not hesitate to call me. I look forward to following Dianne along with you.         Sincerely,        ДМИТРИЙ Jc        CC: No Recipients    Kalina Ames APRN  03/12/25 1333  Sign when Signing Visit  3/12/2025       Chun Woods MD   [address] [place] [id number]    Dianne Pritchard  1945    Chief Complaint   Patient presents with   • Bilateral carotid artery stenosis     Incision is intact, s/p right TCAR. No stroke symptoms       Dear Chun Woods MD       HPI  I had the pleasure of seeing your patient Dianne Pritchard in the office today.  As you recall, Dianne Pritchard is a 79 y.o.  female who we are following for asymptomatic carotid occlusive disease.  S She has previously undergone radiation to her left neck which caused narrowing of her left carotid system, which is not amenable to surgery.  She was found to have significant right carotid stenosis and underwent right TCAR on 2/26/2025.  She is doing well and right neck incision has healed as well as left groin.  She reports no problems since surgery.  She is maintained on aspirin, Plavix, and Crestor.      Review of Systems   Constitutional: Negative.    HENT: Negative.     Eyes: Negative.    Respiratory: Negative.     Cardiovascular: Negative.    Gastrointestinal: Negative.    Endocrine: Negative.    Genitourinary: Negative.    Musculoskeletal: Negative.    Skin: Negative.    Allergic/Immunologic: Negative.    Neurological: Negative.    Hematological:  Bruises/bleeds easily.   Psychiatric/Behavioral: Negative.     All other systems reviewed and are negative.        /70   Pulse 74   Ht 162.6  "cm (64\")   Wt 75.3 kg (166 lb)   SpO2 97%   BMI 28.49 kg/m²       Physical Exam  Vitals and nursing note reviewed.   Constitutional:       Appearance: She is well-developed and overweight.   HENT:      Head: Normocephalic and atraumatic.   Eyes:      General: No scleral icterus.     Pupils: Pupils are equal, round, and reactive to light.   Neck:      Thyroid: No thyromegaly.      Vascular: No carotid bruit or JVD.      Comments: Right neck incision healed  Cardiovascular:      Rate and Rhythm: Normal rate and regular rhythm.      Heart sounds: Normal heart sounds.      Comments: Left groin healed  Pulmonary:      Effort: Pulmonary effort is normal.      Breath sounds: Normal breath sounds.   Abdominal:      General: Bowel sounds are normal. There is no distension or abdominal bruit.      Palpations: Abdomen is soft. There is no mass.      Tenderness: There is no abdominal tenderness.   Musculoskeletal:         General: Normal range of motion.      Cervical back: Neck supple.   Lymphadenopathy:      Cervical: No cervical adenopathy.   Skin:     General: Skin is warm and dry.   Neurological:      Mental Status: She is alert and oriented to person, place, and time.      Cranial Nerves: No cranial nerve deficit.      Sensory: No sensory deficit.   Psychiatric:         Mood and Affect: Mood normal.         Behavior: Behavior normal.         Thought Content: Thought content normal.         Judgment: Judgment normal.                  Patient Active Problem List   Diagnosis   • Tremor   • Benign essential hypertension   • Type 2 diabetes mellitus with other circulatory complications   • Hypothyroidism   • Dizziness   • Multiple falls   • Bruit of left carotid artery   • Right knee pain   • Status post right knee replacement   • BMI 33.0-33.9,adult   • Bilateral carotid artery disease   • Restrictive lung disease   • A-fib   • Presence of Watchman left atrial appendage closure device   • Malignant neoplasm of female " breast   • Status post bilateral mastectomy   • Class 1 obesity due to excess calories with serious comorbidity and body mass index (BMI) of 30.0 to 30.9 in adult   • After cataract not obscuring vision   • Nonproliferative diabetic retinopathy   • Pseudophakia   • Puckering of macula, left eye   • Vitreous degeneration   • Rhabdomyolysis   • Mitral valve stenosis   • Mixed hyperlipidemia   • Stage 3a chronic kidney disease   • Diabetic renal disease   • Preop testing   • Bilateral carotid artery stenosis   • Carotid stenosis         ICD-10-CM ICD-9-CM   1. Bilateral carotid artery stenosis  I65.23 433.10     433.30   2. Essential hypertension  I10 401.9   3. Type 2 diabetes mellitus with other circulatory complications  E11.59 250.70   4. Overweight (BMI 25.0-29.9)  E66.3 278.02       Plan: After thoroughly evaluating Dianne Pritchard, I am pleased to report she is doing well status post right TCAR.  Her right neck incision has healed as well as her left groin.  She is free to resume normal activity without restriction.  We will see her back in 6 months with repeat noninvasive testing including a carotid duplex for continued surveillance.  As discussed previously no intervention needed for left carotid stenosis as this is diminutive flow all the way into the skull base due to previous radiation.  I would like her to continue her aspirin 81 mg daily, Plavix 75 mg daily and Crestor 5 mg daily in addition to her other medications.  Due to previous history of a GI bleeding I will have her continue Plavix for 3 months as long as she is not having any signs of bleeding and then we can go to just aspirin after that time but she will call me sooner should she develop any issues.  I did discuss vascular risk factors as they pertain to the progression of vascular disease including controlling her hypertention and diabetes.  Her blood pressure is stable today in office.  Her hemoglobin A1C is uncontrolled with the most recent  hemoglobin at 7.9%.  Body mass index is 28.49 kg/m².       This was all discussed in full with complete understanding.    Thank you for allowing me to participate in the care of your patient.  Please do not hesitate with any questions or concerns.  I will keep you aware of any further encounters with Dianne Pritchard.        Sincerely yours,         Kalina Ames, Chun Arboleda MD

## 2025-03-12 NOTE — PROGRESS NOTES
"3/12/2025       Chun Woods MD   [address] [place] [id number]    Dianne Pritchard  1945    Chief Complaint   Patient presents with    Bilateral carotid artery stenosis     Incision is intact, s/p right TCAR. No stroke symptoms       Dear Chun Woods MD       HPI  I had the pleasure of seeing your patient Dianne Pritchard in the office today.  As you recall, Dianne Pritchard is a 79 y.o.  female who we are following for asymptomatic carotid occlusive disease.  S She has previously undergone radiation to her left neck which caused narrowing of her left carotid system, which is not amenable to surgery.  She was found to have significant right carotid stenosis and underwent right TCAR on 2/26/2025.  She is doing well and right neck incision has healed as well as left groin.  She reports no problems since surgery.  She is maintained on aspirin, Plavix, and Crestor.      Review of Systems   Constitutional: Negative.    HENT: Negative.     Eyes: Negative.    Respiratory: Negative.     Cardiovascular: Negative.    Gastrointestinal: Negative.    Endocrine: Negative.    Genitourinary: Negative.    Musculoskeletal: Negative.    Skin: Negative.    Allergic/Immunologic: Negative.    Neurological: Negative.    Hematological:  Bruises/bleeds easily.   Psychiatric/Behavioral: Negative.     All other systems reviewed and are negative.        /70   Pulse 74   Ht 162.6 cm (64\")   Wt 75.3 kg (166 lb)   SpO2 97%   BMI 28.49 kg/m²       Physical Exam  Vitals and nursing note reviewed.   Constitutional:       Appearance: She is well-developed and overweight.   HENT:      Head: Normocephalic and atraumatic.   Eyes:      General: No scleral icterus.     Pupils: Pupils are equal, round, and reactive to light.   Neck:      Thyroid: No thyromegaly.      Vascular: No carotid bruit or JVD.      Comments: Right neck incision healed  Cardiovascular:      Rate and Rhythm: Normal rate and regular rhythm.      Heart sounds: Normal " heart sounds.      Comments: Left groin healed  Pulmonary:      Effort: Pulmonary effort is normal.      Breath sounds: Normal breath sounds.   Abdominal:      General: Bowel sounds are normal. There is no distension or abdominal bruit.      Palpations: Abdomen is soft. There is no mass.      Tenderness: There is no abdominal tenderness.   Musculoskeletal:         General: Normal range of motion.      Cervical back: Neck supple.   Lymphadenopathy:      Cervical: No cervical adenopathy.   Skin:     General: Skin is warm and dry.   Neurological:      Mental Status: She is alert and oriented to person, place, and time.      Cranial Nerves: No cranial nerve deficit.      Sensory: No sensory deficit.   Psychiatric:         Mood and Affect: Mood normal.         Behavior: Behavior normal.         Thought Content: Thought content normal.         Judgment: Judgment normal.                  Patient Active Problem List   Diagnosis    Tremor    Benign essential hypertension    Type 2 diabetes mellitus with other circulatory complications    Hypothyroidism    Dizziness    Multiple falls    Bruit of left carotid artery    Right knee pain    Status post right knee replacement    BMI 33.0-33.9,adult    Bilateral carotid artery disease    Restrictive lung disease    A-fib    Presence of Watchman left atrial appendage closure device    Malignant neoplasm of female breast    Status post bilateral mastectomy    Class 1 obesity due to excess calories with serious comorbidity and body mass index (BMI) of 30.0 to 30.9 in adult    After cataract not obscuring vision    Nonproliferative diabetic retinopathy    Pseudophakia    Puckering of macula, left eye    Vitreous degeneration    Rhabdomyolysis    Mitral valve stenosis    Mixed hyperlipidemia    Stage 3a chronic kidney disease    Diabetic renal disease    Preop testing    Bilateral carotid artery stenosis    Carotid stenosis         ICD-10-CM ICD-9-CM   1. Bilateral carotid artery  stenosis  I65.23 433.10     433.30   2. Essential hypertension  I10 401.9   3. Type 2 diabetes mellitus with other circulatory complications  E11.59 250.70   4. Overweight (BMI 25.0-29.9)  E66.3 278.02       Plan: After thoroughly evaluating Dianne Pritchard, I am pleased to report she is doing well status post right TCAR.  Her right neck incision has healed as well as her left groin.  She is free to resume normal activity without restriction.  We will see her back in 6 months with repeat noninvasive testing including a carotid duplex for continued surveillance.  As discussed previously no intervention needed for left carotid stenosis as this is diminutive flow all the way into the skull base due to previous radiation.  I would like her to continue her aspirin 81 mg daily, Plavix 75 mg daily and Crestor 5 mg daily in addition to her other medications.  Due to previous history of a GI bleeding I will have her continue Plavix for 3 months as long as she is not having any signs of bleeding and then we can go to just aspirin after that time but she will call me sooner should she develop any issues.  I did discuss vascular risk factors as they pertain to the progression of vascular disease including controlling her hypertention and diabetes.  Her blood pressure is stable today in office.  Her hemoglobin A1C is uncontrolled with the most recent hemoglobin at 7.9%.  Body mass index is 28.49 kg/m².       This was all discussed in full with complete understanding.    Thank you for allowing me to participate in the care of your patient.  Please do not hesitate with any questions or concerns.  I will keep you aware of any further encounters with Dianne Pritchard.        Sincerely yours,         ДМИТРИЙ Jc Timothy, MD

## 2025-03-25 ENCOUNTER — TELEPHONE (OUTPATIENT)
Dept: HEMATOLOGY | Age: 80
End: 2025-03-25

## 2025-03-25 NOTE — TELEPHONE ENCOUNTER

## 2025-03-27 DIAGNOSIS — Z79.811 LONG TERM CURRENT USE OF AROMATASE INHIBITOR: ICD-10-CM

## 2025-03-27 DIAGNOSIS — C50.412 MALIGNANT NEOPLASM OF UPPER-OUTER QUADRANT OF LEFT BREAST IN FEMALE, ESTROGEN RECEPTOR POSITIVE: Primary | ICD-10-CM

## 2025-03-27 DIAGNOSIS — Z17.0 MALIGNANT NEOPLASM OF UPPER-OUTER QUADRANT OF LEFT BREAST IN FEMALE, ESTROGEN RECEPTOR POSITIVE: Primary | ICD-10-CM

## 2025-03-27 NOTE — PROGRESS NOTES
Progress Note      Pt Name: Tram Aponte  YOB: 1945  MRN: [id number]    Date of evaluation: 3/28/2025  History Obtained From:  patient, electronic medical record    CHIEF COMPLAINT:    Chief Complaint   Patient presents with    Follow-up     Malignant neoplasm of upper-outer quadrant of left breast in female, estrogen receptor positive (HCC)     Current active problems  Left Stage I (pT1c, pN0, pMx),ER+ H2N - Invasive lobular carcinoma of the breast 1/29/2020   Remote history immunoblastic lymphoma  Thrombocytopenia    HISTORY PRESENTING ILLNESS  Tram Aponte is a 79 y.o.  female with right stage II A breast carcinoma from 3/21/2012.  She was also diagnosed with a left stage I breast carcinoma 1/29/2020 and did have a left simple mastectomy.  She has continued to take her letrozole 2.5 mg daily.  She denies any new nodule close her chest wall.      She has osteoporosis on aromatase inhibitor but declines to take any other treatment other than vitamin D and calcium-she continues taking.  She denies any new bone pain.      She did have right transcarotid artery revascularization by Dr. Fowler on 2/26/2025.  She is recovering well.  She continues to have discomfort in her right arm-point of access for procedure.    She does have significant osteoarthritis, exacerbations that time but overall stable.  She continues on Cardizem  daily and metoprolol 25 daily without any exacerbation of atrial fibrillation.  She previously had Watchman procedure.  She has essential hypertension and is also on losartan 25 daily with stable BP.  She does have hypothyroidism currently on levothyroxine 125 mcg daily.      She reports that her A1c dropped from 11 down to 7 and she is now off of all diabetic treatment.    TARGET LYMPHOMA SITES:  Left supraclavicular area.   Left axilla.    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 Soriano

## 2025-03-28 ENCOUNTER — HOSPITAL ENCOUNTER (OUTPATIENT)
Dept: INFUSION THERAPY | Age: 80
Discharge: HOME | End: 2025-03-28
Payer: MEDICARE

## 2025-03-28 ENCOUNTER — OFFICE VISIT (OUTPATIENT)
Dept: HEMATOLOGY | Age: 80
End: 2025-03-28
Payer: MEDICARE

## 2025-03-28 ENCOUNTER — RESULTS FOLLOW-UP (OUTPATIENT)
Dept: HEMATOLOGY | Age: 80
End: 2025-03-28

## 2025-03-28 VITALS
BODY MASS INDEX: 27.52 KG/M2 | TEMPERATURE: 97.5 F | HEART RATE: 107 BPM | WEIGHT: 161.2 LBS | OXYGEN SATURATION: 99 % | SYSTOLIC BLOOD PRESSURE: 100 MMHG | HEIGHT: 64 IN | DIASTOLIC BLOOD PRESSURE: 62 MMHG

## 2025-03-28 DIAGNOSIS — C50.412 MALIGNANT NEOPLASM OF UPPER-OUTER QUADRANT OF LEFT BREAST IN FEMALE, ESTROGEN RECEPTOR POSITIVE: Primary | ICD-10-CM

## 2025-03-28 DIAGNOSIS — D69.6 THROMBOCYTOPENIA: ICD-10-CM

## 2025-03-28 DIAGNOSIS — Z79.811 LONG TERM CURRENT USE OF AROMATASE INHIBITOR: ICD-10-CM

## 2025-03-28 DIAGNOSIS — Z85.72 HISTORY OF LYMPHOMA: ICD-10-CM

## 2025-03-28 DIAGNOSIS — Z17.0 MALIGNANT NEOPLASM OF UPPER-OUTER QUADRANT OF LEFT BREAST IN FEMALE, ESTROGEN RECEPTOR POSITIVE: Primary | ICD-10-CM

## 2025-03-28 DIAGNOSIS — D50.0 IRON DEFICIENCY ANEMIA DUE TO CHRONIC BLOOD LOSS: ICD-10-CM

## 2025-03-28 DIAGNOSIS — C50.412 MALIGNANT NEOPLASM OF UPPER-OUTER QUADRANT OF LEFT BREAST IN FEMALE, ESTROGEN RECEPTOR POSITIVE: ICD-10-CM

## 2025-03-28 DIAGNOSIS — D72.829 LEUKOCYTOSIS, UNSPECIFIED TYPE: ICD-10-CM

## 2025-03-28 DIAGNOSIS — Z17.0 MALIGNANT NEOPLASM OF UPPER-OUTER QUADRANT OF LEFT BREAST IN FEMALE, ESTROGEN RECEPTOR POSITIVE: ICD-10-CM

## 2025-03-28 LAB
ALBUMIN SERPL-MCNC: 3.8 G/DL (ref 3.5–5.2)
ALP SERPL-CCNC: 132 U/L (ref 35–104)
ALT SERPL-CCNC: 13 U/L (ref 5–33)
ANION GAP SERPL CALCULATED.3IONS-SCNC: 11 MMOL/L (ref 7–19)
AST SERPL-CCNC: 41 U/L (ref 5–32)
BASOPHILS # BLD: 0.04 K/UL (ref 0–0.2)
BASOPHILS NFR BLD: 0.5 % (ref 0–1)
BILIRUB SERPL-MCNC: 0.6 MG/DL (ref 0–1.2)
BUN SERPL-MCNC: 18 MG/DL (ref 8–23)
CA 15-3: 67 U/ML (ref 0–25)
CALCIUM SERPL-MCNC: 10.2 MG/DL (ref 8.8–10.2)
CHLORIDE SERPL-SCNC: 94 MMOL/L (ref 98–107)
CO2 SERPL-SCNC: 28 MMOL/L (ref 22–29)
CREAT SERPL-MCNC: 1 MG/DL (ref 0.5–0.9)
EOSINOPHIL # BLD: 0.06 K/UL (ref 0–0.6)
EOSINOPHIL NFR BLD: 0.7 % (ref 0–5)
ERYTHROCYTE [DISTWIDTH] IN BLOOD BY AUTOMATED COUNT: 12.9 % (ref 11.5–14.5)
HCT VFR BLD AUTO: 40.2 % (ref 37–47)
HGB BLD-MCNC: 13.8 G/DL (ref 12–16)
LYMPHOCYTES # BLD: 0.62 K/UL (ref 1.1–4.5)
LYMPHOCYTES NFR BLD: 7.1 % (ref 20–40)
MCH RBC QN AUTO: 30.1 PG (ref 27–31)
MCHC RBC AUTO-ENTMCNC: 34.3 G/DL (ref 33–37)
MCV RBC AUTO: 87.8 FL (ref 81–99)
MONOCYTES # BLD: 0.51 K/UL (ref 0–0.9)
MONOCYTES NFR BLD: 5.8 % (ref 1–10)
NEUTROPHILS # BLD: 7.51 K/UL (ref 1.5–7.5)
NEUTS SEG NFR BLD: 85.6 % (ref 50–65)
PLATELET # BLD AUTO: 112 K/UL (ref 130–400)
PMV BLD AUTO: 11.2 FL (ref 9.4–12.3)
POTASSIUM SERPL-SCNC: 4.9 MMOL/L (ref 3.5–5.1)
PROT SERPL-MCNC: 7.6 G/DL (ref 6.4–8.3)
RBC # BLD AUTO: 4.58 M/UL (ref 4.2–5.4)
SODIUM SERPL-SCNC: 133 MMOL/L (ref 136–145)
WBC # BLD AUTO: 8.77 K/UL (ref 4.8–10.8)

## 2025-03-28 PROCEDURE — 99212 OFFICE O/P EST SF 10 MIN: CPT

## 2025-03-28 PROCEDURE — 86300 IMMUNOASSAY TUMOR CA 15-3: CPT

## 2025-03-28 PROCEDURE — 80053 COMPREHEN METABOLIC PANEL: CPT

## 2025-03-28 PROCEDURE — 85025 COMPLETE CBC W/AUTO DIFF WBC: CPT

## 2025-03-28 RX ORDER — ROSUVASTATIN CALCIUM 5 MG/1
1 TABLET, COATED ORAL
COMMUNITY

## 2025-03-28 ASSESSMENT — ENCOUNTER SYMPTOMS
NAUSEA: 0
ABDOMINAL PAIN: 0
TROUBLE SWALLOWING: 0
VOICE CHANGE: 0
BLOOD IN STOOL: 0
ABDOMINAL DISTENTION: 0
CONSTIPATION: 0
SORE THROAT: 0
COUGH: 0
PHOTOPHOBIA: 0
BACK PAIN: 0
WHEEZING: 0
VOMITING: 0
DIARRHEA: 0
SHORTNESS OF BREATH: 0
EYE ITCHING: 0
EYE DISCHARGE: 0
COLOR CHANGE: 0

## 2025-04-04 ENCOUNTER — OFFICE VISIT (OUTPATIENT)
Dept: FAMILY MEDICINE CLINIC | Facility: CLINIC | Age: 80
End: 2025-04-04
Payer: MEDICARE

## 2025-04-04 VITALS
DIASTOLIC BLOOD PRESSURE: 64 MMHG | SYSTOLIC BLOOD PRESSURE: 110 MMHG | WEIGHT: 166 LBS | OXYGEN SATURATION: 98 % | HEIGHT: 64 IN | HEART RATE: 77 BPM | BODY MASS INDEX: 28.34 KG/M2

## 2025-04-04 DIAGNOSIS — E03.9 HYPOTHYROIDISM, UNSPECIFIED TYPE: ICD-10-CM

## 2025-04-04 DIAGNOSIS — I65.23 BILATERAL CAROTID ARTERY STENOSIS: ICD-10-CM

## 2025-04-04 DIAGNOSIS — E11.59 TYPE 2 DIABETES MELLITUS WITH OTHER CIRCULATORY COMPLICATIONS: Primary | ICD-10-CM

## 2025-04-04 DIAGNOSIS — E78.2 MIXED HYPERLIPIDEMIA: ICD-10-CM

## 2025-04-04 DIAGNOSIS — N18.31 STAGE 3A CHRONIC KIDNEY DISEASE: ICD-10-CM

## 2025-04-04 LAB
EXPIRATION DATE: ABNORMAL
HBA1C MFR BLD: 10.7 % (ref 4.5–5.7)
Lab: ABNORMAL

## 2025-04-04 NOTE — PROGRESS NOTES
Subjective   The ABCs of the Annual Wellness Visit  Medicare Wellness Visit      Dianne Pritchard is a 79 y.o. patient who presents for a Medicare Wellness Visit.    The following portions of the patient's history were reviewed and   updated as appropriate: allergies, current medications, past family history, past medical history, past social history, past surgical history, and problem list.    Compared to one year ago, the patient's physical   health is worse.   Compared to one year ago, the patient's mental   health is the same.    Recent Hospitalizations:  This patient has had a [facility] admission record on file within the last 365 days.  Current Medical Providers:  Patient Care Team:  Chun Woods MD as PCP - General (Family Medicine)  Xiang Fernandes APRN as Nurse Practitioner (Pulmonary Disease)  Esperanza Valles APRN as Nurse Practitioner (Cardiology)  Monica Gardner MD as Consulting Physician (Endocrinology)    Outpatient Medications Prior to Visit   Medication Sig Dispense Refill    aspirin 81 MG chewable tablet Chew 1 tablet Daily.      clopidogrel (Plavix) 75 MG tablet Take 1 tablet by mouth Daily. 30 tablet 3    dilTIAZem CD (CARDIZEM CD) 180 MG 24 hr capsule Take 2 capsules by mouth Daily. 180 capsule 3    Insulin Pen Needle (PEN NEEDLES) 31G X 5 MM misc 1 each 2 (Two) Times a Day. 100 each 5    letrozole (FEMARA) 2.5 MG tablet Take 1 tablet by mouth Daily.      levothyroxine (SYNTHROID, LEVOTHROID) 137 MCG tablet TAKE 1 TABLET BY MOUTH DAILY 90 tablet 0    losartan (COZAAR) 25 MG tablet Take 1 tablet by mouth Daily. 90 tablet 3    metoprolol succinate XL (TOPROL-XL) 25 MG 24 hr tablet Take 1 tablet by mouth Daily. 90 tablet 3    Misc. Devices (VIRAGE CUSTOM BREAST PROSTHES) misc To be used with Mastectomy Bra- 2 each 0    Multiple Vitamins-Minerals (CENTRUM SILVER PO) Take 1 tablet by mouth Daily.      rosuvastatin (Crestor) 5 MG tablet Take 1 tablet by mouth Daily. 90 tablet  "1    TOUJEO SOLOSTAR 300 UNIT/ML solution pen-injector injection 84 Units.  1     No facility-administered medications prior to visit.     No opioid medication identified on active medication list. I have reviewed chart for other potential  high risk medication/s and harmful drug interactions in the elderly.      Aspirin is on active medication list. Aspirin use is indicated based on review of current medical condition/s. Pros and cons of this therapy have been discussed today. Benefits of this medication outweigh potential harm.  Patient has been encouraged to continue taking this medication.  .      Patient Active Problem List   Diagnosis    Tremor    Benign essential hypertension    Type 2 diabetes mellitus with other circulatory complications    Hypothyroidism    Dizziness    Multiple falls    Bruit of left carotid artery    Right knee pain    Status post right knee replacement    BMI 33.0-33.9,adult    Bilateral carotid artery disease    Restrictive lung disease    A-fib    Presence of Watchman left atrial appendage closure device    Malignant neoplasm of female breast    Status post bilateral mastectomy    Class 1 obesity due to excess calories with serious comorbidity and body mass index (BMI) of 30.0 to 30.9 in adult    After cataract not obscuring vision    Nonproliferative diabetic retinopathy    Pseudophakia    Puckering of macula, left eye    Vitreous degeneration    Rhabdomyolysis    Mitral valve stenosis    Mixed hyperlipidemia    Stage 3a chronic kidney disease    Diabetic renal disease    Preop testing    Bilateral carotid artery stenosis    Carotid stenosis     Advance Care Planning Advance Directive is not on file.  ACP discussion was held with the patient during this visit. Patient does not have an advance directive, information provided.            Objective   Vitals:    04/04/25 1338   BP: 110/64   Pulse: 77   SpO2: 98%   Weight: 75.3 kg (166 lb)   Height: 162.6 cm (64\")   PainSc: 0-No pain " "      Estimated body mass index is 28.49 kg/m² as calculated from the following:    Height as of this encounter: 162.6 cm (64\").    Weight as of this encounter: 75.3 kg (166 lb).            Gait and Balance Evaluation:  Normal      Does the patient have evidence of cognitive impairment? No  Lab Results   Component Value Date    HGBA1C 10.7 (A) 2025                                                                                                Health  Risk Assessment    Smoking Status:  Social History     Tobacco Use   Smoking Status Former    Current packs/day: 0.00    Types: Cigarettes    Start date:     Quit date:     Years since quittin.2   Smokeless Tobacco Never     Alcohol Consumption:  Social History     Substance and Sexual Activity   Alcohol Use No       Fall Risk Screen  STEADI Fall Risk Assessment was completed, and patient is at LOW risk for falls.Assessment completed on:2025    Depression Screening   Little interest or pleasure in doing things? Not at all   Feeling down, depressed, or hopeless? Not at all   PHQ-2 Total Score 0      Health Habits and Functional and Cognitive Screenin/4/2025     1:36 PM   Functional & Cognitive Status   Do you have difficulty preparing food and eating? Yes   Do you have difficulty bathing yourself, getting dressed or grooming yourself? No   Do you have difficulty using the toilet? No   Do you have difficulty moving around from place to place? No   Do you have trouble with steps or getting out of a bed or a chair? Yes   Current Diet Unhealthy Diet   Dental Exam Up to date   Eye Exam Up to date   Exercise (times per week) 3 times per week   Current Exercises Include Walking;House Cleaning   Do you need help using the phone?  No   Are you deaf or do you have serious difficulty hearing?  No   Do you need help to go to places out of walking distance? Yes   Do you need help shopping? Yes   Do you need help preparing meals?  Yes   Do you need help " with housework?  Yes   Do you need help with laundry? No   Do you need help taking your medications? No   Do you need help managing money? No   Do you ever drive or ride in a car without wearing a seat belt? No   Have you felt unusual stress, anger or loneliness in the last month? No   Who do you live with? Child   If you need help, do you have trouble finding someone available to you? No   Have you been bothered in the last four weeks by sexual problems? No   Do you have difficulty concentrating, remembering or making decisions? No           Age-appropriate Screening Schedule:  Refer to the list below for future screening recommendations based on patient's age, sex and/or medical conditions. Orders for these recommended tests are listed in the plan section. The patient has been provided with a written plan.    Health Maintenance List  Health Maintenance   Topic Date Due    Pneumococcal Vaccine 50+ (1 of 2 - PCV) Never done    TDAP/TD VACCINES (1 - Tdap) Never done    ZOSTER VACCINE (1 of 2) Never done    RSV Vaccine - Adults (1 - 1-dose 75+ series) Never done    COVID-19 Vaccine (4 - 2024-25 season) 04/03/2025    ANNUAL WELLNESS VISIT  04/04/2025    DIABETIC EYE EXAM  06/10/2025    INFLUENZA VACCINE  07/01/2025    LIPID PANEL  10/04/2025    HEMOGLOBIN A1C  10/04/2025    URINE MICROALBUMIN-CREATININE RATIO (uACR)  10/04/2025    DXA SCAN  04/12/2026    COLORECTAL CANCER SCREENING  05/02/2027    HEPATITIS C SCREENING  Completed    MAMMOGRAM  Discontinued                                                                                                                                                CMS Preventative Services Quick Reference  Risk Factors Identified During Encounter  Inactivity/Sedentary: Patient was advised to exercise at least 150 minutes a week per CDC recommendations.  Dental Screening Recommended  Vision Screening Recommended    The above risks/problems have been discussed with the patient.  Pertinent  information has been shared with the patient in the After Visit Summary.  An After Visit Summary and PPPS were made available to the patient.    Follow Up:   Next Medicare Wellness visit to be scheduled in 1 year.         Additional E&M Note during same encounter follows:  Patient has additional, significant, and separately identifiable condition(s)/problem(s) that require work above and beyond the Medicare Wellness Visit     Chief Complaint  Chief Complaint   Patient presents with    Medicare Wellness-subsequent    Diabetes    Hyperlipidemia    Hypertension        Subjective      History of Present Illness  The patient is a 79-year-old female who presents today for a Medicare wellness visit and follow-up.    She underwent a carotid artery stent placement on 02/26/2025. Post-procedure, she experienced bruising extending from her right arm to beyond her elbow, which has since faded. She reports a lack of energy and a general feeling of malaise. She resides with her son, who assists with meal preparation. She reports no alcohol consumption or smoking. Her pain is well-managed, and she does not endorse any feelings of depression. Her physical activity is limited to walking and climbing stairs.    Her A1c level was recorded as 10.7 today, an increase from 7.9 in October 2024. Despite a decrease in food intake and weight loss, she has not yet consulted a nephrologist. She has been inconsistent with her Toujeo 84 units once daily regimen, missing several doses. She is under the care of an endocrinologist, Dr. Gardner, whom she is scheduled to see soon.    She reports feeling worse than the previous year. On 06/26/2024, she experienced an episode of vomiting after consuming macaroni and tomatoes, which was followed by a period of rest. Since then, she has been experiencing fatigue, decreased appetite, and weight loss, dropping from 180 pounds to 166 pounds. She reports no abdominal pain but notes irregular bowel movements  "and constipation. She reports no presence of blood in her stool. A recent Cologuard test was negative.    She has a history of non-Hodgkin's lymphoma in 1993, treated with radiation and chemotherapy, and bilateral breast cancer.    SOCIAL HISTORY  She does not smoke or drink alcohol.    MEDICATIONS  Toujeo, rosuvastatin, levothyroxine          Objective   Vital Signs:  /64   Pulse 77   Ht 162.6 cm (64\")   Wt 75.3 kg (166 lb)   SpO2 98%   BMI 28.49 kg/m²     The following labs/imaging/notes were reviewed and discussed with the patient by Chun Woods MD on 04/04/2025:   Latest Reference Range & Units 04/04/25 13:45   Hemoglobin A1C 4.5 - 5.7 % 10.7 !   !: Data is abnormal     Latest Reference Range & Units 10/04/24 10:11   Hemoglobin A1C 4.80 - 5.60 % 7.90 (H)   (H): Data is abnormally high     Latest Reference Range & Units 10/04/23 10:13   Sodium 136 - 145 mmol/L 140   Potassium 3.5 - 5.2 mmol/L 5.4 (H)   Chloride 98 - 107 mmol/L 99   CO2 22.0 - 29.0 mmol/L 29.6 (H)   BUN 8 - 23 mg/dL 17   Creatinine 0.57 - 1.00 mg/dL 1.21 (H)   BUN/Creatinine Ratio 7.0 - 25.0  14.0   EGFR Result >60.0 mL/min/1.73 46.0 (L)   Glucose 65 - 99 mg/dL 148 (H)   Calcium 8.6 - 10.5 mg/dL 10.7 (H)   Alkaline Phosphatase 39 - 117 U/L 120 (H)   Total Protein 6.0 - 8.5 g/dL 7.4   Albumin 3.5 - 5.2 g/dL 4.5   A/G Ratio g/dL 1.6   AST (SGOT) 1 - 32 U/L 30   ALT (SGPT) 1 - 33 U/L 16   (H): Data is abnormally high  (L): Data is abnormally low  Physical Exam    Physical Exam          Assessment      Diagnoses and all orders for this visit:    1. Type 2 diabetes mellitus with other circulatory complications (Primary)  -     POC Glycosylated Hemoglobin (Hb A1C)    2. Stage 3a chronic kidney disease    3. Mixed hyperlipidemia    4. Hypothyroidism, unspecified type    5. Bilateral carotid artery stenosis    Other orders  -     empagliflozin (Jardiance) 10 MG tablet tablet; Take 1 tablet by mouth Daily.  Dispense: 90 tablet; Refill: " 1             Assessment & Plan  1. Diabetes Mellitus.  Her A1c has increased from 7.9 in October to 10.7 today. She has been off Toujeo 84 units once daily and will restart it. Jardiance will be prescribed but will be started after her fasting blood sugars have improved to avoid potential side effects such as UTIs and yeast infections. She is advised to monitor her fasting blood sugar levels and call in a week or two with the results. Follow-up with endocrinology is recommended.    2. Chronic Kidney Disease.  Her GFR was 46 in October. Jardiance will be prescribed to help slow the progression of chronic kidney disease once her blood sugar levels are better controlled. Follow-up with nephrology is recommended.    3. Carotid Artery Stenosis.  She had a carotid artery stent placed on 02/26/2025. The left carotid artery is too damaged from previous chemotherapy and radiation to be treated. Continued follow-up with vascular surgery is recommended.    4. Hyperlipidemia.  Her cholesterol levels are well-managed. She will continue her current regimen of rosuvastatin 5 mg.    5. Hypothyroidism.  She will continue her current regimen of levothyroxine 137 mcg.    Follow-up  The patient will follow up in 6 months.    PROCEDURE  The patient underwent a carotid artery stent placement on 02/26/2025.    Orders Placed This Encounter   Procedures    POC Glycosylated Hemoglobin (Hb A1C)     Release to patient:   Routine Release [[id number]]                 Follow Up   Return in about 6 months (around 10/4/2025) for DMII w/ jardiance start&sees endo, hypothyroid, CKD,.  Patient was given instructions and counseling regarding her condition or for health maintenance advice. Please see specific information pulled into the AVS if appropriate.    Patient or patient representative verbalized consent for the use of Ambient Listening during the visit with  Chun Woods MD for chart documentation. 4/6/2025  14:36 CDT

## 2025-04-25 RX ORDER — ROSUVASTATIN CALCIUM 5 MG/1
5 TABLET, COATED ORAL DAILY
Qty: 90 TABLET | Refills: 1 | Status: SHIPPED | OUTPATIENT
Start: 2025-04-25

## 2025-04-28 NOTE — PROGRESS NOTES
I have reviewed the notes, assessments, and/or procedures performed by Karla Morales PTA, I concur with her/his documentation of Dianne Pritchard.     PA SENT THROUGH [facility] FOR XIFAXAN

## 2025-04-29 RX ORDER — LETROZOLE 2.5 MG/1
2.5 TABLET, FILM COATED ORAL DAILY
Qty: 90 TABLET | Refills: 3 | Status: SHIPPED | OUTPATIENT
Start: 2025-04-29

## 2025-05-05 ENCOUNTER — TELEPHONE (OUTPATIENT)
Dept: FAMILY MEDICINE CLINIC | Facility: CLINIC | Age: 80
End: 2025-05-05

## 2025-05-05 NOTE — TELEPHONE ENCOUNTER
Caller: Dianne Pritchard    Relationship: Self    Best call back number: [phone]     What medication are you requesting: SOMETHING TO HELP A YEAST INFECTION    What are your current symptoms: YEAST INFECTION     How long have you been experiencing symptoms: ABOUT A WEEK     Have you had these symptoms before:    [] Yes  [] No    Have you been treated for these symptoms before:   [] Yes  [] No    If a prescription is needed, what is your preferred pharmacy and phone number: [id number] [address]  AT [facility] [address] [facility] [phone] [facility] [phone]      Additional notes: OVER THE COUNTER MEDS AREN'T HELPING

## 2025-05-07 ENCOUNTER — OFFICE VISIT (OUTPATIENT)
Dept: FAMILY MEDICINE CLINIC | Facility: CLINIC | Age: 80
End: 2025-05-07
Payer: MEDICARE

## 2025-05-07 VITALS
BODY MASS INDEX: 28.68 KG/M2 | RESPIRATION RATE: 18 BRPM | HEIGHT: 64 IN | OXYGEN SATURATION: 97 % | SYSTOLIC BLOOD PRESSURE: 150 MMHG | DIASTOLIC BLOOD PRESSURE: 60 MMHG | TEMPERATURE: 96.5 F | WEIGHT: 168 LBS | HEART RATE: 80 BPM

## 2025-05-07 DIAGNOSIS — I10 ESSENTIAL HYPERTENSION: ICD-10-CM

## 2025-05-07 DIAGNOSIS — R30.0 DYSURIA: Primary | ICD-10-CM

## 2025-05-07 PROCEDURE — 1126F AMNT PAIN NOTED NONE PRSNT: CPT | Performed by: NURSE PRACTITIONER

## 2025-05-07 PROCEDURE — 3077F SYST BP >= 140 MM HG: CPT | Performed by: NURSE PRACTITIONER

## 2025-05-07 PROCEDURE — 3078F DIAST BP <80 MM HG: CPT | Performed by: NURSE PRACTITIONER

## 2025-05-07 PROCEDURE — 99213 OFFICE O/P EST LOW 20 MIN: CPT | Performed by: NURSE PRACTITIONER

## 2025-05-07 RX ORDER — FLUCONAZOLE 150 MG/1
150 TABLET ORAL
Qty: 3 TABLET | Refills: 0 | Status: SHIPPED | OUTPATIENT
Start: 2025-05-07

## 2025-05-08 LAB
APPEARANCE UR: CLEAR
BACTERIA #/AREA URNS HPF: NORMAL /[HPF]
BILIRUB UR QL STRIP: NEGATIVE
CASTS URNS QL MICRO: NORMAL /LPF
COLOR UR: YELLOW
EPI CELLS #/AREA URNS HPF: NORMAL /HPF (ref 0–10)
GLUCOSE UR QL STRIP: ABNORMAL
HGB UR QL STRIP: NEGATIVE
KETONES UR QL STRIP: NEGATIVE
LEUKOCYTE ESTERASE UR QL STRIP: NEGATIVE
MICRO URNS: ABNORMAL
NITRITE UR QL STRIP: NEGATIVE
PH UR STRIP: 5.5 [PH] (ref 5–7.5)
PROT UR QL STRIP: ABNORMAL
RBC #/AREA URNS HPF: NORMAL /HPF (ref 0–2)
SP GR UR STRIP: 1.03 (ref 1–1.03)
URINALYSIS REFLEX: ABNORMAL
UROBILINOGEN UR STRIP-MCNC: 0.2 MG/DL (ref 0.2–1)
WBC #/AREA URNS HPF: NORMAL /HPF (ref 0–5)

## 2025-05-13 NOTE — PROGRESS NOTES
"Subjective   Chief Complaint:  Vaginal itching and burning    History of Present Illness  The patient is a 79-year-old female presenting today with vaginal itching and occasional burning.    She reports experiencing pruritus in the vaginal area, accompanied by intermittent dysuria.    Her blood pressure is elevated. She is not concerned but is willing to do some home readings. She reports her blood pressure readings are better at home. She is not experiencing chest pain, palpitations, or headaches.    Past Medical, Surgical, Social, and Family History:  Allergies   Allergen Reactions    Amoxil [Amoxicillin] Hives    Penicillins Hives    Biaxin [Clarithromycin] Other (See Comments)     NOT SURE OF REACTION, \"SORE MOUTH OF DIARRHEA\"    Lortab [Hydrocodone-Acetaminophen] GI Intolerance      Past Medical History:   Diagnosis Date    Acute pain of right knee 05/19/2017    Arrhythmia     Atrial fibrillation, currently in sinus rhythm     Breast cancer     right    Cancer     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)    Carotid artery occlusion     Diabetes mellitus, type II     Dizziness     Drug therapy     Essential hypertension     GERD (gastroesophageal reflux disease)     GI bleed requiring more than 4 units of blood in 24 hours, ICU, or surgery     History of chemotherapy     History of lymphoma     History of transfusion     Hx of radiation therapy     Hypomagnesemia     Hyponatremia 01/14/2025    Hypothyroidism     On amiodarone therapy     PONV (postoperative nausea and vomiting)     UTI (urinary tract infection)       Past Surgical History:   Procedure Laterality Date    ATRIAL APPENDAGE EXCLUSION LEFT WITH TRANSESOPHAGEAL ECHOCARDIOGRAM Left 11/27/2018    Procedure: Atrial Appendage Occlusion;  Surgeon: Mick Orantes MD;  Location: [facility] CATH INVASIVE LOCATION;  Service: Cardiology    ATRIAL APPENDAGE EXCLUSION LEFT WITH TRANSESOPHAGEAL ECHOCARDIOGRAM Left 01/22/2019    Procedure: Atrial Appendage Occlusion;  Surgeon: Ford, " "Mick RHOADES MD;  Location: [facility] CATH INVASIVE LOCATION;  Service: Cardiology    BREAST BIOPSY      CATARACT EXTRACTION W/ INTRAOCULAR LENS  IMPLANT, BILATERAL      MASTECTOMY      MASTECTOMY  03/10/2020    OTHER SURGICAL HISTORY      Mediport insertion X 2    OTHER SURGICAL HISTORY      remote lymphoma treatment    REPLACEMENT TOTAL KNEE Right 2017    TUBAL ABDOMINAL LIGATION        Social History     Socioeconomic History    Marital status:    Tobacco Use    Smoking status: Former     Current packs/day: 0.00     Types: Cigarettes     Start date:      Quit date:      Years since quittin.3    Smokeless tobacco: Never   Vaping Use    Vaping status: Never Used   Substance and Sexual Activity    Alcohol use: No    Drug use: No    Sexual activity: Defer      Family History   Problem Relation Age of Onset    Heart disease Mother     Cancer Father     Cancer Sister     No Known Problems Brother     No Known Problems Brother     Heart disease Sister     Heart attack Sister     No Known Problems Sister     No Known Problems Daughter     No Known Problems Son     No Known Problems Maternal Grandmother     No Known Problems Paternal Grandmother     No Known Problems Maternal Aunt     Breast cancer Paternal Aunt     BRCA 1/2 Neg Hx     Colon cancer Neg Hx     Endometrial cancer Neg Hx     Ovarian cancer Neg Hx        Objective   Vital Signs  /60   Pulse 80   Temp 96.5 °F (35.8 °C) (Infrared)   Resp 18   Ht 162.6 cm (64\")   Wt 76.2 kg (168 lb)   SpO2 97%   BMI 28.84 kg/m²    Physical Exam  Vitals reviewed.   Constitutional:       General: She is not in acute distress.     Appearance: Normal appearance.   Cardiovascular:      Rate and Rhythm: Normal rate and regular rhythm.   Pulmonary:      Effort: Pulmonary effort is normal.      Breath sounds: Normal breath sounds.       Assessment & Plan   Assessment & Plan  1. Essential hypertension.  She will continue her current regimen of Toprol-XL and " losartan. She has been advised to monitor her blood pressure at home and report the readings.    2. Dysuria.  A prescription for Diflucan 150 mg to be taken every 3 days for a total of 3 doses has been provided. A urinalysis will be conducted.    Follow-up:  The patient will Return for follow-up as needed pending results - we will call.    Records and Results Reviewed:  I reviewed current medications as given by patient and allergy list.    : [employer]- and Dragon Speech Recognition - No recording technology was used in the exam room during encounter.    Electronically signed by ДМИТРИЙ Quiles, 05/13/25, 8:04 AM CDT.

## 2025-05-20 ENCOUNTER — OFFICE VISIT (OUTPATIENT)
Dept: CARDIOLOGY | Facility: CLINIC | Age: 80
End: 2025-05-20
Payer: MEDICARE

## 2025-05-20 VITALS
SYSTOLIC BLOOD PRESSURE: 128 MMHG | OXYGEN SATURATION: 96 % | HEIGHT: 64 IN | WEIGHT: 163 LBS | HEART RATE: 98 BPM | DIASTOLIC BLOOD PRESSURE: 60 MMHG | BODY MASS INDEX: 27.83 KG/M2

## 2025-05-20 DIAGNOSIS — E03.9 HYPOTHYROIDISM, UNSPECIFIED TYPE: ICD-10-CM

## 2025-05-20 DIAGNOSIS — I10 BENIGN ESSENTIAL HYPERTENSION: ICD-10-CM

## 2025-05-20 DIAGNOSIS — I48.21 PERMANENT ATRIAL FIBRILLATION: Primary | ICD-10-CM

## 2025-05-20 DIAGNOSIS — I34.2 NONRHEUMATIC MITRAL VALVE STENOSIS: ICD-10-CM

## 2025-05-20 NOTE — PROGRESS NOTES
"    Subjective:     Encounter Date:05/20/2025      Patient ID: Dianne Pritchard is a 79 y.o. female     Chief Complaint: \"no complaints\"  Atrial Fibrillation  Presents for follow-up visit. Symptoms are negative for chest pain, dizziness, palpitations, shortness of breath, syncope and weakness. The symptoms have been stable. Past medical history includes atrial fibrillation.   Shortness of Breath  This is a chronic problem. The current episode started more than 1 year ago. The problem has been unchanged. Pertinent negatives include no chest pain, leg swelling, orthopnea, PND, rash, sputum production, syncope or wheezing.   Hypertension  This is a chronic problem. The current episode started more than 1 year ago. The problem has been rapidly improving since onset. The problem is controlled. Pertinent negatives include no chest pain, malaise/fatigue, orthopnea, palpitations, PND or shortness of breath.       Patient presents today for a follow up. Patient is followed for permanent afib s/p 21mm Watchman implant in 1/2019 as well as mitral stenosis which was noted to be moderate per most recent echo in 5/2023. She was scheduled to have an echo in 7/2024 but she did not come for her echo.     She presents today alone and reports she has been well. She continues to report chronic dyspnea with exertion that is no worse than previous. She denies issues with bleeding with her ASA use. She denies edema. She monitors her BP at home with readings 120-130 at home. She denies chest pain, palpitations, orthopnea and PND.        The following portions of the patient's history were reviewed and updated as appropriate: allergies, current medications, past family history, past medical history, past social history, past surgical history and problem list.    Allergies   Allergen Reactions    Amoxil [Amoxicillin] Hives    Penicillins Hives    Biaxin [Clarithromycin] Other (See Comments)     NOT SURE OF REACTION, \"SORE MOUTH OF DIARRHEA\"    " Lortab [Hydrocodone-Acetaminophen] GI Intolerance       Current Outpatient Medications:     aspirin 81 MG chewable tablet, Chew 1 tablet Daily., Disp: , Rfl:     clopidogrel (Plavix) 75 MG tablet, Take 1 tablet by mouth Daily., Disp: 30 tablet, Rfl: 3    dilTIAZem CD (CARDIZEM CD) 180 MG 24 hr capsule, Take 2 capsules by mouth Daily., Disp: 180 capsule, Rfl: 3    empagliflozin (Jardiance) 10 MG tablet tablet, Take 1 tablet by mouth Daily., Disp: 90 tablet, Rfl: 1    fluconazole (DIFLUCAN) 150 MG tablet, Take 1 tablet by mouth Every 3 (Three) Days., Disp: 3 tablet, Rfl: 0    Insulin Pen Needle (PEN NEEDLES) 31G X 5 MM misc, 1 each 2 (Two) Times a Day., Disp: 100 each, Rfl: 5    letrozole (FEMARA) 2.5 MG tablet, Take 1 tablet by mouth Daily., Disp: , Rfl:     levothyroxine (SYNTHROID, LEVOTHROID) 137 MCG tablet, TAKE 1 TABLET BY MOUTH DAILY, Disp: 90 tablet, Rfl: 0    losartan (COZAAR) 25 MG tablet, Take 1 tablet by mouth Daily., Disp: 90 tablet, Rfl: 3    metoprolol succinate XL (TOPROL-XL) 25 MG 24 hr tablet, Take 1 tablet by mouth Daily., Disp: 90 tablet, Rfl: 3    Misc. 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)    Carotid artery occlusion     Diabetes mellitus, type II     Dizziness     Drug therapy     Essential hypertension     GERD (gastroesophageal reflux disease)     GI bleed requiring more than 4 units of blood in 24 hours, ICU, or surgery     History of chemotherapy     History of lymphoma     History of transfusion     Hx of radiation therapy      Hypomagnesemia     Hyponatremia 2025    Hypothyroidism     On amiodarone therapy     PONV (postoperative nausea and vomiting)     UTI (urinary tract infection)        Social History     Socioeconomic History    Marital status:    Tobacco Use    Smoking status: Former     Current packs/day: 0.00     Types: Cigarettes     Start date:      Quit date:      Years since quittin.4     Passive exposure: Past    Smokeless tobacco: Never   Vaping Use    Vaping status: Never Used   Substance and Sexual Activity    Alcohol use: No    Drug use: No    Sexual activity: Defer       Review of Systems   Constitutional: Negative for malaise/fatigue, weight gain and weight loss.   Cardiovascular:  Positive for dyspnea on exertion (chronic, unchanged). Negative for chest pain, irregular heartbeat, leg swelling, near-syncope, orthopnea, palpitations, paroxysmal nocturnal dyspnea and syncope.   Respiratory:  Negative for cough, shortness of breath, sleep disturbances due to breathing, sputum production and wheezing.    Skin:  Negative for dry skin, flushing, itching and rash.   Gastrointestinal:  Negative for hematemesis and hematochezia.   Neurological:  Negative for dizziness, light-headedness, loss of balance and weakness.   All other systems reviewed and are negative.         Objective:     Vitals reviewed.   Constitutional:       General: Not in acute distress.     Appearance: Well-developed. Not diaphoretic.   Eyes:      General: No scleral icterus.     Conjunctiva/sclera: Conjunctivae normal.      Pupils: Pupils are equal, round, and reactive to light.   HENT:      Head: Normocephalic.    Mouth/Throat:      Pharynx: No oropharyngeal exudate.   Pulmonary:      Effort: Pulmonary effort is normal. No respiratory distress.      Breath sounds: Normal breath sounds. No wheezing. No rales.   Chest:      Chest wall: Not tender to palpatation.   Cardiovascular:      Normal rate. Irregularly irregular rhythm.       "Murmurs: There is a grade 1/4 low frequency crescendo, presystolic diastolic murmur at the apex.   Pulses:     Intact distal pulses.   Edema:     Peripheral edema absent.   Abdominal:      General: Bowel sounds are normal. There is no distension.      Palpations: Abdomen is soft.      Tenderness: There is no abdominal tenderness.   Musculoskeletal: Normal range of motion.      Cervical back: Normal range of motion and neck supple. Skin:     General: Skin is warm and dry.      Coloration: Skin is not pale.      Findings: No erythema or rash.   Neurological:      Mental Status: Alert and oriented to person, place, and time.      Deep Tendon Reflexes: Reflexes are normal and symmetric.   Psychiatric:         Behavior: Behavior normal.           Procedures  /60 (BP Location: Right arm, Patient Position: Sitting, Cuff Size: Adult)   Pulse 98   Ht 162.6 cm (64\")   Wt 73.9 kg (163 lb)   SpO2 96%   BMI 27.98 kg/m²     Lab Review:   I have reviewed previous office notes, recent labs and recent cardiac testing.       Results for orders placed during the hospital encounter of 07/17/23    Adult Transthoracic Echo Complete w/ Color, Spectral and Contrast if necessary per protocol    Interpretation Summary    Left ventricular systolic function is normal. Estimated left ventricular EF = 65%    Left ventricular wall thickness is consistent with mild to moderate concentric hypertrophy.    Left ventricular diastolic dysfunction is noted.    The left atrial cavity is moderately dilated.    The right atrial cavity is mildly  dilated.    There is mild calcification of the aortic valve.    Estimated right ventricular systolic pressure from tricuspid regurgitation is normal (<35 mmHg).    There is a trivial pericardial effusion.          Assessment:          Diagnosis Plan   1. Permanent atrial fibrillation        2. Nonrheumatic mitral valve stenosis  Adult Transthoracic Echo Complete w/ Color, Spectral and Contrast if " necessary per protocol      3. Benign essential hypertension        4. Hypothyroidism, unspecified type               Plan:       1. Permanent afib- rates controlled per exam today. Continue Toprol 25mg daily, Cardizem 180mg daily and ASA 81mg daily (s/p watchman).    2. Mitral valve stenosis- moderate per echo in 5/2023. Will repeat echo.   3. HTN- controlled. Followed by PCP   4. Hypothyroidism- controlled. followed by PCP        Follow up in 1 year or sooner if symptoms worsen. Will call with echo results.     I spent 30 minutes caring for Dianne on this date of service. This time includes time spent by me in the following activities:preparing for the visit, reviewing tests, obtaining and/or reviewing a separately obtained history, performing a medically appropriate examination and/or evaluation , documenting information in the medical record, independently interpreting results and communicating that information with the patient/family/caregiver and care coordination

## 2025-07-01 ENCOUNTER — TELEPHONE (OUTPATIENT)
Dept: FAMILY MEDICINE CLINIC | Facility: CLINIC | Age: 80
End: 2025-07-01

## 2025-07-01 NOTE — TELEPHONE ENCOUNTER
Caller: Dianne Pritchard    Relationship: Self    Best call back number: [phone]     What medication are you requesting: ANTIBIOTIC    What are your current symptoms: COUGH, CONGESTION, NO APPETITE    How long have you been experiencing symptoms: COUPLE OF WEEKS    Have you had these symptoms before:    [] Yes  [x] No    Have you been treated for these symptoms before:   [] Yes  [x] No    If a prescription is needed, what is your preferred pharmacy and phone number: [id number] [address] AT [facility] [address] [facility] [phone] [facility] [phone]      Additional notes: PLEASE CALL IF THIS CAN OR CANNOT BE SENT

## 2025-07-02 ENCOUNTER — HOSPITAL ENCOUNTER (OUTPATIENT)
Dept: GENERAL RADIOLOGY | Facility: HOSPITAL | Age: 80
Discharge: HOME | End: 2025-07-02
Admitting: NURSE PRACTITIONER
Payer: MEDICARE

## 2025-07-02 ENCOUNTER — OFFICE VISIT (OUTPATIENT)
Dept: FAMILY MEDICINE CLINIC | Facility: CLINIC | Age: 80
End: 2025-07-02
Payer: MEDICARE

## 2025-07-02 VITALS
TEMPERATURE: 98.3 F | HEART RATE: 53 BPM | OXYGEN SATURATION: 95 % | HEIGHT: 64 IN | BODY MASS INDEX: 27.31 KG/M2 | DIASTOLIC BLOOD PRESSURE: 58 MMHG | SYSTOLIC BLOOD PRESSURE: 136 MMHG | WEIGHT: 160 LBS

## 2025-07-02 DIAGNOSIS — R09.81 NASAL CONGESTION: ICD-10-CM

## 2025-07-02 DIAGNOSIS — R09.89 CHEST CONGESTION: ICD-10-CM

## 2025-07-02 DIAGNOSIS — R06.2 WHEEZING: ICD-10-CM

## 2025-07-02 DIAGNOSIS — R05.1 ACUTE COUGH: ICD-10-CM

## 2025-07-02 DIAGNOSIS — R05.1 ACUTE COUGH: Primary | ICD-10-CM

## 2025-07-02 LAB
EXPIRATION DATE: NORMAL
FLUAV AG UPPER RESP QL IA.RAPID: NOT DETECTED
FLUBV AG UPPER RESP QL IA.RAPID: NOT DETECTED
INTERNAL CONTROL: NORMAL
Lab: NORMAL
SARS-COV-2 AG UPPER RESP QL IA.RAPID: NOT DETECTED

## 2025-07-02 PROCEDURE — 3075F SYST BP GE 130 - 139MM HG: CPT | Performed by: NURSE PRACTITIONER

## 2025-07-02 PROCEDURE — 3078F DIAST BP <80 MM HG: CPT | Performed by: NURSE PRACTITIONER

## 2025-07-02 PROCEDURE — 87428 SARSCOV & INF VIR A&B AG IA: CPT | Performed by: NURSE PRACTITIONER

## 2025-07-02 PROCEDURE — 1126F AMNT PAIN NOTED NONE PRSNT: CPT | Performed by: NURSE PRACTITIONER

## 2025-07-02 PROCEDURE — 71046 X-RAY EXAM CHEST 2 VIEWS: CPT

## 2025-07-02 PROCEDURE — 99213 OFFICE O/P EST LOW 20 MIN: CPT | Performed by: NURSE PRACTITIONER

## 2025-07-02 RX ORDER — ALBUTEROL SULFATE 90 UG/1
2 INHALANT RESPIRATORY (INHALATION) EVERY 4 HOURS PRN
Qty: 18 G | Refills: 0 | Status: SHIPPED | OUTPATIENT
Start: 2025-07-02

## 2025-07-02 NOTE — PROGRESS NOTES
"Chief Complaint  Cough and Nasal Congestion    Subjective      Dianne Pritchard presents to [facility]  HPI: Patient is a 80 y.o. female who is here today with complaint of chest congestion, cough, nasal congestion for the last 2 weeks. She has been taking some OTC cough syrup and Mucinex, symptoms are not improving. She does have intermittent wheezing. Denies history of asthma or COPD, has used inhaler in the past prescribed by Dr. Bang.     Objective   Vital Signs:  /58   Pulse 53   Temp 98.3 °F (36.8 °C)   Ht 162.6 cm (64\")   Wt 72.6 kg (160 lb)   SpO2 95%   BMI 27.46 kg/m²   Estimated body mass index is 27.46 kg/m² as calculated from the following:    Height as of this encounter: 162.6 cm (64\").    Weight as of this encounter: 72.6 kg (160 lb).          Physical Exam  Constitutional:       Appearance: Normal appearance.   HENT:      Right Ear: Tympanic membrane is erythematous.      Left Ear: Tympanic membrane is erythematous.      Mouth/Throat:      Pharynx: Posterior oropharyngeal erythema and postnasal drip present.   Cardiovascular:      Rate and Rhythm: Normal rate and regular rhythm.   Pulmonary:      Effort: Pulmonary effort is normal.      Breath sounds: Decreased air movement present. Examination of the right-upper field reveals rhonchi. Examination of the left-upper field reveals rhonchi. Examination of the right-lower field reveals rhonchi. Examination of the left-lower field reveals rhonchi. Wheezing and rhonchi present.   Neurological:      Mental Status: She is alert.   Psychiatric:         Mood and Affect: Mood normal.        Result Review :  The following labs/imaging/notes were reviewed and discussed with the patient by ДМИТРИЙ Isaacs on 07/02/2025:             Assessment and Plan   Diagnoses and all orders for this visit:    1. Acute cough (Primary)  -     POCT SARS-CoV-2 Antigen RAÚL + Flu  -     XR Chest 2 View; Future    2. Nasal congestion  - "     POCT SARS-CoV-2 Antigen RAÚL + Flu    3. Chest congestion  -     XR Chest 2 View; Future    4. Wheezing  -     albuterol sulfate  (90 Base) MCG/ACT inhaler; Inhale 2 puffs Every 4 (Four) Hours As Needed for Wheezing.  Dispense: 18 g; Refill: 0      Recommended to continue Mucinex (1,200 mg ER every 12 hours), increase water intake. I have sent inhaler to use as needed for wheezing/shortness of breath/ Patient would like to hold off on oral steroids as she does not like to take them, they make her feel bad. Will notify with chest x-ray results and possibly send antibiotic based on results. If severe symptoms, go to ER.          Follow Up  No follow-ups on file.  Patient was given instructions and counseling regarding her condition or for health maintenance advice. Please see specific information pulled into the AVS if appropriate.

## (undated) DEVICE — SOLUTION IV IRRIG 0.9% NACL 3000ML BAG 2B7477

## (undated) DEVICE — ANTIBACTERIAL UNDYED BRAIDED (POLYGLACTIN 910), SYNTHETIC ABSORBABLE SUTURE: Brand: COATED VICRYL

## (undated) DEVICE — 3M™ IOBAN™ 2 ANTIMICROBIAL INCISE DRAPE 6650EZ: Brand: IOBAN™ 2

## (undated) DEVICE — PINNACLE INTRODUCER SHEATH: Brand: PINNACLE

## (undated) DEVICE — JACKSON-PRATT 100CC BULB RESERVOIR: Brand: CARDINAL HEALTH

## (undated) DEVICE — TOWEL,OR,DSP,ST,BLUE,STD,4/PK,20PK/CS: Brand: MEDLINE

## (undated) DEVICE — SUTURE PERMAHAND SZ 2-0 L18IN NONABSORBABLE BLK L26MM FS 685G

## (undated) DEVICE — SYR SLP TP 10ML DISP

## (undated) DEVICE — SOL IRR NACL 0.9PCT BT 1000ML

## (undated) DEVICE — GAUZE,SPONGE,FLUFF,6"X6.75",STRL,10/TRAY: Brand: MEDLINE

## (undated) DEVICE — PK CATH CARD 30

## (undated) DEVICE — GW ENROUTE HC .014IN 95CM

## (undated) DEVICE — INTENDED FOR TISSUE SEPARATION, AND OTHER PROCEDURES THAT REQUIRE A SHARP SURGICAL BLADE TO PUNCTURE OR CUT.: Brand: BARD-PARKER ®  SAFETY SCALPED

## (undated) DEVICE — TRAP FLD MINIVAC MEGADYNE 100ML

## (undated) DEVICE — CATH F6 ST+ MP A1 100CM: Brand: SUPER TORQUE

## (undated) DEVICE — Device: Brand: MEDEX

## (undated) DEVICE — Device

## (undated) DEVICE — GOWN,PREVENTION PLUS,XL,ST,24/CS: Brand: MEDLINE

## (undated) DEVICE — SUTURE PDS II SZ 2-0 L18IN ABSRB VLT SH L26MM 1/2 CIR TAPR Z775D

## (undated) DEVICE — SURGICAL PROCEDURE PACK KNEE TOT DBD CDS LOURDES HOSP LF

## (undated) DEVICE — STRIP,CLOSURE,WOUND,MEDI-STRIP,1/2X4: Brand: MEDLINE

## (undated) DEVICE — TIBURON BRACHIAL ANGIOGRAPHY DRAPE: Brand: CONVERTORS

## (undated) DEVICE — Device: Brand: POWER-FLO®

## (undated) DEVICE — ST MIC/INTRO ACC SHRP/NDL TUNG/TP NITNL 5F 45CM 7CM

## (undated) DEVICE — GAUZE,SPONGE,4"X4",12PLY,STERILE,LF,2'S: Brand: MEDLINE

## (undated) DEVICE — SUTURE MNCRYL STRATAFIX PS 4-0 30CM

## (undated) DEVICE — DRP SPECIAL PROC 4PC W/FC POUCH

## (undated) DEVICE — GLOVE SURG SZ 75 L12IN FNGR THK87MIL DK GRN LTX FREE ISOLEX

## (undated) DEVICE — SUTURE ETHLN SZ 2-0 L30IN NONABSORBABLE BLK L36MM FSLX 3/8 1674H

## (undated) DEVICE — GLOVE SURG SZ 85 L12IN FNGR THK87MIL DK GRN LTX FREE ISOLEX

## (undated) DEVICE — ULTRACLEAN ACCESSORY ELECTRODE 4" (10.16 CM) COATED BLADE WITH EXTENDED INSULATION: Brand: ULTRACLEAN

## (undated) DEVICE — SUTURE PDS + SZ 2 0 L27IN ABSRB VLT L36MM CT 1 1 2 CIR PDP339H

## (undated) DEVICE — INFLATION DEVICE: Brand: ENCORE™ 26

## (undated) DEVICE — AMBU AURA-I U SIZE 4, DISPOSABLE LARYNGEAL MASK: Brand: AURA-I

## (undated) DEVICE — SYS TRNSEP ACC BRK ACROSS A/ 71CM

## (undated) DEVICE — ADHESIVE SKIN CLSR 0.7ML TOP DERMBND ADV

## (undated) DEVICE — PACK,UNIVERSAL,NO GOWNS: Brand: MEDLINE

## (undated) DEVICE — GLOVE SURG SZ 85 L12IN FNGR THK13MIL BRN LTX SYN POLYMER W

## (undated) DEVICE — THREE QUARTER SHEET: Brand: CONVERTORS

## (undated) DEVICE — MAJOR CDS

## (undated) DEVICE — GLOVE SURG SZ 75 CRM LTX FREE POLYISOPRENE POLYMER BEAD ANTI

## (undated) DEVICE — DRAIN JACKSON PRATT ROUND 15FR: Brand: CARDINAL HEALTH

## (undated) DEVICE — 6MMX35MM: Brand: ENROUTE ENFLATE TRANSCAROTID RX BALLOON DILATATION CATHETER

## (undated) DEVICE — ACCESS SHEATH WITH DILATOR: Brand: WATCHMAN® ACCESS SYSTEM

## (undated) DEVICE — INTRO SHEATH FASTCATH TRNSEP SL1 .032IN 8F 63CM

## (undated) DEVICE — SOLIDIFIER LIQUI LOC PLUS 2000CC

## (undated) DEVICE — SOLUTION IV IRRIG WATER 1000ML POUR BRL 2F7114

## (undated) DEVICE — ANGLED-TIP ARTERIAL SHEATH CONFIGURATION: Brand: ENROUTE TRANSCAROTID NEUROPROTECTION SYSTEM

## (undated) DEVICE — DISCONTINUED NO SUB IDED TG GLOVE SURG SENSICARE ALOE LT LF PF ST GRN SZ 8

## (undated) DEVICE — GLOVE SURG SZ 75 L12IN FNGR THK94MIL TRNSLUC YEL LTX

## (undated) DEVICE — SUT MNCRYL 4/0 PS2 27IN UD MCP426H

## (undated) DEVICE — GLOVE SURG SZ 85 L12IN FNGR ORTHO 126MIL CRM LTX FREE

## (undated) DEVICE — SYR LUERLOK 20CC BX/50

## (undated) DEVICE — ZIMMER® STERILE DISPOSABLE TOURNIQUET CUFF WITH PLC, DUAL PORT, SINGLE BLADDER, 34 IN. (86 CM)

## (undated) DEVICE — COVER US PRB W5XL96IN LTX W/ GEL

## (undated) DEVICE — CLIP INT M L GRN TI TRNSVRS GRV CHEVRON SHP W/ PRECIS TIP

## (undated) DEVICE — PK CATH CARD 30 CA/4

## (undated) DEVICE — SYR LUERLOK 30CC

## (undated) DEVICE — OSCILLATOR BLADE, 25.4 X 90 X 1.27 MM (.050"): Brand: CONMED

## (undated) DEVICE — APPL CHLORAPREP HI/LITE 26ML ORNG

## (undated) DEVICE — PAD MAJOR VASCULAR: Brand: MEDLINE INDUSTRIES, INC.

## (undated) DEVICE — SUT PROLN 5/0 C1 DA 24IN 8725H

## (undated) DEVICE — GW AMPLTZ SUPERSTIFF 3MMJTIP .035IN 260CM

## (undated) DEVICE — SOLUTION IV IRRIG POUR BRL 0.9% SODIUM CHL 2F7124

## (undated) DEVICE — SEALER TISS L20CM DIA13MM ADV BPLR L CRV JAW OPN APPRCH

## (undated) DEVICE — SHEET,DRAPE,53X77,STERILE: Brand: MEDLINE

## (undated) DEVICE — CHLORAPREP 26ML ORANGE

## (undated) DEVICE — ELECTRD PAD DEFIB A/

## (undated) DEVICE — BLANKET WRM W40.2XL55.9IN IORT LO BODY + MISTRAL AIR

## (undated) DEVICE — SUT SILK 2/0 SH CR8 18IN CR8 C012D

## (undated) DEVICE — SNAP KOVER: Brand: UNBRANDED

## (undated) DEVICE — SYR LL TP 10ML STRL

## (undated) DEVICE — GLV SURG SENSICARE W/ALOE PF LF 7.5 STRL

## (undated) DEVICE — KT NDL GUIDE STRL 18GA

## (undated) DEVICE — BAND FLX MSTR STD STR 6IN

## (undated) DEVICE — GW STARTER JB PFTE/HEP/COAT STR/TP .035IN 15X260CM

## (undated) DEVICE — ABDOMINAL PAD: Brand: DERMACEA

## (undated) DEVICE — KT INTRO MIC TROC 4F 21GA 7CM KT/EA/1

## (undated) DEVICE — LARGE BONE HALL BLADE, RECIPROCATOR, 12.5 X 76 X 1.27 MM: Brand: HALL

## (undated) DEVICE — RADIFOCUS GLIDEWIRE ADVANTAGE GUIDEWIRE: Brand: GLIDEWIRE ADVANTAGE

## (undated) DEVICE — SUTURE ABSORBABLE MONOFILAMENT 1-0 OS8 14 IN STRATAFIX SPRL SXPD2B202

## (undated) DEVICE — TOWEL,OR,DSP,ST,BLUE,DLX,4/PK,20PK/CS: Brand: MEDLINE

## (undated) DEVICE — CATH F6INF PIG 145 110CM 6SH: Brand: INFINITI

## (undated) DEVICE — STERILE (14X122CM) TELESCOPICALLY-FOLDED COVER: Brand: CIV-CLEAR™ TRANSDUCER COVER

## (undated) DEVICE — DRSNG SURESITE WNDW 4X4.5

## (undated) DEVICE — GOWN,PREVENTION PLUS,2XL,ST,22/CS: Brand: MEDLINE

## (undated) DEVICE — AEGIS 1" DISK 4MM HOLE, PEEL OPEN: Brand: MEDLINE

## (undated) DEVICE — SUTURE VCRL SZ 2-0 L36IN ABSRB UD L36MM CT-1 1/2 CIR J945H

## (undated) DEVICE — TRAY EPI 25GA L3.5IN 0.75% BIPIVCAIN 8.25% D CONTAIN BPA

## (undated) DEVICE — TELFA NON-ADHERENT ABSORBENT DRESSING: Brand: TELFA

## (undated) DEVICE — GAUZE,SPONGE,4"X4",16PLY,XRAY,STRL,LF: Brand: MEDLINE